# Patient Record
Sex: MALE | Race: WHITE | Employment: OTHER
[De-identification: names, ages, dates, MRNs, and addresses within clinical notes are randomized per-mention and may not be internally consistent; named-entity substitution may affect disease eponyms.]

---

## 2017-01-04 ENCOUNTER — OFFICE VISIT (OUTPATIENT)
Dept: FAMILY MEDICINE CLINIC | Facility: CLINIC | Age: 62
End: 2017-01-04

## 2017-01-04 VITALS
WEIGHT: 208.6 LBS | SYSTOLIC BLOOD PRESSURE: 110 MMHG | DIASTOLIC BLOOD PRESSURE: 88 MMHG | HEIGHT: 65 IN | HEART RATE: 74 BPM | BODY MASS INDEX: 34.75 KG/M2 | TEMPERATURE: 98.7 F | RESPIRATION RATE: 20 BRPM

## 2017-01-04 DIAGNOSIS — J20.9 ACUTE BRONCHITIS, UNSPECIFIED ORGANISM: ICD-10-CM

## 2017-01-04 DIAGNOSIS — J01.10 ACUTE FRONTAL SINUSITIS, RECURRENCE NOT SPECIFIED: Primary | ICD-10-CM

## 2017-01-04 PROCEDURE — 99214 OFFICE O/P EST MOD 30 MIN: CPT | Performed by: NURSE PRACTITIONER

## 2017-01-04 RX ORDER — ALBUTEROL SULFATE 90 UG/1
2 AEROSOL, METERED RESPIRATORY (INHALATION) EVERY 6 HOURS PRN
Qty: 1 INHALER | Refills: 0 | Status: SHIPPED | OUTPATIENT
Start: 2017-01-04 | End: 2020-01-13 | Stop reason: ALTCHOICE

## 2017-01-04 RX ORDER — GUAIFENESIN 600 MG/1
600 TABLET, EXTENDED RELEASE ORAL 2 TIMES DAILY
Qty: 20 TABLET | Refills: 0 | COMMUNITY
Start: 2017-01-04 | End: 2017-03-22 | Stop reason: ALTCHOICE

## 2017-01-04 RX ORDER — AZITHROMYCIN 250 MG/1
TABLET, FILM COATED ORAL
Qty: 1 PACKET | Refills: 0 | Status: SHIPPED | OUTPATIENT
Start: 2017-01-04 | End: 2017-01-14

## 2017-01-04 RX ORDER — GUAIFENESIN AND CODEINE PHOSPHATE 100; 10 MG/5ML; MG/5ML
5 SOLUTION ORAL EVERY 4 HOURS PRN
Qty: 90 ML | Refills: 0 | Status: SHIPPED | OUTPATIENT
Start: 2017-01-04 | End: 2017-01-11

## 2017-01-04 ASSESSMENT — ENCOUNTER SYMPTOMS
SINUS PRESSURE: 1
COUGH: 1
SHORTNESS OF BREATH: 1
ABDOMINAL PAIN: 0
NAUSEA: 0
VOMITING: 0
RHINORRHEA: 1

## 2017-03-22 ENCOUNTER — OFFICE VISIT (OUTPATIENT)
Dept: FAMILY MEDICINE CLINIC | Age: 62
End: 2017-03-22
Payer: COMMERCIAL

## 2017-03-22 VITALS
RESPIRATION RATE: 20 BRPM | TEMPERATURE: 98.3 F | BODY MASS INDEX: 35.45 KG/M2 | DIASTOLIC BLOOD PRESSURE: 80 MMHG | SYSTOLIC BLOOD PRESSURE: 120 MMHG | WEIGHT: 213 LBS | HEART RATE: 74 BPM

## 2017-03-22 DIAGNOSIS — H65.93 OME (OTITIS MEDIA WITH EFFUSION), BILATERAL: Primary | ICD-10-CM

## 2017-03-22 DIAGNOSIS — J01.10 ACUTE FRONTAL SINUSITIS, RECURRENCE NOT SPECIFIED: ICD-10-CM

## 2017-03-22 PROCEDURE — 99213 OFFICE O/P EST LOW 20 MIN: CPT | Performed by: NURSE PRACTITIONER

## 2017-03-22 RX ORDER — FLUTICASONE PROPIONATE 50 MCG
1 SPRAY, SUSPENSION (ML) NASAL 2 TIMES DAILY
Qty: 1 BOTTLE | Refills: 0 | Status: SHIPPED | OUTPATIENT
Start: 2017-03-22 | End: 2018-02-06 | Stop reason: SDUPTHER

## 2017-03-22 RX ORDER — GUAIFENESIN 600 MG/1
600 TABLET, EXTENDED RELEASE ORAL 2 TIMES DAILY
Qty: 20 TABLET | Refills: 0 | Status: SHIPPED | OUTPATIENT
Start: 2017-03-22 | End: 2017-07-10

## 2017-03-22 RX ORDER — AMOXICILLIN AND CLAVULANATE POTASSIUM 875; 125 MG/1; MG/1
1 TABLET, FILM COATED ORAL EVERY 12 HOURS
Qty: 20 TABLET | Refills: 0 | Status: SHIPPED | OUTPATIENT
Start: 2017-03-22 | End: 2017-04-01

## 2017-03-22 ASSESSMENT — ENCOUNTER SYMPTOMS
RHINORRHEA: 1
SINUS PRESSURE: 1
COUGH: 1
SORE THROAT: 1
SHORTNESS OF BREATH: 0

## 2017-05-04 ENCOUNTER — OFFICE VISIT (OUTPATIENT)
Dept: FAMILY MEDICINE CLINIC | Age: 62
End: 2017-05-04
Payer: COMMERCIAL

## 2017-05-04 VITALS
SYSTOLIC BLOOD PRESSURE: 146 MMHG | HEIGHT: 65 IN | BODY MASS INDEX: 35.65 KG/M2 | WEIGHT: 214 LBS | DIASTOLIC BLOOD PRESSURE: 90 MMHG

## 2017-05-04 DIAGNOSIS — E78.2 MIXED HYPERLIPIDEMIA: ICD-10-CM

## 2017-05-04 DIAGNOSIS — J30.2 SEASONAL ALLERGIC RHINITIS, UNSPECIFIED ALLERGIC RHINITIS TRIGGER: ICD-10-CM

## 2017-05-04 DIAGNOSIS — R73.01 IMPAIRED FASTING GLUCOSE: ICD-10-CM

## 2017-05-04 DIAGNOSIS — I10 ESSENTIAL HYPERTENSION: Primary | ICD-10-CM

## 2017-05-04 DIAGNOSIS — E66.9 OBESITY (BMI 30-39.9): ICD-10-CM

## 2017-05-04 PROCEDURE — 99214 OFFICE O/P EST MOD 30 MIN: CPT | Performed by: FAMILY MEDICINE

## 2017-05-04 RX ORDER — LORATADINE 10 MG/1
10 TABLET ORAL DAILY
Qty: 30 TABLET | Refills: 3 | Status: SHIPPED | OUTPATIENT
Start: 2017-05-04 | End: 2018-10-03 | Stop reason: SDUPTHER

## 2017-05-04 ASSESSMENT — ENCOUNTER SYMPTOMS
SHORTNESS OF BREATH: 0
CHEST TIGHTNESS: 0
ABDOMINAL PAIN: 0
BLOOD IN STOOL: 0
RHINORRHEA: 1
COUGH: 1

## 2017-05-04 ASSESSMENT — PATIENT HEALTH QUESTIONNAIRE - PHQ9
2. FEELING DOWN, DEPRESSED OR HOPELESS: 0
SUM OF ALL RESPONSES TO PHQ QUESTIONS 1-9: 0
SUM OF ALL RESPONSES TO PHQ9 QUESTIONS 1 & 2: 0
1. LITTLE INTEREST OR PLEASURE IN DOING THINGS: 0

## 2017-07-10 ENCOUNTER — OFFICE VISIT (OUTPATIENT)
Dept: FAMILY MEDICINE CLINIC | Age: 62
End: 2017-07-10
Payer: COMMERCIAL

## 2017-07-10 VITALS
WEIGHT: 210.2 LBS | HEIGHT: 68 IN | DIASTOLIC BLOOD PRESSURE: 70 MMHG | BODY MASS INDEX: 31.86 KG/M2 | HEART RATE: 70 BPM | SYSTOLIC BLOOD PRESSURE: 110 MMHG | RESPIRATION RATE: 16 BRPM

## 2017-07-10 DIAGNOSIS — Z00.00 ANNUAL PHYSICAL EXAM: Primary | ICD-10-CM

## 2017-07-10 DIAGNOSIS — G89.29 CHRONIC PAIN OF RIGHT KNEE: ICD-10-CM

## 2017-07-10 DIAGNOSIS — G89.29 CHRONIC BILATERAL LOW BACK PAIN WITHOUT SCIATICA: ICD-10-CM

## 2017-07-10 DIAGNOSIS — M54.50 CHRONIC BILATERAL LOW BACK PAIN WITHOUT SCIATICA: ICD-10-CM

## 2017-07-10 DIAGNOSIS — M25.561 CHRONIC PAIN OF RIGHT KNEE: ICD-10-CM

## 2017-07-10 PROCEDURE — 99396 PREV VISIT EST AGE 40-64: CPT | Performed by: FAMILY MEDICINE

## 2017-07-10 RX ORDER — TIZANIDINE HYDROCHLORIDE 4 MG/1
4 CAPSULE, GELATIN COATED ORAL 3 TIMES DAILY PRN
Qty: 30 CAPSULE | Refills: 0 | Status: SHIPPED | OUTPATIENT
Start: 2017-07-10 | End: 2018-04-10 | Stop reason: SDUPTHER

## 2017-07-10 RX ORDER — NAPROXEN 500 MG/1
500 TABLET ORAL 2 TIMES DAILY PRN
Qty: 20 TABLET | Refills: 0 | Status: SHIPPED | OUTPATIENT
Start: 2017-07-10 | End: 2018-04-10 | Stop reason: SDUPTHER

## 2017-07-10 ASSESSMENT — ENCOUNTER SYMPTOMS
CHEST TIGHTNESS: 0
SHORTNESS OF BREATH: 0
BLOOD IN STOOL: 0
ABDOMINAL PAIN: 0
BACK PAIN: 1

## 2017-07-12 ENCOUNTER — HOSPITAL ENCOUNTER (OUTPATIENT)
Age: 62
Discharge: HOME | End: 2017-07-12
Payer: COMMERCIAL

## 2017-07-12 DIAGNOSIS — E78.2 MIXED HYPERLIPIDEMIA: ICD-10-CM

## 2017-07-12 DIAGNOSIS — R73.01 IMPAIRED FASTING GLUCOSE: ICD-10-CM

## 2017-07-12 DIAGNOSIS — E55.9 VITAMIN D DEFICIENCY: ICD-10-CM

## 2017-07-12 LAB
ALBUMIN SERPL-MCNC: 4 G/DL (ref 3.5–5.2)
ALBUMIN/GLOBULIN RATIO: ABNORMAL (ref 1–2.5)
ALP BLD-CCNC: 55 U/L (ref 40–129)
ALT SERPL-CCNC: 35 U/L (ref 5–41)
ANION GAP SERPL CALCULATED.3IONS-SCNC: 13 MMOL/L (ref 9–17)
AST SERPL-CCNC: 21 U/L
BILIRUB SERPL-MCNC: <0.15 MG/DL (ref 0.3–1.2)
BUN BLDV-MCNC: 21 MG/DL (ref 8–23)
BUN/CREAT BLD: ABNORMAL (ref 9–20)
CALCIUM SERPL-MCNC: 9.2 MG/DL (ref 8.6–10.4)
CHLORIDE BLD-SCNC: 105 MMOL/L (ref 98–107)
CHOLESTEROL/HDL RATIO: 7.6
CHOLESTEROL: 206 MG/DL
CO2: 22 MMOL/L (ref 20–31)
CREAT SERPL-MCNC: 0.79 MG/DL (ref 0.7–1.2)
GFR AFRICAN AMERICAN: >60 ML/MIN
GFR NON-AFRICAN AMERICAN: >60 ML/MIN
GFR SERPL CREATININE-BSD FRML MDRD: ABNORMAL ML/MIN/{1.73_M2}
GFR SERPL CREATININE-BSD FRML MDRD: ABNORMAL ML/MIN/{1.73_M2}
GLUCOSE BLD-MCNC: 120 MG/DL (ref 70–99)
HDLC SERPL-MCNC: 27 MG/DL
LDL CHOLESTEROL: 108 MG/DL (ref 0–130)
POTASSIUM SERPL-SCNC: 4.2 MMOL/L (ref 3.7–5.3)
SODIUM BLD-SCNC: 140 MMOL/L (ref 135–144)
TOTAL PROTEIN: 6.9 G/DL (ref 6.4–8.3)
TRIGL SERPL-MCNC: 356 MG/DL
VITAMIN D 25-HYDROXY: 26.9 NG/ML (ref 30–100)
VLDLC SERPL CALC-MCNC: ABNORMAL MG/DL (ref 1–30)

## 2017-07-12 PROCEDURE — 36415 COLL VENOUS BLD VENIPUNCTURE: CPT

## 2017-07-12 PROCEDURE — 80061 LIPID PANEL: CPT

## 2017-07-12 PROCEDURE — 82306 VITAMIN D 25 HYDROXY: CPT

## 2017-07-12 PROCEDURE — 80053 COMPREHEN METABOLIC PANEL: CPT

## 2017-07-14 RX ORDER — ERGOCALCIFEROL 1.25 MG/1
CAPSULE ORAL
Qty: 4 CAPSULE | Refills: 5 | Status: SHIPPED | OUTPATIENT
Start: 2017-07-14 | End: 2018-11-08 | Stop reason: SDUPTHER

## 2017-07-14 RX ORDER — ROSUVASTATIN CALCIUM 10 MG/1
10 TABLET, COATED ORAL DAILY
Qty: 30 TABLET | Refills: 5 | Status: SHIPPED | OUTPATIENT
Start: 2017-07-14 | End: 2018-06-22

## 2017-11-14 ENCOUNTER — OFFICE VISIT (OUTPATIENT)
Dept: FAMILY MEDICINE CLINIC | Age: 62
End: 2017-11-14
Payer: COMMERCIAL

## 2017-11-14 VITALS
RESPIRATION RATE: 14 BRPM | SYSTOLIC BLOOD PRESSURE: 128 MMHG | DIASTOLIC BLOOD PRESSURE: 70 MMHG | BODY MASS INDEX: 32.65 KG/M2 | WEIGHT: 211.6 LBS | HEART RATE: 80 BPM

## 2017-11-14 DIAGNOSIS — R73.01 IMPAIRED FASTING GLUCOSE: ICD-10-CM

## 2017-11-14 DIAGNOSIS — E55.9 VITAMIN D DEFICIENCY: ICD-10-CM

## 2017-11-14 DIAGNOSIS — I10 ESSENTIAL HYPERTENSION: Primary | ICD-10-CM

## 2017-11-14 DIAGNOSIS — E78.2 MIXED HYPERLIPIDEMIA: ICD-10-CM

## 2017-11-14 PROCEDURE — 99214 OFFICE O/P EST MOD 30 MIN: CPT | Performed by: FAMILY MEDICINE

## 2017-11-14 ASSESSMENT — ENCOUNTER SYMPTOMS
ABDOMINAL PAIN: 0
BLOOD IN STOOL: 0
CHEST TIGHTNESS: 0
SHORTNESS OF BREATH: 0

## 2017-11-14 NOTE — PROGRESS NOTES
Subjective:      Patient ID: Shante Bermudez is a 58 y.o. male. Chronic Disease Visit Information    BP Readings from Last 3 Encounters:   07/10/17 110/70   05/04/17 (!) 146/90   03/22/17 120/80          LDL Cholesterol (mg/dL)   Date Value   07/12/2017 108     HDL (mg/dL)   Date Value   07/12/2017 27 (L)     BUN (mg/dL)   Date Value   07/12/2017 21     CREATININE (mg/dL)   Date Value   07/12/2017 0.79     Glucose (mg/dL)   Date Value   07/12/2017 120 (H)            Have you changed or started any medications since your last visit including any over-the-counter medicines, vitamins, or herbal medicines? no   Are you having any side effects from any of your medications? -  no  Have you stopped taking any of your medications? Is so, why? -  no    Have you seen any other physician or provider since your last visit? No  Have you had any other diagnostic tests since your last visit? No  Have you been seen in the emergency room and/or had an admission to a hospital since we last saw you? No  Have you had your annual diabetic retinal (eye) exam? No  Have you had your routine dental cleaning in the past 6 months? no    Have you activated your [employer] account? If not, what are your barriers?  Yes     Patient Care Team:  Jessy Matthews MD as PCP - General (Family Medicine)  Nely Crockett MD as Orthopedic Surgeon (Orthopedic Surgery)  Deepak Monahan MD (Neurology)  Cameron Guerrero MD as Consulting Physician (Gastroenterology)         Medical History Review  Past Medical, Family, and Social History reviewed and does contribute to the patient presenting condition    Health Maintenance   Topic Date Due    Hepatitis C screen  1955    HIV screen  09/03/1970    Pneumococcal med risk (1 of 1 - PPSV23) 09/03/1974    Diabetes screen  09/03/1995    Lipid screen  07/12/2022    Colon cancer screen colonoscopy  03/19/2024    DTaP/Tdap/Td vaccine (2 - Td) 06/12/2025    Zostavax vaccine  Addressed    Flu 2. Mixed hyperlipidemia  ALT    AST    Basic Metabolic Panel    Lipid Panel   3. Impaired fasting glucose  Basic Metabolic Panel   4. Vitamin D deficiency  Vitamin D 25 Hydroxy           Plan:      Samuel received counseling on the following healthy behaviors: nutrition and medication adherence  Reviewed prior labs and health maintenance  Continue current medications, diet and exercise. Discussed use, benefit, and side effects of prescribed medications. Barriers to medication compliance addressed. Patient given educational materials - see patient instructions  Was a self-tracking handout given in paper form or via [employer]? No:     Requested Prescriptions      No prescriptions requested or ordered in this encounter       All patient questions answered. Patient voiced understanding. Quality Measures    There is no height or weight on file to calculate BMI. Elevated. Weight control planned discussed Healthy diet and regular exercise. Blood pressure is normal. Treatment plan consists of No treatment change needed. Lab Results   Component Value Date    LDLCHOLESTEROL 108 07/12/2017    (goal LDL reduction with dx if diabetes is 50% LDL reduction)      PHQ Scores 5/4/2017 4/19/2016   PHQ2 Score 0 0   PHQ9 Score 0 0     Interpretation of Total Score Depression Severity: 1-4 = Minimal depression, 5-9 = Mild depression, 10-14 = Moderate depression, 15-19 = Moderately severe depression, 20-27 = Severe depression      Orders Placed This Encounter   Procedures    ALT     Standing Status:   Future     Standing Expiration Date:   11/14/2018    AST     Standing Status:   Future     Standing Expiration Date:   11/14/2018    Basic Metabolic Panel     Fasting     Standing Status:   Future     Standing Expiration Date:   11/14/2018    Lipid Panel     Standing Status:   Future     Standing Expiration Date:   11/14/2018     Order Specific Question:   Is Patient Fasting?/# of Hours     Answer:    Fast 8-10 hours   

## 2018-02-02 RX ORDER — PANTOPRAZOLE SODIUM 40 MG/1
TABLET, DELAYED RELEASE ORAL
Qty: 30 TABLET | Refills: 3 | Status: SHIPPED | OUTPATIENT
Start: 2018-02-02 | End: 2018-05-11 | Stop reason: SDUPTHER

## 2018-02-06 ENCOUNTER — OFFICE VISIT (OUTPATIENT)
Dept: FAMILY MEDICINE CLINIC | Age: 63
End: 2018-02-06
Payer: COMMERCIAL

## 2018-02-06 VITALS
TEMPERATURE: 97.2 F | HEIGHT: 67 IN | DIASTOLIC BLOOD PRESSURE: 80 MMHG | BODY MASS INDEX: 33.27 KG/M2 | WEIGHT: 212 LBS | SYSTOLIC BLOOD PRESSURE: 120 MMHG | OXYGEN SATURATION: 98 % | HEART RATE: 78 BPM

## 2018-02-06 DIAGNOSIS — H65.91 RIGHT OTITIS MEDIA WITH EFFUSION: ICD-10-CM

## 2018-02-06 DIAGNOSIS — J01.00 ACUTE NON-RECURRENT MAXILLARY SINUSITIS: Primary | ICD-10-CM

## 2018-02-06 PROCEDURE — 99213 OFFICE O/P EST LOW 20 MIN: CPT | Performed by: NURSE PRACTITIONER

## 2018-02-06 RX ORDER — LORATADINE 10 MG/1
10 TABLET ORAL DAILY
Qty: 30 TABLET | Refills: 0 | Status: SHIPPED | OUTPATIENT
Start: 2018-02-06 | End: 2018-04-10

## 2018-02-06 RX ORDER — ATORVASTATIN CALCIUM 20 MG/1
TABLET, FILM COATED ORAL
Qty: 30 TABLET | Refills: 3 | Status: SHIPPED | OUTPATIENT
Start: 2018-02-06 | End: 2018-06-07 | Stop reason: SDUPTHER

## 2018-02-06 RX ORDER — AZITHROMYCIN 250 MG/1
TABLET, FILM COATED ORAL
Qty: 1 PACKET | Refills: 0 | Status: SHIPPED | OUTPATIENT
Start: 2018-02-06 | End: 2018-02-16

## 2018-02-06 RX ORDER — FLUTICASONE PROPIONATE 50 MCG
1 SPRAY, SUSPENSION (ML) NASAL 2 TIMES DAILY
Qty: 1 BOTTLE | Refills: 1 | Status: SHIPPED | OUTPATIENT
Start: 2018-02-06 | End: 2020-04-22 | Stop reason: SDUPTHER

## 2018-02-06 ASSESSMENT — ENCOUNTER SYMPTOMS
SHORTNESS OF BREATH: 0
SINUS PRESSURE: 1
RHINORRHEA: 1
ABDOMINAL PAIN: 0
VOMITING: 0
NAUSEA: 0
COUGH: 1

## 2018-02-06 NOTE — PROGRESS NOTES
For the last 3 days. Pt states taht this happens ever year at this time. Visit Information    Have you changed or started any medications since your last visit including any over-the-counter medicines, vitamins, or herbal medicines? no   Are you having any side effects from any of your medications? -  no  Have you stopped taking any of your medications? Is so, why? -  no    Have you seen any other physician or provider since your last visit? No  Have you had any other diagnostic tests since your last visit? No  Have you been seen in the emergency room and/or had an admission to a hospital since we last saw you? No  Have you had your routine dental cleaning in the past 6 months? no    Have you activated your [employer] account? If not, what are your barriers?  Yes     Patient Care Team:  Aba Knowles MD as PCP - General (Family Medicine)  Andria Dumont MD as Orthopedic Surgeon (Orthopedic Surgery)  Ben Forbes MD (Neurology)  Daylin Jackson MD as Consulting Physician (Gastroenterology)    Medical History Review  Past Medical, Family, and Social History reviewed and does contribute to the patient presenting condition    Health Maintenance   Topic Date Due    Hepatitis C screen  1955    A1C test (Diabetic or Prediabetic)  09/03/1965    HIV screen  09/03/1970    Pneumococcal med risk (1 of 1 - PPSV23) 09/03/1974    Potassium monitoring  07/12/2018    Creatinine monitoring  07/12/2018    Lipid screen  07/12/2022    Colon cancer screen colonoscopy  03/19/2024    DTaP/Tdap/Td vaccine (2 - Td) 06/12/2025    Zostavax vaccine  Addressed    Flu vaccine  Completed

## 2018-04-10 ENCOUNTER — OFFICE VISIT (OUTPATIENT)
Dept: FAMILY MEDICINE CLINIC | Age: 63
End: 2018-04-10
Payer: COMMERCIAL

## 2018-04-10 VITALS
BODY MASS INDEX: 33.64 KG/M2 | WEIGHT: 214.8 LBS | TEMPERATURE: 97.9 F | SYSTOLIC BLOOD PRESSURE: 120 MMHG | DIASTOLIC BLOOD PRESSURE: 80 MMHG | HEART RATE: 72 BPM | RESPIRATION RATE: 16 BRPM

## 2018-04-10 DIAGNOSIS — G89.29 CHRONIC RIGHT-SIDED LOW BACK PAIN WITH RIGHT-SIDED SCIATICA: Primary | ICD-10-CM

## 2018-04-10 DIAGNOSIS — G89.29 CHRONIC RIGHT-SIDED LOW BACK PAIN WITHOUT SCIATICA: Primary | ICD-10-CM

## 2018-04-10 DIAGNOSIS — M54.41 CHRONIC RIGHT-SIDED LOW BACK PAIN WITH RIGHT-SIDED SCIATICA: Primary | ICD-10-CM

## 2018-04-10 DIAGNOSIS — M54.50 CHRONIC RIGHT-SIDED LOW BACK PAIN WITHOUT SCIATICA: Primary | ICD-10-CM

## 2018-04-10 DIAGNOSIS — M51.37 DEGENERATION OF LUMBAR OR LUMBOSACRAL INTERVERTEBRAL DISC: ICD-10-CM

## 2018-04-10 PROCEDURE — 99214 OFFICE O/P EST MOD 30 MIN: CPT | Performed by: NURSE PRACTITIONER

## 2018-04-10 RX ORDER — TIZANIDINE HYDROCHLORIDE 4 MG/1
4 CAPSULE, GELATIN COATED ORAL 2 TIMES DAILY PRN
Qty: 20 CAPSULE | Refills: 1 | Status: SHIPPED | OUTPATIENT
Start: 2018-04-10 | End: 2019-03-05 | Stop reason: ALTCHOICE

## 2018-04-10 RX ORDER — NAPROXEN 500 MG/1
500 TABLET ORAL 2 TIMES DAILY PRN
Qty: 20 TABLET | Refills: 1 | Status: SHIPPED | OUTPATIENT
Start: 2018-04-10 | End: 2019-03-05 | Stop reason: ALTCHOICE

## 2018-04-10 RX ORDER — TRAMADOL HYDROCHLORIDE 50 MG/1
50 TABLET ORAL DAILY PRN
Qty: 7 TABLET | Refills: 0 | Status: SHIPPED | OUTPATIENT
Start: 2018-04-10 | End: 2018-04-17

## 2018-04-10 ASSESSMENT — ENCOUNTER SYMPTOMS
COUGH: 0
ABDOMINAL PAIN: 0
SHORTNESS OF BREATH: 0
NAUSEA: 0
BACK PAIN: 1

## 2018-04-11 ENCOUNTER — HOSPITAL ENCOUNTER (OUTPATIENT)
Dept: GENERAL RADIOLOGY | Age: 63
Discharge: HOME | End: 2018-04-13
Payer: COMMERCIAL

## 2018-04-11 ENCOUNTER — HOSPITAL ENCOUNTER (OUTPATIENT)
Age: 63
Discharge: HOME | End: 2018-04-13
Payer: COMMERCIAL

## 2018-04-11 DIAGNOSIS — G89.29 CHRONIC RIGHT-SIDED LOW BACK PAIN WITHOUT SCIATICA: ICD-10-CM

## 2018-04-11 DIAGNOSIS — M54.50 CHRONIC RIGHT-SIDED LOW BACK PAIN WITHOUT SCIATICA: ICD-10-CM

## 2018-04-11 PROCEDURE — 72100 X-RAY EXAM L-S SPINE 2/3 VWS: CPT

## 2018-04-16 RX ORDER — LISINOPRIL 5 MG/1
TABLET ORAL
Qty: 90 TABLET | Refills: 1 | Status: SHIPPED | OUTPATIENT
Start: 2018-04-16 | End: 2018-11-05 | Stop reason: SDUPTHER

## 2018-05-03 ENCOUNTER — TELEPHONE (OUTPATIENT)
Dept: FAMILY MEDICINE CLINIC | Age: 63
End: 2018-05-03

## 2018-05-03 DIAGNOSIS — Z00.00 ANNUAL PHYSICAL EXAM: Primary | ICD-10-CM

## 2018-05-11 ENCOUNTER — APPOINTMENT (OUTPATIENT)
Dept: GENERAL RADIOLOGY | Age: 63
End: 2018-05-11
Payer: COMMERCIAL

## 2018-05-11 ENCOUNTER — HOSPITAL ENCOUNTER (EMERGENCY)
Age: 63
Discharge: HOME | End: 2018-05-11
Attending: EMERGENCY MEDICINE
Payer: COMMERCIAL

## 2018-05-11 ENCOUNTER — APPOINTMENT (OUTPATIENT)
Dept: CT IMAGING | Age: 63
End: 2018-05-11
Payer: COMMERCIAL

## 2018-05-11 VITALS
DIASTOLIC BLOOD PRESSURE: 86 MMHG | HEART RATE: 65 BPM | RESPIRATION RATE: 23 BRPM | TEMPERATURE: 97.5 F | OXYGEN SATURATION: 93 % | SYSTOLIC BLOOD PRESSURE: 145 MMHG

## 2018-05-11 DIAGNOSIS — R20.2 PARESTHESIA: Primary | ICD-10-CM

## 2018-05-11 LAB
ABSOLUTE EOS #: 0.1 K/UL (ref 0–0.4)
ABSOLUTE IMMATURE GRANULOCYTE: NORMAL K/UL (ref 0–0.3)
ABSOLUTE LYMPH #: 2.1 K/UL (ref 1–4.8)
ABSOLUTE MONO #: 0.4 K/UL (ref 0.1–1.3)
ALBUMIN SERPL-MCNC: 4.3 G/DL (ref 3.5–5.2)
ALBUMIN/GLOBULIN RATIO: ABNORMAL (ref 1–2.5)
ALP BLD-CCNC: 55 U/L (ref 40–129)
ALT SERPL-CCNC: 25 U/L (ref 5–41)
ANION GAP SERPL CALCULATED.3IONS-SCNC: 12 MMOL/L (ref 9–17)
AST SERPL-CCNC: 17 U/L
BASOPHILS # BLD: 1 % (ref 0–2)
BASOPHILS ABSOLUTE: 0.1 K/UL (ref 0–0.2)
BILIRUB SERPL-MCNC: 0.27 MG/DL (ref 0.3–1.2)
BUN BLDV-MCNC: 23 MG/DL (ref 8–23)
BUN/CREAT BLD: ABNORMAL (ref 9–20)
CALCIUM SERPL-MCNC: 8.7 MG/DL (ref 8.6–10.4)
CHLORIDE BLD-SCNC: 103 MMOL/L (ref 98–107)
CO2: 23 MMOL/L (ref 20–31)
CREAT SERPL-MCNC: 0.85 MG/DL (ref 0.7–1.2)
DIFFERENTIAL TYPE: NORMAL
EKG ATRIAL RATE: 69 BPM
EKG P AXIS: 35 DEGREES
EKG P-R INTERVAL: 182 MS
EKG Q-T INTERVAL: 404 MS
EKG QRS DURATION: 104 MS
EKG QTC CALCULATION (BAZETT): 432 MS
EKG R AXIS: -17 DEGREES
EKG T AXIS: 6 DEGREES
EKG VENTRICULAR RATE: 69 BPM
EOSINOPHILS RELATIVE PERCENT: 2 % (ref 0–4)
GFR AFRICAN AMERICAN: >60 ML/MIN
GFR NON-AFRICAN AMERICAN: >60 ML/MIN
GFR SERPL CREATININE-BSD FRML MDRD: ABNORMAL ML/MIN/{1.73_M2}
GFR SERPL CREATININE-BSD FRML MDRD: ABNORMAL ML/MIN/{1.73_M2}
GLUCOSE BLD-MCNC: 102 MG/DL (ref 70–99)
GLUCOSE BLD-MCNC: 93 MG/DL (ref 75–110)
HCT VFR BLD CALC: 44.9 % (ref 41–53)
HEMOGLOBIN: 15.5 G/DL (ref 13.5–17.5)
IMMATURE GRANULOCYTES: NORMAL %
INR BLD: 1
LYMPHOCYTES # BLD: 30 % (ref 24–44)
MCH RBC QN AUTO: 33.2 PG (ref 26–34)
MCHC RBC AUTO-ENTMCNC: 34.5 G/DL (ref 31–37)
MCV RBC AUTO: 96.2 FL (ref 80–100)
MONOCYTES # BLD: 6 % (ref 1–7)
NRBC AUTOMATED: NORMAL PER 100 WBC
PARTIAL THROMBOPLASTIN TIME: 27.9 SEC (ref 23–31)
PDW BLD-RTO: 13.4 % (ref 11.5–14.9)
PLATELET # BLD: 172 K/UL (ref 150–450)
PLATELET ESTIMATE: NORMAL
PMV BLD AUTO: 10.2 FL (ref 6–12)
POTASSIUM SERPL-SCNC: 3.7 MMOL/L (ref 3.7–5.3)
PROTHROMBIN TIME: 10.6 SEC (ref 9.7–12)
RBC # BLD: 4.67 M/UL (ref 4.5–5.9)
RBC # BLD: NORMAL 10*6/UL
SEG NEUTROPHILS: 61 % (ref 36–66)
SEGMENTED NEUTROPHILS ABSOLUTE COUNT: 4.1 K/UL (ref 1.3–9.1)
SODIUM BLD-SCNC: 138 MMOL/L (ref 135–144)
TOTAL PROTEIN: 7 G/DL (ref 6.4–8.3)
TROPONIN INTERP: NORMAL
TROPONIN INTERP: NORMAL
TROPONIN T: <0.03 NG/ML
TROPONIN T: <0.03 NG/ML
WBC # BLD: 6.8 K/UL (ref 3.5–11)
WBC # BLD: NORMAL 10*3/UL

## 2018-05-11 PROCEDURE — 93005 ELECTROCARDIOGRAM TRACING: CPT

## 2018-05-11 PROCEDURE — 36415 COLL VENOUS BLD VENIPUNCTURE: CPT

## 2018-05-11 PROCEDURE — 99284 EMERGENCY DEPT VISIT MOD MDM: CPT

## 2018-05-11 PROCEDURE — 85730 THROMBOPLASTIN TIME PARTIAL: CPT

## 2018-05-11 PROCEDURE — 70450 CT HEAD/BRAIN W/O DYE: CPT

## 2018-05-11 PROCEDURE — 84484 ASSAY OF TROPONIN QUANT: CPT

## 2018-05-11 PROCEDURE — 71045 X-RAY EXAM CHEST 1 VIEW: CPT

## 2018-05-11 PROCEDURE — 82947 ASSAY GLUCOSE BLOOD QUANT: CPT

## 2018-05-11 PROCEDURE — 85025 COMPLETE CBC W/AUTO DIFF WBC: CPT

## 2018-05-11 PROCEDURE — 80053 COMPREHEN METABOLIC PANEL: CPT

## 2018-05-11 PROCEDURE — 85610 PROTHROMBIN TIME: CPT

## 2018-05-11 RX ORDER — PANTOPRAZOLE SODIUM 40 MG/1
TABLET, DELAYED RELEASE ORAL
Qty: 30 TABLET | Refills: 3 | Status: SHIPPED | OUTPATIENT
Start: 2018-05-11 | End: 2018-10-03 | Stop reason: SDUPTHER

## 2018-05-11 ASSESSMENT — ENCOUNTER SYMPTOMS
RESPIRATORY NEGATIVE: 1
SHORTNESS OF BREATH: 0
COUGH: 0
BACK PAIN: 0
VOMITING: 0
ABDOMINAL PAIN: 0
GASTROINTESTINAL NEGATIVE: 1

## 2018-05-14 ENCOUNTER — HOSPITAL ENCOUNTER (OUTPATIENT)
Age: 63
Discharge: HOME | End: 2018-05-14
Payer: COMMERCIAL

## 2018-05-14 DIAGNOSIS — Z00.00 ANNUAL PHYSICAL EXAM: ICD-10-CM

## 2018-05-14 LAB
CHOLESTEROL/HDL RATIO: 5.8
CHOLESTEROL: 198 MG/DL
GLUCOSE FASTING: 109 MG/DL (ref 70–99)
HDLC SERPL-MCNC: 34 MG/DL
LDL CHOLESTEROL: 109 MG/DL (ref 0–130)
TRIGL SERPL-MCNC: 274 MG/DL
VLDLC SERPL CALC-MCNC: ABNORMAL MG/DL (ref 1–30)

## 2018-05-14 PROCEDURE — 36415 COLL VENOUS BLD VENIPUNCTURE: CPT

## 2018-05-14 PROCEDURE — 80061 LIPID PANEL: CPT

## 2018-05-14 PROCEDURE — 82947 ASSAY GLUCOSE BLOOD QUANT: CPT

## 2018-05-15 ENCOUNTER — OFFICE VISIT (OUTPATIENT)
Dept: FAMILY MEDICINE CLINIC | Age: 63
End: 2018-05-15
Payer: COMMERCIAL

## 2018-05-15 VITALS
DIASTOLIC BLOOD PRESSURE: 80 MMHG | RESPIRATION RATE: 16 BRPM | BODY MASS INDEX: 33.36 KG/M2 | SYSTOLIC BLOOD PRESSURE: 120 MMHG | HEART RATE: 80 BPM | WEIGHT: 213 LBS

## 2018-05-15 DIAGNOSIS — R73.01 IMPAIRED FASTING GLUCOSE: ICD-10-CM

## 2018-05-15 DIAGNOSIS — E66.9 OBESITY (BMI 30.0-34.9): ICD-10-CM

## 2018-05-15 DIAGNOSIS — I10 ESSENTIAL HYPERTENSION: Primary | ICD-10-CM

## 2018-05-15 DIAGNOSIS — E78.2 MIXED HYPERLIPIDEMIA: ICD-10-CM

## 2018-05-15 PROBLEM — E66.811 OBESITY (BMI 30.0-34.9): Status: ACTIVE | Noted: 2018-05-15

## 2018-05-15 PROCEDURE — 99214 OFFICE O/P EST MOD 30 MIN: CPT | Performed by: FAMILY MEDICINE

## 2018-05-15 ASSESSMENT — ENCOUNTER SYMPTOMS
BLOOD IN STOOL: 0
SHORTNESS OF BREATH: 0
CHEST TIGHTNESS: 0
ABDOMINAL PAIN: 0

## 2018-05-15 ASSESSMENT — PATIENT HEALTH QUESTIONNAIRE - PHQ9
SUM OF ALL RESPONSES TO PHQ QUESTIONS 1-9: 0
SUM OF ALL RESPONSES TO PHQ9 QUESTIONS 1 & 2: 0
2. FEELING DOWN, DEPRESSED OR HOPELESS: 0
1. LITTLE INTEREST OR PLEASURE IN DOING THINGS: 0

## 2018-06-07 RX ORDER — ATORVASTATIN CALCIUM 20 MG/1
TABLET, FILM COATED ORAL
Qty: 30 TABLET | Refills: 3 | Status: SHIPPED | OUTPATIENT
Start: 2018-06-07 | End: 2018-10-03 | Stop reason: SDUPTHER

## 2018-06-22 ENCOUNTER — OFFICE VISIT (OUTPATIENT)
Dept: FAMILY MEDICINE CLINIC | Age: 63
End: 2018-06-22
Payer: COMMERCIAL

## 2018-06-22 VITALS
WEIGHT: 214 LBS | HEART RATE: 62 BPM | BODY MASS INDEX: 33.59 KG/M2 | DIASTOLIC BLOOD PRESSURE: 80 MMHG | HEIGHT: 67 IN | SYSTOLIC BLOOD PRESSURE: 130 MMHG | TEMPERATURE: 97.4 F | RESPIRATION RATE: 16 BRPM

## 2018-06-22 DIAGNOSIS — Z00.00 ANNUAL PHYSICAL EXAM: Primary | ICD-10-CM

## 2018-06-22 PROCEDURE — 99396 PREV VISIT EST AGE 40-64: CPT | Performed by: NURSE PRACTITIONER

## 2018-06-22 ASSESSMENT — ENCOUNTER SYMPTOMS
BLOOD IN STOOL: 0
COUGH: 0
ABDOMINAL PAIN: 0
SHORTNESS OF BREATH: 0

## 2018-10-03 ENCOUNTER — OFFICE VISIT (OUTPATIENT)
Dept: FAMILY MEDICINE CLINIC | Age: 63
End: 2018-10-03
Payer: COMMERCIAL

## 2018-10-03 VITALS
RESPIRATION RATE: 14 BRPM | TEMPERATURE: 97.8 F | WEIGHT: 215 LBS | BODY MASS INDEX: 33.67 KG/M2 | SYSTOLIC BLOOD PRESSURE: 160 MMHG | DIASTOLIC BLOOD PRESSURE: 90 MMHG | HEART RATE: 64 BPM

## 2018-10-03 DIAGNOSIS — I10 ESSENTIAL HYPERTENSION: ICD-10-CM

## 2018-10-03 DIAGNOSIS — J01.00 ACUTE NON-RECURRENT MAXILLARY SINUSITIS: Primary | ICD-10-CM

## 2018-10-03 PROCEDURE — 99214 OFFICE O/P EST MOD 30 MIN: CPT | Performed by: NURSE PRACTITIONER

## 2018-10-03 RX ORDER — ATORVASTATIN CALCIUM 20 MG/1
TABLET, FILM COATED ORAL
Qty: 30 TABLET | Refills: 3 | Status: SHIPPED | OUTPATIENT
Start: 2018-10-03 | End: 2019-02-06 | Stop reason: SDUPTHER

## 2018-10-03 RX ORDER — LORATADINE 10 MG/1
10 TABLET ORAL DAILY
Qty: 30 TABLET | Refills: 3 | Status: SHIPPED | OUTPATIENT
Start: 2018-10-03 | End: 2020-04-22 | Stop reason: SDUPTHER

## 2018-10-03 RX ORDER — PANTOPRAZOLE SODIUM 40 MG/1
TABLET, DELAYED RELEASE ORAL
Qty: 30 TABLET | Refills: 3 | Status: SHIPPED | OUTPATIENT
Start: 2018-10-03 | End: 2019-02-06 | Stop reason: SDUPTHER

## 2018-10-03 RX ORDER — AMOXICILLIN AND CLAVULANATE POTASSIUM 875; 125 MG/1; MG/1
1 TABLET, FILM COATED ORAL 2 TIMES DAILY
Qty: 20 TABLET | Refills: 0 | Status: SHIPPED | OUTPATIENT
Start: 2018-10-03 | End: 2018-10-13

## 2018-10-03 ASSESSMENT — ENCOUNTER SYMPTOMS
COUGH: 1
ABDOMINAL PAIN: 0
SHORTNESS OF BREATH: 0
RHINORRHEA: 1
NAUSEA: 0
SINUS PRESSURE: 1

## 2018-11-02 ENCOUNTER — OFFICE VISIT (OUTPATIENT)
Dept: FAMILY MEDICINE CLINIC | Age: 63
End: 2018-11-02
Payer: COMMERCIAL

## 2018-11-02 VITALS
WEIGHT: 215 LBS | RESPIRATION RATE: 18 BRPM | BODY MASS INDEX: 33.67 KG/M2 | SYSTOLIC BLOOD PRESSURE: 120 MMHG | HEART RATE: 64 BPM | DIASTOLIC BLOOD PRESSURE: 84 MMHG

## 2018-11-02 DIAGNOSIS — E78.2 MIXED HYPERLIPIDEMIA: ICD-10-CM

## 2018-11-02 DIAGNOSIS — E55.9 VITAMIN D DEFICIENCY: ICD-10-CM

## 2018-11-02 DIAGNOSIS — R73.01 IMPAIRED FASTING GLUCOSE: ICD-10-CM

## 2018-11-02 DIAGNOSIS — E66.9 OBESITY (BMI 30.0-34.9): ICD-10-CM

## 2018-11-02 DIAGNOSIS — Z12.5 SCREENING PSA (PROSTATE SPECIFIC ANTIGEN): ICD-10-CM

## 2018-11-02 DIAGNOSIS — I10 ESSENTIAL HYPERTENSION: Primary | ICD-10-CM

## 2018-11-02 PROCEDURE — 99214 OFFICE O/P EST MOD 30 MIN: CPT | Performed by: FAMILY MEDICINE

## 2018-11-02 ASSESSMENT — ENCOUNTER SYMPTOMS
ABDOMINAL PAIN: 0
SHORTNESS OF BREATH: 0
BLOOD IN STOOL: 0
CHEST TIGHTNESS: 0

## 2018-11-02 NOTE — PROGRESS NOTES
Standing Status:   Future     Standing Expiration Date:   11/2/2019    Basic Metabolic Panel     Fasting     Standing Status:   Future     Standing Expiration Date:   11/2/2019    Lipid Panel     Standing Status:   Future     Standing Expiration Date:   11/2/2019     Order Specific Question:   Is Patient Fasting?/# of Hours     Answer:    Fast 8-10 hours    Magnesium     Standing Status:   Future     Standing Expiration Date:   11/2/2019    Hemoglobin A1C     Standing Status:   Future     Standing Expiration Date:   11/2/2019    Vitamin D 25 Hydroxy     Standing Status:   Future     Standing Expiration Date:   11/2/2019    Psa screening     Standing Status:   Future     Standing Expiration Date:   11/2/2019           Continue routine medication  Follow-up in 4-5 months

## 2018-11-05 RX ORDER — LISINOPRIL 5 MG/1
TABLET ORAL
Qty: 90 TABLET | Refills: 1 | Status: SHIPPED | OUTPATIENT
Start: 2018-11-05 | End: 2019-05-03 | Stop reason: SDUPTHER

## 2018-11-06 ENCOUNTER — HOSPITAL ENCOUNTER (OUTPATIENT)
Age: 63
Discharge: HOME | End: 2018-11-06
Payer: COMMERCIAL

## 2018-11-06 DIAGNOSIS — E78.2 MIXED HYPERLIPIDEMIA: ICD-10-CM

## 2018-11-06 DIAGNOSIS — Z12.5 SCREENING PSA (PROSTATE SPECIFIC ANTIGEN): ICD-10-CM

## 2018-11-06 DIAGNOSIS — R73.01 IMPAIRED FASTING GLUCOSE: ICD-10-CM

## 2018-11-06 DIAGNOSIS — E55.9 VITAMIN D DEFICIENCY: ICD-10-CM

## 2018-11-06 DIAGNOSIS — I10 ESSENTIAL HYPERTENSION: ICD-10-CM

## 2018-11-06 LAB
ALT SERPL-CCNC: 25 U/L (ref 5–41)
ANION GAP SERPL CALCULATED.3IONS-SCNC: 14 MMOL/L (ref 9–17)
AST SERPL-CCNC: 17 U/L
BUN BLDV-MCNC: 15 MG/DL (ref 8–23)
BUN/CREAT BLD: ABNORMAL (ref 9–20)
CALCIUM SERPL-MCNC: 9 MG/DL (ref 8.6–10.4)
CHLORIDE BLD-SCNC: 102 MMOL/L (ref 98–107)
CHOLESTEROL/HDL RATIO: 4.1
CHOLESTEROL: 138 MG/DL
CO2: 23 MMOL/L (ref 20–31)
CREAT SERPL-MCNC: 0.84 MG/DL (ref 0.7–1.2)
ESTIMATED AVERAGE GLUCOSE: 128 MG/DL
GFR AFRICAN AMERICAN: >60 ML/MIN
GFR NON-AFRICAN AMERICAN: >60 ML/MIN
GFR SERPL CREATININE-BSD FRML MDRD: ABNORMAL ML/MIN/{1.73_M2}
GFR SERPL CREATININE-BSD FRML MDRD: ABNORMAL ML/MIN/{1.73_M2}
GLUCOSE BLD-MCNC: 146 MG/DL (ref 70–99)
HBA1C MFR BLD: 6.1 % (ref 4–6)
HDLC SERPL-MCNC: 34 MG/DL
LDL CHOLESTEROL: 74 MG/DL (ref 0–130)
MAGNESIUM: 2.1 MG/DL (ref 1.6–2.6)
POTASSIUM SERPL-SCNC: 3.9 MMOL/L (ref 3.7–5.3)
PROSTATE SPECIFIC ANTIGEN: 0.55 UG/L
SODIUM BLD-SCNC: 139 MMOL/L (ref 135–144)
TRIGL SERPL-MCNC: 150 MG/DL
VITAMIN D 25-HYDROXY: 22 NG/ML (ref 30–100)
VLDLC SERPL CALC-MCNC: ABNORMAL MG/DL (ref 1–30)

## 2018-11-06 PROCEDURE — 84460 ALANINE AMINO (ALT) (SGPT): CPT

## 2018-11-06 PROCEDURE — 80061 LIPID PANEL: CPT

## 2018-11-06 PROCEDURE — 84450 TRANSFERASE (AST) (SGOT): CPT

## 2018-11-06 PROCEDURE — 80048 BASIC METABOLIC PNL TOTAL CA: CPT

## 2018-11-06 PROCEDURE — 82306 VITAMIN D 25 HYDROXY: CPT

## 2018-11-06 PROCEDURE — 36415 COLL VENOUS BLD VENIPUNCTURE: CPT

## 2018-11-06 PROCEDURE — 83036 HEMOGLOBIN GLYCOSYLATED A1C: CPT

## 2018-11-06 PROCEDURE — G0103 PSA SCREENING: HCPCS

## 2018-11-06 PROCEDURE — 83735 ASSAY OF MAGNESIUM: CPT

## 2018-11-08 DIAGNOSIS — E55.9 VITAMIN D DEFICIENCY: Primary | ICD-10-CM

## 2018-11-08 RX ORDER — ERGOCALCIFEROL 1.25 MG/1
CAPSULE ORAL
Qty: 4 CAPSULE | Refills: 5 | Status: SHIPPED | OUTPATIENT
Start: 2018-11-08 | End: 2019-06-10 | Stop reason: SDUPTHER

## 2018-12-11 ENCOUNTER — OFFICE VISIT (OUTPATIENT)
Dept: FAMILY MEDICINE CLINIC | Age: 63
End: 2018-12-11
Payer: COMMERCIAL

## 2018-12-11 ENCOUNTER — HOSPITAL ENCOUNTER (OUTPATIENT)
Age: 63
Discharge: HOME | End: 2018-12-11
Payer: COMMERCIAL

## 2018-12-11 VITALS
RESPIRATION RATE: 16 BRPM | HEART RATE: 60 BPM | BODY MASS INDEX: 33.83 KG/M2 | TEMPERATURE: 97.8 F | DIASTOLIC BLOOD PRESSURE: 86 MMHG | SYSTOLIC BLOOD PRESSURE: 132 MMHG | WEIGHT: 216 LBS

## 2018-12-11 DIAGNOSIS — R59.0 LYMPHADENOPATHY OF LEFT CERVICAL REGION: ICD-10-CM

## 2018-12-11 DIAGNOSIS — R59.0 LYMPHADENOPATHY OF LEFT CERVICAL REGION: Primary | ICD-10-CM

## 2018-12-11 LAB
ABSOLUTE EOS #: 0.2 K/UL (ref 0–0.4)
ABSOLUTE IMMATURE GRANULOCYTE: ABNORMAL K/UL (ref 0–0.3)
ABSOLUTE LYMPH #: 2.5 K/UL (ref 1–4.8)
ABSOLUTE MONO #: 0.8 K/UL (ref 0.1–1.3)
BASOPHILS # BLD: 1 % (ref 0–2)
BASOPHILS ABSOLUTE: 0 K/UL (ref 0–0.2)
DIFFERENTIAL TYPE: ABNORMAL
EOSINOPHILS RELATIVE PERCENT: 2 % (ref 0–4)
HCT VFR BLD CALC: 45.6 % (ref 41–53)
HEMOGLOBIN: 15.4 G/DL (ref 13.5–17.5)
IMMATURE GRANULOCYTES: ABNORMAL %
LYMPHOCYTES # BLD: 30 % (ref 24–44)
MCH RBC QN AUTO: 33.3 PG (ref 26–34)
MCHC RBC AUTO-ENTMCNC: 33.8 G/DL (ref 31–37)
MCV RBC AUTO: 98.7 FL (ref 80–100)
MONOCYTES # BLD: 9 % (ref 1–7)
NRBC AUTOMATED: ABNORMAL PER 100 WBC
PDW BLD-RTO: 13.1 % (ref 11.5–14.9)
PLATELET # BLD: 164 K/UL (ref 150–450)
PLATELET ESTIMATE: ABNORMAL
PMV BLD AUTO: 10.7 FL (ref 6–12)
RBC # BLD: 4.62 M/UL (ref 4.5–5.9)
RBC # BLD: ABNORMAL 10*6/UL
SEG NEUTROPHILS: 58 % (ref 36–66)
SEGMENTED NEUTROPHILS ABSOLUTE COUNT: 4.9 K/UL (ref 1.3–9.1)
WBC # BLD: 8.5 K/UL (ref 3.5–11)
WBC # BLD: ABNORMAL 10*3/UL

## 2018-12-11 PROCEDURE — 99214 OFFICE O/P EST MOD 30 MIN: CPT | Performed by: NURSE PRACTITIONER

## 2018-12-11 PROCEDURE — 85025 COMPLETE CBC W/AUTO DIFF WBC: CPT

## 2018-12-11 PROCEDURE — 36415 COLL VENOUS BLD VENIPUNCTURE: CPT

## 2018-12-11 RX ORDER — CEPHALEXIN 500 MG/1
500 CAPSULE ORAL 4 TIMES DAILY
Qty: 28 CAPSULE | Refills: 0 | Status: SHIPPED | OUTPATIENT
Start: 2018-12-11 | End: 2018-12-18

## 2018-12-11 ASSESSMENT — ENCOUNTER SYMPTOMS
COUGH: 0
ABDOMINAL PAIN: 0
NAUSEA: 0
SHORTNESS OF BREATH: 0

## 2018-12-18 ENCOUNTER — OFFICE VISIT (OUTPATIENT)
Dept: FAMILY MEDICINE CLINIC | Age: 63
End: 2018-12-18
Payer: COMMERCIAL

## 2018-12-18 VITALS
RESPIRATION RATE: 14 BRPM | BODY MASS INDEX: 33.67 KG/M2 | HEART RATE: 56 BPM | WEIGHT: 215 LBS | DIASTOLIC BLOOD PRESSURE: 80 MMHG | SYSTOLIC BLOOD PRESSURE: 130 MMHG | TEMPERATURE: 97.4 F

## 2018-12-18 DIAGNOSIS — R59.0 LYMPHADENOPATHY OF LEFT CERVICAL REGION: Primary | ICD-10-CM

## 2018-12-18 PROCEDURE — 99213 OFFICE O/P EST LOW 20 MIN: CPT | Performed by: NURSE PRACTITIONER

## 2018-12-18 ASSESSMENT — ENCOUNTER SYMPTOMS
SHORTNESS OF BREATH: 0
COUGH: 0
ABDOMINAL PAIN: 0
NAUSEA: 0

## 2018-12-18 NOTE — PROGRESS NOTES
past week. States the lumps are disappearing and the swelling is gone. Review of Systems   Constitutional: Negative for chills and fever. Respiratory: Negative for cough and shortness of breath. Cardiovascular: Negative for chest pain and palpitations. Gastrointestinal: Negative for abdominal pain and nausea. Skin:        Lumps left side of neck       Objective:   Physical Exam   Constitutional: He appears well-developed and well-nourished. No distress. obesity   HENT:   Head: Normocephalic. Right Ear: External ear normal.   Left Ear: External ear normal.   1-2 small palpable lymph nodes beneath left ear lobe. Swelling and multiple nodules in left jaw area are resolved. Eyes: Conjunctivae and EOM are normal.   Neck: Neck supple. No thyromegaly present. Cardiovascular: Normal rate, regular rhythm and normal heart sounds. Pulmonary/Chest: Breath sounds normal. No respiratory distress. Abdominal: Soft. There is no tenderness. Assessment:      1. Lymphadenopathy of left cervical region            Plan:      BP Readings from Last 3 Encounters:   12/18/18 130/80   12/11/18 132/86   11/02/18 120/84     /80 (Site: Right Upper Arm, Position: Sitting, Cuff Size: Medium Adult)   Pulse 56   Temp 97.4 °F (36.3 °C) (Oral)   Resp 14   Wt 215 lb (97.5 kg)   BMI 33.67 kg/m²   Lab Results   Component Value Date    WBC 8.5 12/11/2018    HGB 15.4 12/11/2018    HCT 45.6 12/11/2018     12/11/2018    CHOL 138 11/06/2018    TRIG 150 (H) 11/06/2018    HDL 34 (L) 11/06/2018    ALT 25 11/06/2018    AST 17 11/06/2018     11/06/2018    K 3.9 11/06/2018     11/06/2018    CREATININE 0.84 11/06/2018    BUN 15 11/06/2018    CO2 23 11/06/2018    PSA 0.55 11/06/2018    INR 1.0 05/11/2018    GLUF 109 (H) 05/14/2018    LABA1C 6.1 (H) 11/06/2018     Lab Results   Component Value Date    CALCIUM 9.0 11/06/2018     Lab Results   Component Value Date    LDLCHOLESTEROL 74 11/06/2018         1.

## 2019-03-05 ENCOUNTER — HOSPITAL ENCOUNTER (OUTPATIENT)
Age: 64
Discharge: HOME | End: 2019-03-07
Payer: COMMERCIAL

## 2019-03-05 ENCOUNTER — OFFICE VISIT (OUTPATIENT)
Dept: FAMILY MEDICINE CLINIC | Age: 64
End: 2019-03-05
Payer: COMMERCIAL

## 2019-03-05 ENCOUNTER — HOSPITAL ENCOUNTER (OUTPATIENT)
Dept: GENERAL RADIOLOGY | Age: 64
Discharge: HOME | End: 2019-03-07
Payer: COMMERCIAL

## 2019-03-05 VITALS
WEIGHT: 209.2 LBS | HEART RATE: 76 BPM | BODY MASS INDEX: 32.77 KG/M2 | SYSTOLIC BLOOD PRESSURE: 140 MMHG | DIASTOLIC BLOOD PRESSURE: 88 MMHG | RESPIRATION RATE: 20 BRPM

## 2019-03-05 DIAGNOSIS — I10 ESSENTIAL HYPERTENSION: ICD-10-CM

## 2019-03-05 DIAGNOSIS — M25.512 PAIN AND SWELLING OF LEFT SHOULDER: ICD-10-CM

## 2019-03-05 DIAGNOSIS — M25.512 ACUTE PAIN OF LEFT SHOULDER: ICD-10-CM

## 2019-03-05 DIAGNOSIS — M25.412 PAIN AND SWELLING OF LEFT SHOULDER: ICD-10-CM

## 2019-03-05 DIAGNOSIS — S49.92XA INJURY OF LEFT SHOULDER, INITIAL ENCOUNTER: Primary | ICD-10-CM

## 2019-03-05 PROCEDURE — 99214 OFFICE O/P EST MOD 30 MIN: CPT | Performed by: NURSE PRACTITIONER

## 2019-03-05 PROCEDURE — 73030 X-RAY EXAM OF SHOULDER: CPT

## 2019-03-05 RX ORDER — KETOROLAC TROMETHAMINE 10 MG/1
10 TABLET, FILM COATED ORAL EVERY 6 HOURS PRN
Qty: 20 TABLET | Refills: 0 | Status: ON HOLD | OUTPATIENT
Start: 2019-03-05 | End: 2020-02-19 | Stop reason: HOSPADM

## 2019-03-05 RX ORDER — TIZANIDINE HYDROCHLORIDE 4 MG/1
4 CAPSULE, GELATIN COATED ORAL 3 TIMES DAILY PRN
Qty: 30 CAPSULE | Refills: 0 | Status: SHIPPED | OUTPATIENT
Start: 2019-03-05 | End: 2020-10-16

## 2019-03-05 ASSESSMENT — PATIENT HEALTH QUESTIONNAIRE - PHQ9
2. FEELING DOWN, DEPRESSED OR HOPELESS: 0
1. LITTLE INTEREST OR PLEASURE IN DOING THINGS: 0
SUM OF ALL RESPONSES TO PHQ QUESTIONS 1-9: 0
SUM OF ALL RESPONSES TO PHQ9 QUESTIONS 1 & 2: 0
SUM OF ALL RESPONSES TO PHQ QUESTIONS 1-9: 0

## 2019-03-05 ASSESSMENT — ENCOUNTER SYMPTOMS
NAUSEA: 0
SHORTNESS OF BREATH: 0
COUGH: 0
ABDOMINAL PAIN: 0

## 2019-03-18 ENCOUNTER — HOSPITAL ENCOUNTER (OUTPATIENT)
Dept: MRI IMAGING | Age: 64
Discharge: HOME | End: 2019-03-20
Payer: COMMERCIAL

## 2019-03-18 DIAGNOSIS — M25.412 PAIN AND SWELLING OF LEFT SHOULDER: ICD-10-CM

## 2019-03-18 DIAGNOSIS — S49.92XA INJURY OF LEFT SHOULDER, INITIAL ENCOUNTER: ICD-10-CM

## 2019-03-18 DIAGNOSIS — M25.512 ACUTE PAIN OF LEFT SHOULDER: ICD-10-CM

## 2019-03-18 DIAGNOSIS — M25.512 PAIN AND SWELLING OF LEFT SHOULDER: ICD-10-CM

## 2019-03-18 PROCEDURE — 73221 MRI JOINT UPR EXTREM W/O DYE: CPT

## 2019-03-22 ENCOUNTER — OFFICE VISIT (OUTPATIENT)
Dept: ORTHOPEDIC SURGERY | Age: 64
End: 2019-03-22
Payer: COMMERCIAL

## 2019-03-22 VITALS — HEIGHT: 67 IN | WEIGHT: 207 LBS | BODY MASS INDEX: 32.49 KG/M2

## 2019-03-22 DIAGNOSIS — M75.22 LEFT BICIPITAL TENOSYNOVITIS: ICD-10-CM

## 2019-03-22 DIAGNOSIS — M75.102 TEAR OF LEFT ROTATOR CUFF, UNSPECIFIED TEAR EXTENT: Primary | ICD-10-CM

## 2019-03-22 PROCEDURE — 20610 DRAIN/INJ JOINT/BURSA W/O US: CPT | Performed by: ORTHOPAEDIC SURGERY

## 2019-03-22 PROCEDURE — 99203 OFFICE O/P NEW LOW 30 MIN: CPT | Performed by: ORTHOPAEDIC SURGERY

## 2019-03-22 RX ORDER — TRIAMCINOLONE ACETONIDE 40 MG/ML
40 INJECTION, SUSPENSION INTRA-ARTICULAR; INTRAMUSCULAR ONCE
Status: COMPLETED | OUTPATIENT
Start: 2019-03-22 | End: 2019-03-22

## 2019-03-22 RX ORDER — LIDOCAINE HYDROCHLORIDE 10 MG/ML
5 INJECTION, SOLUTION INFILTRATION; PERINEURAL ONCE
Status: COMPLETED | OUTPATIENT
Start: 2019-03-22 | End: 2019-03-22

## 2019-03-22 RX ADMIN — LIDOCAINE HYDROCHLORIDE 5 ML: 10 INJECTION, SOLUTION INFILTRATION; PERINEURAL at 14:41

## 2019-03-22 RX ADMIN — TRIAMCINOLONE ACETONIDE 40 MG: 40 INJECTION, SUSPENSION INTRA-ARTICULAR; INTRAMUSCULAR at 14:41

## 2019-03-22 NOTE — LETTER
3/22/2019    Delia Vinson MD  [address]  [facility], [address]  [place], [address] [place] [address]    RE: Lauren Red    Dear Dr. Mic Arroyo,    Thank you for allowing me to participate in the care of Mr. Tamayo. I had the opportunity to evaluate the patient on 3/22/2019. Attached you will find my evaluation and recommendations. Thanks again for the confidence you have expressed in me by allowing my participation in the care of your patient. I will keep you apprised of further developments in the patients treatment course as it progresses. If I can be of further assistance in any fashion, please feel free to contact me at your convenience.     Sincerely,        Lydia Schumacher  Shoulder and Elbow Surgery

## 2019-04-06 PROBLEM — M75.22 LEFT BICIPITAL TENOSYNOVITIS: Status: ACTIVE | Noted: 2019-04-06

## 2019-04-06 PROBLEM — M75.102 TEAR OF LEFT ROTATOR CUFF: Status: ACTIVE | Noted: 2019-04-06

## 2019-04-06 NOTE — PROGRESS NOTES
Orthopedic Shoulder Encounter Note     Chief complaint: Left shoulder pain    HPI: Cassandra Winston is a 61 y.o.  right-hand dominant male who presents for evaluation of left shoulder pain that's been ongoing for about a month. He indicates that about a month ago he was getting his yard prepared moving and throwing bricks around and felt a pull in his left shoulder. The following day at work his left shoulder got worse where he was unable to lift his arm up. He went in to see his primary care provider who ordered an x-ray and MRI study. Following the MRI study he was sent here for further evaluation and treatment. He indicates that he has since been able to lift his arm a little bit more. He still has pain in this shoulder primarily over the lateral aspect of the shoulder that worsens over the course of the day. Its typically activity related and he also reports having positional nighttime pain as well. He denies having any weakness but does report having some stiffness in the shoulder in the morning. He denies having any radicular symptoms or numbness/tingling. Previous treatment:    NSAIDs: None    Physical Therapy:  No    Injections: None    Surgeries: None    Review of Systems:     Constitution: no fever or chills   Pain level: 5/10  Musculoskeletal: As noted in the HPI   Neurologic: no neurologic symptoms    Past Medical History  Lorri Desai  has a past medical history of Essential hypertension, Hyperlipidemia, Hyperparathyroidism ([address] Obesity (BMI 30-39.9), Osteoporosis, Sciatica, Stroke ([address] and Vertebral fracture, osteoporotic ([address] Past Surgical History  Lorri Desai  has a past surgical history that includes hernia repair; Vasectomy; and Colonoscopy (3/19/2014).     Current Medications  Current Outpatient Medications   Medication Sig Dispense Refill    ketorolac (TORADOL) 10 MG tablet Take 1 tablet by mouth every 6 hours as needed for Pain Take with food 20 tablet 0    tiZANidine (ZANAFLEX) 4 MG capsule Take 1 capsule by mouth 3 times daily as needed for Muscle spasms 30 capsule 0    metoprolol tartrate (LOPRESSOR) 25 MG tablet take 1 tablet by mouth twice a day 60 tablet 3    atorvastatin (LIPITOR) 20 MG tablet take 1 tablet by mouth at bedtime 30 tablet 3    pantoprazole (PROTONIX) 40 MG tablet take 1 tablet by mouth once daily 30 tablet 3    vitamin D (ERGOCALCIFEROL) 48923 units CAPS capsule take 1 capsule by mouth every week 4 capsule 5    lisinopril (PRINIVIL;ZESTRIL) 5 MG tablet take 1 tablet by mouth once daily 90 tablet 1    loratadine (CLARITIN) 10 MG tablet Take 1 tablet by mouth daily 30 tablet 3    fluticasone (FLONASE) 50 MCG/ACT nasal spray 1 spray by Nasal route 2 times daily 1 Bottle 1    albuterol sulfate HFA (PROAIR HFA) 108 (90 BASE) MCG/ACT inhaler Inhale 2 puffs into the lungs every 6 hours as needed for Wheezing 1 Inhaler 0    aspirin 81 MG chewable tablet Take 1 tablet by mouth daily 30 tablet 3    alendronate (FOSAMAX) 70 MG tablet take 1 tablet by mouth every week (Patient taking differently: take 1 tablet by mouth every week (Fridays)) 4 tablet 5     No current facility-administered medications for this visit. Allergies  Allergies have been reviewed. Cha Myers has No Known Allergies. Social History  Cha Myers  reports that he has been smoking. He has a 20.00 pack-year smoking history. He has never used smokeless tobacco. He reports that he drinks alcohol. He reports that he does not use drugs. Family History  Samuel's family history includes Heart Disease in his father.      Physical Exam:     Ht 5' 7\" (1.702 m)   Wt 207 lb (93.9 kg)   BMI 32.42 kg/m²    General Appearance: alert, well appearing, and in no distress  Mental Status: alert, oriented to person, place, and time  Gait: normal    Shoulder:    Skin: warm and dry, no rash or erythema; no swelling or obvious muscular atrophy  Vasculature: 2+ radial pulses bilaterally  Neuro: Sensation grossly intact to light touch diffusely  Tenderness: Tender to palpation over the anterior aspect of the left shoulder    ROM: (Degrees)    Right   A P   Left   A P    Elevation  155    Elevation  70 110  Abduction  165    Abduction  75  80  ER   65    ER   55 45  IR   L1    IR   L1   90 abd/ER      90 abd/ER     90 abd/IR      90 abd/IR     Crepitation  No    Crepitation No  Dyskenesia  No    Dyskenesia No      Muscle strength:    Right       Left    Deltoid   5    Deltoid   5  Supraspinatus  5    Supraspinatus  3  ER   5    ER   5  IR   5    IR   5    Special tests    Right   Rotator Cuff    Left    n   Painful arc    y   n   Pain with ER    n    n   Neer's     y    n   Hawkin's    y    n   Drop Arm    n  n   Lift off/Belly Press   n  n   ER Lag    n          AC Joint  n   AC tenderness   n  n   Cross-chest adduction  n       Labrum/biceps    n   [place]'s    y (equivocal)   n   Biceps sheer    n      n   Speed's/Yergason's   y    n   Tenderness Biceps Groove  y    n   's    n         Instability  n   Ant Apprehension   n    n   Post Apprehension   n    n   Ant Load shift    n    n   Post Load shift   n   n   Sulcus     n  n   Generalized Laxity   n  n   Relocation test   n  n   Crank test     n  n   Celso-superior escape  n       Imaging:  Xrays: 3 views of the left shoulder obtained on 3/5/19 were independently reviewed  Indications: Left shoulder pain  Findings: Normal glenohumeral joint space. Moderate acromioclavicular joint space loss associated with osteophytic change. Type 2 acromion  Impression: Left shoulder radiograph with moderate acromioclavicular joint osteoarthritis. MRI: MRI of the left shoulder reviewed from 3/18/19 demonstrates a full-thickness tear of the supraspinatus with retraction to about the level of the mid humeral head. Biceps tendon appears to be intact.   There is a fair amount of increased signal on T2-weighted images and subacromial and some deltoid space extending around the biceps tendon. Impression/Plan:     Nereyda Johnson is a 61 y.o. old male with left shoulder full-thickness rotator cuff tear in addition to biceps tenosynovitis. I had a discussion with the patient today with regards to the nature and extent of his problem. We did discuss treatment options available to him including nonoperative and operative intervention. He is electing to proceed conservatively at this time. A cortisone injection was administered as outlined below. I'll see him back for reevaluation in 3 months but he was encouraged to return or call earlier with questions and/or concerns. Procedure: left shoulder subacromial space injection  Following an appropriate discussion with the patient regarding the risks and benefits of the procedure he consented to proceed. his left shoulder was prepped using chlorhexadine solution. Using aseptic technique and through a posterior approach, his left shoulder subacromial space was injected with a 4 cc mixture of 1cc 40mg/ml kenalog and 3 cc of 1% lidocaine without epinephrine. A band aid was applied to the injection site. he tolerated the injection with no immediate adverse reactions.           NA = Not assessed  RTC = Rotator cuff  RCT = Rotator cuff tear  ER = External rotation  IR = Internal rotation  AC = Acromioclavicular  GH = Glenohumeral  n = No  y = Yes

## 2019-05-03 RX ORDER — LISINOPRIL 5 MG/1
TABLET ORAL
Qty: 90 TABLET | Refills: 1 | Status: SHIPPED | OUTPATIENT
Start: 2019-05-03 | End: 2019-11-04 | Stop reason: SDUPTHER

## 2019-06-03 RX ORDER — PANTOPRAZOLE SODIUM 40 MG/1
TABLET, DELAYED RELEASE ORAL
Qty: 30 TABLET | Refills: 3 | Status: SHIPPED | OUTPATIENT
Start: 2019-06-03 | End: 2019-09-30 | Stop reason: SDUPTHER

## 2019-06-03 RX ORDER — ATORVASTATIN CALCIUM 20 MG/1
TABLET, FILM COATED ORAL
Qty: 30 TABLET | Refills: 3 | Status: SHIPPED | OUTPATIENT
Start: 2019-06-03 | End: 2019-09-30 | Stop reason: SDUPTHER

## 2019-06-10 ENCOUNTER — OFFICE VISIT (OUTPATIENT)
Dept: FAMILY MEDICINE CLINIC | Age: 64
End: 2019-06-10
Payer: COMMERCIAL

## 2019-06-10 VITALS
DIASTOLIC BLOOD PRESSURE: 88 MMHG | WEIGHT: 209 LBS | SYSTOLIC BLOOD PRESSURE: 138 MMHG | HEART RATE: 64 BPM | RESPIRATION RATE: 16 BRPM | TEMPERATURE: 97.1 F | BODY MASS INDEX: 32.73 KG/M2

## 2019-06-10 DIAGNOSIS — E78.2 MIXED HYPERLIPIDEMIA: ICD-10-CM

## 2019-06-10 DIAGNOSIS — I10 ESSENTIAL HYPERTENSION: ICD-10-CM

## 2019-06-10 DIAGNOSIS — E55.9 VITAMIN D DEFICIENCY: ICD-10-CM

## 2019-06-10 DIAGNOSIS — R73.01 IFG (IMPAIRED FASTING GLUCOSE): Primary | ICD-10-CM

## 2019-06-10 LAB — HBA1C MFR BLD: 5.9 %

## 2019-06-10 PROCEDURE — 83036 HEMOGLOBIN GLYCOSYLATED A1C: CPT | Performed by: NURSE PRACTITIONER

## 2019-06-10 PROCEDURE — 99214 OFFICE O/P EST MOD 30 MIN: CPT | Performed by: NURSE PRACTITIONER

## 2019-06-10 RX ORDER — ERGOCALCIFEROL 1.25 MG/1
CAPSULE ORAL
Qty: 12 CAPSULE | Refills: 0 | Status: SHIPPED | OUTPATIENT
Start: 2019-06-10 | End: 2019-10-31 | Stop reason: SDUPTHER

## 2019-06-10 ASSESSMENT — ENCOUNTER SYMPTOMS
ABDOMINAL PAIN: 0
COUGH: 0
SHORTNESS OF BREATH: 0
NAUSEA: 0

## 2019-06-10 NOTE — PROGRESS NOTES
Subjective:      Patient ID: Joseph Figueredo is a 61 y.o. male. Chronic Disease Visit Information    BP Readings from Last 3 Encounters:   06/10/19 138/88   03/05/19 (!) 140/88   12/18/18 130/80          Hemoglobin A1C (%)   Date Value   11/06/2018 6.1 (H)     LDL Cholesterol (mg/dL)   Date Value   11/06/2018 74     HDL (mg/dL)   Date Value   11/06/2018 34 (L)     BUN (mg/dL)   Date Value   11/06/2018 15     CREATININE (mg/dL)   Date Value   11/06/2018 0.84     Glucose (mg/dL)   Date Value   11/06/2018 146 (H)            Have you changed or started any medications since your last visit including any over-the-counter medicines, vitamins, or herbal medicines? no   Are you having any side effects from any of your medications? -  no  Have you stopped taking any of your medications? Is so, why? -  no    Have you seen any other physician or provider since your last visit? Yes - Records Obtained  Have you had any other diagnostic tests since your last visit? Yes - Records Obtained  Have you been seen in the emergency room and/or had an admission to a hospital since we last saw you? No  Have you had your annual diabetic retinal (eye) exam? Yes - Records Requested  Have you had your routine dental cleaning in the past 6 months? no    Have you activated your [employer] account? If not, what are your barriers?  Yes     Patient Care Team:  Tyesha Chaparro MD as PCP - General (Family Medicine)  Tyesha Chaparro MD as PCP - [facility] Provider  Anastasia Collier MD as Orthopedic Surgeon (Orthopedic Surgery)  Charbel Cho MD (Neurology)  Oumar Sharpe MD as Consulting Physician (Gastroenterology)         Medical History Review  Past Medical, Family, and Social History reviewed and does contribute to the patient presenting condition    Health Maintenance   Topic Date Due    Hepatitis C screen  1955    Pneumococcal 0-64 years Vaccine (1 of 1 - PPSV23) 09/03/1961    HIV screen  09/03/1970    Shingles Vaccine (1 of 2) 09/03/2005    A1C test (Diabetic or Prediabetic)  11/06/2019    Potassium monitoring  11/06/2019    Creatinine monitoring  11/06/2019    Lipid screen  11/06/2023    Colon cancer screen colonoscopy  03/19/2024    DTaP/Tdap/Td vaccine (2 - Td) 06/12/2025    Flu vaccine  Completed       HPI     61year old male presents with management of IGG HTN HLD and vit d deficiency with medication refills. A1c was 6.1 last Nov and states he doesn't watch diet as he should. A1c is 5.9  today. He used to drink 12 cans of pepci a day but has cut down to one. Currently is on dual therapy for bp and it has been stable. Is compliant with statin therapy and tolerates it well. Denies headache dizziness cp sob or nv abd pain. Hx of right rotator cuff tear and follows up with Dr. Colton Sarmiento. Review of Systems   Constitutional: Negative for chills, diaphoresis and fever. Eyes: Negative for visual disturbance. Respiratory: Negative for cough and shortness of breath. Cardiovascular: Negative for chest pain and palpitations. Gastrointestinal: Negative for abdominal pain and nausea. Musculoskeletal: Positive for arthralgias. Skin: Negative for wound. Neurological: Negative for dizziness, weakness and numbness. Objective:   Physical Exam   Constitutional: He appears well-nourished. No distress. HENT:   Nose: Nose normal.   Mouth/Throat: Oropharynx is clear and moist.   Eyes: Conjunctivae are normal.   Neck: Neck supple. Cardiovascular: Normal rate, regular rhythm and normal heart sounds. Pulmonary/Chest: Effort normal and breath sounds normal. No respiratory distress. Abdominal: Soft. There is no tenderness. Musculoskeletal: He exhibits no edema or tenderness. Lymphadenopathy:     He has no cervical adenopathy. Neurological: He is alert. No cranial nerve deficit. Skin: Skin is warm and dry. No rash noted. Psychiatric: He has a normal mood and affect.  His behavior is normal. Nursing note and vitals reviewed. Assessment:      1. IFG (impaired fasting glucose)    2. Essential hypertension    3. Mixed hyperlipidemia    4. Vitamin D deficiency            Plan:       BP Readings from Last 3 Encounters:   06/10/19 138/88   03/05/19 (!) 140/88   12/18/18 130/80     /88 (Site: Left Upper Arm, Position: Sitting, Cuff Size: Medium Adult)   Pulse 64   Temp 97.1 °F (36.2 °C) (Oral)   Resp 16   Wt 209 lb (94.8 kg)   BMI 32.73 kg/m²   Lab Results   Component Value Date    WBC 8.5 12/11/2018    HGB 15.4 12/11/2018    HCT 45.6 12/11/2018     12/11/2018    CHOL 138 11/06/2018    TRIG 150 (H) 11/06/2018    HDL 34 (L) 11/06/2018    ALT 25 11/06/2018    AST 17 11/06/2018     11/06/2018    K 3.9 11/06/2018     11/06/2018    CREATININE 0.84 11/06/2018    BUN 15 11/06/2018    CO2 23 11/06/2018    PSA 0.55 11/06/2018    INR 1.0 05/11/2018    GLUF 109 (H) 05/14/2018    LABA1C 6.1 (H) 11/06/2018     Lab Results   Component Value Date    CALCIUM 9.0 11/06/2018     Lab Results   Component Value Date    LDLCHOLESTEROL 74 11/06/2018         1. IFG (impaired fasting glucose)  - stable. Cont diet measures   - POCT glycosylated hemoglobin (Hb A1C)    2. Essential hypertension  - stable. Cont current therapy    3. Mixed hyperlipidemia  - cont statin therapy    4. Vitamin D deficiency  - vitamin D (ERGOCALCIFEROL) 78152 units CAPS capsule; take 1 capsule by mouth every week  Dispense: 12 capsule;  Refill: 0      Requested Prescriptions     Signed Prescriptions Disp Refills    vitamin D (ERGOCALCIFEROL) 55453 units CAPS capsule 12 capsule 0     Sig: take 1 capsule by mouth every week       Medications Discontinued During This Encounter   Medication Reason    vitamin D (ERGOCALCIFEROL) 24863 units CAPS capsule REORDER       Samuel received counseling on the following healthy behaviors: nutrition, exercise and tobacco cessation  Reviewed prior labs and health maintenance  Continue current medications, diet and exercise. Discussed use, benefit, and side effects of prescribed medications. Barriers to medication compliance addressed. Patient given educational materials - see patient instructions  Was a self-tracking handout given in paper form or via [employer]? Yes    Requested Prescriptions     Signed Prescriptions Disp Refills    vitamin D (ERGOCALCIFEROL) 36342 units CAPS capsule 12 capsule 0     Sig: take 1 capsule by mouth every week       All patient questions answered. Patient voiced understanding. Quality Measures    Body mass index is 32.73 kg/m². Elevated. Weight control planned discussed medically supervised diet with primary care physician and Healthy diet and regular exercise. BP: 138/88 Blood pressure is normal. Treatment plan consists of No treatment change needed.     Lab Results   Component Value Date    LDLCHOLESTEROL 74 11/06/2018    (goal LDL reduction with dx if diabetes is 50% LDL reduction)      PHQ Scores 3/5/2019 5/15/2018 5/4/2017 4/19/2016   PHQ2 Score 0 0 0 0   PHQ9 Score 0 0 0 0     Interpretation of Total Score Depression Severity: 1-4 = Minimal depression, 5-9 = Mild depression, 10-14 = Moderate depression, 15-19 = Moderately severe depression, 20-27 = Severe depression        Diann Ramos APRN - CNP

## 2019-06-27 ENCOUNTER — OFFICE VISIT (OUTPATIENT)
Dept: ORTHOPEDIC SURGERY | Age: 64
End: 2019-06-27
Payer: COMMERCIAL

## 2019-06-27 VITALS — BODY MASS INDEX: 32.96 KG/M2 | WEIGHT: 210 LBS | HEIGHT: 67 IN

## 2019-06-27 DIAGNOSIS — M75.122 COMPLETE TEAR OF LEFT ROTATOR CUFF, UNSPECIFIED WHETHER TRAUMATIC: Primary | ICD-10-CM

## 2019-06-27 PROCEDURE — 99213 OFFICE O/P EST LOW 20 MIN: CPT | Performed by: ORTHOPAEDIC SURGERY

## 2019-07-06 NOTE — PROGRESS NOTES
He was encouraged to return or call at any time with questions and or concerns.          NA = Not assessed  RTC = Rotator cuff  RCT = Rotator cuff tear  ER = External rotation  IR = Internal rotation  AC = Acromioclavicular  GH = Glenohumeral  n = No  y = Yes

## 2019-09-30 RX ORDER — PANTOPRAZOLE SODIUM 40 MG/1
TABLET, DELAYED RELEASE ORAL
Qty: 30 TABLET | Refills: 3 | Status: SHIPPED | OUTPATIENT
Start: 2019-09-30 | End: 2020-01-28

## 2019-09-30 RX ORDER — ATORVASTATIN CALCIUM 20 MG/1
TABLET, FILM COATED ORAL
Qty: 30 TABLET | Refills: 3 | Status: SHIPPED | OUTPATIENT
Start: 2019-09-30 | End: 2020-01-28

## 2019-10-31 ENCOUNTER — OFFICE VISIT (OUTPATIENT)
Dept: FAMILY MEDICINE CLINIC | Age: 64
End: 2019-10-31
Payer: COMMERCIAL

## 2019-10-31 VITALS
RESPIRATION RATE: 20 BRPM | BODY MASS INDEX: 33.89 KG/M2 | DIASTOLIC BLOOD PRESSURE: 80 MMHG | WEIGHT: 216.4 LBS | SYSTOLIC BLOOD PRESSURE: 138 MMHG | HEART RATE: 64 BPM | TEMPERATURE: 98.5 F

## 2019-10-31 DIAGNOSIS — E78.2 MIXED HYPERLIPIDEMIA: ICD-10-CM

## 2019-10-31 DIAGNOSIS — I10 ESSENTIAL HYPERTENSION: ICD-10-CM

## 2019-10-31 DIAGNOSIS — E55.9 VITAMIN D DEFICIENCY: ICD-10-CM

## 2019-10-31 DIAGNOSIS — R73.01 IFG (IMPAIRED FASTING GLUCOSE): Primary | ICD-10-CM

## 2019-10-31 PROCEDURE — 99214 OFFICE O/P EST MOD 30 MIN: CPT | Performed by: NURSE PRACTITIONER

## 2019-10-31 RX ORDER — ERGOCALCIFEROL 1.25 MG/1
CAPSULE ORAL
Qty: 12 CAPSULE | Refills: 1 | Status: SHIPPED | OUTPATIENT
Start: 2019-10-31 | End: 2020-06-18 | Stop reason: SDUPTHER

## 2019-10-31 ASSESSMENT — ENCOUNTER SYMPTOMS
ABDOMINAL PAIN: 0
COUGH: 0
SHORTNESS OF BREATH: 0
NAUSEA: 0

## 2019-11-01 ENCOUNTER — HOSPITAL ENCOUNTER (OUTPATIENT)
Age: 64
Discharge: HOME | End: 2019-11-01
Payer: COMMERCIAL

## 2019-11-01 DIAGNOSIS — I10 ESSENTIAL HYPERTENSION: ICD-10-CM

## 2019-11-01 DIAGNOSIS — E55.9 VITAMIN D DEFICIENCY: ICD-10-CM

## 2019-11-01 DIAGNOSIS — R73.01 IFG (IMPAIRED FASTING GLUCOSE): ICD-10-CM

## 2019-11-01 DIAGNOSIS — E78.2 MIXED HYPERLIPIDEMIA: ICD-10-CM

## 2019-11-01 LAB
ALBUMIN SERPL-MCNC: 4.2 G/DL (ref 3.5–5.2)
ALBUMIN/GLOBULIN RATIO: ABNORMAL (ref 1–2.5)
ALP BLD-CCNC: 65 U/L (ref 40–129)
ALT SERPL-CCNC: 22 U/L (ref 5–41)
ANION GAP SERPL CALCULATED.3IONS-SCNC: 12 MMOL/L (ref 9–17)
AST SERPL-CCNC: 19 U/L
BILIRUB SERPL-MCNC: 0.25 MG/DL (ref 0.3–1.2)
BUN BLDV-MCNC: 22 MG/DL (ref 8–23)
BUN/CREAT BLD: ABNORMAL (ref 9–20)
CALCIUM SERPL-MCNC: 9.2 MG/DL (ref 8.6–10.4)
CHLORIDE BLD-SCNC: 105 MMOL/L (ref 98–107)
CHOLESTEROL/HDL RATIO: 3.9
CHOLESTEROL: 142 MG/DL
CO2: 23 MMOL/L (ref 20–31)
CREAT SERPL-MCNC: 0.86 MG/DL (ref 0.7–1.2)
ESTIMATED AVERAGE GLUCOSE: 123 MG/DL
GFR AFRICAN AMERICAN: >60 ML/MIN
GFR NON-AFRICAN AMERICAN: >60 ML/MIN
GFR SERPL CREATININE-BSD FRML MDRD: ABNORMAL ML/MIN/{1.73_M2}
GFR SERPL CREATININE-BSD FRML MDRD: ABNORMAL ML/MIN/{1.73_M2}
GLUCOSE BLD-MCNC: 123 MG/DL (ref 70–99)
HBA1C MFR BLD: 5.9 % (ref 4–6)
HDLC SERPL-MCNC: 36 MG/DL
LDL CHOLESTEROL: 63 MG/DL (ref 0–130)
MAGNESIUM: 2.2 MG/DL (ref 1.6–2.6)
POTASSIUM SERPL-SCNC: 4.6 MMOL/L (ref 3.7–5.3)
SODIUM BLD-SCNC: 140 MMOL/L (ref 135–144)
TOTAL PROTEIN: 7.1 G/DL (ref 6.4–8.3)
TRIGL SERPL-MCNC: 215 MG/DL
VITAMIN D 25-HYDROXY: 33.2 NG/ML (ref 30–100)
VLDLC SERPL CALC-MCNC: ABNORMAL MG/DL (ref 1–30)

## 2019-11-01 PROCEDURE — 83735 ASSAY OF MAGNESIUM: CPT

## 2019-11-01 PROCEDURE — 83036 HEMOGLOBIN GLYCOSYLATED A1C: CPT

## 2019-11-01 PROCEDURE — 80061 LIPID PANEL: CPT

## 2019-11-01 PROCEDURE — 82306 VITAMIN D 25 HYDROXY: CPT

## 2019-11-01 PROCEDURE — 80053 COMPREHEN METABOLIC PANEL: CPT

## 2019-11-01 PROCEDURE — 36415 COLL VENOUS BLD VENIPUNCTURE: CPT

## 2019-11-04 RX ORDER — LISINOPRIL 5 MG/1
TABLET ORAL
Qty: 90 TABLET | Refills: 1 | Status: SHIPPED | OUTPATIENT
Start: 2019-11-04 | End: 2020-07-17

## 2020-01-13 ENCOUNTER — OFFICE VISIT (OUTPATIENT)
Dept: FAMILY MEDICINE CLINIC | Age: 65
End: 2020-01-13
Payer: COMMERCIAL

## 2020-01-13 VITALS
HEIGHT: 67 IN | HEART RATE: 68 BPM | SYSTOLIC BLOOD PRESSURE: 136 MMHG | OXYGEN SATURATION: 98 % | DIASTOLIC BLOOD PRESSURE: 80 MMHG | TEMPERATURE: 98.7 F | WEIGHT: 217 LBS | BODY MASS INDEX: 34.06 KG/M2

## 2020-01-13 PROCEDURE — 99214 OFFICE O/P EST MOD 30 MIN: CPT | Performed by: FAMILY MEDICINE

## 2020-01-13 ASSESSMENT — ENCOUNTER SYMPTOMS
DIARRHEA: 0
CHEST TIGHTNESS: 0
BLOOD IN STOOL: 0
ABDOMINAL PAIN: 1
VOMITING: 0
SHORTNESS OF BREATH: 0
CONSTIPATION: 0

## 2020-01-13 ASSESSMENT — PATIENT HEALTH QUESTIONNAIRE - PHQ9
1. LITTLE INTEREST OR PLEASURE IN DOING THINGS: 0
SUM OF ALL RESPONSES TO PHQ QUESTIONS 1-9: 0
SUM OF ALL RESPONSES TO PHQ QUESTIONS 1-9: 0
2. FEELING DOWN, DEPRESSED OR HOPELESS: 0
SUM OF ALL RESPONSES TO PHQ9 QUESTIONS 1 & 2: 0

## 2020-01-13 NOTE — PROGRESS NOTES
Subjective:      Patient ID: Rodger Garcia is a 59 y.o. male. Visit Information    Have you changed or started any medications since your last visit including any over-the-counter medicines, vitamins, or herbal medicines? no   Are you having any side effects from any of your medications? -  no  Have you stopped taking any of your medications? Is so, why? -  yes - ran out    Have you seen any other physician or provider since your last visit? No  Have you had any other diagnostic tests since your last visit? No  Have you been seen in the emergency room and/or had an admission to a hospital since we last saw you? No  Have you had your routine dental cleaning in the past 6 months? no    Have you activated your [employer] account? If not, what are your barriers? Yes     Patient Care Team:  Alfredito Ramirez MD as PCP - General (Family Medicine)  Alfredito Ramirez MD as PCP - [facility]  Candace Mohan MD as Orthopedic Surgeon (Orthopedic Surgery)  Shruthi Diaz MD (Neurology)  Bo Dumont MD as Consulting Physician (Gastroenterology)    Medical History Review  Past Medical, Family, and Social History reviewed and does not contribute to the patient presenting condition    Health Maintenance   Topic Date Due    Hepatitis C screen  1955    Pneumococcal 0-64 years Vaccine (1 of 1 - PPSV23) 09/03/1961    HIV screen  09/03/1970    Shingles Vaccine (1 of 2) 09/03/2005    Colon cancer screen colonoscopy  03/19/2015    A1C test (Diabetic or Prediabetic)  11/01/2020    Lipid screen  11/01/2020    Potassium monitoring  11/01/2020    Creatinine monitoring  11/01/2020    DTaP/Tdap/Td vaccine (2 - Td) 06/12/2025    Flu vaccine  Completed     HPI  Patient is a 79-year-old obese white male who presents for hypertension, hyperlipidemia, vitamin D deficiency, impaired fasting glucose. Patient also states about 6 days ago he started having right lower abdominal pain.   He states that wheezing. Abdominal:      General: There is no distension. Palpations: Abdomen is soft. There is no mass. Tenderness: There is no tenderness. Skin:     General: Skin is warm and dry. Findings: No rash. Neurological:      Mental Status: He is alert and oriented to person, place, and time. Psychiatric:         Mood and Affect: Mood normal.         Behavior: Behavior normal.         Assessment:       Diagnosis Orders   1. Essential hypertension  CBC Auto Differential   2. Mixed hyperlipidemia     3. Vitamin D deficiency     4. Impaired fasting glucose     5. Right lower quadrant abdominal pain  CBC Auto Differential           Plan:       Samuel received counseling on the following healthy behaviors: nutrition and medication adherence  Reviewed prior labs and health maintenance  Continue current medications, diet and exercise. Discussed use, benefit, and side effects of prescribed medications. Barriers to medication compliance addressed. Patient given educational materials - see patient instructions  Was a self-tracking handout given in paper form or via [employer]? No:     Requested Prescriptions      No prescriptions requested or ordered in this encounter       All patient questions answered. Patient voiced understanding. Quality Measures    Body mass index is 33.98 kg/m². Elevated. Weight control planned discussed Healthy diet and regular exercise. BP: 136/80 Blood pressure is normal. Treatment plan consists of Weight Reduction.     Lab Results   Component Value Date    LDLCHOLESTEROL 63 11/01/2019    (goal LDL reduction with dx if diabetes is 50% LDL reduction)      PHQ Scores 1/13/2020 3/5/2019 5/15/2018 5/4/2017 4/19/2016   PHQ2 Score 0 0 0 0 0   PHQ9 Score 0 0 0 0 0     Interpretation of Total Score Depression Severity: 1-4 = Minimal depression, 5-9 = Mild depression, 10-14 = Moderate depression, 15-19 = Moderately severe depression, 20-27 = Severe depression  Orders Placed This Encounter   Procedures    CBC Auto Differential     Standing Status:   Future     Standing Expiration Date:   1/13/2021         Patient referred to general surgery for his abdominal pain and possible colonoscopy  Continue routine medication  Follow-up in 3 to 4 months

## 2020-01-28 RX ORDER — ATORVASTATIN CALCIUM 20 MG/1
TABLET, FILM COATED ORAL
Qty: 30 TABLET | Refills: 3 | Status: SHIPPED | OUTPATIENT
Start: 2020-01-28 | End: 2020-07-28

## 2020-01-28 RX ORDER — PANTOPRAZOLE SODIUM 40 MG/1
TABLET, DELAYED RELEASE ORAL
Qty: 30 TABLET | Refills: 3 | Status: SHIPPED | OUTPATIENT
Start: 2020-01-28 | End: 2020-04-01 | Stop reason: SDUPTHER

## 2020-02-04 ENCOUNTER — HOSPITAL ENCOUNTER (OUTPATIENT)
Dept: PREADMISSION TESTING | Age: 65
Discharge: HOME | End: 2020-02-08
Payer: COMMERCIAL

## 2020-02-04 VITALS
HEIGHT: 67 IN | WEIGHT: 215.7 LBS | BODY MASS INDEX: 33.85 KG/M2 | TEMPERATURE: 97.9 F | SYSTOLIC BLOOD PRESSURE: 124 MMHG | RESPIRATION RATE: 20 BRPM | DIASTOLIC BLOOD PRESSURE: 72 MMHG | OXYGEN SATURATION: 97 % | HEART RATE: 69 BPM

## 2020-02-04 LAB
ABSOLUTE EOS #: 0.2 K/UL (ref 0–0.4)
ABSOLUTE IMMATURE GRANULOCYTE: ABNORMAL K/UL (ref 0–0.3)
ABSOLUTE LYMPH #: 1.8 K/UL (ref 1–4.8)
ABSOLUTE MONO #: 0.6 K/UL (ref 0.1–1.3)
ANION GAP SERPL CALCULATED.3IONS-SCNC: 12 MMOL/L (ref 9–17)
BASOPHILS # BLD: 1 % (ref 0–2)
BASOPHILS ABSOLUTE: 0 K/UL (ref 0–0.2)
BUN BLDV-MCNC: 19 MG/DL (ref 8–23)
BUN/CREAT BLD: ABNORMAL (ref 9–20)
CALCIUM SERPL-MCNC: 9.3 MG/DL (ref 8.6–10.4)
CHLORIDE BLD-SCNC: 104 MMOL/L (ref 98–107)
CO2: 21 MMOL/L (ref 20–31)
CREAT SERPL-MCNC: 0.9 MG/DL (ref 0.7–1.2)
DIFFERENTIAL TYPE: ABNORMAL
EOSINOPHILS RELATIVE PERCENT: 3 % (ref 0–4)
GFR AFRICAN AMERICAN: >60 ML/MIN
GFR NON-AFRICAN AMERICAN: >60 ML/MIN
GFR SERPL CREATININE-BSD FRML MDRD: ABNORMAL ML/MIN/{1.73_M2}
GFR SERPL CREATININE-BSD FRML MDRD: ABNORMAL ML/MIN/{1.73_M2}
GLUCOSE BLD-MCNC: 124 MG/DL (ref 70–99)
HCT VFR BLD CALC: 46.2 % (ref 41–53)
HEMOGLOBIN: 15.6 G/DL (ref 13.5–17.5)
IMMATURE GRANULOCYTES: ABNORMAL %
LYMPHOCYTES # BLD: 24 % (ref 24–44)
MCH RBC QN AUTO: 32.7 PG (ref 26–34)
MCHC RBC AUTO-ENTMCNC: 33.7 G/DL (ref 31–37)
MCV RBC AUTO: 97.1 FL (ref 80–100)
MONOCYTES # BLD: 8 % (ref 1–7)
NRBC AUTOMATED: ABNORMAL PER 100 WBC
PDW BLD-RTO: 13.4 % (ref 11.5–14.9)
PLATELET # BLD: 171 K/UL (ref 150–450)
PLATELET ESTIMATE: ABNORMAL
PMV BLD AUTO: 10.5 FL (ref 6–12)
POTASSIUM SERPL-SCNC: 4.2 MMOL/L (ref 3.7–5.3)
RBC # BLD: 4.76 M/UL (ref 4.5–5.9)
RBC # BLD: ABNORMAL 10*6/UL
SEG NEUTROPHILS: 64 % (ref 36–66)
SEGMENTED NEUTROPHILS ABSOLUTE COUNT: 4.8 K/UL (ref 1.3–9.1)
SODIUM BLD-SCNC: 137 MMOL/L (ref 135–144)
WBC # BLD: 7.5 K/UL (ref 3.5–11)
WBC # BLD: ABNORMAL 10*3/UL

## 2020-02-04 PROCEDURE — 36415 COLL VENOUS BLD VENIPUNCTURE: CPT

## 2020-02-04 PROCEDURE — 93005 ELECTROCARDIOGRAM TRACING: CPT | Performed by: SURGERY

## 2020-02-04 PROCEDURE — 80048 BASIC METABOLIC PNL TOTAL CA: CPT

## 2020-02-04 PROCEDURE — 85025 COMPLETE CBC W/AUTO DIFF WBC: CPT

## 2020-02-04 NOTE — H&P
 None   Social Needs    Financial resource strain: None    Food insecurity:     Worry: None     Inability: None    Transportation needs:     Medical: None     Non-medical: None   Tobacco Use    Smoking status: Current Every Day Smoker     Packs/day: 1.00     Years: 40.00     Pack years: 40.00    Smokeless tobacco: Never Used   Substance and Sexual Activity    Alcohol use: Yes     Comment: rarely    Drug use: No    Sexual activity: Yes     Partners: Female   Lifestyle    Physical activity:     Days per week: None     Minutes per session: None    Stress: None   Relationships    Social connections:     Talks on phone: None     Gets together: None     Attends Oriental orthodox service: None     Active member of club or organization: None     Attends meetings of clubs or organizations: None     Relationship status: None    Intimate partner violence:     Fear of current or ex partner: None     Emotionally abused: None     Physically abused: None     Forced sexual activity: None   Other Topics Concern    None   Social History Narrative    None           REVIEW OF SYSTEMS      No Known Allergies    Current Outpatient Medications on File Prior to Encounter   Medication Sig Dispense Refill    metoprolol tartrate (LOPRESSOR) 25 MG tablet take 1 tablet by mouth twice a day 60 tablet 3    atorvastatin (LIPITOR) 20 MG tablet take 1 tablet by mouth at bedtime 30 tablet 3    pantoprazole (PROTONIX) 40 MG tablet take 1 tablet by mouth once daily 30 tablet 3    lisinopril (PRINIVIL;ZESTRIL) 5 MG tablet take 1 tablet by mouth once daily 90 tablet 1    vitamin D (ERGOCALCIFEROL) 1.25 MG (73476 UT) CAPS capsule take 1 capsule by mouth every week 12 capsule 1    ketorolac (TORADOL) 10 MG tablet Take 1 tablet by mouth every 6 hours as needed for Pain Take with food 20 tablet 0    tiZANidine (ZANAFLEX) 4 MG capsule Take 1 capsule by mouth 3 times daily as needed for Muscle spasms 30 capsule 0    loratadine (CLARITIN) 10 MG tablet Take 1 tablet by mouth daily 30 tablet 3    fluticasone (FLONASE) 50 MCG/ACT nasal spray 1 spray by Nasal route 2 times daily 1 Bottle 1    aspirin 81 MG chewable tablet Take 1 tablet by mouth daily 30 tablet 3    alendronate (FOSAMAX) 70 MG tablet take 1 tablet by mouth every week (Patient taking differently: take 1 tablet by mouth every week (Fridays)) 4 tablet 5     No current facility-administered medications on file prior to encounter. General health:  Fairly good. No fever or chills. Skin:  No itching, redness or rash. HEENT:  No headache, epistaxis or sore throat. Neck:  No pain, stiffness or masses. Cardiovascular/Respiratory system:  No chest pain, palpitation or shortness of breath. Gastrointestinal tract: See HPI              Genitourinary:  No burning on micturition. No hesitancy, urgency, frequency or discoloration of urine. Locomotor:  No bone or joint pains. No swelling. Neuropsychiatric:  No referable complaints. GENERAL PHYSICAL EXAM:     Vitals: /72 Comment: lt. arm  Pulse 69   Temp 97.9 °F (36.6 °C) (Oral)   Resp 20   Ht 5' 7\" (1.702 m)   Wt 215 lb 11.2 oz (97.8 kg)   SpO2 97%   BMI 33.78 kg/m²  Body mass index is 33.78 kg/m². GENERAL APPEARANCE:   [facility] 59 [facility]  male, moderately obese, nourished, conscious, alert. Does not appear to be distress or pain at this time. SKIN:  Warm, dry, no cyanosis or jaundice. HEAD:  Normocephalic, atraumatic, no swelling or tenderness. EYES:  Pupils equal, reactive to light. EARS:  No discharge, no marked hearing loss. NOSE:  No rhinorrhea, epistaxis or septal deformity. THROAT:  Not congested. No ulceration bleeding or discharge.                   NECK:  No stiffness, trachea central.                  CHEST:  Symmetrical and equal on expansion. HEART:  RRR S1 > S2. No audible murmurs or gallops. LUNGS:  Equal on expansion, normal breath sounds. No adventitious sounds. ABDOMEN:  Obese. Soft on palpation. No localized tenderness. No guarding or rigidity. No palpable hepatosplenomegaly. Umbilical hernia noted upon cough. LYMPHATICS:  No palpable cervical lymphadenopathy. LOCOMOTOR, BACK AND SPINE:  No tenderness or deformities. EXTREMITIES:  Symmetrical, no pretibial edema. Rosios sign negative or no calf tenderness. No discoloration or ulcerations. NEUROLOGIC:  The patient is conscious, alert, oriented,Cranial nerve II-XII intact, taste and smell were not examined. No apparent focal sensory or motor deficits.                                                                                      PROVISIONAL DIAGNOSES / SURGERY:      UMBILICAL HERNIA     HERNIA UMBILICAL REPAIR OPEN W/MESH      Patient Active Problem List    Diagnosis Date Noted    Tear of left rotator cuff 04/06/2019    Left bicipital tenosynovitis 04/06/2019    Obesity (BMI 30.0-34.9) 05/15/2018    Essential hypertension 05/04/2017    Impaired fasting glucose 09/04/2014    Mixed hyperlipidemia 09/04/2014    Allergic rhinitis 09/30/2013    Low back pain 06/04/2013    Degeneration of lumbar or lumbosacral intervertebral disc 05/09/2013    Back pain 05/09/2013    Osteoporosis 04/25/2013    Cigarette smoker 04/25/2013    Secondary hyperparathyroidism ([facility] [address] 04/25/2013    Vitamin D deficiency 04/25/2013    History of stroke 04/25/2013           TWAN Tobias, KAMALA - CNP on 2/4/2020 at 8:56 AM

## 2020-02-05 ENCOUNTER — ANESTHESIA EVENT (OUTPATIENT)
Dept: ENDOSCOPY | Age: 65
End: 2020-02-05

## 2020-02-05 RX ORDER — SODIUM CHLORIDE, SODIUM LACTATE, POTASSIUM CHLORIDE, CALCIUM CHLORIDE 600; 310; 30; 20 MG/100ML; MG/100ML; MG/100ML; MG/100ML
INJECTION, SOLUTION INTRAVENOUS CONTINUOUS
Status: CANCELLED | OUTPATIENT
Start: 2020-02-05

## 2020-02-05 RX ORDER — LIDOCAINE HYDROCHLORIDE 10 MG/ML
1 INJECTION, SOLUTION EPIDURAL; INFILTRATION; INTRACAUDAL; PERINEURAL
Status: CANCELLED | OUTPATIENT
Start: 2020-02-05 | End: 2020-02-05

## 2020-02-05 RX ORDER — SODIUM CHLORIDE 0.9 % (FLUSH) 0.9 %
10 SYRINGE (ML) INJECTION EVERY 12 HOURS SCHEDULED
Status: CANCELLED | OUTPATIENT
Start: 2020-02-05

## 2020-02-05 RX ORDER — SODIUM CHLORIDE 0.9 % (FLUSH) 0.9 %
10 SYRINGE (ML) INJECTION PRN
Status: CANCELLED | OUTPATIENT
Start: 2020-02-05

## 2020-02-06 LAB
EKG ATRIAL RATE: 63 BPM
EKG P AXIS: 7 DEGREES
EKG P-R INTERVAL: 184 MS
EKG Q-T INTERVAL: 410 MS
EKG QRS DURATION: 94 MS
EKG QTC CALCULATION (BAZETT): 419 MS
EKG R AXIS: -7 DEGREES
EKG T AXIS: 11 DEGREES
EKG VENTRICULAR RATE: 63 BPM

## 2020-02-06 PROCEDURE — 93010 ELECTROCARDIOGRAM REPORT: CPT | Performed by: INTERNAL MEDICINE

## 2020-02-11 ENCOUNTER — ANESTHESIA (OUTPATIENT)
Dept: ENDOSCOPY | Age: 65
End: 2020-02-11

## 2020-02-11 ENCOUNTER — HOSPITAL ENCOUNTER (OUTPATIENT)
Age: 65
Setting detail: OUTPATIENT SURGERY
Discharge: HOME | End: 2020-02-11
Attending: SURGERY | Admitting: SURGERY

## 2020-02-11 VITALS — DIASTOLIC BLOOD PRESSURE: 75 MMHG | TEMPERATURE: 63.3 F | SYSTOLIC BLOOD PRESSURE: 173 MMHG | OXYGEN SATURATION: 97 %

## 2020-02-11 VITALS
HEART RATE: 68 BPM | SYSTOLIC BLOOD PRESSURE: 138 MMHG | DIASTOLIC BLOOD PRESSURE: 80 MMHG | HEIGHT: 67 IN | BODY MASS INDEX: 33.74 KG/M2 | RESPIRATION RATE: 18 BRPM | WEIGHT: 215 LBS | TEMPERATURE: 98.4 F | OXYGEN SATURATION: 96 %

## 2020-02-11 PROCEDURE — 6360000002 HC RX W HCPCS

## 2020-02-11 PROCEDURE — 7100000001 HC PACU RECOVERY - ADDTL 15 MIN: Performed by: SURGERY

## 2020-02-11 PROCEDURE — 2709999900 HC NON-CHARGEABLE SUPPLY: Performed by: SURGERY

## 2020-02-11 PROCEDURE — 2580000003 HC RX 258: Performed by: ANESTHESIOLOGY

## 2020-02-11 PROCEDURE — 2500000003 HC RX 250 WO HCPCS

## 2020-02-11 PROCEDURE — 7100000000 HC PACU RECOVERY - FIRST 15 MIN: Performed by: SURGERY

## 2020-02-11 PROCEDURE — 88305 TISSUE EXAM BY PATHOLOGIST: CPT

## 2020-02-11 PROCEDURE — 3700000000 HC ANESTHESIA ATTENDED CARE: Performed by: SURGERY

## 2020-02-11 PROCEDURE — 3609010400 HC COLONOSCOPY POLYPECTOMY HOT BIOPSY: Performed by: SURGERY

## 2020-02-11 PROCEDURE — 7100000031 HC ASPR PHASE II RECOVERY - ADDTL 15 MIN: Performed by: SURGERY

## 2020-02-11 PROCEDURE — 2720000010 HC SURG SUPPLY STERILE: Performed by: SURGERY

## 2020-02-11 PROCEDURE — 7100000010 HC PHASE II RECOVERY - FIRST 15 MIN: Performed by: SURGERY

## 2020-02-11 PROCEDURE — 3700000001 HC ADD 15 MINUTES (ANESTHESIA): Performed by: SURGERY

## 2020-02-11 PROCEDURE — 7100000011 HC PHASE II RECOVERY - ADDTL 15 MIN: Performed by: SURGERY

## 2020-02-11 PROCEDURE — 7100000030 HC ASPR PHASE II RECOVERY - FIRST 15 MIN: Performed by: SURGERY

## 2020-02-11 DEVICE — WORKING LENGTH 235CM, WORKING CHANNEL 2.8MM
Type: IMPLANTABLE DEVICE | Site: TRANSVERSE COLON | Status: FUNCTIONAL
Brand: RESOLUTION 360 CLIP

## 2020-02-11 RX ORDER — SODIUM CHLORIDE 0.9 % (FLUSH) 0.9 %
10 SYRINGE (ML) INJECTION PRN
Status: DISCONTINUED | OUTPATIENT
Start: 2020-02-11 | End: 2020-02-11 | Stop reason: HOSPADM

## 2020-02-11 RX ORDER — SODIUM CHLORIDE, SODIUM LACTATE, POTASSIUM CHLORIDE, CALCIUM CHLORIDE 600; 310; 30; 20 MG/100ML; MG/100ML; MG/100ML; MG/100ML
INJECTION, SOLUTION INTRAVENOUS CONTINUOUS
Status: DISCONTINUED | OUTPATIENT
Start: 2020-02-11 | End: 2020-02-11 | Stop reason: HOSPADM

## 2020-02-11 RX ORDER — PROPOFOL 10 MG/ML
INJECTION, EMULSION INTRAVENOUS PRN
Status: DISCONTINUED | OUTPATIENT
Start: 2020-02-11 | End: 2020-02-11 | Stop reason: SDUPTHER

## 2020-02-11 RX ORDER — SODIUM CHLORIDE 0.9 % (FLUSH) 0.9 %
10 SYRINGE (ML) INJECTION EVERY 12 HOURS SCHEDULED
Status: DISCONTINUED | OUTPATIENT
Start: 2020-02-11 | End: 2020-02-11 | Stop reason: HOSPADM

## 2020-02-11 RX ORDER — LIDOCAINE HYDROCHLORIDE 10 MG/ML
INJECTION, SOLUTION EPIDURAL; INFILTRATION; INTRACAUDAL; PERINEURAL PRN
Status: DISCONTINUED | OUTPATIENT
Start: 2020-02-11 | End: 2020-02-11 | Stop reason: SDUPTHER

## 2020-02-11 RX ORDER — LIDOCAINE HYDROCHLORIDE 10 MG/ML
1 INJECTION, SOLUTION EPIDURAL; INFILTRATION; INTRACAUDAL; PERINEURAL
Status: DISCONTINUED | OUTPATIENT
Start: 2020-02-11 | End: 2020-02-11 | Stop reason: HOSPADM

## 2020-02-11 RX ORDER — MIDAZOLAM HYDROCHLORIDE 1 MG/ML
INJECTION INTRAMUSCULAR; INTRAVENOUS PRN
Status: DISCONTINUED | OUTPATIENT
Start: 2020-02-11 | End: 2020-02-11 | Stop reason: SDUPTHER

## 2020-02-11 RX ORDER — ONDANSETRON 2 MG/ML
INJECTION INTRAMUSCULAR; INTRAVENOUS PRN
Status: DISCONTINUED | OUTPATIENT
Start: 2020-02-11 | End: 2020-02-11 | Stop reason: SDUPTHER

## 2020-02-11 RX ADMIN — MIDAZOLAM 2 MG: 1 INJECTION INTRAMUSCULAR; INTRAVENOUS at 15:51

## 2020-02-11 RX ADMIN — ONDANSETRON 4 MG: 2 INJECTION INTRAMUSCULAR; INTRAVENOUS at 17:00

## 2020-02-11 RX ADMIN — SODIUM CHLORIDE, POTASSIUM CHLORIDE, SODIUM LACTATE AND CALCIUM CHLORIDE: 600; 310; 30; 20 INJECTION, SOLUTION INTRAVENOUS at 13:57

## 2020-02-11 RX ADMIN — PROPOFOL 150 MG: 10 INJECTION, EMULSION INTRAVENOUS at 16:00

## 2020-02-11 RX ADMIN — LIDOCAINE HYDROCHLORIDE 50 MG: 10 INJECTION, SOLUTION EPIDURAL; INFILTRATION; INTRACAUDAL; PERINEURAL at 16:00

## 2020-02-11 ASSESSMENT — PULMONARY FUNCTION TESTS
PIF_VALUE: 3
PIF_VALUE: 3
PIF_VALUE: 5
PIF_VALUE: 18
PIF_VALUE: 3
PIF_VALUE: 2
PIF_VALUE: 0
PIF_VALUE: 3
PIF_VALUE: 1
PIF_VALUE: 3
PIF_VALUE: 4
PIF_VALUE: 3
PIF_VALUE: 0
PIF_VALUE: 3
PIF_VALUE: 1
PIF_VALUE: 3
PIF_VALUE: 20
PIF_VALUE: 3
PIF_VALUE: 2
PIF_VALUE: 3
PIF_VALUE: 2
PIF_VALUE: 3
PIF_VALUE: 3
PIF_VALUE: 0
PIF_VALUE: 3
PIF_VALUE: 19
PIF_VALUE: 5
PIF_VALUE: 3
PIF_VALUE: 3
PIF_VALUE: 1
PIF_VALUE: 3
PIF_VALUE: 4
PIF_VALUE: 0
PIF_VALUE: 3
PIF_VALUE: 2
PIF_VALUE: 3
PIF_VALUE: 4
PIF_VALUE: 3
PIF_VALUE: 20
PIF_VALUE: 3
PIF_VALUE: 4
PIF_VALUE: 1
PIF_VALUE: 1
PIF_VALUE: 4
PIF_VALUE: 1
PIF_VALUE: 3

## 2020-02-11 ASSESSMENT — PAIN SCALES - GENERAL
PAINLEVEL_OUTOF10: 0

## 2020-02-11 ASSESSMENT — LIFESTYLE VARIABLES: SMOKING_STATUS: 1

## 2020-02-11 NOTE — H&P
HISTORY and [address]       NAME:  Kaelyn Vasquez  MRN: [id number]   YOB: 1955   Date: 2/11/2020   Age: 59 y.o. Gender: male       COMPLAINT AND PRESENT HISTORY:   Kaelyn Vasquez is 59 y.o.,  male, Patient is here Colonoscopy today and    He will be having [address] W/MESH tomorrow  2/12/2020. Pt has had prior Colonoscopy approx 6 yrs ago, pt believes that he had x2 polyps. No changes in bowel pattern. No hx of GI/RECTAL bleeding. No hx of abdominal cramping. Pt has heartburn and reflux he is on PPI. Patient has symptoms of : bulging out and discomfort. Patient noticed the Hernia greater than  5 years ago. The Hernia grew in size. The Hernia is  Tender and  expansile especially when leaning on things. Pt is more relieved with hernia when: lying down, leaning on the hernia aggravates hernia more. Pt denies any constipation or bowel blockage. No fever or chills. Pt is current smoker. PAST MEDICAL HISTORY     Past Medical History:   Diagnosis Date    Essential hypertension 5/4/2017    Hyperlipidemia 9/4/2014    Hyperparathyroidism ([address]     Obesity (BMI 30-39. 9) 5/4/2017    Osteoporosis     Sciatica     Stroke ([address]     Vertebral fracture, osteoporotic ([address]        SURGICAL HISTORY       Past Surgical History:   Procedure Laterality Date    COLONOSCOPY  3/19/2014    tubular adenoma x 2, inadequate prep, some polyps not removed, needs colonoscopy in 6-12 months    HERNIA REPAIR      rt. inguinal    VASECTOMY         FAMILY HISTORY       Family History   Problem Relation Age of Onset    Heart Disease Father        SOCIAL HISTORY       Social History     Socioeconomic History    Marital status:      Spouse name: None    Number of children: None    Years of education: None    Highest education level: None   Occupational History    None   Social Needs    Financial resource strain: None    Food (CLARITIN) 10 MG tablet Take 1 tablet by mouth daily 30 tablet 3    fluticasone (FLONASE) 50 MCG/ACT nasal spray 1 spray by Nasal route 2 times daily 1 Bottle 1    aspirin 81 MG chewable tablet Take 1 tablet by mouth daily 30 tablet 3    alendronate (FOSAMAX) 70 MG tablet take 1 tablet by mouth every week (Patient taking differently: take 1 tablet by mouth every week (Fridays)) 4 tablet 5       Negative except for what is mentioned in the HPI. GENERAL PHYSICAL EXAM     Vitals: BP (!) 150/98   Pulse 71   Temp 97.9 °F (36.6 °C) (Oral)   Resp 18   Ht 5' 7\" (1.702 m)   Wt 215 lb (97.5 kg)   SpO2 97%   BMI 33.67 kg/m²  Body mass index is 33.67 kg/m². GENERAL APPEARANCE:   Zita Kim is 59 y.o.,  male, moderately obese, nourished, conscious, alert. Does not appear to be distress or pain at this time. SKIN:  Warm, dry, no cyanosis or jaundice. HEAD:  Normocephalic, atraumatic, no swelling or tenderness. EYES:  Pupils equal, reactive to light. EARS:  No discharge, no marked hearing loss. NOSE:  No rhinorrhea, epistaxis or septal deformity. THROAT:  Not congested. No ulceration bleeding or discharge. NECK:  No stiffness, trachea central.  No palpable masses or L.N.                 CHEST:  Symmetrical and equal on expansion. HEART:  RRR S1 > S2. No audible murmurs or gallops. LUNGS:  Equal on expansion, normal breath sounds. No adventitious sounds. ABDOMEN:  Obese. Soft on palpation. No dysphagia, No localized tenderness. No guarding or rigidity. No palpable hepatosplenomegaly. LYMPHATICS:  No palpable cervical lymphadenopathy. LOCOMOTOR, BACK AND SPINE:  No tenderness or deformities. EXTREMITIES:  Symmetrical, no pretibial edema. Rosios sign negative. No discoloration or ulcerations.     NEUROLOGIC: The patient is conscious, alert, oriented,Cranial nerve II-XII intact, taste and smell were not examined. No apparent focal sensory or motor deficits.              PROVISIONAL DIAGNOSES / SURGERY:      ABDOMINAL PAIN    HERNIA UMBILICAL REPAIR OPEN W/MESH    Patient Active Problem List    Diagnosis Date Noted    Tear of left rotator cuff 04/06/2019    Left bicipital tenosynovitis 04/06/2019    Obesity (BMI 30.0-34.9) 05/15/2018    Essential hypertension 05/04/2017    Impaired fasting glucose 09/04/2014    Mixed hyperlipidemia 09/04/2014    Allergic rhinitis 09/30/2013    Low back pain 06/04/2013    Degeneration of lumbar or lumbosacral intervertebral disc 05/09/2013    Back pain 05/09/2013    Osteoporosis 04/25/2013    Cigarette smoker 04/25/2013    Secondary hyperparathyroidism ([facility] [address] 04/25/2013    Vitamin D deficiency 04/25/2013    History of stroke 04/25/2013           TWAN Ledezma, APRN - CNP on 2/11/2020 at 3:51 PM

## 2020-02-11 NOTE — H&P (VIEW-ONLY)
HISTORY and [address]       NAME:  Michaelle Flood  MRN: [id number]   YOB: 1955   Date: 2/11/2020   Age: 59 y.o. Gender: male       COMPLAINT AND PRESENT HISTORY:   Michaelle Flood is 59 y.o.,  male, Patient is here Colonoscopy today and    He will be having [address] W/MESH tomorrow  2/12/2020. Pt has had prior Colonoscopy approx 6 yrs ago, pt believes that he had x2 polyps. No changes in bowel pattern. No hx of GI/RECTAL bleeding. No hx of abdominal cramping. Pt has heartburn and reflux he is on PPI. Patient has symptoms of : bulging out and discomfort. Patient noticed the Hernia greater than  5 years ago. The Hernia grew in size. The Hernia is  Tender and  expansile especially when leaning on things. Pt is more relieved with hernia when: lying down, leaning on the hernia aggravates hernia more. Pt denies any constipation or bowel blockage. No fever or chills. Pt is current smoker. PAST MEDICAL HISTORY     Past Medical History:   Diagnosis Date    Essential hypertension 5/4/2017    Hyperlipidemia 9/4/2014    Hyperparathyroidism ([address]     Obesity (BMI 30-39. 9) 5/4/2017    Osteoporosis     Sciatica     Stroke ([address]     Vertebral fracture, osteoporotic ([address]        SURGICAL HISTORY       Past Surgical History:   Procedure Laterality Date    COLONOSCOPY  3/19/2014    tubular adenoma x 2, inadequate prep, some polyps not removed, needs colonoscopy in 6-12 months    HERNIA REPAIR      rt. inguinal    VASECTOMY         FAMILY HISTORY       Family History   Problem Relation Age of Onset    Heart Disease Father        SOCIAL HISTORY       Social History     Socioeconomic History    Marital status:      Spouse name: None    Number of children: None    Years of education: None    Highest education level: None   Occupational History    None   Social Needs    Financial resource strain: None    Food

## 2020-02-11 NOTE — ANESTHESIA PRE PROCEDURE
Liliane Bradford  mL/hr at 02/11/20 1357      lidocaine PF 1 % injection 1 mL  1 mL Intradermal Once PRN Jovanni Crisostomo MD        sodium chloride flush 0.9 % injection 10 mL  10 mL Intravenous 2 times per day Jovanni Crisostomo MD        sodium chloride flush 0.9 % injection 10 mL  10 mL Intravenous PRN Jovanni Crisostomo MD           Allergies:  No Known Allergies    Problem List:    Patient Active Problem List   Diagnosis Code    Osteoporosis M81.0    Cigarette smoker F17.210    Secondary hyperparathyroidism ([facility] [address] N25.81    Vitamin D deficiency E55.9    History of stroke Z86.73    Degeneration of lumbar or lumbosacral intervertebral disc M51.37    Back pain M54.9    Low back pain M54.5    Allergic rhinitis J30.9    Impaired fasting glucose R73.01    Mixed hyperlipidemia E78.2    Essential hypertension I10    Obesity (BMI 30.0-34. 9) E66.9    Tear of left rotator cuff M75.102    Left bicipital tenosynovitis M75.22       Past Medical History:        Diagnosis Date    Essential hypertension 5/4/2017    Hyperlipidemia 9/4/2014    Hyperparathyroidism ([facility] [address]     Obesity (BMI 30-39. 9) 5/4/2017    Osteoporosis     Sciatica     Stroke [facility])     Vertebral fracture, osteoporotic (HCC)        Past Surgical History:        Procedure Laterality Date    COLONOSCOPY  3/19/2014    tubular adenoma x 2, inadequate prep, some polyps not removed, needs colonoscopy in 6-12 months    HERNIA REPAIR      rt. inguinal    VASECTOMY         Social History:    Social History     Tobacco Use    Smoking status: Current Every Day Smoker     Packs/day: 1.00     Years: 40.00     Pack years: 40.00    Smokeless tobacco: Never Used   Substance Use Topics    Alcohol use: Yes     Comment: rarely                                Ready to quit: Not Answered  Counseling given: Not Answered      Vital Signs (Current):   Vitals:    02/11/20 1343 02/11/20 1351   BP:  (!) 150/98   Pulse:  71   Resp:  18   Temp:  97.9 °F (36.6 °C)   TempSrc:  Oral FB Dental: normal exam         Pulmonary:normal exam  breath sounds clear to auscultation  (+) current smoker          Patient did not smoke on day of surgery. Cardiovascular:    (+) hypertension:,         Rhythm: regular  Rate: normal           Beta Blocker:  Dose within 24 Hrs         Neuro/Psych:   (+) CVA:, neuromuscular disease:,              ROS comment: Degeneration of lumbar or lumbosacral intervertebral disc GI/Hepatic/Renal: Neg GI/Hepatic/Renal ROS            Endo/Other:                      ROS comment: Osteoporosis  Obesity Abdominal:           Vascular:                                        Anesthesia Plan      general     ASA 3       Induction: intravenous. MIPS: Prophylactic antiemetics administered. Anesthetic plan and risks discussed with patient. Plan discussed with CRNA.                   Felicity Thornton MD   2/11/2020

## 2020-02-12 ENCOUNTER — HOSPITAL ENCOUNTER (INPATIENT)
Age: 65
LOS: 7 days | Discharge: HOME HEALTH | DRG: 330 | End: 2020-02-19
Attending: SURGERY | Admitting: SURGERY

## 2020-02-12 ENCOUNTER — ANESTHESIA EVENT (OUTPATIENT)
Dept: OPERATING ROOM | Age: 65
DRG: 330 | End: 2020-02-12

## 2020-02-12 ENCOUNTER — ANESTHESIA (OUTPATIENT)
Dept: OPERATING ROOM | Age: 65
DRG: 330 | End: 2020-02-12

## 2020-02-12 VITALS — DIASTOLIC BLOOD PRESSURE: 93 MMHG | OXYGEN SATURATION: 95 % | TEMPERATURE: 97.2 F | SYSTOLIC BLOOD PRESSURE: 186 MMHG

## 2020-02-12 PROBLEM — K63.5 COLON POLYP: Status: ACTIVE | Noted: 2020-02-12

## 2020-02-12 LAB
BILIRUBIN URINE: NEGATIVE
COLOR: YELLOW
COMMENT UA: ABNORMAL
GLUCOSE BLD-MCNC: 132 MG/DL (ref 75–110)
GLUCOSE URINE: NEGATIVE
KETONES, URINE: ABNORMAL
LEUKOCYTE ESTERASE, URINE: NEGATIVE
NITRITE, URINE: NEGATIVE
PH UA: 5.5 (ref 5–8)
PROTEIN UA: NEGATIVE
SPECIFIC GRAVITY UA: 1.02 (ref 1–1.03)
TURBIDITY: CLEAR
URINE HGB: NEGATIVE
UROBILINOGEN, URINE: NORMAL

## 2020-02-12 PROCEDURE — 2500000003 HC RX 250 WO HCPCS: Performed by: NURSE ANESTHETIST, CERTIFIED REGISTERED

## 2020-02-12 PROCEDURE — 88307 TISSUE EXAM BY PATHOLOGIST: CPT

## 2020-02-12 PROCEDURE — 2500000003 HC RX 250 WO HCPCS: Performed by: SURGERY

## 2020-02-12 PROCEDURE — 2580000003 HC RX 258: Performed by: ANESTHESIOLOGY

## 2020-02-12 PROCEDURE — 2500000003 HC RX 250 WO HCPCS: Performed by: ANESTHESIOLOGY

## 2020-02-12 PROCEDURE — 88305 TISSUE EXAM BY PATHOLOGIST: CPT

## 2020-02-12 PROCEDURE — 3600000013 HC SURGERY LEVEL 3 ADDTL 15MIN: Performed by: SURGERY

## 2020-02-12 PROCEDURE — 3700000000 HC ANESTHESIA ATTENDED CARE: Performed by: SURGERY

## 2020-02-12 PROCEDURE — 2580000003 HC RX 258: Performed by: SURGERY

## 2020-02-12 PROCEDURE — 6360000002 HC RX W HCPCS: Performed by: NURSE ANESTHETIST, CERTIFIED REGISTERED

## 2020-02-12 PROCEDURE — 3700000001 HC ADD 15 MINUTES (ANESTHESIA): Performed by: SURGERY

## 2020-02-12 PROCEDURE — 0DBU0ZZ EXCISION OF OMENTUM, OPEN APPROACH: ICD-10-PCS | Performed by: SURGERY

## 2020-02-12 PROCEDURE — 3600000003 HC SURGERY LEVEL 3 BASE: Performed by: SURGERY

## 2020-02-12 PROCEDURE — 6360000002 HC RX W HCPCS: Performed by: ANESTHESIOLOGY

## 2020-02-12 PROCEDURE — 2709999900 HC NON-CHARGEABLE SUPPLY: Performed by: SURGERY

## 2020-02-12 PROCEDURE — 0DTF0ZZ RESECTION OF RIGHT LARGE INTESTINE, OPEN APPROACH: ICD-10-PCS | Performed by: SURGERY

## 2020-02-12 PROCEDURE — 2720000010 HC SURG SUPPLY STERILE: Performed by: SURGERY

## 2020-02-12 PROCEDURE — C1765 ADHESION BARRIER: HCPCS | Performed by: SURGERY

## 2020-02-12 PROCEDURE — 81003 URINALYSIS AUTO W/O SCOPE: CPT

## 2020-02-12 PROCEDURE — 6360000002 HC RX W HCPCS: Performed by: SURGERY

## 2020-02-12 PROCEDURE — 2060000000 HC ICU INTERMEDIATE R&B

## 2020-02-12 PROCEDURE — 7100000000 HC PACU RECOVERY - FIRST 15 MIN: Performed by: SURGERY

## 2020-02-12 PROCEDURE — 6370000000 HC RX 637 (ALT 250 FOR IP): Performed by: ANESTHESIOLOGY

## 2020-02-12 PROCEDURE — 7100000001 HC PACU RECOVERY - ADDTL 15 MIN: Performed by: SURGERY

## 2020-02-12 PROCEDURE — 2700000000 HC OXYGEN THERAPY PER DAY

## 2020-02-12 PROCEDURE — C9113 INJ PANTOPRAZOLE SODIUM, VIA: HCPCS | Performed by: SURGERY

## 2020-02-12 PROCEDURE — 82947 ASSAY GLUCOSE BLOOD QUANT: CPT

## 2020-02-12 PROCEDURE — 94761 N-INVAS EAR/PLS OXIMETRY MLT: CPT

## 2020-02-12 RX ORDER — ONDANSETRON 2 MG/ML
4 INJECTION INTRAMUSCULAR; INTRAVENOUS EVERY 6 HOURS PRN
Status: DISCONTINUED | OUTPATIENT
Start: 2020-02-12 | End: 2020-02-19 | Stop reason: HOSPADM

## 2020-02-12 RX ORDER — KETOROLAC TROMETHAMINE 30 MG/ML
15 INJECTION, SOLUTION INTRAMUSCULAR; INTRAVENOUS EVERY 6 HOURS PRN
Status: DISCONTINUED | OUTPATIENT
Start: 2020-02-12 | End: 2020-02-14

## 2020-02-12 RX ORDER — PROPOFOL 10 MG/ML
INJECTION, EMULSION INTRAVENOUS PRN
Status: DISCONTINUED | OUTPATIENT
Start: 2020-02-12 | End: 2020-02-12 | Stop reason: SDUPTHER

## 2020-02-12 RX ORDER — SODIUM CHLORIDE 9 MG/ML
INJECTION, SOLUTION INTRAVENOUS CONTINUOUS
Status: DISCONTINUED | OUTPATIENT
Start: 2020-02-12 | End: 2020-02-19 | Stop reason: HOSPADM

## 2020-02-12 RX ORDER — LIDOCAINE HYDROCHLORIDE 10 MG/ML
INJECTION, SOLUTION EPIDURAL; INFILTRATION; INTRACAUDAL; PERINEURAL PRN
Status: DISCONTINUED | OUTPATIENT
Start: 2020-02-12 | End: 2020-02-12 | Stop reason: SDUPTHER

## 2020-02-12 RX ORDER — FENTANYL CITRATE 50 UG/ML
25 INJECTION, SOLUTION INTRAMUSCULAR; INTRAVENOUS EVERY 5 MIN PRN
Status: DISCONTINUED | OUTPATIENT
Start: 2020-02-12 | End: 2020-02-12 | Stop reason: HOSPADM

## 2020-02-12 RX ORDER — ROCURONIUM BROMIDE 10 MG/ML
INJECTION, SOLUTION INTRAVENOUS PRN
Status: DISCONTINUED | OUTPATIENT
Start: 2020-02-12 | End: 2020-02-12 | Stop reason: SDUPTHER

## 2020-02-12 RX ORDER — METOCLOPRAMIDE HYDROCHLORIDE 5 MG/ML
10 INJECTION INTRAMUSCULAR; INTRAVENOUS EVERY 6 HOURS
Status: DISCONTINUED | OUTPATIENT
Start: 2020-02-12 | End: 2020-02-17

## 2020-02-12 RX ORDER — DIPHENHYDRAMINE HYDROCHLORIDE 50 MG/ML
12.5 INJECTION INTRAMUSCULAR; INTRAVENOUS
Status: DISCONTINUED | OUTPATIENT
Start: 2020-02-12 | End: 2020-02-12 | Stop reason: HOSPADM

## 2020-02-12 RX ORDER — SODIUM CHLORIDE 0.9 % (FLUSH) 0.9 %
10 SYRINGE (ML) INJECTION EVERY 12 HOURS SCHEDULED
Status: DISCONTINUED | OUTPATIENT
Start: 2020-02-12 | End: 2020-02-19 | Stop reason: HOSPADM

## 2020-02-12 RX ORDER — ONDANSETRON 2 MG/ML
INJECTION INTRAMUSCULAR; INTRAVENOUS PRN
Status: DISCONTINUED | OUTPATIENT
Start: 2020-02-12 | End: 2020-02-12 | Stop reason: SDUPTHER

## 2020-02-12 RX ORDER — MIDAZOLAM HYDROCHLORIDE 1 MG/ML
INJECTION INTRAMUSCULAR; INTRAVENOUS PRN
Status: DISCONTINUED | OUTPATIENT
Start: 2020-02-12 | End: 2020-02-12 | Stop reason: SDUPTHER

## 2020-02-12 RX ORDER — DEXAMETHASONE SODIUM PHOSPHATE 4 MG/ML
INJECTION, SOLUTION INTRA-ARTICULAR; INTRALESIONAL; INTRAMUSCULAR; INTRAVENOUS; SOFT TISSUE PRN
Status: DISCONTINUED | OUTPATIENT
Start: 2020-02-12 | End: 2020-02-12 | Stop reason: SDUPTHER

## 2020-02-12 RX ORDER — PANTOPRAZOLE SODIUM 40 MG/10ML
40 INJECTION, POWDER, LYOPHILIZED, FOR SOLUTION INTRAVENOUS DAILY
Status: DISCONTINUED | OUTPATIENT
Start: 2020-02-12 | End: 2020-02-17

## 2020-02-12 RX ORDER — FENTANYL CITRATE 50 UG/ML
100 INJECTION, SOLUTION INTRAMUSCULAR; INTRAVENOUS
Status: DISCONTINUED | OUTPATIENT
Start: 2020-02-12 | End: 2020-02-19 | Stop reason: HOSPADM

## 2020-02-12 RX ORDER — SODIUM CHLORIDE, SODIUM LACTATE, POTASSIUM CHLORIDE, CALCIUM CHLORIDE 600; 310; 30; 20 MG/100ML; MG/100ML; MG/100ML; MG/100ML
INJECTION, SOLUTION INTRAVENOUS CONTINUOUS
Status: DISCONTINUED | OUTPATIENT
Start: 2020-02-12 | End: 2020-02-12

## 2020-02-12 RX ORDER — GLYCOPYRROLATE 1 MG/5 ML
SYRINGE (ML) INTRAVENOUS PRN
Status: DISCONTINUED | OUTPATIENT
Start: 2020-02-12 | End: 2020-02-12 | Stop reason: SDUPTHER

## 2020-02-12 RX ORDER — METOCLOPRAMIDE HYDROCHLORIDE 5 MG/ML
10 INJECTION INTRAMUSCULAR; INTRAVENOUS
Status: DISCONTINUED | OUTPATIENT
Start: 2020-02-12 | End: 2020-02-12 | Stop reason: HOSPADM

## 2020-02-12 RX ORDER — PROMETHAZINE HYDROCHLORIDE 25 MG/ML
6.25 INJECTION, SOLUTION INTRAMUSCULAR; INTRAVENOUS
Status: DISCONTINUED | OUTPATIENT
Start: 2020-02-12 | End: 2020-02-12 | Stop reason: CLARIF

## 2020-02-12 RX ORDER — OXYCODONE HYDROCHLORIDE AND ACETAMINOPHEN 5; 325 MG/1; MG/1
1 TABLET ORAL
Status: DISCONTINUED | OUTPATIENT
Start: 2020-02-12 | End: 2020-02-12 | Stop reason: HOSPADM

## 2020-02-12 RX ORDER — SODIUM CHLORIDE 0.9 % (FLUSH) 0.9 %
10 SYRINGE (ML) INJECTION PRN
Status: DISCONTINUED | OUTPATIENT
Start: 2020-02-12 | End: 2020-02-19 | Stop reason: HOSPADM

## 2020-02-12 RX ORDER — EPHEDRINE SULFATE/0.9% NACL/PF 50 MG/5 ML
SYRINGE (ML) INTRAVENOUS PRN
Status: DISCONTINUED | OUTPATIENT
Start: 2020-02-12 | End: 2020-02-12 | Stop reason: SDUPTHER

## 2020-02-12 RX ORDER — SODIUM CHLORIDE 9 MG/ML
10 INJECTION INTRAVENOUS DAILY
Status: DISCONTINUED | OUTPATIENT
Start: 2020-02-12 | End: 2020-02-17

## 2020-02-12 RX ORDER — FENTANYL CITRATE 50 UG/ML
50 INJECTION, SOLUTION INTRAMUSCULAR; INTRAVENOUS
Status: DISCONTINUED | OUTPATIENT
Start: 2020-02-12 | End: 2020-02-19 | Stop reason: HOSPADM

## 2020-02-12 RX ORDER — 0.9 % SODIUM CHLORIDE 0.9 %
500 INTRAVENOUS SOLUTION INTRAVENOUS
Status: DISCONTINUED | OUTPATIENT
Start: 2020-02-12 | End: 2020-02-12 | Stop reason: HOSPADM

## 2020-02-12 RX ORDER — HYDROMORPHONE HCL 110MG/55ML
PATIENT CONTROLLED ANALGESIA SYRINGE INTRAVENOUS PRN
Status: DISCONTINUED | OUTPATIENT
Start: 2020-02-12 | End: 2020-02-12 | Stop reason: SDUPTHER

## 2020-02-12 RX ORDER — HYDRALAZINE HYDROCHLORIDE 20 MG/ML
5 INJECTION INTRAMUSCULAR; INTRAVENOUS EVERY 10 MIN PRN
Status: DISCONTINUED | OUTPATIENT
Start: 2020-02-12 | End: 2020-02-12 | Stop reason: HOSPADM

## 2020-02-12 RX ORDER — LABETALOL 20 MG/4 ML (5 MG/ML) INTRAVENOUS SYRINGE
5 EVERY 10 MIN PRN
Status: DISCONTINUED | OUTPATIENT
Start: 2020-02-12 | End: 2020-02-12 | Stop reason: HOSPADM

## 2020-02-12 RX ORDER — FENTANYL CITRATE 50 UG/ML
INJECTION, SOLUTION INTRAMUSCULAR; INTRAVENOUS PRN
Status: DISCONTINUED | OUTPATIENT
Start: 2020-02-12 | End: 2020-02-12 | Stop reason: SDUPTHER

## 2020-02-12 RX ADMIN — FENTANYL CITRATE 50 MCG: 50 INJECTION, SOLUTION INTRAMUSCULAR; INTRAVENOUS at 16:48

## 2020-02-12 RX ADMIN — MIDAZOLAM 2 MG: 1 INJECTION INTRAMUSCULAR; INTRAVENOUS at 15:35

## 2020-02-12 RX ADMIN — PROPOFOL 200 MG: 10 INJECTION, EMULSION INTRAVENOUS at 15:39

## 2020-02-12 RX ADMIN — SODIUM CHLORIDE, POTASSIUM CHLORIDE, SODIUM LACTATE AND CALCIUM CHLORIDE: 600; 310; 30; 20 INJECTION, SOLUTION INTRAVENOUS at 17:46

## 2020-02-12 RX ADMIN — HYDROMORPHONE HYDROCHLORIDE 0.5 MG: 2 INJECTION, SOLUTION INTRAMUSCULAR; INTRAVENOUS; SUBCUTANEOUS at 17:40

## 2020-02-12 RX ADMIN — SODIUM CHLORIDE 10 ML: 9 INJECTION INTRAMUSCULAR; INTRAVENOUS; SUBCUTANEOUS at 20:54

## 2020-02-12 RX ADMIN — Medication 0.4 MG: at 16:06

## 2020-02-12 RX ADMIN — HYDROMORPHONE HYDROCHLORIDE 0.5 MG: 2 INJECTION, SOLUTION INTRAMUSCULAR; INTRAVENOUS; SUBCUTANEOUS at 17:06

## 2020-02-12 RX ADMIN — FENTANYL CITRATE 100 MCG: 50 INJECTION, SOLUTION INTRAMUSCULAR; INTRAVENOUS at 15:39

## 2020-02-12 RX ADMIN — HYDROMORPHONE HYDROCHLORIDE 0.5 MG: 2 INJECTION, SOLUTION INTRAMUSCULAR; INTRAVENOUS; SUBCUTANEOUS at 18:06

## 2020-02-12 RX ADMIN — METOCLOPRAMIDE 10 MG: 5 INJECTION, SOLUTION INTRAMUSCULAR; INTRAVENOUS at 20:50

## 2020-02-12 RX ADMIN — DEXTROSE MONOHYDRATE 2 G: 50 INJECTION, SOLUTION INTRAVENOUS at 22:47

## 2020-02-12 RX ADMIN — METRONIDAZOLE 500 MG: 500 INJECTION, SOLUTION INTRAVENOUS at 15:48

## 2020-02-12 RX ADMIN — ROCURONIUM BROMIDE 20 MG: 10 INJECTION, SOLUTION INTRAVENOUS at 16:09

## 2020-02-12 RX ADMIN — HYDROMORPHONE HYDROCHLORIDE 0.5 MG: 2 INJECTION, SOLUTION INTRAMUSCULAR; INTRAVENOUS; SUBCUTANEOUS at 17:27

## 2020-02-12 RX ADMIN — HYDROMORPHONE HYDROCHLORIDE 0.5 MG: 1 INJECTION, SOLUTION INTRAMUSCULAR; INTRAVENOUS; SUBCUTANEOUS at 18:37

## 2020-02-12 RX ADMIN — FENTANYL CITRATE 100 MCG: 50 INJECTION, SOLUTION INTRAMUSCULAR; INTRAVENOUS at 16:09

## 2020-02-12 RX ADMIN — METOPROLOL TARTRATE 25 MG: 25 TABLET ORAL at 14:02

## 2020-02-12 RX ADMIN — SODIUM CHLORIDE: 9 INJECTION, SOLUTION INTRAVENOUS at 19:57

## 2020-02-12 RX ADMIN — METRONIDAZOLE 500 MG: 500 INJECTION, SOLUTION INTRAVENOUS at 20:51

## 2020-02-12 RX ADMIN — SODIUM CHLORIDE, POTASSIUM CHLORIDE, SODIUM LACTATE AND CALCIUM CHLORIDE: 600; 310; 30; 20 INJECTION, SOLUTION INTRAVENOUS at 16:13

## 2020-02-12 RX ADMIN — ONDANSETRON 4 MG: 2 INJECTION INTRAMUSCULAR; INTRAVENOUS at 17:51

## 2020-02-12 RX ADMIN — PANTOPRAZOLE SODIUM 40 MG: 40 INJECTION, POWDER, FOR SOLUTION INTRAVENOUS at 20:50

## 2020-02-12 RX ADMIN — ROCURONIUM BROMIDE 50 MG: 10 INJECTION, SOLUTION INTRAVENOUS at 15:39

## 2020-02-12 RX ADMIN — ROCURONIUM BROMIDE 10 MG: 10 INJECTION, SOLUTION INTRAVENOUS at 17:27

## 2020-02-12 RX ADMIN — LIDOCAINE HYDROCHLORIDE 50 MG: 10 INJECTION, SOLUTION EPIDURAL; INFILTRATION; INTRACAUDAL; PERINEURAL at 15:39

## 2020-02-12 RX ADMIN — Medication 10 MG: at 15:59

## 2020-02-12 RX ADMIN — ROCURONIUM BROMIDE 20 MG: 10 INJECTION, SOLUTION INTRAVENOUS at 16:34

## 2020-02-12 RX ADMIN — SODIUM CHLORIDE, POTASSIUM CHLORIDE, SODIUM LACTATE AND CALCIUM CHLORIDE: 600; 310; 30; 20 INJECTION, SOLUTION INTRAVENOUS at 14:13

## 2020-02-12 RX ADMIN — KETOROLAC TROMETHAMINE 15 MG: 30 INJECTION, SOLUTION INTRAMUSCULAR; INTRAVENOUS at 20:50

## 2020-02-12 RX ADMIN — ROCURONIUM BROMIDE 10 MG: 10 INJECTION, SOLUTION INTRAVENOUS at 17:33

## 2020-02-12 RX ADMIN — SUGAMMADEX 200 MG: 100 INJECTION, SOLUTION INTRAVENOUS at 18:01

## 2020-02-12 RX ADMIN — CEFAZOLIN 2 G: 10 INJECTION, POWDER, FOR SOLUTION INTRAVENOUS at 15:45

## 2020-02-12 RX ADMIN — DEXAMETHASONE SODIUM PHOSPHATE 8 MG: 4 INJECTION, SOLUTION INTRA-ARTICULAR; INTRALESIONAL; INTRAMUSCULAR; INTRAVENOUS; SOFT TISSUE at 15:46

## 2020-02-12 RX ADMIN — LIDOCAINE HYDROCHLORIDE 50 MG: 10 INJECTION, SOLUTION EPIDURAL; INFILTRATION; INTRACAUDAL; PERINEURAL at 17:58

## 2020-02-12 ASSESSMENT — PULMONARY FUNCTION TESTS
PIF_VALUE: 5
PIF_VALUE: 22
PIF_VALUE: 21
PIF_VALUE: 20
PIF_VALUE: 21
PIF_VALUE: 14
PIF_VALUE: 21
PIF_VALUE: 22
PIF_VALUE: 20
PIF_VALUE: 22
PIF_VALUE: 19
PIF_VALUE: 14
PIF_VALUE: 17
PIF_VALUE: 19
PIF_VALUE: 14
PIF_VALUE: 2
PIF_VALUE: 21
PIF_VALUE: 16
PIF_VALUE: 21
PIF_VALUE: 19
PIF_VALUE: 26
PIF_VALUE: 22
PIF_VALUE: 19
PIF_VALUE: 21
PIF_VALUE: 21
PIF_VALUE: 14
PIF_VALUE: 21
PIF_VALUE: 15
PIF_VALUE: 19
PIF_VALUE: 22
PIF_VALUE: 22
PIF_VALUE: 1
PIF_VALUE: 14
PIF_VALUE: 22
PIF_VALUE: 21
PIF_VALUE: 20
PIF_VALUE: 22
PIF_VALUE: 1
PIF_VALUE: 21
PIF_VALUE: 19
PIF_VALUE: 24
PIF_VALUE: 17
PIF_VALUE: 20
PIF_VALUE: 21
PIF_VALUE: 19
PIF_VALUE: 31
PIF_VALUE: 3
PIF_VALUE: 21
PIF_VALUE: 15
PIF_VALUE: 20
PIF_VALUE: 14
PIF_VALUE: 15
PIF_VALUE: 22
PIF_VALUE: 21
PIF_VALUE: 21
PIF_VALUE: 22
PIF_VALUE: 22
PIF_VALUE: 19
PIF_VALUE: 20
PIF_VALUE: 20
PIF_VALUE: 3
PIF_VALUE: 19
PIF_VALUE: 1
PIF_VALUE: 7
PIF_VALUE: 20
PIF_VALUE: 22
PIF_VALUE: 21
PIF_VALUE: 19
PIF_VALUE: 22
PIF_VALUE: 19
PIF_VALUE: 21
PIF_VALUE: 2
PIF_VALUE: 19
PIF_VALUE: 16
PIF_VALUE: 21
PIF_VALUE: 19
PIF_VALUE: 19
PIF_VALUE: 21
PIF_VALUE: 21
PIF_VALUE: 5
PIF_VALUE: 14
PIF_VALUE: 22
PIF_VALUE: 19
PIF_VALUE: 19
PIF_VALUE: 20
PIF_VALUE: 14
PIF_VALUE: 19
PIF_VALUE: 22
PIF_VALUE: 19
PIF_VALUE: 13
PIF_VALUE: 1
PIF_VALUE: 19
PIF_VALUE: 22
PIF_VALUE: 14
PIF_VALUE: 17
PIF_VALUE: 22
PIF_VALUE: 22
PIF_VALUE: 18
PIF_VALUE: 20
PIF_VALUE: 22
PIF_VALUE: 21
PIF_VALUE: 2
PIF_VALUE: 22
PIF_VALUE: 19
PIF_VALUE: 19
PIF_VALUE: 14
PIF_VALUE: 17
PIF_VALUE: 21
PIF_VALUE: 14
PIF_VALUE: 5
PIF_VALUE: 22
PIF_VALUE: 19
PIF_VALUE: 15
PIF_VALUE: 19
PIF_VALUE: 21
PIF_VALUE: 20
PIF_VALUE: 21
PIF_VALUE: 1
PIF_VALUE: 21
PIF_VALUE: 13
PIF_VALUE: 14
PIF_VALUE: 19
PIF_VALUE: 21
PIF_VALUE: 21
PIF_VALUE: 14
PIF_VALUE: 14
PIF_VALUE: 20
PIF_VALUE: 2
PIF_VALUE: 19
PIF_VALUE: 20
PIF_VALUE: 21
PIF_VALUE: 19
PIF_VALUE: 19
PIF_VALUE: 20
PIF_VALUE: 14
PIF_VALUE: 19
PIF_VALUE: 21
PIF_VALUE: 31
PIF_VALUE: 19
PIF_VALUE: 21
PIF_VALUE: 22
PIF_VALUE: 13
PIF_VALUE: 19
PIF_VALUE: 5
PIF_VALUE: 26

## 2020-02-12 ASSESSMENT — PAIN DESCRIPTION - PAIN TYPE: TYPE: ACUTE PAIN

## 2020-02-12 ASSESSMENT — PAIN SCALES - GENERAL
PAINLEVEL_OUTOF10: 0
PAINLEVEL_OUTOF10: 0
PAINLEVEL_OUTOF10: 10
PAINLEVEL_OUTOF10: 7

## 2020-02-12 ASSESSMENT — PAIN - FUNCTIONAL ASSESSMENT: PAIN_FUNCTIONAL_ASSESSMENT: 0-10

## 2020-02-12 ASSESSMENT — LIFESTYLE VARIABLES: SMOKING_STATUS: 1

## 2020-02-12 ASSESSMENT — PAIN DESCRIPTION - LOCATION: LOCATION: ABDOMEN

## 2020-02-12 NOTE — ANESTHESIA PRE PROCEDURE
Medications Ordered in Other Visits   Medication Dose Route Frequency Provider Last Rate Last Dose    lactated ringers infusion   Intravenous Continuous Larisa Warner MD        ceFAZolin (ANCEF) 2 g in dextrose 5 % 50 mL IVPB  2 g Intravenous Once Tita Reyes MD        metronidazole (FLAGYL) 500 mg in NaCl 100 mL IVPB premix  500 mg Intravenous Once Tita Ryees MD           Allergies:  No Known Allergies    Problem List:    Patient Active Problem List   Diagnosis Code    Osteoporosis M81.0    Cigarette smoker F17.210    Secondary hyperparathyroidism ([facility] [address] N25.81    Vitamin D deficiency E55.9    History of stroke Z80.78    Degeneration of lumbar or lumbosacral intervertebral disc M51.37    Back pain M54.9    Low back pain M54.5    Allergic rhinitis J30.9    Impaired fasting glucose R73.01    Mixed hyperlipidemia E78.2    Essential hypertension I10    Obesity (BMI 30.0-34. 9) E66.9    Tear of left rotator cuff M75.102    Left bicipital tenosynovitis M75.22       Past Medical History:        Diagnosis Date    Essential hypertension 5/4/2017    Hyperlipidemia 9/4/2014    Hyperparathyroidism ([facility] [address]     Obesity (BMI 30-39. 9) 5/4/2017    Osteoporosis     Sciatica     Stroke [facility])     Vertebral fracture, osteoporotic (HCC)        Past Surgical History:        Procedure Laterality Date    COLONOSCOPY  3/19/2014    tubular adenoma x 2, inadequate prep, some polyps not removed, needs colonoscopy in 6-12 months    COLONOSCOPY N/A 2/11/2020    [facility]      rt. inguinal    VASECTOMY         Social History:    Social History     Tobacco Use    Smoking status: Current Every Day Smoker     Packs/day: 1.00     Years: 40.00     Pack years: 40.00    Smokeless tobacco: Never Used   Substance Use Topics    Alcohol use: Yes     Comment: rarely                                Ready to quit: Not Answered  Counseling given: Not Answered      Vital Signs (Current): There were no vitals filed for this visit. BP Readings from Last 3 Encounters:   02/11/20 138/80   02/11/20 (!) 173/75   02/04/20 124/72       NPO Status:                                                                                 BMI:   Wt Readings from Last 3 Encounters:   02/11/20 215 lb (97.5 kg)   02/04/20 215 lb 11.2 oz (97.8 kg)   01/13/20 217 lb (98.4 kg)     There is no height or weight on file to calculate BMI.    CBC:   Lab Results   Component Value Date    WBC 7.5 02/04/2020    RBC 4.76 02/04/2020    HGB 15.6 02/04/2020    HCT 46.2 02/04/2020    MCV 97.1 02/04/2020    RDW 13.4 02/04/2020     02/04/2020       CMP:   Lab Results   Component Value Date     02/04/2020    K 4.2 02/04/2020     02/04/2020    CO2 21 02/04/2020    BUN 19 02/04/2020    CREATININE 0.90 02/04/2020    GFRAA >60 02/04/2020    LABGLOM >60 02/04/2020    GLUCOSE 124 02/04/2020    PROT 7.1 11/01/2019    CALCIUM 9.3 02/04/2020    BILITOT 0.25 11/01/2019    ALKPHOS 65 11/01/2019    AST 19 11/01/2019    ALT 22 11/01/2019       POC Tests: No results for input(s): POCGLU, POCNA, POCK, POCCL, POCBUN, POCHEMO, POCHCT in the last 72 hours. Coags:   Lab Results   Component Value Date    PROTIME 10.6 05/11/2018    INR 1.0 05/11/2018    APTT 27.9 05/11/2018       HCG (If Applicable): No results found for: PREGTESTUR, PREGSERUM, HCG, HCGQUANT     ABGs: No results found for: PHART, PO2ART, ZPM9YBC, CUC9AEQ, BEART, S8KWMVUM     Type & Screen (If Applicable):  No results found for: [facility]    Anesthesia Evaluation  Patient summary reviewed and Nursing notes reviewed no history of anesthetic complications:   Airway: Mallampati: III  TM distance: >3 FB   Neck ROM: full  Mouth opening: > = 3 FB Dental:      Comment:  Many missing lower pre molars and molars    Pulmonary:normal exam  breath sounds clear to auscultation  (+) current smoker          Patient did not smoke on day of surgery. Cardiovascular:    (+) hypertension:,       ECG reviewed  Rhythm: regular  Rate: normal  Echocardiogram reviewed         Beta Blocker:  Dose within 24 Hrs         Neuro/Psych:   (+) CVA:, neuromuscular disease:,              ROS comment: Degeneration of lumbar or lumbosacral intervertebral disc GI/Hepatic/Renal:   (+) bowel prep,          ROS comment: Colon polp. Endo/Other: Negative Endo/Other ROS                     ROS comment: Osteoporosis  Obesity Abdominal:           Vascular: negative vascular ROS. Anesthesia Plan      general     ASA 3       Induction: intravenous. MIPS: Postoperative opioids intended and Prophylactic antiemetics administered. Anesthetic plan and risks discussed with patient. Plan discussed with CRNA.                   Larisa Warner MD   2/12/2020

## 2020-02-13 LAB
ABSOLUTE EOS #: 0 K/UL (ref 0–0.4)
ABSOLUTE IMMATURE GRANULOCYTE: ABNORMAL K/UL (ref 0–0.3)
ABSOLUTE LYMPH #: 1.38 K/UL (ref 1–4.8)
ABSOLUTE MONO #: 2.06 K/UL (ref 0.1–1.3)
ANION GAP SERPL CALCULATED.3IONS-SCNC: 14 MMOL/L (ref 9–17)
BASOPHILS # BLD: 0 % (ref 0–2)
BASOPHILS ABSOLUTE: 0 K/UL (ref 0–0.2)
BUN BLDV-MCNC: 15 MG/DL (ref 8–23)
BUN/CREAT BLD: ABNORMAL (ref 9–20)
CALCIUM SERPL-MCNC: 8.1 MG/DL (ref 8.6–10.4)
CHLORIDE BLD-SCNC: 109 MMOL/L (ref 98–107)
CO2: 20 MMOL/L (ref 20–31)
CREAT SERPL-MCNC: 1.22 MG/DL (ref 0.7–1.2)
DIFFERENTIAL TYPE: ABNORMAL
EOSINOPHILS RELATIVE PERCENT: 0 % (ref 0–4)
GFR AFRICAN AMERICAN: >60 ML/MIN
GFR NON-AFRICAN AMERICAN: 60 ML/MIN
GFR SERPL CREATININE-BSD FRML MDRD: ABNORMAL ML/MIN/{1.73_M2}
GFR SERPL CREATININE-BSD FRML MDRD: ABNORMAL ML/MIN/{1.73_M2}
GLUCOSE BLD-MCNC: 184 MG/DL (ref 70–99)
HCT VFR BLD CALC: 38.1 % (ref 41–53)
HCT VFR BLD CALC: 41.9 % (ref 41–53)
HEMOGLOBIN: 12.7 G/DL (ref 13.5–17.5)
HEMOGLOBIN: 14.1 G/DL (ref 13.5–17.5)
IMMATURE GRANULOCYTES: ABNORMAL %
LYMPHOCYTES # BLD: 8 % (ref 24–44)
MCH RBC QN AUTO: 33.2 PG (ref 26–34)
MCHC RBC AUTO-ENTMCNC: 33.6 G/DL (ref 31–37)
MCV RBC AUTO: 98.8 FL (ref 80–100)
MONOCYTES # BLD: 12 % (ref 1–7)
MORPHOLOGY: NORMAL
NRBC AUTOMATED: ABNORMAL PER 100 WBC
PDW BLD-RTO: 13.2 % (ref 11.5–14.9)
PLATELET # BLD: 237 K/UL (ref 150–450)
PLATELET ESTIMATE: ABNORMAL
PMV BLD AUTO: 10.5 FL (ref 6–12)
POTASSIUM SERPL-SCNC: 4 MMOL/L (ref 3.7–5.3)
RBC # BLD: 4.25 M/UL (ref 4.5–5.9)
RBC # BLD: ABNORMAL 10*6/UL
SEG NEUTROPHILS: 80 % (ref 36–66)
SEGMENTED NEUTROPHILS ABSOLUTE COUNT: 13.76 K/UL (ref 1.3–9.1)
SODIUM BLD-SCNC: 143 MMOL/L (ref 135–144)
SURGICAL PATHOLOGY REPORT: NORMAL
WBC # BLD: 17.2 K/UL (ref 3.5–11)
WBC # BLD: ABNORMAL 10*3/UL

## 2020-02-13 PROCEDURE — 36415 COLL VENOUS BLD VENIPUNCTURE: CPT

## 2020-02-13 PROCEDURE — 6370000000 HC RX 637 (ALT 250 FOR IP): Performed by: FAMILY MEDICINE

## 2020-02-13 PROCEDURE — 6360000002 HC RX W HCPCS: Performed by: SURGERY

## 2020-02-13 PROCEDURE — 85018 HEMOGLOBIN: CPT

## 2020-02-13 PROCEDURE — 2580000003 HC RX 258: Performed by: SURGERY

## 2020-02-13 PROCEDURE — 2060000000 HC ICU INTERMEDIATE R&B

## 2020-02-13 PROCEDURE — 85014 HEMATOCRIT: CPT

## 2020-02-13 PROCEDURE — 85025 COMPLETE CBC W/AUTO DIFF WBC: CPT

## 2020-02-13 PROCEDURE — 2500000003 HC RX 250 WO HCPCS: Performed by: SURGERY

## 2020-02-13 PROCEDURE — C9113 INJ PANTOPRAZOLE SODIUM, VIA: HCPCS | Performed by: SURGERY

## 2020-02-13 PROCEDURE — 80048 BASIC METABOLIC PNL TOTAL CA: CPT

## 2020-02-13 PROCEDURE — 2700000000 HC OXYGEN THERAPY PER DAY

## 2020-02-13 PROCEDURE — 2500000003 HC RX 250 WO HCPCS: Performed by: FAMILY MEDICINE

## 2020-02-13 RX ORDER — METOPROLOL TARTRATE 5 MG/5ML
2.5 INJECTION INTRAVENOUS ONCE
Status: COMPLETED | OUTPATIENT
Start: 2020-02-13 | End: 2020-02-13

## 2020-02-13 RX ORDER — METOPROLOL TARTRATE 5 MG/5ML
5 INJECTION INTRAVENOUS ONCE
Status: DISCONTINUED | OUTPATIENT
Start: 2020-02-13 | End: 2020-02-13

## 2020-02-13 RX ORDER — 0.9 % SODIUM CHLORIDE 0.9 %
1000 INTRAVENOUS SOLUTION INTRAVENOUS ONCE
Status: COMPLETED | OUTPATIENT
Start: 2020-02-13 | End: 2020-02-13

## 2020-02-13 RX ORDER — TIZANIDINE 4 MG/1
4 TABLET ORAL 3 TIMES DAILY PRN
Status: DISCONTINUED | OUTPATIENT
Start: 2020-02-13 | End: 2020-02-16

## 2020-02-13 RX ADMIN — SODIUM CHLORIDE: 9 INJECTION, SOLUTION INTRAVENOUS at 20:11

## 2020-02-13 RX ADMIN — METOCLOPRAMIDE 10 MG: 5 INJECTION, SOLUTION INTRAMUSCULAR; INTRAVENOUS at 08:08

## 2020-02-13 RX ADMIN — ENOXAPARIN SODIUM 40 MG: 40 INJECTION SUBCUTANEOUS at 08:08

## 2020-02-13 RX ADMIN — FENTANYL CITRATE 100 MCG: 50 INJECTION INTRAMUSCULAR; INTRAVENOUS at 06:08

## 2020-02-13 RX ADMIN — METOPROLOL TARTRATE 2.5 MG: 1 INJECTION, SOLUTION INTRAVENOUS at 12:31

## 2020-02-13 RX ADMIN — SODIUM CHLORIDE 1000 ML: 9 INJECTION, SOLUTION INTRAVENOUS at 16:26

## 2020-02-13 RX ADMIN — FENTANYL CITRATE 100 MCG: 50 INJECTION INTRAMUSCULAR; INTRAVENOUS at 10:33

## 2020-02-13 RX ADMIN — PANTOPRAZOLE SODIUM 40 MG: 40 INJECTION, POWDER, FOR SOLUTION INTRAVENOUS at 08:06

## 2020-02-13 RX ADMIN — METRONIDAZOLE 500 MG: 500 INJECTION, SOLUTION INTRAVENOUS at 02:15

## 2020-02-13 RX ADMIN — METOPROLOL TARTRATE 25 MG: 25 TABLET ORAL at 13:56

## 2020-02-13 RX ADMIN — TIZANIDINE 4 MG: 4 TABLET ORAL at 18:32

## 2020-02-13 RX ADMIN — METRONIDAZOLE 500 MG: 500 INJECTION, SOLUTION INTRAVENOUS at 09:28

## 2020-02-13 RX ADMIN — FENTANYL CITRATE 100 MCG: 50 INJECTION INTRAMUSCULAR; INTRAVENOUS at 02:15

## 2020-02-13 RX ADMIN — ONDANSETRON 4 MG: 2 INJECTION INTRAMUSCULAR; INTRAVENOUS at 02:15

## 2020-02-13 RX ADMIN — SODIUM CHLORIDE 10 ML: 9 INJECTION INTRAMUSCULAR; INTRAVENOUS; SUBCUTANEOUS at 08:07

## 2020-02-13 RX ADMIN — METOPROLOL TARTRATE 25 MG: 25 TABLET ORAL at 21:09

## 2020-02-13 RX ADMIN — METOCLOPRAMIDE 10 MG: 5 INJECTION, SOLUTION INTRAMUSCULAR; INTRAVENOUS at 13:50

## 2020-02-13 RX ADMIN — METOCLOPRAMIDE 10 MG: 5 INJECTION, SOLUTION INTRAMUSCULAR; INTRAVENOUS at 02:15

## 2020-02-13 RX ADMIN — FENTANYL CITRATE 100 MCG: 50 INJECTION INTRAMUSCULAR; INTRAVENOUS at 08:10

## 2020-02-13 RX ADMIN — FENTANYL CITRATE 50 MCG: 50 INJECTION INTRAMUSCULAR; INTRAVENOUS at 19:23

## 2020-02-13 RX ADMIN — METOCLOPRAMIDE 10 MG: 5 INJECTION, SOLUTION INTRAMUSCULAR; INTRAVENOUS at 20:07

## 2020-02-13 RX ADMIN — DEXTROSE MONOHYDRATE 2 G: 50 INJECTION, SOLUTION INTRAVENOUS at 08:00

## 2020-02-13 RX ADMIN — TIZANIDINE 4 MG: 4 TABLET ORAL at 09:28

## 2020-02-13 RX ADMIN — KETOROLAC TROMETHAMINE 15 MG: 30 INJECTION, SOLUTION INTRAMUSCULAR; INTRAVENOUS at 13:50

## 2020-02-13 ASSESSMENT — PAIN SCALES - GENERAL
PAINLEVEL_OUTOF10: 6
PAINLEVEL_OUTOF10: 9
PAINLEVEL_OUTOF10: 8
PAINLEVEL_OUTOF10: 10
PAINLEVEL_OUTOF10: 10
PAINLEVEL_OUTOF10: 6
PAINLEVEL_OUTOF10: 10
PAINLEVEL_OUTOF10: 7
PAINLEVEL_OUTOF10: 6
PAINLEVEL_OUTOF10: 6

## 2020-02-13 ASSESSMENT — PAIN DESCRIPTION - LOCATION
LOCATION: ABDOMEN
LOCATION: ABDOMEN;BACK

## 2020-02-13 ASSESSMENT — PAIN DESCRIPTION - PAIN TYPE
TYPE: ACUTE PAIN
TYPE: ACUTE PAIN;SURGICAL PAIN
TYPE: ACUTE PAIN;SURGICAL PAIN
TYPE: ACUTE PAIN;SURGICAL PAIN;CHRONIC PAIN
TYPE: ACUTE PAIN;SURGICAL PAIN
TYPE: ACUTE PAIN;SURGICAL PAIN

## 2020-02-13 ASSESSMENT — PAIN DESCRIPTION - ORIENTATION
ORIENTATION: RIGHT;LEFT
ORIENTATION: RIGHT;LEFT
ORIENTATION: LEFT;RIGHT

## 2020-02-13 NOTE — CARE COORDINATION
CASE MANAGEMENT NOTE:    Admission Date:  2/12/2020 Dimitris Connors is a 59 y.o.  male    Admitted for : Colon polyp [K63.5]    Met with:  Patient    PCP:  Jackelin Reyes                                Insurance:  [facility]      Current Residence/ Living Arrangements:  independently at home             Current Services PTA:  No    Is patient agreeable to VNS: Yes    Freedom of choice provided:  Yes    List of [address] provided: No    VNS chosen:  No    DME:  none    Home Oxygen: No    Nebulizer: No    CPAP/BIPAP: No    Supplier: N/A    Potential Assistance Needed: Yes New post op and lives alone ? VNS     SNF needed: No    Freedom of choice and list provided: Yes    Pharmacy:  [facility]       Does Patient want to use MEDS to BEDS? No    Is the Patient an [facility] with Readmission Risk Score greater than 14%? No  If yes, pt needs a follow up appointment made within 7 days. Family Members/Caregivers that pt would like involved in their care:    Yes    If yes, list name here:  Tono hollingsworth    Transportation Provider:  Family             Is patient in Isolation/One on One/Altered Mental Status? No  If yes, skip next question. If no, would they like an I-Pad to  use? No  If yes, call [phone]. Discharge Plan:  02/13/2020 LAKISHA Lives in a 1 story home with steps to enter DME none Plan to return home with no needs possible VNS post op. R. Hemicolectomy with anastimosis . mk              Electronically signed by: Narciso Smith RN on 2/13/2020 at 11:33 AM

## 2020-02-13 NOTE — PROGRESS NOTES
Patient was encouraged to take deep breaths with 3-4 breath hold a couple times per awaked hour. Encourage splinting incision and with good coughing. Pulse Ox increased from 90 to 93 with above procedure.     Melba Cruz RRT

## 2020-02-13 NOTE — PROGRESS NOTES
Safety maintained. Call light reachable.  Electronically signed by Prateek Fallon RN on 2/13/2020 at 7:09 AM

## 2020-02-13 NOTE — FLOWSHEET NOTE
02/13/20 1238   Encounter Summary   Services provided to: Patient   Referral/Consult From: Rounding   Continue Visiting   (2-13-20)   Complexity of Encounter Moderate   Length of Encounter 15 minutes   Spiritual Assessment Completed Yes   Spiritual/Restorationism   Type Spiritual support   Assessment Calm; Approachable; Anxious   Intervention Active listening;Prayer;Provided reading materials/devotional materials;Sustaining presence/ Ministry of presence   Outcome Expressed gratitude

## 2020-02-13 NOTE — PROGRESS NOTES
At 2050 Pt c/o pain. Non-pharmacological interventions ineffective. Administered IV Toradol PRN A/O. Effective. Will monitor.

## 2020-02-13 NOTE — PROGRESS NOTES
At [address] pt admitted from PACU to room 2084 accompanied by his sister. A&O. Calm and cooperative. Pt stable. No distress. Educated on staff, safety, environment, routine, IV. Meds, pain and POC this shift. Provided oral swabs. Call light reachable.

## 2020-02-14 LAB
ABSOLUTE BANDS #: 0.81 K/UL (ref 0–1)
ABSOLUTE EOS #: 0 K/UL (ref 0–0.4)
ABSOLUTE EOS #: 0.1 K/UL (ref 0–0.4)
ABSOLUTE IMMATURE GRANULOCYTE: ABNORMAL K/UL (ref 0–0.3)
ABSOLUTE IMMATURE GRANULOCYTE: ABNORMAL K/UL (ref 0–0.3)
ABSOLUTE LYMPH #: 2.27 K/UL (ref 1–4.8)
ABSOLUTE LYMPH #: 2.3 K/UL (ref 1–4.8)
ABSOLUTE MONO #: 1.1 K/UL (ref 0.1–1.3)
ABSOLUTE MONO #: 1.3 K/UL (ref 0.1–1.3)
ANION GAP SERPL CALCULATED.3IONS-SCNC: 13 MMOL/L (ref 9–17)
BANDS: 5 % (ref 0–10)
BASOPHILS # BLD: 0 % (ref 0–2)
BASOPHILS # BLD: 0 % (ref 0–2)
BASOPHILS ABSOLUTE: 0 K/UL (ref 0–0.2)
BASOPHILS ABSOLUTE: 0 K/UL (ref 0–0.2)
BUN BLDV-MCNC: 24 MG/DL (ref 8–23)
BUN/CREAT BLD: ABNORMAL (ref 9–20)
CALCIUM SERPL-MCNC: 7.9 MG/DL (ref 8.6–10.4)
CHLORIDE BLD-SCNC: 107 MMOL/L (ref 98–107)
CO2: 21 MMOL/L (ref 20–31)
CREAT SERPL-MCNC: 1.5 MG/DL (ref 0.7–1.2)
DIFFERENTIAL TYPE: ABNORMAL
DIFFERENTIAL TYPE: ABNORMAL
EOSINOPHILS RELATIVE PERCENT: 0 % (ref 0–4)
EOSINOPHILS RELATIVE PERCENT: 1 % (ref 0–4)
GFR AFRICAN AMERICAN: 57 ML/MIN
GFR NON-AFRICAN AMERICAN: 47 ML/MIN
GFR SERPL CREATININE-BSD FRML MDRD: ABNORMAL ML/MIN/{1.73_M2}
GFR SERPL CREATININE-BSD FRML MDRD: ABNORMAL ML/MIN/{1.73_M2}
GLUCOSE BLD-MCNC: 120 MG/DL (ref 75–110)
GLUCOSE BLD-MCNC: 129 MG/DL (ref 70–99)
HCT VFR BLD CALC: 27.5 % (ref 41–53)
HCT VFR BLD CALC: 28.7 % (ref 41–53)
HCT VFR BLD CALC: 29.7 % (ref 41–53)
HEMOGLOBIN: 9 G/DL (ref 13.5–17.5)
HEMOGLOBIN: 9.2 G/DL (ref 13.5–17.5)
HEMOGLOBIN: 9.8 G/DL (ref 13.5–17.5)
IMMATURE GRANULOCYTES: ABNORMAL %
IMMATURE GRANULOCYTES: ABNORMAL %
LYMPHOCYTES # BLD: 14 % (ref 24–44)
LYMPHOCYTES # BLD: 15 % (ref 24–44)
MCH RBC QN AUTO: 32.4 PG (ref 26–34)
MCH RBC QN AUTO: 33.3 PG (ref 26–34)
MCHC RBC AUTO-ENTMCNC: 32.1 G/DL (ref 31–37)
MCHC RBC AUTO-ENTMCNC: 33.1 G/DL (ref 31–37)
MCV RBC AUTO: 100.7 FL (ref 80–100)
MCV RBC AUTO: 100.8 FL (ref 80–100)
MONOCYTES # BLD: 7 % (ref 1–7)
MONOCYTES # BLD: 8 % (ref 1–7)
MORPHOLOGY: NORMAL
NRBC AUTOMATED: ABNORMAL PER 100 WBC
NRBC AUTOMATED: ABNORMAL PER 100 WBC
PDW BLD-RTO: 13.6 % (ref 11.5–14.9)
PDW BLD-RTO: 13.7 % (ref 11.5–14.9)
PLATELET # BLD: 162 K/UL (ref 150–450)
PLATELET # BLD: 170 K/UL (ref 150–450)
PLATELET ESTIMATE: ABNORMAL
PLATELET ESTIMATE: ABNORMAL
PMV BLD AUTO: 9.4 FL (ref 6–12)
PMV BLD AUTO: 9.9 FL (ref 6–12)
POTASSIUM SERPL-SCNC: 4 MMOL/L (ref 3.7–5.3)
RBC # BLD: 2.85 M/UL (ref 4.5–5.9)
RBC # BLD: 2.95 M/UL (ref 4.5–5.9)
RBC # BLD: ABNORMAL 10*6/UL
RBC # BLD: ABNORMAL 10*6/UL
SEG NEUTROPHILS: 73 % (ref 36–66)
SEG NEUTROPHILS: 77 % (ref 36–66)
SEGMENTED NEUTROPHILS ABSOLUTE COUNT: 11.8 K/UL (ref 1.3–9.1)
SEGMENTED NEUTROPHILS ABSOLUTE COUNT: 11.82 K/UL (ref 1.3–9.1)
SODIUM BLD-SCNC: 141 MMOL/L (ref 135–144)
SURGICAL PATHOLOGY REPORT: NORMAL
WBC # BLD: 15.3 K/UL (ref 3.5–11)
WBC # BLD: 16.2 K/UL (ref 3.5–11)
WBC # BLD: ABNORMAL 10*3/UL
WBC # BLD: ABNORMAL 10*3/UL

## 2020-02-14 PROCEDURE — 97530 THERAPEUTIC ACTIVITIES: CPT

## 2020-02-14 PROCEDURE — 85018 HEMOGLOBIN: CPT

## 2020-02-14 PROCEDURE — 2060000000 HC ICU INTERMEDIATE R&B

## 2020-02-14 PROCEDURE — 36415 COLL VENOUS BLD VENIPUNCTURE: CPT

## 2020-02-14 PROCEDURE — 82947 ASSAY GLUCOSE BLOOD QUANT: CPT

## 2020-02-14 PROCEDURE — 85025 COMPLETE CBC W/AUTO DIFF WBC: CPT

## 2020-02-14 PROCEDURE — 6370000000 HC RX 637 (ALT 250 FOR IP): Performed by: FAMILY MEDICINE

## 2020-02-14 PROCEDURE — 85014 HEMATOCRIT: CPT

## 2020-02-14 PROCEDURE — 97161 PT EVAL LOW COMPLEX 20 MIN: CPT

## 2020-02-14 PROCEDURE — 6360000002 HC RX W HCPCS: Performed by: SURGERY

## 2020-02-14 PROCEDURE — C9113 INJ PANTOPRAZOLE SODIUM, VIA: HCPCS | Performed by: SURGERY

## 2020-02-14 PROCEDURE — 2580000003 HC RX 258: Performed by: SURGERY

## 2020-02-14 PROCEDURE — 80048 BASIC METABOLIC PNL TOTAL CA: CPT

## 2020-02-14 RX ADMIN — ONDANSETRON 4 MG: 2 INJECTION INTRAMUSCULAR; INTRAVENOUS at 08:36

## 2020-02-14 RX ADMIN — SODIUM CHLORIDE: 9 INJECTION, SOLUTION INTRAVENOUS at 09:30

## 2020-02-14 RX ADMIN — SODIUM CHLORIDE 10 ML: 9 INJECTION INTRAMUSCULAR; INTRAVENOUS; SUBCUTANEOUS at 08:37

## 2020-02-14 RX ADMIN — KETOROLAC TROMETHAMINE 15 MG: 30 INJECTION, SOLUTION INTRAMUSCULAR; INTRAVENOUS at 05:13

## 2020-02-14 RX ADMIN — TIZANIDINE 4 MG: 4 TABLET ORAL at 08:37

## 2020-02-14 RX ADMIN — TIZANIDINE 4 MG: 4 TABLET ORAL at 00:54

## 2020-02-14 RX ADMIN — METOCLOPRAMIDE 10 MG: 5 INJECTION, SOLUTION INTRAMUSCULAR; INTRAVENOUS at 08:36

## 2020-02-14 RX ADMIN — SODIUM CHLORIDE: 9 INJECTION, SOLUTION INTRAVENOUS at 16:33

## 2020-02-14 RX ADMIN — PANTOPRAZOLE SODIUM 40 MG: 40 INJECTION, POWDER, FOR SOLUTION INTRAVENOUS at 08:37

## 2020-02-14 RX ADMIN — FENTANYL CITRATE 50 MCG: 50 INJECTION INTRAMUSCULAR; INTRAVENOUS at 16:32

## 2020-02-14 RX ADMIN — METOCLOPRAMIDE 10 MG: 5 INJECTION, SOLUTION INTRAMUSCULAR; INTRAVENOUS at 02:07

## 2020-02-14 RX ADMIN — SODIUM CHLORIDE: 9 INJECTION, SOLUTION INTRAVENOUS at 02:40

## 2020-02-14 RX ADMIN — METOCLOPRAMIDE 10 MG: 5 INJECTION, SOLUTION INTRAMUSCULAR; INTRAVENOUS at 15:21

## 2020-02-14 RX ADMIN — METOCLOPRAMIDE 10 MG: 5 INJECTION, SOLUTION INTRAMUSCULAR; INTRAVENOUS at 20:57

## 2020-02-14 RX ADMIN — METOPROLOL TARTRATE 25 MG: 25 TABLET ORAL at 08:37

## 2020-02-14 RX ADMIN — PIPERACILLIN SODIUM AND TAZOBACTAM SODIUM 3.38 G: 3; .375 INJECTION, POWDER, LYOPHILIZED, FOR SOLUTION INTRAVENOUS at 16:33

## 2020-02-14 RX ADMIN — METOPROLOL TARTRATE 25 MG: 25 TABLET ORAL at 20:57

## 2020-02-14 RX ADMIN — FENTANYL CITRATE 50 MCG: 50 INJECTION INTRAMUSCULAR; INTRAVENOUS at 08:36

## 2020-02-14 RX ADMIN — PIPERACILLIN SODIUM AND TAZOBACTAM SODIUM 3.38 G: 3; .375 INJECTION, POWDER, LYOPHILIZED, FOR SOLUTION INTRAVENOUS at 20:57

## 2020-02-14 RX ADMIN — FENTANYL CITRATE 50 MCG: 50 INJECTION INTRAMUSCULAR; INTRAVENOUS at 20:58

## 2020-02-14 ASSESSMENT — PAIN DESCRIPTION - LOCATION
LOCATION: ABDOMEN

## 2020-02-14 ASSESSMENT — PAIN DESCRIPTION - FREQUENCY: FREQUENCY: CONTINUOUS

## 2020-02-14 ASSESSMENT — PAIN DESCRIPTION - ORIENTATION
ORIENTATION: LEFT;RIGHT
ORIENTATION: RIGHT;LEFT
ORIENTATION: LEFT;RIGHT

## 2020-02-14 ASSESSMENT — PAIN SCALES - GENERAL
PAINLEVEL_OUTOF10: 0
PAINLEVEL_OUTOF10: 5
PAINLEVEL_OUTOF10: 5
PAINLEVEL_OUTOF10: 4
PAINLEVEL_OUTOF10: 7
PAINLEVEL_OUTOF10: 5
PAINLEVEL_OUTOF10: 7
PAINLEVEL_OUTOF10: 7
PAINLEVEL_OUTOF10: 6

## 2020-02-14 ASSESSMENT — PAIN DESCRIPTION - PAIN TYPE
TYPE: SURGICAL PAIN
TYPE: SURGICAL PAIN
TYPE: ACUTE PAIN;SURGICAL PAIN
TYPE: ACUTE PAIN;SURGICAL PAIN

## 2020-02-14 ASSESSMENT — PAIN DESCRIPTION - DESCRIPTORS: DESCRIPTORS: ACHING

## 2020-02-14 NOTE — CARE COORDINATION
DISCHARGE PLANNING NOTE:    Plan is for this patient to return to home. He declines need for VNS and states he is comfortable taking care of CANDIS's at home. His brother is going to assist with any needs and his sister is going to do his cleaning and some cooking. POD #2 right hemicolectomy. On IV ancef, IV flagyl, IV reglan, NPO, NG out. Will continue to follow along.      Electronically signed by Stuart Schwartz RN on 2/14/2020 at 12:22 PM

## 2020-02-14 NOTE — PROGRESS NOTES
Hospitalist Progress Note  2/13/2020 9:42 PM  Subjective:   Admit Date: 2/12/2020  PCP: Sherri Parham MD     Full Code      C/c:No chief complaint on file. Interval History:pt is post op and had colectomy for diverticulitis,pt is doing better,except post op pain    Diet: Diet NPO Effective Now Exceptions are: Ice Chips, Sips with Meds                                ip days:1  Medications:   Scheduled Meds:   metoprolol tartrate  25 mg Oral BID    sodium chloride flush  10 mL Intravenous 2 times per day    [Held by provider] enoxaparin  40 mg Subcutaneous Daily    metoclopramide  10 mg Intravenous Q6H    pantoprazole  40 mg Intravenous Daily    And    sodium chloride (PF)  10 mL Intravenous Daily     Continuous Infusions:   sodium chloride 150 mL/hr at 02/13/20 2011     PRN Meds:.tiZANidine, sodium chloride flush, ondansetron, fentanNYL, fentanNYL, ketorolac     CBC:   Recent Labs     02/13/20  0533 02/13/20  1208   WBC 17.2*  --    HGB 14.1 12.7*     --      BMP:    Recent Labs     02/13/20  0533      K 4.0   *   CO2 20   BUN 15   CREATININE 1.22*   GLUCOSE 184*     Hepatic: No results for input(s): AST, ALT, ALB, BILITOT, ALKPHOS in the last 72 hours. Troponin: No results for input(s): TROPONINI in the last 72 hours. BNP: No results for input(s): BNP in the last 72 hours. Lipids: No results for input(s): CHOL, HDL in the last 72 hours. Invalid input(s): LDLCALCU  INR: No results for input(s): INR in the last 72 hours.     Objective:   Vitals: /84   Pulse 97   Temp 98.6 °F (37 °C) (Oral)   Resp 16   Ht 5' 5\" (1.651 m)   Wt 220 lb 14.4 oz (100.2 kg)   SpO2 92%   BMI 36.76 kg/m²   General appearance: alert, appears stated age and cooperative  Skin: Skin color, texture, turgor normal. No rashes or lesions  Lungs: clear to auscultation bilaterally  Heart: regular rate and rhythm, S1, S2 normal, no murmur, click, rub or gallop  Abdomen: soft, non-tender; bowel

## 2020-02-14 NOTE — PROGRESS NOTES
RN into room to ambulate patient, PT in with patient and RN expressed the need to ambulate the halls. PT is going to ambulate patient in hallway; RN expressed the importance of the IS. RN will continue to monitor.

## 2020-02-14 NOTE — PROGRESS NOTES
Hospitalist Progress Note  2/14/2020 5:18 PM  Subjective:   Admit Date: 2/12/2020  PCP: Noreen Khan MD     Full Code      C/c:No chief complaint on file. Interval History: still having some oozing from surgical site    Diet: DIET CLEAR LIQUID;                                ip days:2  Medications:   Scheduled Meds:   piperacillin-tazobactam  3.375 g Intravenous Q8H    metoprolol tartrate  25 mg Oral BID    sodium chloride flush  10 mL Intravenous 2 times per day    metoclopramide  10 mg Intravenous Q6H    pantoprazole  40 mg Intravenous Daily    And    sodium chloride (PF)  10 mL Intravenous Daily     Continuous Infusions:   sodium chloride 150 mL/hr at 02/14/20 1633     PRN Meds:.tiZANidine, sodium chloride flush, ondansetron, fentanNYL, fentanNYL     CBC:   Recent Labs     02/13/20  0533 02/13/20  1208 02/14/20  0613 02/14/20  1429   WBC 17.2*  --  16.2* 15.3*   HGB 14.1 12.7* 9.8* 9.2*     --  170 162     BMP:    Recent Labs     02/13/20  0533 02/14/20  0613    141   K 4.0 4.0   * 107   CO2 20 21   BUN 15 24*   CREATININE 1.22* 1.50*   GLUCOSE 184* 129*     Hepatic: No results for input(s): AST, ALT, ALB, BILITOT, ALKPHOS in the last 72 hours. Troponin: No results for input(s): TROPONINI in the last 72 hours. BNP: No results for input(s): BNP in the last 72 hours. Lipids: No results for input(s): CHOL, HDL in the last 72 hours. Invalid input(s): LDLCALCU  INR: No results for input(s): INR in the last 72 hours.     Objective:   Vitals: BP (!) 142/82   Pulse 97   Temp 98.8 °F (37.1 °C) (Oral)   Resp 22   Ht 5' 5\" (1.651 m)   Wt 220 lb 14.4 oz (100.2 kg)   SpO2 93%   BMI 36.76 kg/m²   General appearance: alert, appears stated age and cooperative  Skin: Skin color, texture, turgor normal. No rashes or lesions  Lungs: clear to auscultation bilaterally  Heart: regular rate and rhythm, S1, S2 normal, no murmur, click, rub or gallop  Abdomen: soft, non-tender; bowel

## 2020-02-15 LAB
ABSOLUTE EOS #: 0.2 K/UL (ref 0–0.4)
ABSOLUTE IMMATURE GRANULOCYTE: ABNORMAL K/UL (ref 0–0.3)
ABSOLUTE LYMPH #: 1.3 K/UL (ref 1–4.8)
ABSOLUTE MONO #: 0.8 K/UL (ref 0.1–1.3)
ANION GAP SERPL CALCULATED.3IONS-SCNC: 7 MMOL/L (ref 9–17)
BASOPHILS # BLD: 1 % (ref 0–2)
BASOPHILS ABSOLUTE: 0.1 K/UL (ref 0–0.2)
BUN BLDV-MCNC: 15 MG/DL (ref 8–23)
BUN/CREAT BLD: ABNORMAL (ref 9–20)
CALCIUM SERPL-MCNC: 8.2 MG/DL (ref 8.6–10.4)
CHLORIDE BLD-SCNC: 109 MMOL/L (ref 98–107)
CO2: 23 MMOL/L (ref 20–31)
CREAT SERPL-MCNC: 1.07 MG/DL (ref 0.7–1.2)
DIFFERENTIAL TYPE: ABNORMAL
EOSINOPHILS RELATIVE PERCENT: 1 % (ref 0–4)
GFR AFRICAN AMERICAN: >60 ML/MIN
GFR NON-AFRICAN AMERICAN: >60 ML/MIN
GFR SERPL CREATININE-BSD FRML MDRD: ABNORMAL ML/MIN/{1.73_M2}
GFR SERPL CREATININE-BSD FRML MDRD: ABNORMAL ML/MIN/{1.73_M2}
GLUCOSE BLD-MCNC: 156 MG/DL (ref 70–99)
HCT VFR BLD CALC: 25.3 % (ref 41–53)
HCT VFR BLD CALC: 27.3 % (ref 41–53)
HCT VFR BLD CALC: 27.8 % (ref 41–53)
HCT VFR BLD CALC: 28.2 % (ref 41–53)
HEMOGLOBIN: 8.3 G/DL (ref 13.5–17.5)
HEMOGLOBIN: 8.9 G/DL (ref 13.5–17.5)
HEMOGLOBIN: 9.4 G/DL (ref 13.5–17.5)
HEMOGLOBIN: 9.7 G/DL (ref 13.5–17.5)
IMMATURE GRANULOCYTES: ABNORMAL %
LYMPHOCYTES # BLD: 11 % (ref 24–44)
MCH RBC QN AUTO: 34.3 PG (ref 26–34)
MCHC RBC AUTO-ENTMCNC: 34.3 G/DL (ref 31–37)
MCV RBC AUTO: 100 FL (ref 80–100)
MONOCYTES # BLD: 7 % (ref 1–7)
NRBC AUTOMATED: ABNORMAL PER 100 WBC
PDW BLD-RTO: 13.2 % (ref 11.5–14.9)
PLATELET # BLD: 175 K/UL (ref 150–450)
PLATELET ESTIMATE: ABNORMAL
PMV BLD AUTO: 9.6 FL (ref 6–12)
POTASSIUM SERPL-SCNC: 4.3 MMOL/L (ref 3.7–5.3)
RBC # BLD: 2.82 M/UL (ref 4.5–5.9)
RBC # BLD: ABNORMAL 10*6/UL
SEG NEUTROPHILS: 80 % (ref 36–66)
SEGMENTED NEUTROPHILS ABSOLUTE COUNT: 9.8 K/UL (ref 1.3–9.1)
SODIUM BLD-SCNC: 139 MMOL/L (ref 135–144)
WBC # BLD: 12.2 K/UL (ref 3.5–11)
WBC # BLD: ABNORMAL 10*3/UL

## 2020-02-15 PROCEDURE — 2060000000 HC ICU INTERMEDIATE R&B

## 2020-02-15 PROCEDURE — 80048 BASIC METABOLIC PNL TOTAL CA: CPT

## 2020-02-15 PROCEDURE — 6360000002 HC RX W HCPCS: Performed by: SURGERY

## 2020-02-15 PROCEDURE — 85014 HEMATOCRIT: CPT

## 2020-02-15 PROCEDURE — 85025 COMPLETE CBC W/AUTO DIFF WBC: CPT

## 2020-02-15 PROCEDURE — 85018 HEMOGLOBIN: CPT

## 2020-02-15 PROCEDURE — C9113 INJ PANTOPRAZOLE SODIUM, VIA: HCPCS | Performed by: SURGERY

## 2020-02-15 PROCEDURE — 2580000003 HC RX 258: Performed by: SURGERY

## 2020-02-15 PROCEDURE — 6370000000 HC RX 637 (ALT 250 FOR IP): Performed by: FAMILY MEDICINE

## 2020-02-15 PROCEDURE — 36415 COLL VENOUS BLD VENIPUNCTURE: CPT

## 2020-02-15 RX ADMIN — METOPROLOL TARTRATE 25 MG: 25 TABLET ORAL at 07:18

## 2020-02-15 RX ADMIN — TIZANIDINE 4 MG: 4 TABLET ORAL at 19:35

## 2020-02-15 RX ADMIN — FENTANYL CITRATE 50 MCG: 50 INJECTION INTRAMUSCULAR; INTRAVENOUS at 04:05

## 2020-02-15 RX ADMIN — FENTANYL CITRATE 50 MCG: 50 INJECTION INTRAMUSCULAR; INTRAVENOUS at 16:17

## 2020-02-15 RX ADMIN — PIPERACILLIN SODIUM AND TAZOBACTAM SODIUM 3.38 G: 3; .375 INJECTION, POWDER, LYOPHILIZED, FOR SOLUTION INTRAVENOUS at 06:16

## 2020-02-15 RX ADMIN — SODIUM CHLORIDE 10 ML: 9 INJECTION INTRAMUSCULAR; INTRAVENOUS; SUBCUTANEOUS at 07:18

## 2020-02-15 RX ADMIN — FENTANYL CITRATE 100 MCG: 50 INJECTION INTRAMUSCULAR; INTRAVENOUS at 22:11

## 2020-02-15 RX ADMIN — PANTOPRAZOLE SODIUM 40 MG: 40 INJECTION, POWDER, FOR SOLUTION INTRAVENOUS at 07:18

## 2020-02-15 RX ADMIN — METOCLOPRAMIDE 10 MG: 5 INJECTION, SOLUTION INTRAMUSCULAR; INTRAVENOUS at 07:18

## 2020-02-15 RX ADMIN — PIPERACILLIN SODIUM AND TAZOBACTAM SODIUM 3.38 G: 3; .375 INJECTION, POWDER, LYOPHILIZED, FOR SOLUTION INTRAVENOUS at 19:35

## 2020-02-15 RX ADMIN — METOCLOPRAMIDE 10 MG: 5 INJECTION, SOLUTION INTRAMUSCULAR; INTRAVENOUS at 19:35

## 2020-02-15 RX ADMIN — METOPROLOL TARTRATE 25 MG: 25 TABLET ORAL at 19:35

## 2020-02-15 RX ADMIN — METOCLOPRAMIDE 10 MG: 5 INJECTION, SOLUTION INTRAMUSCULAR; INTRAVENOUS at 01:22

## 2020-02-15 RX ADMIN — PIPERACILLIN SODIUM AND TAZOBACTAM SODIUM 3.38 G: 3; .375 INJECTION, POWDER, LYOPHILIZED, FOR SOLUTION INTRAVENOUS at 14:03

## 2020-02-15 RX ADMIN — FENTANYL CITRATE 50 MCG: 50 INJECTION INTRAMUSCULAR; INTRAVENOUS at 00:18

## 2020-02-15 RX ADMIN — METOCLOPRAMIDE 10 MG: 5 INJECTION, SOLUTION INTRAMUSCULAR; INTRAVENOUS at 14:04

## 2020-02-15 RX ADMIN — TIZANIDINE 4 MG: 4 TABLET ORAL at 09:49

## 2020-02-15 RX ADMIN — FENTANYL CITRATE 100 MCG: 50 INJECTION INTRAMUSCULAR; INTRAVENOUS at 07:43

## 2020-02-15 RX ADMIN — SODIUM CHLORIDE: 9 INJECTION, SOLUTION INTRAVENOUS at 07:15

## 2020-02-15 ASSESSMENT — PAIN DESCRIPTION - FREQUENCY: FREQUENCY: INTERMITTENT

## 2020-02-15 ASSESSMENT — PAIN SCALES - GENERAL
PAINLEVEL_OUTOF10: 4
PAINLEVEL_OUTOF10: 0
PAINLEVEL_OUTOF10: 0
PAINLEVEL_OUTOF10: 7
PAINLEVEL_OUTOF10: 6
PAINLEVEL_OUTOF10: 7
PAINLEVEL_OUTOF10: 4
PAINLEVEL_OUTOF10: 4
PAINLEVEL_OUTOF10: 7
PAINLEVEL_OUTOF10: 8
PAINLEVEL_OUTOF10: 4

## 2020-02-15 ASSESSMENT — PAIN DESCRIPTION - ONSET: ONSET: ON-GOING

## 2020-02-15 ASSESSMENT — PAIN DESCRIPTION - PROGRESSION: CLINICAL_PROGRESSION: GRADUALLY IMPROVING

## 2020-02-15 ASSESSMENT — PAIN DESCRIPTION - PAIN TYPE: TYPE: SURGICAL PAIN

## 2020-02-15 ASSESSMENT — PAIN DESCRIPTION - DESCRIPTORS: DESCRIPTORS: ACHING;CRAMPING

## 2020-02-15 NOTE — PROGRESS NOTES
Nurse changed dressing to abd. Midline incision intact and well approximated. Case drain draining serosanguinous drainage. Dressing changed.  Will continue to monitor

## 2020-02-16 PROBLEM — E66.811 OBESITY (BMI 30.0-34.9): Chronic | Status: ACTIVE | Noted: 2018-05-15

## 2020-02-16 PROBLEM — D62 ACUTE BLOOD LOSS AS CAUSE OF POSTOPERATIVE ANEMIA: Status: ACTIVE | Noted: 2020-02-16

## 2020-02-16 PROBLEM — I10 ESSENTIAL HYPERTENSION: Chronic | Status: ACTIVE | Noted: 2017-05-04

## 2020-02-16 PROBLEM — K63.5 COLON POLYP: Chronic | Status: ACTIVE | Noted: 2020-02-12

## 2020-02-16 PROBLEM — E66.9 OBESITY (BMI 30.0-34.9): Chronic | Status: ACTIVE | Noted: 2018-05-15

## 2020-02-16 PROBLEM — Z90.49 S/P RIGHT HEMICOLECTOMY: Chronic | Status: ACTIVE | Noted: 2020-02-16

## 2020-02-16 LAB
ABSOLUTE EOS #: 0.4 K/UL (ref 0–0.4)
ABSOLUTE IMMATURE GRANULOCYTE: ABNORMAL K/UL (ref 0–0.3)
ABSOLUTE LYMPH #: 1.5 K/UL (ref 1–4.8)
ABSOLUTE MONO #: 0.8 K/UL (ref 0.1–1.3)
ANION GAP SERPL CALCULATED.3IONS-SCNC: 10 MMOL/L (ref 9–17)
BASOPHILS # BLD: 0 % (ref 0–2)
BASOPHILS ABSOLUTE: 0 K/UL (ref 0–0.2)
BUN BLDV-MCNC: 14 MG/DL (ref 8–23)
BUN/CREAT BLD: ABNORMAL (ref 9–20)
CALCIUM SERPL-MCNC: 7.7 MG/DL (ref 8.6–10.4)
CHLORIDE BLD-SCNC: 110 MMOL/L (ref 98–107)
CO2: 21 MMOL/L (ref 20–31)
CREAT SERPL-MCNC: 0.98 MG/DL (ref 0.7–1.2)
DIFFERENTIAL TYPE: ABNORMAL
EOSINOPHILS RELATIVE PERCENT: 6 % (ref 0–4)
GFR AFRICAN AMERICAN: >60 ML/MIN
GFR NON-AFRICAN AMERICAN: >60 ML/MIN
GFR SERPL CREATININE-BSD FRML MDRD: ABNORMAL ML/MIN/{1.73_M2}
GFR SERPL CREATININE-BSD FRML MDRD: ABNORMAL ML/MIN/{1.73_M2}
GLUCOSE BLD-MCNC: 105 MG/DL (ref 70–99)
HCT VFR BLD CALC: 26 % (ref 41–53)
HCT VFR BLD CALC: 26.2 % (ref 41–53)
HCT VFR BLD CALC: 26.8 % (ref 41–53)
HCT VFR BLD CALC: 28 % (ref 41–53)
HEMOGLOBIN: 8.3 G/DL (ref 13.5–17.5)
HEMOGLOBIN: 8.6 G/DL (ref 13.5–17.5)
HEMOGLOBIN: 8.8 G/DL (ref 13.5–17.5)
HEMOGLOBIN: 9.2 G/DL (ref 13.5–17.5)
IMMATURE GRANULOCYTES: ABNORMAL %
LYMPHOCYTES # BLD: 21 % (ref 24–44)
MCH RBC QN AUTO: 32.5 PG (ref 26–34)
MCHC RBC AUTO-ENTMCNC: 32 G/DL (ref 31–37)
MCV RBC AUTO: 101.3 FL (ref 80–100)
MONOCYTES # BLD: 11 % (ref 1–7)
NRBC AUTOMATED: ABNORMAL PER 100 WBC
PDW BLD-RTO: 13.3 % (ref 11.5–14.9)
PLATELET # BLD: 166 K/UL (ref 150–450)
PLATELET ESTIMATE: ABNORMAL
PMV BLD AUTO: 9.3 FL (ref 6–12)
POTASSIUM SERPL-SCNC: 4.1 MMOL/L (ref 3.7–5.3)
RBC # BLD: 2.57 M/UL (ref 4.5–5.9)
RBC # BLD: ABNORMAL 10*6/UL
SEG NEUTROPHILS: 62 % (ref 36–66)
SEGMENTED NEUTROPHILS ABSOLUTE COUNT: 4.3 K/UL (ref 1.3–9.1)
SODIUM BLD-SCNC: 141 MMOL/L (ref 135–144)
WBC # BLD: 6.9 K/UL (ref 3.5–11)
WBC # BLD: ABNORMAL 10*3/UL

## 2020-02-16 PROCEDURE — C9113 INJ PANTOPRAZOLE SODIUM, VIA: HCPCS | Performed by: SURGERY

## 2020-02-16 PROCEDURE — 6370000000 HC RX 637 (ALT 250 FOR IP): Performed by: INTERNAL MEDICINE

## 2020-02-16 PROCEDURE — 85025 COMPLETE CBC W/AUTO DIFF WBC: CPT

## 2020-02-16 PROCEDURE — 2580000003 HC RX 258: Performed by: SURGERY

## 2020-02-16 PROCEDURE — 85014 HEMATOCRIT: CPT

## 2020-02-16 PROCEDURE — 6370000000 HC RX 637 (ALT 250 FOR IP): Performed by: SURGERY

## 2020-02-16 PROCEDURE — 36415 COLL VENOUS BLD VENIPUNCTURE: CPT

## 2020-02-16 PROCEDURE — 6360000002 HC RX W HCPCS: Performed by: SURGERY

## 2020-02-16 PROCEDURE — 80048 BASIC METABOLIC PNL TOTAL CA: CPT

## 2020-02-16 PROCEDURE — 85018 HEMOGLOBIN: CPT

## 2020-02-16 PROCEDURE — 2060000000 HC ICU INTERMEDIATE R&B

## 2020-02-16 RX ORDER — NICOTINE 21 MG/24HR
1 PATCH, TRANSDERMAL 24 HOURS TRANSDERMAL DAILY
Status: DISCONTINUED | OUTPATIENT
Start: 2020-02-16 | End: 2020-02-19 | Stop reason: HOSPADM

## 2020-02-16 RX ADMIN — PIPERACILLIN SODIUM AND TAZOBACTAM SODIUM 3.38 G: 3; .375 INJECTION, POWDER, LYOPHILIZED, FOR SOLUTION INTRAVENOUS at 13:31

## 2020-02-16 RX ADMIN — SODIUM CHLORIDE 10 ML: 9 INJECTION INTRAMUSCULAR; INTRAVENOUS; SUBCUTANEOUS at 08:29

## 2020-02-16 RX ADMIN — METOPROLOL TARTRATE 12.5 MG: 25 TABLET ORAL at 08:30

## 2020-02-16 RX ADMIN — PANTOPRAZOLE SODIUM 40 MG: 40 INJECTION, POWDER, FOR SOLUTION INTRAVENOUS at 08:29

## 2020-02-16 RX ADMIN — Medication 10 ML: at 20:37

## 2020-02-16 RX ADMIN — PIPERACILLIN SODIUM AND TAZOBACTAM SODIUM 3.38 G: 3; .375 INJECTION, POWDER, LYOPHILIZED, FOR SOLUTION INTRAVENOUS at 03:42

## 2020-02-16 RX ADMIN — FENTANYL CITRATE 100 MCG: 50 INJECTION INTRAMUSCULAR; INTRAVENOUS at 19:28

## 2020-02-16 RX ADMIN — SODIUM CHLORIDE: 9 INJECTION, SOLUTION INTRAVENOUS at 03:44

## 2020-02-16 RX ADMIN — FENTANYL CITRATE 100 MCG: 50 INJECTION INTRAMUSCULAR; INTRAVENOUS at 03:48

## 2020-02-16 RX ADMIN — FENTANYL CITRATE 50 MCG: 50 INJECTION INTRAMUSCULAR; INTRAVENOUS at 13:31

## 2020-02-16 RX ADMIN — PIPERACILLIN SODIUM AND TAZOBACTAM SODIUM 3.38 G: 3; .375 INJECTION, POWDER, LYOPHILIZED, FOR SOLUTION INTRAVENOUS at 20:05

## 2020-02-16 RX ADMIN — FENTANYL CITRATE 50 MCG: 50 INJECTION INTRAMUSCULAR; INTRAVENOUS at 08:30

## 2020-02-16 RX ADMIN — METOPROLOL TARTRATE 12.5 MG: 25 TABLET ORAL at 20:35

## 2020-02-16 RX ADMIN — METOCLOPRAMIDE 10 MG: 5 INJECTION, SOLUTION INTRAMUSCULAR; INTRAVENOUS at 01:51

## 2020-02-16 RX ADMIN — SODIUM CHLORIDE: 9 INJECTION, SOLUTION INTRAVENOUS at 19:30

## 2020-02-16 ASSESSMENT — PAIN SCALES - GENERAL
PAINLEVEL_OUTOF10: 3
PAINLEVEL_OUTOF10: 7
PAINLEVEL_OUTOF10: 1
PAINLEVEL_OUTOF10: 3
PAINLEVEL_OUTOF10: 5
PAINLEVEL_OUTOF10: 7
PAINLEVEL_OUTOF10: 5

## 2020-02-16 ASSESSMENT — PAIN DESCRIPTION - PAIN TYPE
TYPE: SURGICAL PAIN

## 2020-02-16 ASSESSMENT — PAIN DESCRIPTION - LOCATION
LOCATION: ABDOMEN

## 2020-02-16 ASSESSMENT — PAIN DESCRIPTION - DESCRIPTORS: DESCRIPTORS: ACHING;CRAMPING

## 2020-02-16 ASSESSMENT — PAIN DESCRIPTION - ONSET
ONSET: ON-GOING
ONSET: ON-GOING

## 2020-02-16 ASSESSMENT — PAIN DESCRIPTION - FREQUENCY
FREQUENCY: INTERMITTENT
FREQUENCY: INTERMITTENT

## 2020-02-16 ASSESSMENT — PAIN DESCRIPTION - PROGRESSION: CLINICAL_PROGRESSION: GRADUALLY IMPROVING

## 2020-02-16 ASSESSMENT — PAIN DESCRIPTION - ORIENTATION: ORIENTATION: RIGHT;LEFT

## 2020-02-16 NOTE — PROGRESS NOTES
of lumbar or lumbosacral intervertebral disc    Back pain    Low back pain    Allergic rhinitis    Impaired fasting glucose    Mixed hyperlipidemia    Essential hypertension    Obesity (BMI 30.0-34. 9)    Tear of left rotator cuff    Left bicipital tenosynovitis    Colon polyp         Plan  1. GI prophylaxis proton pump inhibitor per orders, pharmacologic prophylaxis (with any of the following: enoxaparin (Lovenox) 40mg SQ 2h prior to surgery then QD) and intermittent pneumatic compression boots  2. clear liquids  3.  Advance to full liquid    Electronically signed by Fatoumata Calloway MD  on 2/15/2020 at 9:25 PM

## 2020-02-16 NOTE — PROGRESS NOTES
Attempted to wean oxygen to 2l\m  After approximately 4 hours pt began to desat and oxygen turned back up to 4 l\m.

## 2020-02-16 NOTE — PROGRESS NOTES
Surgery Progress Note             POD # 4    PATIENT NAME: Jj Ayala     TODAY'S DATE: 2/16/2020, 7:19 AM    Chief complaint: s/p right hemicolectomy    SUBJECTIVE:    Pt is having severe nicotine withdrawal.  Patients pain is 4 out of 10. Pt is  passing gas. Pt has had a bowel movement. OBJECTIVE:   VITALS:  /60   Pulse 78   Temp 98.6 °F (37 °C) (Oral)   Resp 16   Ht 5' 5\" (1.651 m)   Wt 220 lb 14.4 oz (100.2 kg)   SpO2 91%   BMI 36.76 kg/m²     INTAKE/OUTPUT:      Intake/Output Summary (Last 24 hours) at 2/16/2020 0719  Last data filed at 2/16/2020 0348  Gross per 24 hour   Intake 3410 ml   Output 1275 ml   Net 2135 ml                 CONSTITUTIONAL:  awake and alert. No acute distress  CARDIOVASCULAR:  regular rate and rhythm and No Murmur  LUNGS:  CTA Bilaterally  ABDOMEN:   Abdomen soft, non-tender, non-distended  INCISION: Incision clean/dry/intact    Data:  CBC:   Lab Results   Component Value Date    WBC 6.9 02/16/2020    RBC 2.57 02/16/2020    HGB 9.2 02/16/2020    HCT 28.0 02/16/2020    .3 02/16/2020    MCH 32.5 02/16/2020    MCHC 32.0 02/16/2020    RDW 13.3 02/16/2020     02/16/2020    MPV 9.3 02/16/2020     BMP:    Lab Results   Component Value Date     02/16/2020    K 4.1 02/16/2020     02/16/2020    CO2 21 02/16/2020    BUN 14 02/16/2020    LABALBU 4.2 11/01/2019    CREATININE 0.98 02/16/2020    CALCIUM 7.7 02/16/2020    GFRAA >60 02/16/2020    LABGLOM >60 02/16/2020    GLUCOSE 105 02/16/2020       ASSESSMENT   59 y.o. male Active Problems:     Patient Active Problem List   Diagnosis    Osteoporosis    Cigarette smoker    Secondary hyperparathyroidism ([address]    Vitamin D deficiency    History of stroke    Degeneration of lumbar or lumbosacral intervertebral disc    Back pain    Low back pain    Allergic rhinitis    Impaired fasting glucose    Mixed hyperlipidemia    Essential hypertension    Obesity (BMI 30.0-34. 9)    Tear of left rotator cuff    Left bicipital tenosynovitis    Colon polyp    Acute blood loss as cause of postoperative anemia    S/P right hemicolectomy           Plan  1. GI prophylaxis proton pump inhibitor per orders, intermittent pneumatic compression boots  2. full liquid diet  3. Advance to regular diet  4. Nicotine patch.     Electronically signed by Anthony Peña MD  on 2/16/2020 at 7:19 AM

## 2020-02-17 LAB
ABSOLUTE EOS #: 0.4 K/UL (ref 0–0.4)
ABSOLUTE IMMATURE GRANULOCYTE: ABNORMAL K/UL (ref 0–0.3)
ABSOLUTE LYMPH #: 1.6 K/UL (ref 1–4.8)
ABSOLUTE MONO #: 0.8 K/UL (ref 0.1–1.3)
ANION GAP SERPL CALCULATED.3IONS-SCNC: 9 MMOL/L (ref 9–17)
BASOPHILS # BLD: 0 % (ref 0–2)
BASOPHILS ABSOLUTE: 0 K/UL (ref 0–0.2)
BUN BLDV-MCNC: 10 MG/DL (ref 8–23)
BUN/CREAT BLD: ABNORMAL (ref 9–20)
CALCIUM SERPL-MCNC: 7.8 MG/DL (ref 8.6–10.4)
CHLORIDE BLD-SCNC: 112 MMOL/L (ref 98–107)
CO2: 22 MMOL/L (ref 20–31)
CREAT SERPL-MCNC: 0.79 MG/DL (ref 0.7–1.2)
DIFFERENTIAL TYPE: ABNORMAL
EOSINOPHILS RELATIVE PERCENT: 6 % (ref 0–4)
GFR AFRICAN AMERICAN: >60 ML/MIN
GFR NON-AFRICAN AMERICAN: >60 ML/MIN
GFR SERPL CREATININE-BSD FRML MDRD: ABNORMAL ML/MIN/{1.73_M2}
GFR SERPL CREATININE-BSD FRML MDRD: ABNORMAL ML/MIN/{1.73_M2}
GLUCOSE BLD-MCNC: 105 MG/DL (ref 70–99)
HCT VFR BLD CALC: 24.7 % (ref 41–53)
HEMOGLOBIN: 8.2 G/DL (ref 13.5–17.5)
IMMATURE GRANULOCYTES: ABNORMAL %
LYMPHOCYTES # BLD: 21 % (ref 24–44)
MCH RBC QN AUTO: 32.7 PG (ref 26–34)
MCHC RBC AUTO-ENTMCNC: 33.3 G/DL (ref 31–37)
MCV RBC AUTO: 98.4 FL (ref 80–100)
MONOCYTES # BLD: 10 % (ref 1–7)
NRBC AUTOMATED: ABNORMAL PER 100 WBC
PDW BLD-RTO: 12.8 % (ref 11.5–14.9)
PLATELET # BLD: 189 K/UL (ref 150–450)
PLATELET ESTIMATE: ABNORMAL
PMV BLD AUTO: 9.3 FL (ref 6–12)
POTASSIUM SERPL-SCNC: 3.6 MMOL/L (ref 3.7–5.3)
RBC # BLD: 2.51 M/UL (ref 4.5–5.9)
RBC # BLD: ABNORMAL 10*6/UL
SEG NEUTROPHILS: 63 % (ref 36–66)
SEGMENTED NEUTROPHILS ABSOLUTE COUNT: 5 K/UL (ref 1.3–9.1)
SODIUM BLD-SCNC: 143 MMOL/L (ref 135–144)
WBC # BLD: 7.9 K/UL (ref 3.5–11)
WBC # BLD: ABNORMAL 10*3/UL

## 2020-02-17 PROCEDURE — 85025 COMPLETE CBC W/AUTO DIFF WBC: CPT

## 2020-02-17 PROCEDURE — 36415 COLL VENOUS BLD VENIPUNCTURE: CPT

## 2020-02-17 PROCEDURE — 80048 BASIC METABOLIC PNL TOTAL CA: CPT

## 2020-02-17 PROCEDURE — C9113 INJ PANTOPRAZOLE SODIUM, VIA: HCPCS | Performed by: SURGERY

## 2020-02-17 PROCEDURE — 6370000000 HC RX 637 (ALT 250 FOR IP): Performed by: INTERNAL MEDICINE

## 2020-02-17 PROCEDURE — 2060000000 HC ICU INTERMEDIATE R&B

## 2020-02-17 PROCEDURE — 6370000000 HC RX 637 (ALT 250 FOR IP): Performed by: SURGERY

## 2020-02-17 PROCEDURE — 2580000003 HC RX 258: Performed by: SURGERY

## 2020-02-17 PROCEDURE — 6360000002 HC RX W HCPCS: Performed by: SURGERY

## 2020-02-17 RX ADMIN — FENTANYL CITRATE 50 MCG: 50 INJECTION INTRAMUSCULAR; INTRAVENOUS at 17:01

## 2020-02-17 RX ADMIN — FENTANYL CITRATE 50 MCG: 50 INJECTION INTRAMUSCULAR; INTRAVENOUS at 20:57

## 2020-02-17 RX ADMIN — PIPERACILLIN SODIUM AND TAZOBACTAM SODIUM 3.38 G: 3; .375 INJECTION, POWDER, LYOPHILIZED, FOR SOLUTION INTRAVENOUS at 20:57

## 2020-02-17 RX ADMIN — FENTANYL CITRATE 50 MCG: 50 INJECTION INTRAMUSCULAR; INTRAVENOUS at 11:05

## 2020-02-17 RX ADMIN — PIPERACILLIN SODIUM AND TAZOBACTAM SODIUM 3.38 G: 3; .375 INJECTION, POWDER, LYOPHILIZED, FOR SOLUTION INTRAVENOUS at 04:00

## 2020-02-17 RX ADMIN — FENTANYL CITRATE 50 MCG: 50 INJECTION INTRAMUSCULAR; INTRAVENOUS at 13:25

## 2020-02-17 RX ADMIN — FENTANYL CITRATE 50 MCG: 50 INJECTION INTRAMUSCULAR; INTRAVENOUS at 08:58

## 2020-02-17 RX ADMIN — FENTANYL CITRATE 100 MCG: 50 INJECTION INTRAMUSCULAR; INTRAVENOUS at 02:56

## 2020-02-17 RX ADMIN — METOPROLOL TARTRATE 25 MG: 25 TABLET ORAL at 09:11

## 2020-02-17 RX ADMIN — SODIUM CHLORIDE 10 ML: 9 INJECTION INTRAMUSCULAR; INTRAVENOUS; SUBCUTANEOUS at 09:03

## 2020-02-17 RX ADMIN — METOPROLOL TARTRATE 25 MG: 25 TABLET ORAL at 20:58

## 2020-02-17 RX ADMIN — PANTOPRAZOLE SODIUM 40 MG: 40 INJECTION, POWDER, FOR SOLUTION INTRAVENOUS at 09:03

## 2020-02-17 ASSESSMENT — PAIN SCALES - GENERAL
PAINLEVEL_OUTOF10: 4
PAINLEVEL_OUTOF10: 8
PAINLEVEL_OUTOF10: 7
PAINLEVEL_OUTOF10: 6
PAINLEVEL_OUTOF10: 5
PAINLEVEL_OUTOF10: 3
PAINLEVEL_OUTOF10: 7
PAINLEVEL_OUTOF10: 7
PAINLEVEL_OUTOF10: 4

## 2020-02-17 ASSESSMENT — PAIN DESCRIPTION - LOCATION
LOCATION: ABDOMEN
LOCATION: ABDOMEN

## 2020-02-17 ASSESSMENT — PAIN DESCRIPTION - PAIN TYPE
TYPE: SURGICAL PAIN
TYPE: SURGICAL PAIN

## 2020-02-17 NOTE — PROGRESS NOTES
Patient was seen and examined. Significant diarrhea. Tolerating diet. No fever chills vital signs are stable. Voiding well. Abdomen is benign. Incision is clean dry intact. CANDIS drains are serosanguineous. Extremity nontender. Blood work was reviewed. Continue diet as tolerated. Discontinue Reglan. Hep-Lock IV. Likely discharge tomorrow.

## 2020-02-17 NOTE — CARE COORDINATION
DISCHARGE PLANNING NOTE:    I spoke with patient at bedside. Patient states his sister is currently at home caring for his kittens. She will be able to assist with any needs he has upon discharge. He declines need for VNS. He is having lots of liquid stools. Dr. Citlalli Arredondo rounds while I am at bedside. He states he is going to stop the reglan, stop the IV fluids, and hopefully by tomorrow his loose stools will slow down and he can discharge tomorrow. Will continue to follow along.      Electronically signed by Stuart Schwartz RN on 2/17/2020 at 11:29 AM

## 2020-02-17 NOTE — PROGRESS NOTES
Patient reports having multiple loose stools overnight and this morning. Dr. Sugey Meek notified when rounding. New orders received to hold Zosyn, Reglan, and Protonix.

## 2020-02-17 NOTE — PROGRESS NOTES
[facility] Internal Medicine     Patient name:  Des Albert  Date of admission:  2/12/2020 12:58 PM  MRN:   [id number]  YOB: 1955    CC- post op     HPI-  Pt is post op   Tolerating liquid diet advanced   bp normal today     REVIEW OF SYSTEMS:    · General----negative for fatigue, weight loss  · GI negative for nausea and vomiting, no dysphagia       EXAM-  BP (!) 152/86   Pulse 84   Temp 99.3 °F (37.4 °C) (Oral)   Resp 16   Ht 5' 5\" (1.651 m)   Wt 220 lb 14.4 oz (100.2 kg)   SpO2 93%   BMI 36.76 kg/m²      · General appearance: NAD conversant  · Lungs: normal effort, clear to auscultation bilaterally,no wheeze. · Heart: regular rate and rhythm, S1, S2 normal, no murmur  · Abdomen: soft, BS +   · Extremities: no cyanosis, no edema no clubbing no synovitis      Laboratory Testing:  CBC:   Recent Labs     02/16/20  0537  02/16/20  2329   WBC 6.9  --   --    HGB 8.3*   < > 8.6*     --   --     < > = values in this interval not displayed. BMP:    Recent Labs     02/14/20  0613 02/15/20  0414 02/16/20  0537    139 141   K 4.0 4.3 4.1    109* 110*   CO2 21 23 21   BUN 24* 15 14   CREATININE 1.50* 1.07 0.98   GLUCOSE 129* 156* 105*         ASSESSMENT:    Patient Active Problem List   Diagnosis    Osteoporosis    Cigarette smoker    Secondary hyperparathyroidism ([facility] [address]    Vitamin D deficiency    History of stroke    Degeneration of lumbar or lumbosacral intervertebral disc    Back pain    Low back pain    Allergic rhinitis    Impaired fasting glucose    Mixed hyperlipidemia    Essential hypertension    Obesity (BMI 30.0-34. 9)    Tear of left rotator cuff    Left bicipital tenosynovitis    Colon polyp    Acute blood loss as cause of postoperative anemia    S/P right hemicolectomy       PLAN:  Post op status   Held dvt proph start am if ok   f/u cbc bmp   Physical therapy   Pain control   Monitor bp and temp  optimise po intake as tolerated  Call if fevers or elevated bp    Decrease IVF to 50 if tolerating po           Javier Rizvi MD  [address] [place] [address] Internal Medicine   [address] [address]  [phone]

## 2020-02-18 LAB
ABSOLUTE EOS #: 0.3 K/UL (ref 0–0.4)
ABSOLUTE IMMATURE GRANULOCYTE: ABNORMAL K/UL (ref 0–0.3)
ABSOLUTE LYMPH #: 1.5 K/UL (ref 1–4.8)
ABSOLUTE MONO #: 0.8 K/UL (ref 0.1–1.3)
ANION GAP SERPL CALCULATED.3IONS-SCNC: 12 MMOL/L (ref 9–17)
BASOPHILS # BLD: 1 % (ref 0–2)
BASOPHILS ABSOLUTE: 0 K/UL (ref 0–0.2)
BUN BLDV-MCNC: 13 MG/DL (ref 8–23)
BUN/CREAT BLD: ABNORMAL (ref 9–20)
CALCIUM SERPL-MCNC: 8.1 MG/DL (ref 8.6–10.4)
CHLORIDE BLD-SCNC: 110 MMOL/L (ref 98–107)
CO2: 23 MMOL/L (ref 20–31)
CREAT SERPL-MCNC: 0.82 MG/DL (ref 0.7–1.2)
DIFFERENTIAL TYPE: ABNORMAL
EOSINOPHILS RELATIVE PERCENT: 3 % (ref 0–4)
GFR AFRICAN AMERICAN: >60 ML/MIN
GFR NON-AFRICAN AMERICAN: >60 ML/MIN
GFR SERPL CREATININE-BSD FRML MDRD: ABNORMAL ML/MIN/{1.73_M2}
GFR SERPL CREATININE-BSD FRML MDRD: ABNORMAL ML/MIN/{1.73_M2}
GLUCOSE BLD-MCNC: 106 MG/DL (ref 70–99)
HCT VFR BLD CALC: 25.6 % (ref 41–53)
HEMOGLOBIN: 8.4 G/DL (ref 13.5–17.5)
IMMATURE GRANULOCYTES: ABNORMAL %
LYMPHOCYTES # BLD: 16 % (ref 24–44)
MCH RBC QN AUTO: 32.2 PG (ref 26–34)
MCHC RBC AUTO-ENTMCNC: 32.8 G/DL (ref 31–37)
MCV RBC AUTO: 98.2 FL (ref 80–100)
MONOCYTES # BLD: 8 % (ref 1–7)
NRBC AUTOMATED: ABNORMAL PER 100 WBC
PDW BLD-RTO: 12.8 % (ref 11.5–14.9)
PLATELET # BLD: 214 K/UL (ref 150–450)
PLATELET ESTIMATE: ABNORMAL
PMV BLD AUTO: 9.1 FL (ref 6–12)
POTASSIUM SERPL-SCNC: 3.3 MMOL/L (ref 3.7–5.3)
POTASSIUM SERPL-SCNC: 3.7 MMOL/L (ref 3.7–5.3)
RBC # BLD: 2.61 M/UL (ref 4.5–5.9)
RBC # BLD: ABNORMAL 10*6/UL
SEG NEUTROPHILS: 72 % (ref 36–66)
SEGMENTED NEUTROPHILS ABSOLUTE COUNT: 7 K/UL (ref 1.3–9.1)
SODIUM BLD-SCNC: 145 MMOL/L (ref 135–144)
WBC # BLD: 9.6 K/UL (ref 3.5–11)
WBC # BLD: ABNORMAL 10*3/UL

## 2020-02-18 PROCEDURE — 6360000002 HC RX W HCPCS: Performed by: SURGERY

## 2020-02-18 PROCEDURE — 2580000003 HC RX 258: Performed by: SURGERY

## 2020-02-18 PROCEDURE — 6370000000 HC RX 637 (ALT 250 FOR IP): Performed by: SURGERY

## 2020-02-18 PROCEDURE — 36415 COLL VENOUS BLD VENIPUNCTURE: CPT

## 2020-02-18 PROCEDURE — 80048 BASIC METABOLIC PNL TOTAL CA: CPT

## 2020-02-18 PROCEDURE — 84132 ASSAY OF SERUM POTASSIUM: CPT

## 2020-02-18 PROCEDURE — 85025 COMPLETE CBC W/AUTO DIFF WBC: CPT

## 2020-02-18 PROCEDURE — 2060000000 HC ICU INTERMEDIATE R&B

## 2020-02-18 PROCEDURE — 6370000000 HC RX 637 (ALT 250 FOR IP): Performed by: INTERNAL MEDICINE

## 2020-02-18 RX ORDER — OXYCODONE HYDROCHLORIDE AND ACETAMINOPHEN 5; 325 MG/1; MG/1
1 TABLET ORAL EVERY 4 HOURS PRN
Status: DISCONTINUED | OUTPATIENT
Start: 2020-02-18 | End: 2020-02-19 | Stop reason: HOSPADM

## 2020-02-18 RX ORDER — POTASSIUM CHLORIDE 7.45 MG/ML
10 INJECTION INTRAVENOUS PRN
Status: DISCONTINUED | OUTPATIENT
Start: 2020-02-18 | End: 2020-02-19 | Stop reason: HOSPADM

## 2020-02-18 RX ADMIN — METOPROLOL TARTRATE 25 MG: 25 TABLET ORAL at 08:38

## 2020-02-18 RX ADMIN — OXYCODONE HYDROCHLORIDE AND ACETAMINOPHEN 1 TABLET: 5; 325 TABLET ORAL at 13:53

## 2020-02-18 RX ADMIN — OXYCODONE HYDROCHLORIDE AND ACETAMINOPHEN 1 TABLET: 5; 325 TABLET ORAL at 20:28

## 2020-02-18 RX ADMIN — POTASSIUM CHLORIDE 10 MEQ: 7.46 INJECTION, SOLUTION INTRAVENOUS at 15:35

## 2020-02-18 RX ADMIN — METOPROLOL TARTRATE 25 MG: 25 TABLET ORAL at 20:28

## 2020-02-18 RX ADMIN — FENTANYL CITRATE 50 MCG: 50 INJECTION INTRAMUSCULAR; INTRAVENOUS at 03:53

## 2020-02-18 RX ADMIN — POTASSIUM CHLORIDE 10 MEQ: 7.46 INJECTION, SOLUTION INTRAVENOUS at 13:54

## 2020-02-18 RX ADMIN — Medication 10 ML: at 08:38

## 2020-02-18 RX ADMIN — PIPERACILLIN SODIUM AND TAZOBACTAM SODIUM 3.38 G: 3; .375 INJECTION, POWDER, LYOPHILIZED, FOR SOLUTION INTRAVENOUS at 12:30

## 2020-02-18 RX ADMIN — OXYCODONE HYDROCHLORIDE AND ACETAMINOPHEN 1 TABLET: 5; 325 TABLET ORAL at 08:55

## 2020-02-18 RX ADMIN — POTASSIUM CHLORIDE 10 MEQ: 7.46 INJECTION, SOLUTION INTRAVENOUS at 10:28

## 2020-02-18 RX ADMIN — ENOXAPARIN SODIUM 40 MG: 40 INJECTION SUBCUTANEOUS at 13:53

## 2020-02-18 RX ADMIN — PIPERACILLIN SODIUM AND TAZOBACTAM SODIUM 3.38 G: 3; .375 INJECTION, POWDER, LYOPHILIZED, FOR SOLUTION INTRAVENOUS at 20:34

## 2020-02-18 RX ADMIN — POTASSIUM CHLORIDE 10 MEQ: 7.46 INJECTION, SOLUTION INTRAVENOUS at 12:24

## 2020-02-18 ASSESSMENT — PAIN SCALES - GENERAL
PAINLEVEL_OUTOF10: 6
PAINLEVEL_OUTOF10: 3
PAINLEVEL_OUTOF10: 5
PAINLEVEL_OUTOF10: 4
PAINLEVEL_OUTOF10: 6
PAINLEVEL_OUTOF10: 7
PAINLEVEL_OUTOF10: 5
PAINLEVEL_OUTOF10: 7

## 2020-02-18 NOTE — PROGRESS NOTES
Hospitalist Progress Note  2/17/2020 9:37 PM  Subjective:   Admit Date: 2/12/2020  PCP: Ihsan Johnson MD     Full Code      C/c:post colectomy      Interval History: pt having lot of diarrhea    Diet: DIET GENERAL;                                ip days:5  Medications:   Scheduled Meds:   metoprolol tartrate  25 mg Oral BID    nicotine  1 patch Transdermal Daily    piperacillin-tazobactam  3.375 g Intravenous Q8H    sodium chloride flush  10 mL Intravenous 2 times per day     Continuous Infusions:   sodium chloride Stopped (02/17/20 1400)     PRN Meds:.sodium chloride flush, ondansetron, fentanNYL, fentanNYL     CBC:   Recent Labs     02/15/20  0414  02/16/20  0537  02/16/20  1805 02/16/20  2329 02/17/20  0542   WBC 12.2*  --  6.9  --   --   --  7.9   HGB 9.7*   < > 8.3*   < > 8.8* 8.6* 8.2*     --  166  --   --   --  189    < > = values in this interval not displayed. BMP:    Recent Labs     02/15/20  0414 02/16/20  0537 02/17/20  0542    141 143   K 4.3 4.1 3.6*   * 110* 112*   CO2 23 21 22   BUN 15 14 10   CREATININE 1.07 0.98 0.79   GLUCOSE 156* 105* 105*     Hepatic: No results for input(s): AST, ALT, ALB, BILITOT, ALKPHOS in the last 72 hours. Troponin: No results for input(s): TROPONINI in the last 72 hours. BNP: No results for input(s): BNP in the last 72 hours. Lipids: No results for input(s): CHOL, HDL in the last 72 hours. Invalid input(s): LDLCALCU  INR: No results for input(s): INR in the last 72 hours.     Objective:   Vitals: /74   Pulse 78   Temp 99.2 °F (37.3 °C) (Oral)   Resp 16   Ht 5' 5\" (1.651 m)   Wt 220 lb 14.4 oz (100.2 kg)   SpO2 94%   BMI 36.76 kg/m²   General appearance: alert, appears stated age and cooperative  Skin: Skin color, texture, turgor normal. No rashes or lesions  Lungs: clear to auscultation bilaterally  Heart: regular rate and rhythm, S1, S2 normal, no murmur, click, rub or gallop  Abdomen: soft, non-tender; bowel sounds normal; no masses,  no organomegaly  Extremities: extremities normal, atraumatic, no cyanosis or edema  Neurologic: Mental status: Alert, oriented, thought content appropriate    Prophylaxis:   DVT with  [] lovenox        [] heparin        [] Scd        [x] none:     Radiology:  No results found. Assessment :   1. S/p colectomy/doing better  2. Ac diarrhea     Plan:   1. Hold reglan/protonix/anibiotics  2. See order    Patient Active Problem List:     Osteoporosis     Cigarette smoker     Secondary hyperparathyroidism ([facility] [address]     Vitamin D deficiency     History of stroke     Degeneration of lumbar or lumbosacral intervertebral disc     Back pain     Low back pain     Allergic rhinitis     Impaired fasting glucose     Mixed hyperlipidemia     Essential hypertension     Obesity (BMI 30.0-34. 9)     Tear of left rotator cuff     Left bicipital tenosynovitis     Colon polyp     Acute blood loss as cause of postoperative anemia     S/P right hemicolectomy      Anticipated Disposition upon discharge: [] Home                                                                         [] Home with Home Health                                                                         [] [facility]                                                                         [] [address]      Patient is admitted as inpatient status because of co-morbidities listed above, severity of signs and symptoms as outlined, requirement for current medical therapies and most importantly because of direct risk to patient if care not provided in a hospital setting.           Sammie Randall MD  [facility] Hospitalist

## 2020-02-18 NOTE — DISCHARGE INSTR - COC
Continuity of Care Form    Patient Name: Haider Sanchez   :  1955  MRN:  [id number]    Admit date:  2020  Discharge date:  2020      Code Status Order: Full Code   Advance Directives:   Advance Care Flowsheet Documentation     Date/Time Healthcare Directive Type of Healthcare Directive Copy in [address] Agent's Name Healthcare Agent's Phone Number    20 1349  Yes, patient has an advance directive for healthcare treatment --  No, copy requested from family -- -- --          Admitting Physician:  Neo Ruiz MD  PCP: Peter Nicole MD    Discharging Nurse: [facility] Unit/Room#: 0293/5743-10  Discharging Unit Phone Number: [phone]    Emergency Contact:   Extended Emergency Contact Information  Primary Emergency Contact: [address] [address] Phone: [phone]  Relation: Brother/Sister    Past Surgical History:  Past Surgical History:   Procedure Laterality Date    COLONOSCOPY  3/19/2014    tubular adenoma x 2, inadequate prep, some polyps not removed, needs colonoscopy in 6-12 months    COLONOSCOPY N/A 2020    COLONOSCOPY POLYPECTOMY HOT BIOPSY performed by Neo Ruiz MD at [facility] Right 2020    OPEN RIGHT COLECTOMY, PRIMARY ANASTOMOSIS performed by Neo Ruiz MD at [address] [place] [address] N      rt. inguinal    OMENTUM RESECTION  2020    OMENTECTOMY -  PARTIAL GREATER OMENECTOMY performed by Neo Ruiz MD at [facility]         Immunization History:   Immunization History   Administered Date(s) Administered    Hepatitis B 2003, 2003, 2003    Influenza Vaccine, unspecified formulation 10/18/2016, 08/15/2017    Influenza Virus Vaccine 10/08/2015, 10/03/2018, 2019    Tdap (Boostrix, Adacel) 2015       Active Problems:  Patient Active Problem List   Diagnosis Code    Osteoporosis M81.0    Cigarette smoker F17.210    Secondary hyperparathyroidism ([facility] [address] N25.81    Vitamin D deficiency E55.9    History of stroke Z80.78    Degeneration of lumbar or lumbosacral intervertebral disc M51.37    Back pain M54.9    Low back pain M54.5    Allergic rhinitis J30.9    Impaired fasting glucose R73.01    Mixed hyperlipidemia E78.2    Essential hypertension I10    Obesity (BMI 30.0-34. 9) E66.9    Tear of left rotator cuff M75.102    Left bicipital tenosynovitis M75.22    Colon polyp K63.5    Acute blood loss as cause of postoperative anemia D62    S/P right hemicolectomy Z90.49       Isolation/Infection:   Isolation          No Isolation        Patient Infection Status     None to display          Nurse Assessment:  Last Vital Signs: BP (!) 145/81   Pulse 68   Temp 99.4 °F (37.4 °C) (Oral)   Resp 16   Ht 5' 5\" (1.651 m)   Wt 220 lb 14.4 oz (100.2 kg)   SpO2 (!) 89%   BMI 36.76 kg/m²     Last documented pain score (0-10 scale): Pain Level: 6  Last Weight:   Wt Readings from Last 1 Encounters:   02/16/20 220 lb 14.4 oz (100.2 kg)     Mental Status:  oriented    IV Access:  - None    Nursing Mobility/ADLs:  Walking   Independent  Transfer  Independent  Bathing  Independent  Dressing  Independent  Toileting  Independent  Feeding  Independent  Med Admin  Independent  Med Delivery   whole    Wound Care Documentation and Therapy:        Elimination:  Continence:   · Bowel:  Yes  · Bladder: Yes  Urinary Catheter: None   Colostomy/Ileostomy/Ileal Conduit: No       Date of Last BM: 2-19-20    Intake/Output Summary (Last 24 hours) at 2/18/2020 1225  Last data filed at 2/18/2020 0536  Gross per 24 hour   Intake --   Output 295 ml   Net -295 ml     I/O last 3 completed shifts:  In: -   Out: 385 [Drains:385]    Safety Concerns:     None    Impairments/Disabilities:      None    Nutrition Therapy:  Current Nutrition Therapy:   - Oral Diet:  high fiber diet    Routes of Feeding: Oral  Liquids: No Restrictions  Daily Fluid Restriction: no  Last Modified Barium Swallow with Video (Video Swallowing Test): not done    Treatments at the Time of Hospital Discharge:   Respiratory Treatments: as needed  Oxygen Therapy:  is not on home oxygen therapy. Ventilator:    - No ventilator support    Rehab Therapies: Physical Therapy and Occupational Therapy  Weight Bearing Status/Restrictions: No weight bearing restirctions  Other Medical Equipment (for information only, NOT a DME order): Other Treatments: Skilled nursing assessment. Medication administration and education. Patient's personal belongings (please select all that are sent with patient):  Glasses    RN SIGNATURE:  Electronically signed by Harry Sanon RN on 2/19/20 at 10:37 AM    CASE MANAGEMENT/SOCIAL WORK SECTION    Inpatient Status Date: 2/12/20    Readmission Risk Assessment Score:  Readmission Risk              Risk of Unplanned Readmission:        7           Discharging to Facility/ [facility]  [facility]  Phone [phone]  Fax  [phone]    Home health care agency's  to evaluate patient two weeks prior to discharge from home health to determine post-discharge services. · Name:   · Address:  · Phone:  · Fax:    Dialysis Facility (if applicable)   · Name:  · Address:  · Dialysis Schedule:  · Phone:  · Fax:    / signature: Electronically signed by Colleen Faulkner RN on 2/18/20 at 12:26 PM    PHYSICIAN SECTION    Prognosis: Good    Condition at Discharge: Stable    Rehab Potential (if transferring to Rehab): Good    Recommended Labs or Other Treatments After Discharge: follow up with dr Clarisse Flores in 1 week,  Empty record CANDIS output daily    Physician Certification: I certify the above information and transfer of Melody Wilcox  is necessary for the continuing treatment of the diagnosis listed and that he requires Home Care for less 30 days.      Update Admission H&P: No change in

## 2020-02-18 NOTE — PLAN OF CARE
Nutrition Problem: Altered GI function  Intervention: Food and/or Nutrient Delivery: Continue current diet  Nutritional Goals: PO intakes are greater than 75% at meals
Problem: Falls - Risk of:  Goal: Will remain free from falls  2/18/2020 1725 by Srikanth Lim RN  Outcome: Ongoing     Problem: Falls - Risk of:  Goal: Absence of physical injury  2/18/2020 1725 by Srikanth Lim RN  Outcome: Ongoing     Problem: Infection:  Goal: Will remain free from infection  2/18/2020 0531 by Chino Dnag RN  Outcome: Ongoing     Problem: Safety:  Goal: Free from accidental physical injury  2/18/2020 1725 by Srikanth Lim RN  Outcome: Ongoing     Problem: Safety:  Goal: Free from intentional harm  2/18/2020 1725 by Srikanth Lim RN  Outcome: Ongoing     Problem: Pain:  Goal: Pain level will decrease  2/18/2020 1725 by Srikanth Lim RN  Outcome: Ongoing    Problem: Pain:  Goal: Control of acute pain  2/18/2020 1725 by Srikanth Lim RN  Outcome: Ongoing     Problem: Pain:  Goal: Control of chronic pain  2/18/2020 1725 by Srikanth Lim RN  Outcome: Ongoing     Problem: Discharge Planning:  Goal: Patients continuum of care needs are met  2/18/2020 1725 by Srikanth Lim RN  Outcome: Ongoing     Problem: Nutrition  Goal: Optimal nutrition therapy  2/18/2020 1725 by Srikanth Lim RN  Outcome: Ongoing     Problem:  Bowel/Gastric:  Goal: Control of bowel function will improve  2/18/2020 1725 by Srikanth Lim RN  Outcome: Ongoing          Problem: Infection - Surgical Site:  Goal: Will show no infection signs and symptoms  2/18/2020 1725 by Srikanth Lim RN  Outcome: Ongoing        Problem: Pain:  Goal: Pain level will decrease  2/18/2020 1725 by Srikanth Lim RN  Outcome: Ongoing     Problem: Pain:  Goal: Patient's pain/discomfort is manageable  2/18/2020 1725 by Srikanth Lim RN  Outcome: Ongoing
Problem: Falls - Risk of:  Goal: Will remain free from falls  Description  Will remain free from falls  2/16/2020 0409 by Mt Monroy RN  Outcome: Ongoing  2/15/2020 1829 by Ghazala Stock RN  Outcome: Ongoing     Problem: Infection:  Goal: Will remain free from infection  Description  Will remain free from infection  2/16/2020 0409 by Mt Monroy RN  Outcome: Ongoing  2/15/2020 [address] by Ghazala Stock RN  Outcome: Ongoing     Problem: Safety:  Goal: Free from accidental physical injury  Description  Free from accidental physical injury  2/16/2020 0409 by tM Monroy RN  Outcome: Ongoing  2/15/2020 [address] by Ghazala Stock RN  Outcome: Ongoing     Problem: Pain:  Goal: Patient's pain/discomfort is manageable  Description  Patient's pain/discomfort is manageable  2/16/2020 0409 by Mt Monroy RN  Outcome: Ongoing  2/15/2020 [address] by Ghazala Stock RN  Outcome: Ongoing     Problem: Skin Integrity:  Goal: Skin integrity will stabilize  Description  Skin integrity will stabilize  2/16/2020 0409 by Mt Monroy RN  Outcome: Ongoing  2/15/2020 [address] by Ghazala Stock RN  Outcome: Ongoing     Problem:  Bowel/Gastric:  Goal: Control of bowel function will improve  Description  Control of bowel function will improve  2/16/2020 0409 by Mt Monroy RN  Outcome: Ongoing  2/15/2020 1829 by Ghazala Stock RN  Outcome: Ongoing
Problem: Falls - Risk of:  Goal: Will remain free from falls  Description  Will remain free from falls  Outcome: Met This Shift  Goal: Absence of physical injury  Description  Absence of physical injury  Outcome: Met This Shift     Problem: Infection:  Goal: Will remain free from infection  Description  Will remain free from infection  Outcome: Met This Shift     Problem: Safety:  Goal: Free from accidental physical injury  Description  Free from accidental physical injury  Outcome: Met This Shift  Goal: Free from intentional harm  Description  Free from intentional harm  Outcome: Met This Shift     Problem: Daily Care:  Goal: Daily care needs are met  Description  Daily care needs are met  Outcome: Met This Shift     Problem: Pain:  Goal: Patient's pain/discomfort is manageable  Description  Patient's pain/discomfort is manageable  Outcome: Met This Shift     Problem: Skin Integrity:  Goal: Skin integrity will stabilize  Description  Skin integrity will stabilize  Outcome: Met This Shift     Problem: Discharge Planning:  Goal: Patients continuum of care needs are met  Description  Patients continuum of care needs are met  Outcome: Met This Shift   Pt stable. No distress. Educated on staff, safety, environment, routine, meds, IV, pain and POC this shift. Safety maintained. Call light reachable.  Electronically signed by Minna Kussmaul, RN on 2/13/2020 at 6:27 AM
function will improve  Description  Control of bowel function will improve  2/14/2020 0400 by Darcy Anguiano RN  Outcome: Ongoing  Note:   Pt hypoactive bowel sounds; will continue to monitor bowel function.
infection signs and symptoms  Description  Will show no infection signs and symptoms  Outcome: Ongoing
opportunities for patient to meet all ADLs per self. Problem: Pain:  Goal: Patient's pain/discomfort is manageable  Description  Patient's pain/discomfort is manageable  2/15/2020 0408 by Edna Yeager RN  Outcome: Ongoing  Note:   Pain decreased with prescribed PRN pain meds. 2/14/2020 1812 by Ivan Forrest RN  Outcome: Ongoing  Note:   Abdominal Pain: Patient complains of abdominal pain. The pain is located midline abdomen at surgical site. The pain is described as aching and sharp, and is 5/10 in intensity. Aggravating factors include movement and lying supine. Alleviating factors include activity and sitting up and prescribed PRN pain med. Goal: Pain level will decrease  Description  Pain level will decrease  2/15/2020 0408 by Edna Yeager RN  Outcome: Ongoing  2/14/2020 1812 by Ivan Forrest RN  Outcome: Ongoing  Goal: Control of acute pain  Description  Control of acute pain  2/15/2020 0408 by Edna Yeager RN  Outcome: Ongoing  2/14/2020 1812 by Ivan Forrest RN  Outcome: Ongoing  Goal: Control of chronic pain  Description  Control of chronic pain  2/15/2020 0408 by Edna Yeager RN  Outcome: Ongoing  2/14/2020 1812 by Ivan Forrest RN  Outcome: Ongoing     Problem: Skin Integrity:  Goal: Skin integrity will stabilize  Description  Skin integrity will stabilize  2/15/2020 0408 by Edna Yeager RN  Outcome: Ongoing  2/14/2020 1812 by Ivan Forrest RN  Outcome: Ongoing  Note:   Skin assessed during dressing change. See head to toe assessment. Problem: Discharge Planning:  Goal: Patients continuum of care needs are met  Description  Patients continuum of care needs are met  2/15/2020 0408 by Edna Yeager RN  Outcome: Ongoing  2/14/2020 1812 by Ivan Forrest RN  Outcome: Ongoing  Note:   Patient plans to discharge home.      Problem: Nutrition  Goal: Optimal nutrition therapy  2/15/2020 0408 by Edna Yeager RN  Outcome: Ongoing  2/14/2020 1812 by Ivan Forrest

## 2020-02-18 NOTE — CARE COORDINATION
ONGOING DISCHARGE PLAN:    Spoke with patient regarding discharge plan and he is agreeable to VNS. [place] of choice was offered and list of VNS agencies was provided. [facility] selected was [facility] to see patient for new referral today. POD #6 open right colectomy and hernia repair with mesh. Active orders include IV zosyn and general diet. HAYLEY NEEDS SIGNED AND COMPLETED. Will continue to follow for additional discharge needs.     Electronically signed by Zoey Ferrera RN on 2/18/2020 at 12:18 PM

## 2020-02-19 ENCOUNTER — TELEPHONE (OUTPATIENT)
Dept: FAMILY MEDICINE CLINIC | Age: 65
End: 2020-02-19

## 2020-02-19 VITALS
HEIGHT: 65 IN | SYSTOLIC BLOOD PRESSURE: 143 MMHG | DIASTOLIC BLOOD PRESSURE: 82 MMHG | HEART RATE: 71 BPM | TEMPERATURE: 99.1 F | RESPIRATION RATE: 16 BRPM | BODY MASS INDEX: 36.8 KG/M2 | WEIGHT: 220.9 LBS | OXYGEN SATURATION: 92 %

## 2020-02-19 LAB
ABSOLUTE EOS #: 0.4 K/UL (ref 0–0.4)
ABSOLUTE IMMATURE GRANULOCYTE: ABNORMAL K/UL (ref 0–0.3)
ABSOLUTE LYMPH #: 2.3 K/UL (ref 1–4.8)
ABSOLUTE MONO #: 0.8 K/UL (ref 0.1–1.3)
ANION GAP SERPL CALCULATED.3IONS-SCNC: 8 MMOL/L (ref 9–17)
BASOPHILS # BLD: 1 % (ref 0–2)
BASOPHILS ABSOLUTE: 0.1 K/UL (ref 0–0.2)
BUN BLDV-MCNC: 12 MG/DL (ref 8–23)
BUN/CREAT BLD: ABNORMAL (ref 9–20)
CALCIUM SERPL-MCNC: 8.2 MG/DL (ref 8.6–10.4)
CHLORIDE BLD-SCNC: 108 MMOL/L (ref 98–107)
CO2: 23 MMOL/L (ref 20–31)
CREAT SERPL-MCNC: 0.84 MG/DL (ref 0.7–1.2)
DIFFERENTIAL TYPE: ABNORMAL
EOSINOPHILS RELATIVE PERCENT: 4 % (ref 0–4)
GFR AFRICAN AMERICAN: >60 ML/MIN
GFR NON-AFRICAN AMERICAN: >60 ML/MIN
GFR SERPL CREATININE-BSD FRML MDRD: ABNORMAL ML/MIN/{1.73_M2}
GFR SERPL CREATININE-BSD FRML MDRD: ABNORMAL ML/MIN/{1.73_M2}
GLUCOSE BLD-MCNC: 106 MG/DL (ref 70–99)
HCT VFR BLD CALC: 28.7 % (ref 41–53)
HEMOGLOBIN: 9.5 G/DL (ref 13.5–17.5)
IMMATURE GRANULOCYTES: ABNORMAL %
LYMPHOCYTES # BLD: 24 % (ref 24–44)
MCH RBC QN AUTO: 33.6 PG (ref 26–34)
MCHC RBC AUTO-ENTMCNC: 33.1 G/DL (ref 31–37)
MCV RBC AUTO: 101.6 FL (ref 80–100)
MONOCYTES # BLD: 9 % (ref 1–7)
NRBC AUTOMATED: ABNORMAL PER 100 WBC
PDW BLD-RTO: 13.2 % (ref 11.5–14.9)
PLATELET # BLD: 257 K/UL (ref 150–450)
PLATELET ESTIMATE: ABNORMAL
PMV BLD AUTO: 9.1 FL (ref 6–12)
POTASSIUM SERPL-SCNC: 3.8 MMOL/L (ref 3.7–5.3)
RBC # BLD: 2.83 M/UL (ref 4.5–5.9)
RBC # BLD: ABNORMAL 10*6/UL
SEG NEUTROPHILS: 62 % (ref 36–66)
SEGMENTED NEUTROPHILS ABSOLUTE COUNT: 5.9 K/UL (ref 1.3–9.1)
SODIUM BLD-SCNC: 139 MMOL/L (ref 135–144)
WBC # BLD: 9.5 K/UL (ref 3.5–11)
WBC # BLD: ABNORMAL 10*3/UL

## 2020-02-19 PROCEDURE — 85025 COMPLETE CBC W/AUTO DIFF WBC: CPT

## 2020-02-19 PROCEDURE — 80048 BASIC METABOLIC PNL TOTAL CA: CPT

## 2020-02-19 PROCEDURE — 2580000003 HC RX 258: Performed by: SURGERY

## 2020-02-19 PROCEDURE — 6370000000 HC RX 637 (ALT 250 FOR IP): Performed by: SURGERY

## 2020-02-19 PROCEDURE — 6370000000 HC RX 637 (ALT 250 FOR IP): Performed by: INTERNAL MEDICINE

## 2020-02-19 PROCEDURE — 36415 COLL VENOUS BLD VENIPUNCTURE: CPT

## 2020-02-19 PROCEDURE — 6360000002 HC RX W HCPCS: Performed by: SURGERY

## 2020-02-19 RX ORDER — DOCUSATE SODIUM 100 MG/1
100 CAPSULE, LIQUID FILLED ORAL 2 TIMES DAILY
Qty: 30 CAPSULE | Refills: 2 | Status: ON HOLD | OUTPATIENT
Start: 2020-02-19 | End: 2021-01-30

## 2020-02-19 RX ORDER — OXYCODONE HYDROCHLORIDE AND ACETAMINOPHEN 5; 325 MG/1; MG/1
1 TABLET ORAL EVERY 6 HOURS PRN
Qty: 28 TABLET | Refills: 0 | Status: SHIPPED | OUTPATIENT
Start: 2020-02-19 | End: 2020-02-26

## 2020-02-19 RX ORDER — IBUPROFEN 800 MG/1
800 TABLET ORAL EVERY 8 HOURS PRN
Qty: 30 TABLET | Refills: 0 | Status: SHIPPED | OUTPATIENT
Start: 2020-02-19 | End: 2020-11-13 | Stop reason: ALTCHOICE

## 2020-02-19 RX ADMIN — PIPERACILLIN SODIUM AND TAZOBACTAM SODIUM 3.38 G: 3; .375 INJECTION, POWDER, LYOPHILIZED, FOR SOLUTION INTRAVENOUS at 06:39

## 2020-02-19 RX ADMIN — METOPROLOL TARTRATE 25 MG: 25 TABLET ORAL at 08:18

## 2020-02-19 RX ADMIN — OXYCODONE HYDROCHLORIDE AND ACETAMINOPHEN 1 TABLET: 5; 325 TABLET ORAL at 00:48

## 2020-02-19 RX ADMIN — OXYCODONE HYDROCHLORIDE AND ACETAMINOPHEN 1 TABLET: 5; 325 TABLET ORAL at 05:59

## 2020-02-19 ASSESSMENT — PAIN SCALES - GENERAL
PAINLEVEL_OUTOF10: 5
PAINLEVEL_OUTOF10: 5
PAINLEVEL_OUTOF10: 3

## 2020-02-19 NOTE — DISCHARGE SUMMARY
tablet  take 1 tablet by mouth once daily             tiZANidine (ZANAFLEX) 4 MG capsule  Take 1 capsule by mouth 3 times daily as needed for Muscle spasms             vitamin D (ERGOCALCIFEROL) 1.25 MG (68209 UT) CAPS capsule  take 1 capsule by mouth every week                  HPI and Hospital Course:  He recovered from his partial colectomy and was advanced to a regular diet. He was discharged home instable condition.         Electronically signed by Ivette Archuleta MD on 2/19/2020 at 10:05 AM

## 2020-02-19 NOTE — PROGRESS NOTES
Hospitalist Progress Note  2/19/2020 10:23 AM  Subjective:   Admit Date: 2/12/2020  PCP: Rachelle Guillermo MD     Full Code      C/c:no diarrhea/doing better      Interval History: wants to go home    Diet: DIET GENERAL;                                ip days:7  Medications:   Scheduled Meds:   enoxaparin  40 mg Subcutaneous Daily    metoprolol tartrate  25 mg Oral BID    nicotine  1 patch Transdermal Daily    piperacillin-tazobactam  3.375 g Intravenous Q8H    sodium chloride flush  10 mL Intravenous 2 times per day     Continuous Infusions:   sodium chloride Stopped (02/17/20 1400)     PRN Meds:.oxyCODONE-acetaminophen, potassium chloride, sodium chloride flush, ondansetron, fentanNYL, fentanNYL     CBC:   Recent Labs     02/17/20  0542 02/18/20  0722 02/19/20  0525   WBC 7.9 9.6 9.5   HGB 8.2* 8.4* 9.5*    214 257     BMP:    Recent Labs     02/17/20  0542 02/18/20  0722 02/18/20  1742 02/19/20  0525    145*  --  139   K 3.6* 3.3* 3.7 3.8   * 110*  --  108*   CO2 22 23  --  23   BUN 10 13  --  12   CREATININE 0.79 0.82  --  0.84   GLUCOSE 105* 106*  --  106*     Hepatic: No results for input(s): AST, ALT, ALB, BILITOT, ALKPHOS in the last 72 hours. Troponin: No results for input(s): TROPONINI in the last 72 hours. BNP: No results for input(s): BNP in the last 72 hours. Lipids: No results for input(s): CHOL, HDL in the last 72 hours. Invalid input(s): LDLCALCU  INR: No results for input(s): INR in the last 72 hours.     Objective:   Vitals: BP (!) 143/82   Pulse 71   Temp 99.1 °F (37.3 °C) (Oral)   Resp 16   Ht 5' 5\" (1.651 m)   Wt 220 lb 14.4 oz (100.2 kg)   SpO2 92%   BMI 36.76 kg/m²   General appearance: alert, appears stated age and cooperative  Skin: Skin color, texture, turgor normal. No rashes or lesions  Lungs: clear to auscultation bilaterally  Heart: regular rate and rhythm, S1, S2 normal, no murmur, click, rub or gallop  Abdomen: soft, non-tender; bowel sounds normal; no masses,  no organomegaly  Extremities: extremities normal, atraumatic, no cyanosis or edema  Neurologic: Mental status: Alert, oriented, thought content appropriate    Prophylaxis:   DVT with  [x] lovenox        [] heparin        [] Scd        [] none:     Radiology:  No results found. Assessment :   1. S/p colectomy  2. stable     Plan:   1. Ok to d/c  2. See order    Patient Active Problem List:     Osteoporosis     Cigarette smoker     Secondary hyperparathyroidism ([facility] [address]     Vitamin D deficiency     History of stroke     Degeneration of lumbar or lumbosacral intervertebral disc     Back pain     Low back pain     Allergic rhinitis     Impaired fasting glucose     Mixed hyperlipidemia     Essential hypertension     Obesity (BMI 30.0-34. 9)     Tear of left rotator cuff     Left bicipital tenosynovitis     Colon polyp     Acute blood loss as cause of postoperative anemia     S/P right hemicolectomy      Anticipated Disposition upon discharge: [] Home                                                                         [] Home with Home Health                                                                         [] [facility]                                                                         [] [address]      Patient is admitted as inpatient status because of co-morbidities listed above, severity of signs and symptoms as outlined, requirement for current medical therapies and most importantly because of direct risk to patient if care not provided in a hospital setting.           Noemi Castrejon MD  Rounding Hospitalist

## 2020-04-01 RX ORDER — PANTOPRAZOLE SODIUM 40 MG/1
TABLET, DELAYED RELEASE ORAL
Qty: 90 TABLET | Refills: 1 | Status: SHIPPED | OUTPATIENT
Start: 2020-04-01 | End: 2021-01-26

## 2020-04-01 NOTE — TELEPHONE ENCOUNTER
Last visit: 1/13/20  Last Med refill:   Does patient have enough medication for 72 hours: Yes    Next Visit Date:  Future Appointments   Date Time Provider Miryam Goncalves   6/17/2020  3:15 PM Janie Cassette, APRN - CNP [address] Maintenance   Topic Date Due    Hepatitis C screen  1955    HIV screen  09/03/1970    Shingles Vaccine (1 of 2) 09/03/2005    Low dose CT lung screening  09/03/2010    Pneumococcal 0-64 years Vaccine (1 of 1 - PPSV23) 04/13/2020 (Originally 9/3/1961)    A1C test (Diabetic or Prediabetic)  11/01/2020    Lipid screen  11/01/2020    Colon cancer screen colonoscopy  02/11/2021    Potassium monitoring  02/19/2021    Creatinine monitoring  02/19/2021    DTaP/Tdap/Td vaccine (2 - Td) 06/12/2025    Flu vaccine  Completed    Hepatitis A vaccine  Aged Out    Hepatitis B vaccine  Aged Out    Hib vaccine  Aged Out    Meningococcal (ACWY) vaccine  Aged Out       Hemoglobin A1C (%)   Date Value   11/01/2019 5.9   06/10/2019 5.9   11/06/2018 6.1 (H)             ( goal A1C is < 7)   No results found for: LABMICR  LDL Cholesterol (mg/dL)   Date Value   11/01/2019 63   11/06/2018 74       (goal LDL is <100)   AST (U/L)   Date Value   11/01/2019 19     ALT (U/L)   Date Value   11/01/2019 22     BUN (mg/dL)   Date Value   02/19/2020 12     BP Readings from Last 3 Encounters:   02/19/20 (!) 143/82   02/12/20 (!) 186/93   02/11/20 138/80          (goal 120/80)    All Future Testing planned in [facility]  Lab Frequency Next Occurrence   CBC Auto Differential Once 01/13/2021               Patient Active Problem List:     Osteoporosis     Cigarette smoker     Secondary hyperparathyroidism ([address]     Vitamin D deficiency     History of stroke     Degeneration of lumbar or lumbosacral intervertebral disc     Back pain     Low back pain     Allergic rhinitis     Impaired fasting glucose     Mixed hyperlipidemia     Essential hypertension     Obesity (BMI 30.0-34. 9)     Tear of

## 2020-04-22 ENCOUNTER — OFFICE VISIT (OUTPATIENT)
Dept: FAMILY MEDICINE CLINIC | Age: 65
End: 2020-04-22
Payer: COMMERCIAL

## 2020-04-22 VITALS
BODY MASS INDEX: 33.12 KG/M2 | HEART RATE: 75 BPM | WEIGHT: 211 LBS | SYSTOLIC BLOOD PRESSURE: 130 MMHG | TEMPERATURE: 98.2 F | DIASTOLIC BLOOD PRESSURE: 88 MMHG | OXYGEN SATURATION: 96 % | HEIGHT: 67 IN

## 2020-04-22 PROBLEM — D64.9 ANEMIA: Status: ACTIVE | Noted: 2020-04-22

## 2020-04-22 PROBLEM — Z85.828 HISTORY OF MALIGNANT NEOPLASM OF SKIN: Status: ACTIVE | Noted: 2020-04-22

## 2020-04-22 PROBLEM — G89.18 ACUTE POSTOPERATIVE PAIN: Status: ACTIVE | Noted: 2020-04-22

## 2020-04-22 PROCEDURE — 99214 OFFICE O/P EST MOD 30 MIN: CPT | Performed by: NURSE PRACTITIONER

## 2020-04-22 RX ORDER — LORATADINE 10 MG/1
10 TABLET ORAL DAILY
Qty: 30 TABLET | Refills: 3 | Status: SHIPPED | OUTPATIENT
Start: 2020-04-22 | End: 2020-10-16

## 2020-04-22 RX ORDER — AMOXICILLIN AND CLAVULANATE POTASSIUM 875; 125 MG/1; MG/1
1 TABLET, FILM COATED ORAL 2 TIMES DAILY
Qty: 20 TABLET | Refills: 0 | Status: SHIPPED | OUTPATIENT
Start: 2020-04-22 | End: 2020-05-02

## 2020-04-22 RX ORDER — FLUTICASONE PROPIONATE 50 MCG
1 SPRAY, SUSPENSION (ML) NASAL 2 TIMES DAILY
Qty: 1 BOTTLE | Refills: 2 | Status: SHIPPED | OUTPATIENT
Start: 2020-04-22 | End: 2020-10-16

## 2020-04-22 RX ORDER — GUAIFENESIN 600 MG/1
600 TABLET, EXTENDED RELEASE ORAL 2 TIMES DAILY
Qty: 28 TABLET | Refills: 0 | COMMUNITY
Start: 2020-04-22 | End: 2020-11-13 | Stop reason: ALTCHOICE

## 2020-04-22 ASSESSMENT — ENCOUNTER SYMPTOMS
COUGH: 1
NAUSEA: 0
VOMITING: 0
ABDOMINAL PAIN: 0
SINUS PRESSURE: 1
RHINORRHEA: 1
SHORTNESS OF BREATH: 0

## 2020-04-22 NOTE — PROGRESS NOTES
Visit Information    Have you changed or started any medications since your last visit including any over-the-counter medicines, vitamins, or herbal medicines? no   Are you having any side effects from any of your medications? -  no  Have you stopped taking any of your medications? Is so, why? -  no    Have you seen any other physician or provider since your last visit? Yes - Records Obtained  Have you had any other diagnostic tests since your last visit? Yes - Records Obtained  Have you been seen in the emergency room and/or had an admission to a hospital since we last saw you? No  Have you had your routine dental cleaning in the past 6 months? no    Have you activated your [employer] account? If not, what are your barriers? Yes     Patient Care Team:  Charlie Barney MD as PCP - General (Family Medicine)  Charlie Barney MD as PCP - [facility] Provider  Harmony Regalado MD as Orthopedic Surgeon (Orthopedic Surgery)  Lazaro Guerra MD (Neurology)  Gabriel Catalan MD as Consulting Physician (Gastroenterology)    Medical History Review  Past Medical, Family, and Social History reviewed and does contribute to the patient presenting condition    Health Maintenance   Topic Date Due    Hepatitis C screen  1955    Pneumococcal 0-64 years Vaccine (1 of 1 - PPSV23) 09/03/1961    HIV screen  09/03/1970    Shingles Vaccine (1 of 2) 09/03/2005    Low dose CT lung screening  09/03/2010    A1C test (Diabetic or Prediabetic)  11/01/2020    Lipid screen  11/01/2020    Colon cancer screen colonoscopy  02/11/2021    Potassium monitoring  02/19/2021    Creatinine monitoring  02/19/2021    DTaP/Tdap/Td vaccine (2 - Td) 06/12/2025    Flu vaccine  Completed    Hepatitis A vaccine  Aged Out    Hepatitis B vaccine  Aged Out    Hib vaccine  Aged Out    Meningococcal (ACWY) vaccine  Aged Out     Subjective:      Patient ID: Janet Hampton is a 59 y.o. male.     HPI     59year old presents with management of followin. Anemia - due to blood loss during surgery. States he had hernia repair in [place] and noted to have low HH. Hgb was improving per chart review. States he has been doing well since surgery. 2. nasal congestion/pressure post nasal drainage sneezing cough for 2 weeks. Pt also c/o both ear plugged starting 2 days ago. he says cough is productive with greenish sputum, he denies fever chills body ache malaise or nv abd pain, denies any drainage from the ear. Pt is a remote smoker and quit 2 months ago. Hx of HTN stable with current therapy. Review of Systems   Constitutional: Negative for chills and fever. HENT: Positive for congestion, postnasal drip, rhinorrhea and sinus pressure. Respiratory: Positive for cough. Negative for shortness of breath. Cardiovascular: Negative for chest pain and palpitations. Gastrointestinal: Negative for abdominal pain, nausea and vomiting. Neurological: Negative for dizziness and headaches. Objective:   Physical Exam  Vitals signs and nursing note reviewed. Constitutional:       General: He is not in acute distress. Appearance: He is well-developed. HENT:      Head: Normocephalic. Right Ear: External ear normal.      Left Ear: External ear normal.      Nose:      Right Sinus: Maxillary sinus tenderness and frontal sinus tenderness present. Left Sinus: Maxillary sinus tenderness and frontal sinus tenderness present. Eyes:      General: No scleral icterus. Conjunctiva/sclera: Conjunctivae normal.   Neck:      Musculoskeletal: Neck supple. Thyroid: No thyromegaly. Cardiovascular:      Rate and Rhythm: Normal rate and regular rhythm. Heart sounds: Normal heart sounds. Pulmonary:      Effort: No respiratory distress. Breath sounds: Normal breath sounds. Abdominal:      General: There is no distension. Palpations: Abdomen is soft. Tenderness: There is no abdominal tenderness.    Musculoskeletal: Normal range of motion. Lymphadenopathy:      Cervical: No cervical adenopathy. Skin:     General: Skin is warm and dry. Findings: No rash. Neurological:      Mental Status: He is alert and oriented to person, place, and time. Psychiatric:         Behavior: Behavior normal.         Assessment:      1. Anemia, unspecified type    2. Acute non-recurrent pansinusitis    3. Allergic rhinitis, unspecified seasonality, unspecified trigger            Plan:      BP Readings from Last 3 Encounters:   04/22/20 130/88   02/19/20 (!) 143/82   02/12/20 (!) 186/93     /88   Pulse 75   Temp 98.2 °F (36.8 °C) (Tympanic)   Ht 5' 7\" (1.702 m)   Wt 211 lb (95.7 kg)   SpO2 96%   BMI 33.05 kg/m²   Lab Results   Component Value Date    WBC 9.5 02/19/2020    HGB 9.5 (L) 02/19/2020    HCT 28.7 (L) 02/19/2020     02/19/2020    CHOL 142 11/01/2019    TRIG 215 (H) 11/01/2019    HDL 36 (L) 11/01/2019    ALT 22 11/01/2019    AST 19 11/01/2019     02/19/2020    K 3.8 02/19/2020     (H) 02/19/2020    CREATININE 0.84 02/19/2020    BUN 12 02/19/2020    CO2 23 02/19/2020    PSA 0.55 11/06/2018    INR 1.0 05/11/2018    GLUF 109 (H) 05/14/2018    LABA1C 5.9 11/01/2019     Lab Results   Component Value Date    CALCIUM 8.2 (L) 02/19/2020     Lab Results   Component Value Date    LDLCHOLESTEROL 63 11/01/2019         1. Anemia   - check CBC and bmp    2. Acute non-recurrent pansinusitis  - amoxicillin-clavulanate (AUGMENTIN) 875-125 MG per tablet; Take 1 tablet by mouth 2 times daily for 10 days  Dispense: 20 tablet; Refill: 0  - guaiFENesin (MUCINEX) 600 MG extended release tablet; Take 1 tablet by mouth 2 times daily  Dispense: 28 tablet; Refill: 0  - increase fluids and rest     3. Allergic rhinitis, unspecified seasonality, unspecified trigger  - loratadine (CLARITIN) 10 MG tablet; Take 1 tablet by mouth daily  Dispense: 30 tablet;  Refill: 3  - fluticasone (FLONASE) 50 MCG/ACT nasal spray; 1 spray by Nasal

## 2020-06-17 ENCOUNTER — OFFICE VISIT (OUTPATIENT)
Dept: FAMILY MEDICINE CLINIC | Age: 65
End: 2020-06-17
Payer: COMMERCIAL

## 2020-06-17 VITALS
TEMPERATURE: 97.7 F | OXYGEN SATURATION: 97 % | SYSTOLIC BLOOD PRESSURE: 132 MMHG | WEIGHT: 213 LBS | DIASTOLIC BLOOD PRESSURE: 86 MMHG | HEIGHT: 67 IN | HEART RATE: 77 BPM | BODY MASS INDEX: 33.43 KG/M2

## 2020-06-17 PROCEDURE — 99214 OFFICE O/P EST MOD 30 MIN: CPT | Performed by: NURSE PRACTITIONER

## 2020-06-17 SDOH — ECONOMIC STABILITY: FOOD INSECURITY: WITHIN THE PAST 12 MONTHS, YOU WORRIED THAT YOUR FOOD WOULD RUN OUT BEFORE YOU GOT MONEY TO BUY MORE.: NEVER TRUE

## 2020-06-17 SDOH — ECONOMIC STABILITY: INCOME INSECURITY: HOW HARD IS IT FOR YOU TO PAY FOR THE VERY BASICS LIKE FOOD, HOUSING, MEDICAL CARE, AND HEATING?: NOT HARD AT ALL

## 2020-06-17 SDOH — ECONOMIC STABILITY: FOOD INSECURITY: WITHIN THE PAST 12 MONTHS, THE FOOD YOU BOUGHT JUST DIDN'T LAST AND YOU DIDN'T HAVE MONEY TO GET MORE.: NEVER TRUE

## 2020-06-17 ASSESSMENT — PATIENT HEALTH QUESTIONNAIRE - PHQ9
1. LITTLE INTEREST OR PLEASURE IN DOING THINGS: 0
SUM OF ALL RESPONSES TO PHQ QUESTIONS 1-9: 0
SUM OF ALL RESPONSES TO PHQ QUESTIONS 1-9: 0
SUM OF ALL RESPONSES TO PHQ9 QUESTIONS 1 & 2: 0
2. FEELING DOWN, DEPRESSED OR HOPELESS: 0

## 2020-06-17 ASSESSMENT — ENCOUNTER SYMPTOMS
SHORTNESS OF BREATH: 0
ABDOMINAL PAIN: 0
NAUSEA: 0
COUGH: 0

## 2020-06-17 NOTE — PROGRESS NOTES
Subjective:      Patient ID: Zeny Gar is a 59 y.o. maleChronic Disease Visit Information    BP Readings from Last 3 Encounters:   06/17/20 132/86   04/22/20 130/88   02/19/20 (!) 143/82          Hemoglobin A1C (%)   Date Value   11/01/2019 5.9   06/10/2019 5.9   11/06/2018 6.1 (H)     LDL Cholesterol (mg/dL)   Date Value   11/01/2019 63     HDL (mg/dL)   Date Value   11/01/2019 36 (L)     BUN (mg/dL)   Date Value   02/19/2020 12     CREATININE (mg/dL)   Date Value   02/19/2020 0.84     Glucose (mg/dL)   Date Value   02/19/2020 106 (H)            Have you changed or started any medications since your last visit including any over-the-counter medicines, vitamins, or herbal medicines? no   Are you having any side effects from any of your medications? -  no  Have you stopped taking any of your medications? Is so, why? -  no    Have you seen any other physician or provider since your last visit? No  Have you had any other diagnostic tests since your last visit? Yes - Records Requested  Have you been seen in the emergency room and/or had an admission to a hospital since we last saw you? No  Have you had your annual diabetic retinal (eye) exam? No  Have you had your routine dental cleaning in the past 6 months? no    Have you activated your [employer] account? If not, what are your barriers?  Yes     Patient Care Team:  Kyara Thomson MD as PCP - General (Family Medicine)  Kyara Thomson MD as PCP - [facility] Provider  Keisha Ayala MD as Orthopedic Surgeon (Orthopedic Surgery)  Jyoti Rodriguez MD (Neurology)  Eber Pizarro MD as Consulting Physician (Gastroenterology)         Medical History Review  Past Medical, Family, and Social History reviewed and does contribute to the patient presenting condition    Health Maintenance   Topic Date Due    Hepatitis C screen  1955    HIV screen  09/03/1970    Shingles Vaccine (1 of 2) 09/03/2005    Low dose CT lung screening  09/03/2010   

## 2020-06-18 ENCOUNTER — HOSPITAL ENCOUNTER (OUTPATIENT)
Age: 65
Discharge: HOME | End: 2020-06-18
Payer: COMMERCIAL

## 2020-06-18 LAB
ALT SERPL-CCNC: 29 U/L (ref 5–41)
ANION GAP SERPL CALCULATED.3IONS-SCNC: 11 MMOL/L (ref 9–17)
BUN BLDV-MCNC: 17 MG/DL (ref 8–23)
BUN/CREAT BLD: ABNORMAL (ref 9–20)
CALCIUM SERPL-MCNC: 9.2 MG/DL (ref 8.6–10.4)
CHLORIDE BLD-SCNC: 107 MMOL/L (ref 98–107)
CHOLESTEROL/HDL RATIO: 5.1
CHOLESTEROL: 193 MG/DL
CO2: 24 MMOL/L (ref 20–31)
CREAT SERPL-MCNC: 0.86 MG/DL (ref 0.7–1.2)
ESTIMATED AVERAGE GLUCOSE: 134 MG/DL
GFR AFRICAN AMERICAN: >60 ML/MIN
GFR NON-AFRICAN AMERICAN: >60 ML/MIN
GFR SERPL CREATININE-BSD FRML MDRD: ABNORMAL ML/MIN/{1.73_M2}
GFR SERPL CREATININE-BSD FRML MDRD: ABNORMAL ML/MIN/{1.73_M2}
GLUCOSE BLD-MCNC: 111 MG/DL (ref 70–99)
HBA1C MFR BLD: 6.3 % (ref 4–6)
HCT VFR BLD CALC: 41.6 % (ref 41–53)
HDLC SERPL-MCNC: 38 MG/DL
HEMOGLOBIN: 14 G/DL (ref 13.5–17.5)
LDL CHOLESTEROL: 97 MG/DL (ref 0–130)
MCH RBC QN AUTO: 30.9 PG (ref 26–34)
MCHC RBC AUTO-ENTMCNC: 33.7 G/DL (ref 31–37)
MCV RBC AUTO: 91.9 FL (ref 80–100)
NRBC AUTOMATED: NORMAL PER 100 WBC
PDW BLD-RTO: 14.6 % (ref 11.5–14.9)
PLATELET # BLD: 191 K/UL (ref 150–450)
PMV BLD AUTO: 9.3 FL (ref 6–12)
POTASSIUM SERPL-SCNC: 4.2 MMOL/L (ref 3.7–5.3)
PROSTATE SPECIFIC ANTIGEN: 0.58 UG/L
RBC # BLD: 4.53 M/UL (ref 4.5–5.9)
SODIUM BLD-SCNC: 142 MMOL/L (ref 135–144)
TRIGL SERPL-MCNC: 291 MG/DL
VITAMIN D 25-HYDROXY: 25.7 NG/ML (ref 30–100)
VLDLC SERPL CALC-MCNC: ABNORMAL MG/DL (ref 1–30)
WBC # BLD: 7 K/UL (ref 3.5–11)

## 2020-06-18 PROCEDURE — 80048 BASIC METABOLIC PNL TOTAL CA: CPT

## 2020-06-18 PROCEDURE — 83036 HEMOGLOBIN GLYCOSYLATED A1C: CPT

## 2020-06-18 PROCEDURE — 36415 COLL VENOUS BLD VENIPUNCTURE: CPT

## 2020-06-18 PROCEDURE — 85027 COMPLETE CBC AUTOMATED: CPT

## 2020-06-18 PROCEDURE — G0103 PSA SCREENING: HCPCS

## 2020-06-18 PROCEDURE — 80061 LIPID PANEL: CPT

## 2020-06-18 PROCEDURE — 84460 ALANINE AMINO (ALT) (SGPT): CPT

## 2020-06-18 PROCEDURE — 82306 VITAMIN D 25 HYDROXY: CPT

## 2020-06-18 RX ORDER — ERGOCALCIFEROL 1.25 MG/1
CAPSULE ORAL
Qty: 12 CAPSULE | Refills: 1 | Status: SHIPPED | OUTPATIENT
Start: 2020-06-18 | End: 2020-12-03

## 2020-07-17 RX ORDER — LISINOPRIL 5 MG/1
TABLET ORAL
Qty: 90 TABLET | Refills: 1 | Status: SHIPPED | OUTPATIENT
Start: 2020-07-17 | End: 2021-02-05 | Stop reason: SDUPTHER

## 2020-10-16 ENCOUNTER — OFFICE VISIT (OUTPATIENT)
Dept: FAMILY MEDICINE CLINIC | Age: 65
End: 2020-10-16
Payer: COMMERCIAL

## 2020-10-16 VITALS
BODY MASS INDEX: 34.21 KG/M2 | SYSTOLIC BLOOD PRESSURE: 128 MMHG | HEART RATE: 87 BPM | OXYGEN SATURATION: 95 % | HEIGHT: 67 IN | TEMPERATURE: 98.1 F | DIASTOLIC BLOOD PRESSURE: 70 MMHG | WEIGHT: 218 LBS

## 2020-10-16 PROBLEM — Z87.891 EX-CIGARETTE SMOKER: Status: ACTIVE | Noted: 2020-10-16

## 2020-10-16 PROCEDURE — 99214 OFFICE O/P EST MOD 30 MIN: CPT | Performed by: FAMILY MEDICINE

## 2020-10-16 RX ORDER — TIZANIDINE 4 MG/1
4 TABLET ORAL 3 TIMES DAILY PRN
Qty: 30 TABLET | Refills: 0 | Status: SHIPPED | OUTPATIENT
Start: 2020-10-16 | End: 2020-12-17

## 2020-10-16 RX ORDER — IBUPROFEN 800 MG/1
800 TABLET ORAL 3 TIMES DAILY PRN
Qty: 30 TABLET | Refills: 0 | Status: SHIPPED | OUTPATIENT
Start: 2020-10-16 | End: 2021-01-18 | Stop reason: SDUPTHER

## 2020-10-16 RX ORDER — LORATADINE 10 MG/1
10 TABLET ORAL DAILY
Qty: 30 TABLET | Refills: 3 | Status: SHIPPED | OUTPATIENT
Start: 2020-10-16 | End: 2021-10-15 | Stop reason: SDUPTHER

## 2020-10-16 RX ORDER — FLUTICASONE PROPIONATE 50 MCG
SPRAY, SUSPENSION (ML) NASAL
Qty: 1 BOTTLE | Refills: 3 | Status: SHIPPED | OUTPATIENT
Start: 2020-10-16 | End: 2021-10-11

## 2020-10-16 ASSESSMENT — ENCOUNTER SYMPTOMS
BLOOD IN STOOL: 0
CHEST TIGHTNESS: 0
SHORTNESS OF BREATH: 0
RHINORRHEA: 1
BACK PAIN: 1
ABDOMINAL PAIN: 0

## 2020-10-16 NOTE — PROGRESS NOTES
Subjective:      Patient ID: Gonzalo Powell is a 72 y.o. male. HPI  Visit Information    Have you changed or started any medications since your last visit including any over-the-counter medicines, vitamins, or herbal medicines? no   Have you stopped taking any of your medications? Is so, why? -  no  Are you having any side effects from any of your medications? - no    Have you seen any other physician or provider since your last visit?  no   Have you had any other diagnostic tests since your last visit?  no   Have you been seen in the emergency room and/or had an admission in a hospital since we last saw you?  no   Have you had your routine dental cleaning in the past 6 months?  yes -     Do you have an active MyChart account? If no, what is the barrier?   Yes    Patient Care Team:  Wilner Phelna MD as PCP - General (Family Medicine)  Wil[facility] Phelan [facility]  Jamie Roy MD as Orthopedic Surgeon (Orthopedic Surgery)  Adwoa Lambert MD (Neurology)  Ashleigh Jeong MD as Consulting Physician (Gastroenterology)    Medical History Review  Past Medical, Family, and Social History reviewed and does contribute to the patient presenting condition    Health Maintenance   Topic Date Due    AAA screen  1955    Hepatitis C screen  1955    HIV screen  09/03/1970    Shingles Vaccine (1 of 2) 09/03/2005    Low dose CT lung screening  09/03/2010    Pneumococcal 65+ years Vaccine (1 of 1 - PPSV23) 09/03/2020    Colon cancer screen colonoscopy  02/11/2021    A1C test (Diabetic or Prediabetic)  06/18/2021    Lipid screen  06/18/2021    Potassium monitoring  06/18/2021    Creatinine monitoring  06/18/2021    DTaP/Tdap/Td vaccine (2 - Td) 06/12/2025    Flu vaccine  Completed    Hepatitis A vaccine  Aged Out    Hepatitis B vaccine  Aged Out    Hib vaccine  Aged Out    Meningococcal (ACWY) vaccine  Aged Out       Patient is a 54-year-old obese white male who presents for hypertension, hyperlipidemia, vitamin D deficiency, impaired fasting glucose, allergic rhinitis. He also has a history of chronic low back pain which sometimes radiates into his right thigh. He also states he has been sneezing and having an itchy, runny nose at times. He denies any fever, chills, chest pain, abdominal pain, shortness of breath. He states he is taking and tolerating his routine medication. He states he has a good appetite and remains active. Review of Systems   Constitutional: Negative for chills and fever. HENT: Positive for congestion, rhinorrhea, sneezing and tinnitus. Respiratory: Negative for chest tightness and shortness of breath. Cardiovascular: Negative for chest pain. Gastrointestinal: Negative for abdominal pain and blood in stool. Genitourinary: Negative for dysuria and hematuria. Musculoskeletal: Positive for back pain. Skin: Negative for rash. Neurological: Negative for dizziness. Psychiatric/Behavioral: Negative for dysphoric mood. Objective:   Physical Exam  Vitals signs and nursing note reviewed. Constitutional:       General: He is not in acute distress. Appearance: He is well-developed. HENT:      Head: Normocephalic and atraumatic. Right Ear: Tympanic membrane, ear canal and external ear normal.      Left Ear: Tympanic membrane, ear canal and external ear normal.      Nose: Nose normal.      Mouth/Throat:      Mouth: Mucous membranes are moist.      Pharynx: Oropharynx is clear. Eyes:      General: No scleral icterus. Right eye: No discharge. Left eye: No discharge. Conjunctiva/sclera: Conjunctivae normal.   Neck:      Musculoskeletal: Neck supple. Cardiovascular:      Rate and Rhythm: Normal rate and regular rhythm. Heart sounds: Normal heart sounds. Pulmonary:      Effort: Pulmonary effort is normal. No respiratory distress. Breath sounds: Normal breath sounds. No wheezing. Abdominal:      General: There is no distension. Palpations: Abdomen is soft. Tenderness: There is no abdominal tenderness. Musculoskeletal:      Lumbar back: He exhibits tenderness, pain and spasm. Skin:     General: Skin is warm and dry. Findings: No rash. Neurological:      Mental Status: He is alert and oriented to person, place, and time. Psychiatric:         Mood and Affect: Mood normal.         Behavior: Behavior normal.         Assessment:       Diagnosis Orders   1. Essential hypertension  Comprehensive Metabolic Panel    Lipid Panel    Magnesium   2. Mixed hyperlipidemia  Comprehensive Metabolic Panel    Lipid Panel   3. Vitamin D deficiency  Vitamin D 25 Hydroxy   4. Impaired fasting glucose  Comprehensive Metabolic Panel   5. Seasonal allergic rhinitis, unspecified trigger  fluticasone (FLONASE) 50 MCG/ACT nasal spray    loratadine (CLARITIN) 10 MG tablet   6. Lumbar pain  tiZANidine (ZANAFLEX) 4 MG tablet    ibuprofen (ADVIL;MOTRIN) 800 MG tablet           Plan:       Samuel received counseling on the following healthy behaviors: nutrition and medication adherence  Reviewed prior labs and health maintenance  Continue current medications, diet and exercise. Discussed use, benefit, and side effects of prescribed medications. Barriers to medication compliance addressed. Patient given educational materials - see patient instructions  Was a self-tracking handout given in paper form or via [employer] ( [employer] )t?  No:     Requested Prescriptions     Signed Prescriptions Disp Refills    fluticasone (FLONASE) 50 MCG/ACT nasal spray 1 Bottle 3     Si sprays into each nostril daily    loratadine (CLARITIN) 10 MG tablet 30 tablet 3     Sig: Take 1 tablet by mouth daily    tiZANidine (ZANAFLEX) 4 MG tablet 30 tablet 0     Sig: Take 1 tablet by mouth 3 times daily as needed (For muscle spasm)    ibuprofen (ADVIL;MOTRIN) 800 MG tablet 30 tablet 0     Sig: Take 1 tablet by mouth 3 times daily as needed for Pain (Take with food)       All patient questions answered. Patient voiced understanding. Quality Measures    Body mass index is 34.14 kg/m². Elevated. Weight control planned discussed Healthy diet and regular exercise. BP: 128/70. Blood pressure is normal. Treatment plan consists of Weight Reduction. Fall Risk 10/16/2020   2 or more falls in past year? no   Fall with injury in past year? no     The patient does not have a history of falls. I did , complete a risk assessment for falls. A plan of care for falls No Treatment plan indicated    Lab Results   Component Value Date    LDLCHOLESTEROL 97 2020    (goal LDL reduction with dx if diabetes is 50% LDL reduction)    PHQ Scores 2020 2020 3/5/2019 5/15/2018 2017 2016   PHQ2 Score 0 0 0 0 0 0   PHQ9 Score 0 0 0 0 0 0     Interpretation of Total Score Depression Severity: 1-4 = Minimal depression, 5-9 = Mild depression, 10-14 = Moderate depression, 15-19 = Moderately severe depression, 20-27 = Severe depression    Orders Placed This Encounter   Procedures    Comprehensive Metabolic Panel     Fasting     Standing Status:   Future     Standing Expiration Date:   10/16/2021    Lipid Panel     Standing Status:   Future     Standing Expiration Date:   10/16/2021     Order Specific Question:   Is Patient Fasting?/# of Hours     Answer:    Fast 8-10 hours    Magnesium     Standing Status:   Future     Standing Expiration Date:   10/16/2021    Vitamin D 25 Hydroxy     Standing Status:   Future     Standing Expiration Date:   10/16/2021     Orders Placed This Encounter   Medications    fluticasone (FLONASE) 50 MCG/ACT nasal spray     Si sprays into each nostril daily     Dispense:  1 Bottle     Refill:  3    loratadine (CLARITIN) 10 MG tablet     Sig: Take 1 tablet by mouth daily     Dispense:  30 tablet     Refill:  3    tiZANidine (ZANAFLEX) 4 MG tablet     Sig: Take 1 tablet by mouth 3 times daily as needed (For muscle spasm)     Dispense:  30 tablet     Refill:  0    ibuprofen (ADVIL;MOTRIN) 800 MG tablet     Sig: Take 1 tablet by mouth 3 times daily as needed for Pain (Take with food)     Dispense:  30 tablet     Refill:  0         Patient referred to his spinal surgeon for his chronic low back pain  Continue routine medication  Follow-up in 6 months or sooner if needed

## 2020-10-26 ENCOUNTER — HOSPITAL ENCOUNTER (OUTPATIENT)
Age: 65
Discharge: HOME | End: 2020-10-26
Payer: COMMERCIAL

## 2020-10-26 LAB
ALBUMIN SERPL-MCNC: 4.2 G/DL (ref 3.5–5.2)
ALBUMIN/GLOBULIN RATIO: ABNORMAL (ref 1–2.5)
ALP BLD-CCNC: 57 U/L (ref 40–129)
ALT SERPL-CCNC: 33 U/L (ref 5–41)
ANION GAP SERPL CALCULATED.3IONS-SCNC: 12 MMOL/L (ref 9–17)
AST SERPL-CCNC: 20 U/L
BILIRUB SERPL-MCNC: 0.23 MG/DL (ref 0.3–1.2)
BUN BLDV-MCNC: 24 MG/DL (ref 8–23)
BUN/CREAT BLD: ABNORMAL (ref 9–20)
CALCIUM SERPL-MCNC: 9.6 MG/DL (ref 8.6–10.4)
CHLORIDE BLD-SCNC: 106 MMOL/L (ref 98–107)
CHOLESTEROL/HDL RATIO: 5
CHOLESTEROL: 205 MG/DL
CO2: 21 MMOL/L (ref 20–31)
CREAT SERPL-MCNC: 1.04 MG/DL (ref 0.7–1.2)
GFR AFRICAN AMERICAN: >60 ML/MIN
GFR NON-AFRICAN AMERICAN: >60 ML/MIN
GFR SERPL CREATININE-BSD FRML MDRD: ABNORMAL ML/MIN/{1.73_M2}
GFR SERPL CREATININE-BSD FRML MDRD: ABNORMAL ML/MIN/{1.73_M2}
GLUCOSE BLD-MCNC: 123 MG/DL (ref 70–99)
HDLC SERPL-MCNC: 41 MG/DL
LDL CHOLESTEROL: 106 MG/DL (ref 0–130)
MAGNESIUM: 2.2 MG/DL (ref 1.6–2.6)
POTASSIUM SERPL-SCNC: 4.3 MMOL/L (ref 3.7–5.3)
SODIUM BLD-SCNC: 139 MMOL/L (ref 135–144)
TOTAL PROTEIN: 7.5 G/DL (ref 6.4–8.3)
TRIGL SERPL-MCNC: 289 MG/DL
VITAMIN D 25-HYDROXY: 27.2 NG/ML (ref 30–100)
VLDLC SERPL CALC-MCNC: ABNORMAL MG/DL (ref 1–30)

## 2020-10-26 PROCEDURE — 36415 COLL VENOUS BLD VENIPUNCTURE: CPT

## 2020-10-26 PROCEDURE — 82306 VITAMIN D 25 HYDROXY: CPT

## 2020-10-26 PROCEDURE — 83735 ASSAY OF MAGNESIUM: CPT

## 2020-10-26 PROCEDURE — 80061 LIPID PANEL: CPT

## 2020-10-26 PROCEDURE — 80053 COMPREHEN METABOLIC PANEL: CPT

## 2020-10-27 ENCOUNTER — OFFICE VISIT (OUTPATIENT)
Dept: ORTHOPEDIC SURGERY | Age: 65
End: 2020-10-27
Payer: COMMERCIAL

## 2020-10-27 PROCEDURE — 99203 OFFICE O/P NEW LOW 30 MIN: CPT | Performed by: ORTHOPAEDIC SURGERY

## 2020-10-27 NOTE — PROGRESS NOTES
Patient ID: Gudelia Simpson is a 72 y.o. male. Chief Complaint   Patient presents with   Glo Villalpando Doctor    Pain     low back pain last seen          HPI     Patient presents with chronic low back pain. Patient was last seen by me in . At that time we ordered physical therapy he reports he could only afford to go about 6 visits could not afford the 6 weeks of therapy and therefore just chose to continue to tolerate his pain. Patient will get intermittent sharp stabbing pain which then caused him to walk forward hunched for several days at a time. At this time the patient is not complaining of radicular leg pain but pain along the beltline    Past Medical History:   Diagnosis Date    Essential hypertension 2017    Hyperlipidemia 2014    [facility] ([facility] [address]     Obesity (BMI 30-39. 9) 2017    Osteoporosis     Sciatica     Stroke ([facility] [address]     Vertebral fracture, osteoporotic (HCC)      Past Surgical History:   Procedure Laterality Date    COLONOSCOPY  3/19/2014    tubular adenoma x 2, inadequate prep, some polyps not removed, needs colonoscopy in 6-12 months    COLONOSCOPY N/A 2020    COLONOSCOPY POLYPECTOMY HOT BIOPSY performed by Bhakti Huggins MD at [facility] [address] 2020    OPEN RIGHT COLECTOMY, PRIMARY ANASTOMOSIS performed by Bhakti Huggins MD at [facility] [address]      rt. inguinal    OMENTUM RESECTION  2020    OMENTECTOMY -  PARTIAL GREATER OMENECTOMY performed by Bhakti Huggins MD at [address] [facility]       Family History   Problem Relation Age of Onset    Heart Disease Father      Social History     Occupational History    Not on file   Tobacco Use    Smoking status: Former Smoker     Packs/day: 1.00     Years: 40.00     Pack years: 40.00     Last attempt to quit: 2020     Years since quittin.6    Smokeless tobacco: Never Used   Substance and Sexual Activity    Alcohol use:  Yes Comment: rarely    Drug use: No    Sexual activity: Yes     Partners: Female        Review of Systems   All other systems reviewed and are negative. Physical Exam  Vitals signs and nursing note reviewed. Constitutional:       Appearance: He is well-developed. HENT:      Head: Normocephalic and atraumatic. Nose: Nose normal.   Eyes:      Conjunctiva/sclera: Conjunctivae normal.   Neck:      Musculoskeletal: Normal range of motion and neck supple. Pulmonary:      Effort: Pulmonary effort is normal. No respiratory distress. Musculoskeletal:      Comments: Normal gait     Skin:     General: Skin is warm and dry. Neurological:      Mental Status: He is alert and oriented to person, place, and time. Sensory: No sensory deficit. Psychiatric:         Behavior: Behavior normal.         Thought Content: Thought content normal.     [facility] x-rays for 2018 reveals moderate multilevel lumbar spondylosis age-appropriate greatest dissipates collapse at 3 4    Assessment:          1. Degeneration of lumbar or lumbosacral intervertebral disc    2. Low back pain without sciatica, unspecified back pain laterality, unspecified chronicity        Chronic low back pain over 18 years last seen by me 7 to 8 years ago          Plan:     Mri  PT    No orders of the defined types were placed in this encounter. Osmel Saleh MD    Please note that this chart was generated using voicerecognition Dragon dictation software. Although every effort was made to ensurethe accuracy of this automated transcription, some errors in transcription may haveoccurred.

## 2020-11-05 RX ORDER — ATORVASTATIN CALCIUM 20 MG/1
TABLET, FILM COATED ORAL
Qty: 90 TABLET | Refills: 0 | Status: SHIPPED | OUTPATIENT
Start: 2020-11-05 | End: 2021-01-26

## 2020-11-05 NOTE — TELEPHONE ENCOUNTER
Next Visit Date:  Future Appointments   Date Time Provider Miryam Rodríguezi   4/16/2021 10:00 AM Grazyna Carrera MD [address] [facility] [address] [facility] [address] Maintenance   Topic Date Due    AAA screen  1955    Hepatitis C screen  1955    HIV screen  09/03/1970    Shingles Vaccine (1 of 2) 09/03/2005    Low dose CT lung screening  09/03/2010    Pneumococcal 65+ years Vaccine (1 of 1 - PPSV23) 09/03/2020    Annual Wellness Visit (AWV)  10/16/2020    Colon cancer screen colonoscopy  02/11/2021    A1C test (Diabetic or Prediabetic)  06/18/2021    Lipid screen  10/26/2021    Potassium monitoring  10/26/2021    Creatinine monitoring  10/26/2021    DTaP/Tdap/Td vaccine (2 - Td) 06/12/2025    Flu vaccine  Completed    Hepatitis A vaccine  Aged Out    Hepatitis B vaccine  Aged Out    Hib vaccine  Aged Out    Meningococcal (ACWY) vaccine  Aged Out       Hemoglobin A1C (%)   Date Value   06/18/2020 6.3 (H)   11/01/2019 5.9   06/10/2019 5.9             ( goal A1C is < 7)   No results found for: LABMICR  LDL Cholesterol (mg/dL)   Date Value   10/26/2020 106   06/18/2020 97       (goal LDL is <100)   AST (U/L)   Date Value   10/26/2020 20     ALT (U/L)   Date Value   10/26/2020 33     BUN (mg/dL)   Date Value   10/26/2020 24 (H)     BP Readings from Last 3 Encounters:   10/16/20 128/70   06/17/20 132/86   04/22/20 130/88          (goal 120/80)    All Future Testing planned in [facility]  Lab Frequency Next Occurrence   MRI LUMBAR SPINE WO CONTRAST Once 10/27/2020               Patient Active Problem List:     Osteoporosis     Vitamin D deficiency     History of stroke     Degeneration of lumbar or lumbosacral intervertebral disc     Back pain     Low back pain     Allergic rhinitis     Impaired fasting glucose     Mixed hyperlipidemia     Essential hypertension     Obesity (BMI 30.0-34. 9)     Tear of left rotator cuff     Left bicipital tenosynovitis     Colon polyp     Acute blood loss as cause of

## 2020-11-13 ENCOUNTER — OFFICE VISIT (OUTPATIENT)
Dept: FAMILY MEDICINE CLINIC | Age: 65
End: 2020-11-13
Payer: COMMERCIAL

## 2020-11-13 VITALS
SYSTOLIC BLOOD PRESSURE: 128 MMHG | RESPIRATION RATE: 18 BRPM | HEART RATE: 76 BPM | TEMPERATURE: 97.2 F | WEIGHT: 221 LBS | DIASTOLIC BLOOD PRESSURE: 82 MMHG | BODY MASS INDEX: 34.61 KG/M2

## 2020-11-13 PROCEDURE — 99213 OFFICE O/P EST LOW 20 MIN: CPT | Performed by: FAMILY MEDICINE

## 2020-11-13 ASSESSMENT — PATIENT HEALTH QUESTIONNAIRE - PHQ9
SUM OF ALL RESPONSES TO PHQ QUESTIONS 1-9: 0
SUM OF ALL RESPONSES TO PHQ QUESTIONS 1-9: 0
SUM OF ALL RESPONSES TO PHQ9 QUESTIONS 1 & 2: 0
1. LITTLE INTEREST OR PLEASURE IN DOING THINGS: 0
SUM OF ALL RESPONSES TO PHQ QUESTIONS 1-9: 0
2. FEELING DOWN, DEPRESSED OR HOPELESS: 0

## 2020-11-13 ASSESSMENT — ENCOUNTER SYMPTOMS
SHORTNESS OF BREATH: 0
CHEST TIGHTNESS: 0
ABDOMINAL PAIN: 0
SINUS PRESSURE: 1

## 2020-11-13 NOTE — PROGRESS NOTES
(ACWY) vaccine  Aged Out         Patient is a 42-year-old white male who presents for complaint of ringing in both ears which started about 3 to 4 weeks ago. He states that the ringing seems to be worse in the right ear. He also states he has been having sinus pressure. He states that he is taking Claritin but not his Flonase. He denies any fever, chills, chest pain, shortness of breath. Review of Systems   Constitutional: Negative for chills and fever. HENT: Positive for congestion, sinus pressure and tinnitus. Respiratory: Negative for chest tightness and shortness of breath. Cardiovascular: Negative for chest pain. Gastrointestinal: Negative for abdominal pain. Skin: Negative for rash. Objective:   Physical Exam  Vitals signs and nursing note reviewed. Constitutional:       General: He is not in acute distress. Appearance: He is well-developed. HENT:      Head: Normocephalic and atraumatic. Right Ear: Tympanic membrane, ear canal and external ear normal.      Left Ear: Tympanic membrane, ear canal and external ear normal.      Nose: Nose normal.      Mouth/Throat:      Mouth: Mucous membranes are moist.      Pharynx: Oropharynx is clear. Eyes:      General: No scleral icterus. Right eye: No discharge. Left eye: No discharge. Conjunctiva/sclera: Conjunctivae normal.   Neck:      Musculoskeletal: Neck supple. Cardiovascular:      Rate and Rhythm: Normal rate and regular rhythm. Heart sounds: Normal heart sounds. Pulmonary:      Effort: Pulmonary effort is normal. No respiratory distress. Breath sounds: Normal breath sounds. No wheezing. Abdominal:      General: There is no distension. Palpations: Abdomen is soft. Tenderness: There is no abdominal tenderness. Skin:     General: Skin is warm and dry. Findings: No rash. Neurological:      Mental Status: He is alert and oriented to person, place, and time.    Psychiatric: Mood and Affect: Mood normal.         Behavior: Behavior normal.         Assessment:       Diagnosis Orders   1. Bilateral tinnitus     2. Essential hypertension     3.  Sinus pressure             Plan:        Continue on Claritin, resume Flonase nasal spray  Patient referred to ENT  Continue routine medication  Follow-up as scheduled

## 2020-12-03 RX ORDER — ERGOCALCIFEROL 1.25 MG/1
CAPSULE ORAL
Qty: 12 CAPSULE | Refills: 1 | Status: SHIPPED | OUTPATIENT
Start: 2020-12-03 | End: 2021-05-24

## 2020-12-17 ENCOUNTER — HOSPITAL ENCOUNTER (EMERGENCY)
Age: 65
Discharge: HOME | End: 2020-12-17
Attending: EMERGENCY MEDICINE
Payer: COMMERCIAL

## 2020-12-17 ENCOUNTER — APPOINTMENT (OUTPATIENT)
Dept: CT IMAGING | Age: 65
End: 2020-12-17
Payer: COMMERCIAL

## 2020-12-17 ENCOUNTER — APPOINTMENT (OUTPATIENT)
Dept: GENERAL RADIOLOGY | Age: 65
End: 2020-12-17
Payer: COMMERCIAL

## 2020-12-17 VITALS
BODY MASS INDEX: 34.3 KG/M2 | WEIGHT: 219 LBS | HEART RATE: 64 BPM | OXYGEN SATURATION: 94 % | DIASTOLIC BLOOD PRESSURE: 86 MMHG | SYSTOLIC BLOOD PRESSURE: 126 MMHG | RESPIRATION RATE: 17 BRPM | TEMPERATURE: 98 F

## 2020-12-17 LAB
ABSOLUTE EOS #: 0.2 K/UL (ref 0–0.4)
ABSOLUTE IMMATURE GRANULOCYTE: ABNORMAL K/UL (ref 0–0.3)
ABSOLUTE LYMPH #: 2.2 K/UL (ref 1–4.8)
ABSOLUTE MONO #: 0.6 K/UL (ref 0.1–1.3)
ALBUMIN SERPL-MCNC: 4.1 G/DL (ref 3.5–5.2)
ALBUMIN/GLOBULIN RATIO: ABNORMAL (ref 1–2.5)
ALP BLD-CCNC: 64 U/L (ref 40–129)
ALT SERPL-CCNC: 42 U/L (ref 5–41)
ANION GAP SERPL CALCULATED.3IONS-SCNC: 12 MMOL/L (ref 9–17)
AST SERPL-CCNC: 21 U/L
BASOPHILS # BLD: 1 % (ref 0–2)
BASOPHILS ABSOLUTE: 0.1 K/UL (ref 0–0.2)
BILIRUB SERPL-MCNC: 0.2 MG/DL (ref 0.3–1.2)
BILIRUBIN DIRECT: <0.08 MG/DL
BILIRUBIN URINE: NEGATIVE
BILIRUBIN, INDIRECT: ABNORMAL MG/DL (ref 0–1)
BNP INTERPRETATION: NORMAL
BUN BLDV-MCNC: 22 MG/DL (ref 8–23)
BUN/CREAT BLD: ABNORMAL (ref 9–20)
CALCIUM SERPL-MCNC: 9.7 MG/DL (ref 8.6–10.4)
CHLORIDE BLD-SCNC: 104 MMOL/L (ref 98–107)
CO2: 22 MMOL/L (ref 20–31)
COLOR: YELLOW
COMMENT UA: NORMAL
CREAT SERPL-MCNC: 1.03 MG/DL (ref 0.7–1.2)
DIFFERENTIAL TYPE: ABNORMAL
EKG ATRIAL RATE: 67 BPM
EKG P AXIS: 3 DEGREES
EKG P-R INTERVAL: 180 MS
EKG Q-T INTERVAL: 394 MS
EKG QRS DURATION: 102 MS
EKG QTC CALCULATION (BAZETT): 416 MS
EKG R AXIS: -11 DEGREES
EKG T AXIS: 2 DEGREES
EKG VENTRICULAR RATE: 67 BPM
EOSINOPHILS RELATIVE PERCENT: 3 % (ref 0–4)
GFR AFRICAN AMERICAN: >60 ML/MIN
GFR NON-AFRICAN AMERICAN: >60 ML/MIN
GFR SERPL CREATININE-BSD FRML MDRD: ABNORMAL ML/MIN/{1.73_M2}
GFR SERPL CREATININE-BSD FRML MDRD: ABNORMAL ML/MIN/{1.73_M2}
GLOBULIN: ABNORMAL G/DL (ref 1.5–3.8)
GLUCOSE BLD-MCNC: 130 MG/DL (ref 70–99)
GLUCOSE URINE: NEGATIVE
HCT VFR BLD CALC: 43.1 % (ref 41–53)
HEMOGLOBIN: 14.3 G/DL (ref 13.5–17.5)
IMMATURE GRANULOCYTES: ABNORMAL %
KETONES, URINE: NEGATIVE
LACTIC ACID: 1.5 MMOL/L (ref 0.5–2.2)
LEUKOCYTE ESTERASE, URINE: NEGATIVE
LIPASE: 31 U/L (ref 13–60)
LYMPHOCYTES # BLD: 27 % (ref 24–44)
MAGNESIUM: 2.1 MG/DL (ref 1.6–2.6)
MCH RBC QN AUTO: 31.6 PG (ref 26–34)
MCHC RBC AUTO-ENTMCNC: 33.3 G/DL (ref 31–37)
MCV RBC AUTO: 95 FL (ref 80–100)
MONOCYTES # BLD: 7 % (ref 1–7)
NITRITE, URINE: NEGATIVE
NRBC AUTOMATED: ABNORMAL PER 100 WBC
PDW BLD-RTO: 13.1 % (ref 11.5–14.9)
PH UA: 5.5 (ref 5–8)
PLATELET # BLD: 129 K/UL (ref 150–450)
PLATELET ESTIMATE: ABNORMAL
PMV BLD AUTO: 9.5 FL (ref 6–12)
POTASSIUM SERPL-SCNC: 4.3 MMOL/L (ref 3.7–5.3)
PRO-BNP: 56 PG/ML
PROTEIN UA: NEGATIVE
RBC # BLD: 4.53 M/UL (ref 4.5–5.9)
RBC # BLD: ABNORMAL 10*6/UL
SEG NEUTROPHILS: 62 % (ref 36–66)
SEGMENTED NEUTROPHILS ABSOLUTE COUNT: 5 K/UL (ref 1.3–9.1)
SODIUM BLD-SCNC: 138 MMOL/L (ref 135–144)
SPECIFIC GRAVITY UA: 1.03 (ref 1–1.03)
TOTAL PROTEIN: 7.4 G/DL (ref 6.4–8.3)
TROPONIN INTERP: NORMAL
TROPONIN INTERP: NORMAL
TROPONIN T: NORMAL NG/ML
TROPONIN T: NORMAL NG/ML
TROPONIN, HIGH SENSITIVITY: 10 NG/L (ref 0–22)
TROPONIN, HIGH SENSITIVITY: 9 NG/L (ref 0–22)
TURBIDITY: CLEAR
URINE HGB: NEGATIVE
UROBILINOGEN, URINE: NORMAL
WBC # BLD: 8.1 K/UL (ref 3.5–11)
WBC # BLD: ABNORMAL 10*3/UL

## 2020-12-17 PROCEDURE — 80076 HEPATIC FUNCTION PANEL: CPT

## 2020-12-17 PROCEDURE — 2580000003 HC RX 258: Performed by: EMERGENCY MEDICINE

## 2020-12-17 PROCEDURE — 6360000004 HC RX CONTRAST MEDICATION: Performed by: EMERGENCY MEDICINE

## 2020-12-17 PROCEDURE — 93005 ELECTROCARDIOGRAM TRACING: CPT | Performed by: EMERGENCY MEDICINE

## 2020-12-17 PROCEDURE — 93010 ELECTROCARDIOGRAM REPORT: CPT | Performed by: INTERNAL MEDICINE

## 2020-12-17 PROCEDURE — 83880 ASSAY OF NATRIURETIC PEPTIDE: CPT

## 2020-12-17 PROCEDURE — 80048 BASIC METABOLIC PNL TOTAL CA: CPT

## 2020-12-17 PROCEDURE — 81003 URINALYSIS AUTO W/O SCOPE: CPT

## 2020-12-17 PROCEDURE — 99285 EMERGENCY DEPT VISIT HI MDM: CPT

## 2020-12-17 PROCEDURE — 83690 ASSAY OF LIPASE: CPT

## 2020-12-17 PROCEDURE — 6360000002 HC RX W HCPCS: Performed by: EMERGENCY MEDICINE

## 2020-12-17 PROCEDURE — 85025 COMPLETE CBC W/AUTO DIFF WBC: CPT

## 2020-12-17 PROCEDURE — 96374 THER/PROPH/DIAG INJ IV PUSH: CPT

## 2020-12-17 PROCEDURE — 84484 ASSAY OF TROPONIN QUANT: CPT

## 2020-12-17 PROCEDURE — 83605 ASSAY OF LACTIC ACID: CPT

## 2020-12-17 PROCEDURE — 36415 COLL VENOUS BLD VENIPUNCTURE: CPT

## 2020-12-17 PROCEDURE — 83735 ASSAY OF MAGNESIUM: CPT

## 2020-12-17 PROCEDURE — 74177 CT ABD & PELVIS W/CONTRAST: CPT

## 2020-12-17 PROCEDURE — 71045 X-RAY EXAM CHEST 1 VIEW: CPT

## 2020-12-17 RX ORDER — MORPHINE SULFATE 4 MG/ML
4 INJECTION, SOLUTION INTRAMUSCULAR; INTRAVENOUS ONCE
Status: DISCONTINUED | OUTPATIENT
Start: 2020-12-17 | End: 2020-12-17 | Stop reason: HOSPADM

## 2020-12-17 RX ORDER — 0.9 % SODIUM CHLORIDE 0.9 %
80 INTRAVENOUS SOLUTION INTRAVENOUS ONCE
Status: COMPLETED | OUTPATIENT
Start: 2020-12-17 | End: 2020-12-17

## 2020-12-17 RX ORDER — ONDANSETRON 2 MG/ML
4 INJECTION INTRAMUSCULAR; INTRAVENOUS ONCE
Status: COMPLETED | OUTPATIENT
Start: 2020-12-17 | End: 2020-12-17

## 2020-12-17 RX ORDER — SODIUM CHLORIDE 0.9 % (FLUSH) 0.9 %
10 SYRINGE (ML) INJECTION PRN
Status: DISCONTINUED | OUTPATIENT
Start: 2020-12-17 | End: 2020-12-17 | Stop reason: HOSPADM

## 2020-12-17 RX ADMIN — ONDANSETRON 4 MG: 2 INJECTION INTRAMUSCULAR; INTRAVENOUS at 06:20

## 2020-12-17 RX ADMIN — Medication 10 ML: at 07:10

## 2020-12-17 RX ADMIN — SODIUM CHLORIDE 80 ML: 9 INJECTION, SOLUTION INTRAVENOUS at 07:10

## 2020-12-17 RX ADMIN — IOPAMIDOL 75 ML: 755 INJECTION, SOLUTION INTRAVENOUS at 07:10

## 2020-12-17 ASSESSMENT — ENCOUNTER SYMPTOMS
SHORTNESS OF BREATH: 1
BACK PAIN: 0
ABDOMINAL PAIN: 1
COUGH: 0
DIARRHEA: 0
VOMITING: 0
NAUSEA: 1

## 2020-12-17 ASSESSMENT — PAIN SCALES - GENERAL: PAINLEVEL_OUTOF10: 4

## 2020-12-17 NOTE — ED NOTES
Pt discharged in stable condition with discharge instructions, including follow up with PCP. Pt ambulates to door with steady gait and without assistance.       Libertad Mae RN  12/17/20 [id number]

## 2020-12-17 NOTE — ED PROVIDER NOTES
EMERGENCY DEPARTMENT ENCOUNTER    Pt Name: Paul Max  MRN: [id number]  [place] 1955  Date of evaluation: 20  CHIEF COMPLAINT       Chief Complaint   Patient presents with    Chest Pain    Abdominal Pain    Nausea     HISTORY OF PRESENT ILLNESS   HPI     This is a 70-year-old male who comes in today. The patient is status post right hemicolectomy secondary to polyps. The patient states that he suddenly woke up out of bed and had right lower and right upper quadrant abdominal pain with associated nausea and then epigastric abdominal pain that radiated up into his chest with some mild shortness of breath and nausea without any vomiting no diarrhea. The patient's states this happened suddenly tonight he was feeling in his normal state of health yesterday he denies any fevers chills cough congestion. No history of similar symptoms. Did not take anything at home for his pain it is intermittent nothing makes it better or worse but when it comes on it lasts for a couple of minutes. REVIEW OF SYSTEMS     Review of Systems   Constitutional: Negative for fever. HENT: Negative for congestion. Respiratory: Positive for shortness of breath. Negative for cough. Cardiovascular: Positive for chest pain. Gastrointestinal: Positive for abdominal pain and nausea. Negative for diarrhea and vomiting. Genitourinary: Negative for dysuria. Musculoskeletal: Negative for back pain. Skin: Negative for rash. Neurological: Negative for headaches. All other systems reviewed and are negative. PASTMEDICAL HISTORY     Past Medical History:   Diagnosis Date    Essential hypertension 2017    Hyperlipidemia 2014    Hyperparathyroidism ([address]     Obesity (BMI 30-39. 9) 2017    Osteoporosis     Sciatica     Stroke ([address]     Vertebral fracture, osteoporotic ([address]      SURGICAL HISTORY       Past Surgical History:   Procedure Laterality Date    COLONOSCOPY  3/19/2014    tubular

## 2020-12-17 NOTE — PROGRESS NOTES
ADDENDUM:        Care of this patient was assumed from Dr. Fadi Oneill    at    0700  . The patient was seen for Chest Pain, Abdominal Pain, and Nausea  . The patient's initial evaluation and plan have been discussed with the prior provider who initially evaluated the patient. Nursing Notes, Past Medical Hx, Past Surgical Hx, Social Hx, Allergies, and Family Hx were all reviewed. I performed a repeat evaluation of the patient and reviewed tests completed so far. 27-year-old male presented with chief complaint of nausea. Worked up for atypical ACS. 2 negative troponins. Comfortable on reassessment. CT scan without any acute abnormalities. Questionable postsurgical changes from prior abdominal surgery. Mild LFT elevation. Fatty liver on CT. Cholelithiasis on CT. Tolerating p.o. on reassessment pain-free agreeable with discharge home with outpatient follow-up. ED Course        The patient was given the following medications:  Orders Placed This Encounter   Medications    morphine sulfate (PF) injection 4 mg    ondansetron (ZOFRAN) injection 4 mg    0.9 % sodium chloride bolus    iopamidol (ISOVUE-370) 76 % injection 75 mL    sodium chloride flush 0.9 % injection 10 mL       RECENT VITALS:  BP: (!) 175/88, Temp: 98 °F (36.7 °C), Pulse: 65, Resp: 14     RADIOLOGY:All plain film, CT, MRI, and formal ultrasound images (except ED bedside ultrasound) are read by the radiologist and the images and interpretations are directly viewed by the emergency physician. CT ABDOMEN PELVIS W IV CONTRAST Additional Contrast? None   Preliminary Result   1. No evidence of bowel obstruction, intraperitoneal free air, or abscess. 2. Postsurgical changes related to prior hemicolectomy. Very small focal   soft tissue densities within the mesenteric fat near the prior surgery likely   represent postsurgical change; however, other etiologies cannot be excluded.    Follow-up CT examination in 3-4 months is recommended for re-evaluation. 3. Cholelithiasis. 4. Fatty infiltration of the liver. 5. Visualization of focal area of intermediate attenuation with tiny nodular   area of enhancement within the right hepatic dome. Finding may merely   represent perfusion anomaly with adjacent flash filler/cavernous hemangioma. Finding can be reassessed for stability on recommended follow-up examination. XR CHEST PORTABLE   Final Result   No acute cardiopulmonary abnormality. LABS: All lab results were reviewed by myself, and all abnormals are listed below. Labs Reviewed   CBC WITH AUTO DIFFERENTIAL - Abnormal; Notable for the following components:       Result Value    Platelets 021 (*)     All other components within normal limits   BASIC METABOLIC PANEL - Abnormal; Notable for the following components:    Glucose 130 (*)     All other components within normal limits   HEPATIC FUNCTION PANEL - Abnormal; Notable for the following components:    ALT 42 (*)     Total Bilirubin 0.20 (*)     All other components within normal limits   MAGNESIUM   BRAIN NATRIURETIC PEPTIDE   LIPASE   TROPONIN   TROPONIN   LACTIC ACID   URINALYSIS           Disposition   DISPOSITION:    DISPOSITION Decision To Discharge 12/17/2020 09:28:57 AM      CLINICAL IMPRESSION:  1. Abdominal pain, unspecified abdominal location    2.  Nausea        PATIENT REFERRED TO:  Eduardo Girard MD  [address]  [phone]    Call in 1 day        DISCHARGE MEDICATIONS:  New Prescriptions    No medications on file       Jil Adame MD  Attending Emergency Physician

## 2021-01-18 DIAGNOSIS — M54.50 LUMBAR PAIN: ICD-10-CM

## 2021-01-18 RX ORDER — IBUPROFEN 800 MG/1
800 TABLET ORAL 3 TIMES DAILY PRN
Qty: 30 TABLET | Refills: 1 | Status: SHIPPED | OUTPATIENT
Start: 2021-01-18 | End: 2021-10-11

## 2021-01-21 ENCOUNTER — HOSPITAL ENCOUNTER (OUTPATIENT)
Dept: MRI IMAGING | Age: 66
Discharge: HOME | End: 2021-01-23
Payer: COMMERCIAL

## 2021-01-21 DIAGNOSIS — M51.37 DEGENERATION OF LUMBAR OR LUMBOSACRAL INTERVERTEBRAL DISC: ICD-10-CM

## 2021-01-21 DIAGNOSIS — M54.50 LOW BACK PAIN WITHOUT SCIATICA, UNSPECIFIED BACK PAIN LATERALITY, UNSPECIFIED CHRONICITY: ICD-10-CM

## 2021-01-21 PROCEDURE — 72148 MRI LUMBAR SPINE W/O DYE: CPT

## 2021-01-26 RX ORDER — PANTOPRAZOLE SODIUM 40 MG/1
TABLET, DELAYED RELEASE ORAL
Qty: 90 TABLET | Refills: 1 | Status: SHIPPED | OUTPATIENT
Start: 2021-01-26 | End: 2021-07-26

## 2021-01-26 RX ORDER — ATORVASTATIN CALCIUM 20 MG/1
TABLET, FILM COATED ORAL
Qty: 90 TABLET | Refills: 0 | Status: SHIPPED | OUTPATIENT
Start: 2021-01-26 | End: 2021-04-26

## 2021-01-28 ENCOUNTER — OFFICE VISIT (OUTPATIENT)
Dept: ORTHOPEDIC SURGERY | Age: 66
End: 2021-01-28
Payer: COMMERCIAL

## 2021-01-28 DIAGNOSIS — M48.061 SPINAL STENOSIS OF LUMBAR REGION, UNSPECIFIED WHETHER NEUROGENIC CLAUDICATION PRESENT: ICD-10-CM

## 2021-01-28 DIAGNOSIS — M51.37 DEGENERATION OF LUMBAR OR LUMBOSACRAL INTERVERTEBRAL DISC: ICD-10-CM

## 2021-01-28 DIAGNOSIS — M54.50 LOW BACK PAIN WITHOUT SCIATICA, UNSPECIFIED BACK PAIN LATERALITY, UNSPECIFIED CHRONICITY: Primary | ICD-10-CM

## 2021-01-28 PROCEDURE — 99213 OFFICE O/P EST LOW 20 MIN: CPT | Performed by: ORTHOPAEDIC SURGERY

## 2021-01-29 ENCOUNTER — HOSPITAL ENCOUNTER (OUTPATIENT)
Age: 66
Setting detail: OBSERVATION
Discharge: HOME | End: 2021-02-02
Attending: STUDENT IN AN ORGANIZED HEALTH CARE EDUCATION/TRAINING PROGRAM | Admitting: STUDENT IN AN ORGANIZED HEALTH CARE EDUCATION/TRAINING PROGRAM
Payer: COMMERCIAL

## 2021-01-29 ENCOUNTER — TELEPHONE (OUTPATIENT)
Dept: PAIN MANAGEMENT | Age: 66
End: 2021-01-29

## 2021-01-29 ENCOUNTER — APPOINTMENT (OUTPATIENT)
Dept: CT IMAGING | Age: 66
End: 2021-01-29
Payer: COMMERCIAL

## 2021-01-29 DIAGNOSIS — R07.9 CHEST PAIN, UNSPECIFIED TYPE: Primary | ICD-10-CM

## 2021-01-29 LAB
ABSOLUTE EOS #: 0.2 K/UL (ref 0–0.4)
ABSOLUTE IMMATURE GRANULOCYTE: ABNORMAL K/UL (ref 0–0.3)
ABSOLUTE LYMPH #: 1.8 K/UL (ref 1–4.8)
ABSOLUTE MONO #: 0.7 K/UL (ref 0.1–1.3)
ALBUMIN SERPL-MCNC: 4.3 G/DL (ref 3.5–5.2)
ALBUMIN/GLOBULIN RATIO: ABNORMAL (ref 1–2.5)
ALP BLD-CCNC: 63 U/L (ref 40–129)
ALT SERPL-CCNC: 61 U/L (ref 5–41)
ANION GAP SERPL CALCULATED.3IONS-SCNC: 13 MMOL/L (ref 9–17)
AST SERPL-CCNC: 36 U/L
BASOPHILS # BLD: 1 % (ref 0–2)
BASOPHILS ABSOLUTE: 0.1 K/UL (ref 0–0.2)
BILIRUB SERPL-MCNC: 0.21 MG/DL (ref 0.3–1.2)
BUN BLDV-MCNC: 22 MG/DL (ref 8–23)
BUN/CREAT BLD: ABNORMAL (ref 9–20)
CALCIUM SERPL-MCNC: 9.2 MG/DL (ref 8.6–10.4)
CHLORIDE BLD-SCNC: 104 MMOL/L (ref 98–107)
CO2: 21 MMOL/L (ref 20–31)
CREAT SERPL-MCNC: 1.03 MG/DL (ref 0.7–1.2)
DIFFERENTIAL TYPE: ABNORMAL
EOSINOPHILS RELATIVE PERCENT: 2 % (ref 0–4)
GFR AFRICAN AMERICAN: >60 ML/MIN
GFR NON-AFRICAN AMERICAN: >60 ML/MIN
GFR SERPL CREATININE-BSD FRML MDRD: ABNORMAL ML/MIN/{1.73_M2}
GFR SERPL CREATININE-BSD FRML MDRD: ABNORMAL ML/MIN/{1.73_M2}
GLUCOSE BLD-MCNC: 126 MG/DL (ref 70–99)
HCT VFR BLD CALC: 43.2 % (ref 41–53)
HEMOGLOBIN: 14.5 G/DL (ref 13.5–17.5)
IMMATURE GRANULOCYTES: ABNORMAL %
LIPASE: 25 U/L (ref 13–60)
LYMPHOCYTES # BLD: 19 % (ref 24–44)
MCH RBC QN AUTO: 32.1 PG (ref 26–34)
MCHC RBC AUTO-ENTMCNC: 33.5 G/DL (ref 31–37)
MCV RBC AUTO: 95.7 FL (ref 80–100)
MONOCYTES # BLD: 8 % (ref 1–7)
NRBC AUTOMATED: ABNORMAL PER 100 WBC
PDW BLD-RTO: 13.7 % (ref 11.5–14.9)
PLATELET # BLD: 147 K/UL (ref 150–450)
PLATELET ESTIMATE: ABNORMAL
PMV BLD AUTO: 9.9 FL (ref 6–12)
POTASSIUM SERPL-SCNC: 4 MMOL/L (ref 3.7–5.3)
RBC # BLD: 4.51 M/UL (ref 4.5–5.9)
RBC # BLD: ABNORMAL 10*6/UL
SEG NEUTROPHILS: 70 % (ref 36–66)
SEGMENTED NEUTROPHILS ABSOLUTE COUNT: 6.6 K/UL (ref 1.3–9.1)
SODIUM BLD-SCNC: 138 MMOL/L (ref 135–144)
TOTAL PROTEIN: 7.3 G/DL (ref 6.4–8.3)
TROPONIN INTERP: NORMAL
TROPONIN T: NORMAL NG/ML
TROPONIN, HIGH SENSITIVITY: 12 NG/L (ref 0–22)
WBC # BLD: 9.4 K/UL (ref 3.5–11)
WBC # BLD: ABNORMAL 10*3/UL

## 2021-01-29 PROCEDURE — 81003 URINALYSIS AUTO W/O SCOPE: CPT

## 2021-01-29 PROCEDURE — 96375 TX/PRO/DX INJ NEW DRUG ADDON: CPT

## 2021-01-29 PROCEDURE — 80053 COMPREHEN METABOLIC PANEL: CPT

## 2021-01-29 PROCEDURE — 85025 COMPLETE CBC W/AUTO DIFF WBC: CPT

## 2021-01-29 PROCEDURE — 6360000002 HC RX W HCPCS: Performed by: STUDENT IN AN ORGANIZED HEALTH CARE EDUCATION/TRAINING PROGRAM

## 2021-01-29 PROCEDURE — 2580000003 HC RX 258: Performed by: STUDENT IN AN ORGANIZED HEALTH CARE EDUCATION/TRAINING PROGRAM

## 2021-01-29 PROCEDURE — 99285 EMERGENCY DEPT VISIT HI MDM: CPT

## 2021-01-29 PROCEDURE — 85379 FIBRIN DEGRADATION QUANT: CPT

## 2021-01-29 PROCEDURE — 96374 THER/PROPH/DIAG INJ IV PUSH: CPT

## 2021-01-29 PROCEDURE — 93005 ELECTROCARDIOGRAM TRACING: CPT | Performed by: STUDENT IN AN ORGANIZED HEALTH CARE EDUCATION/TRAINING PROGRAM

## 2021-01-29 PROCEDURE — 2500000003 HC RX 250 WO HCPCS: Performed by: STUDENT IN AN ORGANIZED HEALTH CARE EDUCATION/TRAINING PROGRAM

## 2021-01-29 PROCEDURE — 6360000004 HC RX CONTRAST MEDICATION: Performed by: STUDENT IN AN ORGANIZED HEALTH CARE EDUCATION/TRAINING PROGRAM

## 2021-01-29 PROCEDURE — 83690 ASSAY OF LIPASE: CPT

## 2021-01-29 PROCEDURE — 74174 CTA ABD&PLVS W/CONTRAST: CPT

## 2021-01-29 PROCEDURE — 36415 COLL VENOUS BLD VENIPUNCTURE: CPT

## 2021-01-29 PROCEDURE — 6370000000 HC RX 637 (ALT 250 FOR IP): Performed by: STUDENT IN AN ORGANIZED HEALTH CARE EDUCATION/TRAINING PROGRAM

## 2021-01-29 PROCEDURE — 84484 ASSAY OF TROPONIN QUANT: CPT

## 2021-01-29 RX ORDER — SODIUM CHLORIDE 0.9 % (FLUSH) 0.9 %
10 SYRINGE (ML) INJECTION PRN
Status: DISCONTINUED | OUTPATIENT
Start: 2021-01-29 | End: 2021-01-30 | Stop reason: SDUPTHER

## 2021-01-29 RX ORDER — MAGNESIUM HYDROXIDE/ALUMINUM HYDROXICE/SIMETHICONE 120; 1200; 1200 MG/30ML; MG/30ML; MG/30ML
30 SUSPENSION ORAL ONCE
Status: COMPLETED | OUTPATIENT
Start: 2021-01-29 | End: 2021-01-30

## 2021-01-29 RX ORDER — NITROGLYCERIN 0.4 MG/1
0.4 TABLET SUBLINGUAL EVERY 5 MIN PRN
Status: DISCONTINUED | OUTPATIENT
Start: 2021-01-29 | End: 2021-02-02 | Stop reason: HOSPADM

## 2021-01-29 RX ORDER — ONDANSETRON 2 MG/ML
4 INJECTION INTRAMUSCULAR; INTRAVENOUS ONCE
Status: COMPLETED | OUTPATIENT
Start: 2021-01-29 | End: 2021-01-29

## 2021-01-29 RX ORDER — 0.9 % SODIUM CHLORIDE 0.9 %
80 INTRAVENOUS SOLUTION INTRAVENOUS ONCE
Status: COMPLETED | OUTPATIENT
Start: 2021-01-29 | End: 2021-01-30

## 2021-01-29 RX ADMIN — FAMOTIDINE 20 MG: 10 INJECTION INTRAVENOUS at 23:15

## 2021-01-29 RX ADMIN — NITROGLYCERIN 0.4 MG: 0.4 TABLET, ORALLY DISINTEGRATING SUBLINGUAL at 23:12

## 2021-01-29 RX ADMIN — SODIUM CHLORIDE 80 ML: 9 INJECTION, SOLUTION INTRAVENOUS at 23:51

## 2021-01-29 RX ADMIN — Medication 10 ML: at 23:51

## 2021-01-29 RX ADMIN — ONDANSETRON 4 MG: 2 INJECTION INTRAMUSCULAR; INTRAVENOUS at 23:13

## 2021-01-29 RX ADMIN — IOPAMIDOL 100 ML: 755 INJECTION, SOLUTION INTRAVENOUS at 23:51

## 2021-01-29 ASSESSMENT — ENCOUNTER SYMPTOMS
NAUSEA: 0
VOMITING: 0
EYE PAIN: 0
EYE REDNESS: 0
DIARRHEA: 0
SHORTNESS OF BREATH: 0
FACIAL SWELLING: 0
ABDOMINAL PAIN: 1
SORE THROAT: 0
COUGH: 0
BACK PAIN: 0
COLOR CHANGE: 0
RHINORRHEA: 0

## 2021-01-29 ASSESSMENT — PAIN SCALES - GENERAL: PAINLEVEL_OUTOF10: 8

## 2021-01-29 ASSESSMENT — PAIN DESCRIPTION - LOCATION: LOCATION: ABDOMEN;CHEST

## 2021-01-29 ASSESSMENT — PAIN DESCRIPTION - PAIN TYPE: TYPE: ACUTE PAIN

## 2021-01-29 ASSESSMENT — PAIN DESCRIPTION - ORIENTATION: ORIENTATION: LEFT;MID

## 2021-01-29 NOTE — TELEPHONE ENCOUNTER
Called patient to schedule a consultation with Dr. Pinky Heaton. Tyra. 3/17/21 @ 10am  Call earlier if any cancellations.

## 2021-01-30 ENCOUNTER — APPOINTMENT (OUTPATIENT)
Dept: NUCLEAR MEDICINE | Age: 66
End: 2021-01-30
Payer: COMMERCIAL

## 2021-01-30 PROBLEM — I72.8 SPLENIC ARTERY ANEURYSM (HCC): Status: ACTIVE | Noted: 2021-01-30

## 2021-01-30 PROBLEM — R07.9 CHEST PAIN: Status: ACTIVE | Noted: 2021-01-30

## 2021-01-30 LAB
ALBUMIN SERPL-MCNC: 4.2 G/DL (ref 3.5–5.2)
ALBUMIN/GLOBULIN RATIO: ABNORMAL (ref 1–2.5)
ALP BLD-CCNC: 59 U/L (ref 40–129)
ALT SERPL-CCNC: 58 U/L (ref 5–41)
ANION GAP SERPL CALCULATED.3IONS-SCNC: 13 MMOL/L (ref 9–17)
AST SERPL-CCNC: 27 U/L
BILIRUB SERPL-MCNC: 0.27 MG/DL (ref 0.3–1.2)
BILIRUBIN DIRECT: 0.09 MG/DL
BILIRUBIN URINE: NEGATIVE
BILIRUBIN, INDIRECT: 0.18 MG/DL (ref 0–1)
BNP INTERPRETATION: NORMAL
BUN BLDV-MCNC: 19 MG/DL (ref 8–23)
BUN/CREAT BLD: ABNORMAL (ref 9–20)
CALCIUM SERPL-MCNC: 9.3 MG/DL (ref 8.6–10.4)
CHLORIDE BLD-SCNC: 102 MMOL/L (ref 98–107)
CO2: 22 MMOL/L (ref 20–31)
COLOR: YELLOW
COMMENT UA: ABNORMAL
CREAT SERPL-MCNC: 0.98 MG/DL (ref 0.7–1.2)
D-DIMER QUANTITATIVE: <0.27 MG/L FEU (ref 0–0.59)
GFR AFRICAN AMERICAN: >60 ML/MIN
GFR NON-AFRICAN AMERICAN: >60 ML/MIN
GFR SERPL CREATININE-BSD FRML MDRD: ABNORMAL ML/MIN/{1.73_M2}
GFR SERPL CREATININE-BSD FRML MDRD: ABNORMAL ML/MIN/{1.73_M2}
GLOBULIN: ABNORMAL G/DL (ref 1.5–3.8)
GLUCOSE BLD-MCNC: 157 MG/DL (ref 70–99)
GLUCOSE URINE: NEGATIVE
HCT VFR BLD CALC: 42.2 % (ref 41–53)
HEMOGLOBIN: 13.9 G/DL (ref 13.5–17.5)
INR BLD: 1
KETONES, URINE: NEGATIVE
LEUKOCYTE ESTERASE, URINE: NEGATIVE
LV EF: 55 %
LVEF MODALITY: NORMAL
MCH RBC QN AUTO: 31.5 PG (ref 26–34)
MCHC RBC AUTO-ENTMCNC: 32.9 G/DL (ref 31–37)
MCV RBC AUTO: 96 FL (ref 80–100)
NITRITE, URINE: NEGATIVE
NRBC AUTOMATED: ABNORMAL PER 100 WBC
PDW BLD-RTO: 13.8 % (ref 11.5–14.9)
PH UA: 6 (ref 5–8)
PLATELET # BLD: 205 K/UL (ref 150–450)
PMV BLD AUTO: 9.7 FL (ref 6–12)
POTASSIUM SERPL-SCNC: 4.3 MMOL/L (ref 3.7–5.3)
PRO-BNP: 191 PG/ML
PROTEIN UA: NEGATIVE
PROTHROMBIN TIME: 13 SEC (ref 11.8–14.6)
RBC # BLD: 4.4 M/UL (ref 4.5–5.9)
SODIUM BLD-SCNC: 137 MMOL/L (ref 135–144)
SPECIFIC GRAVITY UA: 1.03 (ref 1–1.03)
TOTAL PROTEIN: 7.3 G/DL (ref 6.4–8.3)
TROPONIN INTERP: NORMAL
TROPONIN INTERP: NORMAL
TROPONIN T: NORMAL NG/ML
TROPONIN T: NORMAL NG/ML
TROPONIN, HIGH SENSITIVITY: 10 NG/L (ref 0–22)
TROPONIN, HIGH SENSITIVITY: 10 NG/L (ref 0–22)
TURBIDITY: CLEAR
URINE HGB: NEGATIVE
UROBILINOGEN, URINE: NORMAL
WBC # BLD: 14.3 K/UL (ref 3.5–11)

## 2021-01-30 PROCEDURE — 6370000000 HC RX 637 (ALT 250 FOR IP): Performed by: INTERNAL MEDICINE

## 2021-01-30 PROCEDURE — G0378 HOSPITAL OBSERVATION PER HR: HCPCS

## 2021-01-30 PROCEDURE — 36415 COLL VENOUS BLD VENIPUNCTURE: CPT

## 2021-01-30 PROCEDURE — 2580000003 HC RX 258: Performed by: SURGERY

## 2021-01-30 PROCEDURE — 2500000003 HC RX 250 WO HCPCS: Performed by: SURGERY

## 2021-01-30 PROCEDURE — A9537 TC99M MEBROFENIN: HCPCS | Performed by: INTERNAL MEDICINE

## 2021-01-30 PROCEDURE — U0005 INFEC AGEN DETEC AMPLI PROBE: HCPCS

## 2021-01-30 PROCEDURE — 85610 PROTHROMBIN TIME: CPT

## 2021-01-30 PROCEDURE — 93306 TTE W/DOPPLER COMPLETE: CPT

## 2021-01-30 PROCEDURE — 6370000000 HC RX 637 (ALT 250 FOR IP): Performed by: STUDENT IN AN ORGANIZED HEALTH CARE EDUCATION/TRAINING PROGRAM

## 2021-01-30 PROCEDURE — 6360000002 HC RX W HCPCS: Performed by: SURGERY

## 2021-01-30 PROCEDURE — 83880 ASSAY OF NATRIURETIC PEPTIDE: CPT

## 2021-01-30 PROCEDURE — 2580000003 HC RX 258: Performed by: INTERNAL MEDICINE

## 2021-01-30 PROCEDURE — 85027 COMPLETE CBC AUTOMATED: CPT

## 2021-01-30 PROCEDURE — 3430000000 HC RX DIAGNOSTIC RADIOPHARMACEUTICAL: Performed by: INTERNAL MEDICINE

## 2021-01-30 PROCEDURE — 2580000003 HC RX 258: Performed by: STUDENT IN AN ORGANIZED HEALTH CARE EDUCATION/TRAINING PROGRAM

## 2021-01-30 PROCEDURE — 80076 HEPATIC FUNCTION PANEL: CPT

## 2021-01-30 PROCEDURE — 84484 ASSAY OF TROPONIN QUANT: CPT

## 2021-01-30 PROCEDURE — 80048 BASIC METABOLIC PNL TOTAL CA: CPT

## 2021-01-30 PROCEDURE — U0003 INFECTIOUS AGENT DETECTION BY NUCLEIC ACID (DNA OR RNA); SEVERE ACUTE RESPIRATORY SYNDROME CORONAVIRUS 2 (SARS-COV-2) (CORONAVIRUS DISEASE [COVID-19]), AMPLIFIED PROBE TECHNIQUE, MAKING USE OF HIGH THROUGHPUT TECHNOLOGIES AS DESCRIBED BY CMS-2020-01-R: HCPCS

## 2021-01-30 PROCEDURE — 6360000002 HC RX W HCPCS: Performed by: STUDENT IN AN ORGANIZED HEALTH CARE EDUCATION/TRAINING PROGRAM

## 2021-01-30 PROCEDURE — 78226 HEPATOBILIARY SYSTEM IMAGING: CPT

## 2021-01-30 RX ORDER — ONDANSETRON 2 MG/ML
4 INJECTION INTRAMUSCULAR; INTRAVENOUS EVERY 6 HOURS PRN
Status: DISCONTINUED | OUTPATIENT
Start: 2021-01-30 | End: 2021-01-30 | Stop reason: SDUPTHER

## 2021-01-30 RX ORDER — LISINOPRIL 10 MG/1
10 TABLET ORAL ONCE
Status: COMPLETED | OUTPATIENT
Start: 2021-01-30 | End: 2021-01-30

## 2021-01-30 RX ORDER — ACETAMINOPHEN 650 MG/1
650 SUPPOSITORY RECTAL EVERY 6 HOURS PRN
Status: DISCONTINUED | OUTPATIENT
Start: 2021-01-30 | End: 2021-02-02 | Stop reason: HOSPADM

## 2021-01-30 RX ORDER — ONDANSETRON 2 MG/ML
4 INJECTION INTRAMUSCULAR; INTRAVENOUS EVERY 6 HOURS PRN
Status: DISCONTINUED | OUTPATIENT
Start: 2021-01-30 | End: 2021-02-02 | Stop reason: HOSPADM

## 2021-01-30 RX ORDER — LISINOPRIL 5 MG/1
5 TABLET ORAL DAILY
Status: DISCONTINUED | OUTPATIENT
Start: 2021-01-30 | End: 2021-01-30

## 2021-01-30 RX ORDER — SODIUM CHLORIDE 0.9 % (FLUSH) 0.9 %
10 SYRINGE (ML) INJECTION EVERY 12 HOURS SCHEDULED
Status: DISCONTINUED | OUTPATIENT
Start: 2021-01-30 | End: 2021-01-30 | Stop reason: SDUPTHER

## 2021-01-30 RX ORDER — ACETAMINOPHEN 325 MG/1
650 TABLET ORAL EVERY 4 HOURS PRN
Status: DISCONTINUED | OUTPATIENT
Start: 2021-01-30 | End: 2021-01-30 | Stop reason: SDUPTHER

## 2021-01-30 RX ORDER — SODIUM CHLORIDE 0.9 % (FLUSH) 0.9 %
10 SYRINGE (ML) INJECTION PRN
Status: DISCONTINUED | OUTPATIENT
Start: 2021-01-30 | End: 2021-01-30 | Stop reason: SDUPTHER

## 2021-01-30 RX ORDER — SODIUM CHLORIDE 0.9 % (FLUSH) 0.9 %
10 SYRINGE (ML) INJECTION PRN
Status: DISCONTINUED | OUTPATIENT
Start: 2021-01-30 | End: 2021-02-02 | Stop reason: HOSPADM

## 2021-01-30 RX ORDER — METOPROLOL TARTRATE 50 MG/1
25 TABLET, FILM COATED ORAL 2 TIMES DAILY
Status: CANCELLED | OUTPATIENT
Start: 2021-01-30

## 2021-01-30 RX ORDER — ASPIRIN 81 MG/1
81 TABLET, CHEWABLE ORAL DAILY
Status: DISCONTINUED | OUTPATIENT
Start: 2021-01-30 | End: 2021-02-02 | Stop reason: HOSPADM

## 2021-01-30 RX ORDER — CARVEDILOL 6.25 MG/1
6.25 TABLET ORAL 2 TIMES DAILY WITH MEALS
Status: DISCONTINUED | OUTPATIENT
Start: 2021-01-30 | End: 2021-01-31

## 2021-01-30 RX ORDER — POLYETHYLENE GLYCOL 3350 17 G/17G
17 POWDER, FOR SOLUTION ORAL DAILY PRN
Status: DISCONTINUED | OUTPATIENT
Start: 2021-01-30 | End: 2021-02-02 | Stop reason: HOSPADM

## 2021-01-30 RX ORDER — SODIUM CHLORIDE 0.9 % (FLUSH) 0.9 %
10 SYRINGE (ML) INJECTION EVERY 12 HOURS SCHEDULED
Status: DISCONTINUED | OUTPATIENT
Start: 2021-01-30 | End: 2021-02-02 | Stop reason: HOSPADM

## 2021-01-30 RX ORDER — SODIUM CHLORIDE 9 MG/ML
INJECTION, SOLUTION INTRAVENOUS CONTINUOUS
Status: DISCONTINUED | OUTPATIENT
Start: 2021-01-30 | End: 2021-02-02 | Stop reason: HOSPADM

## 2021-01-30 RX ORDER — LISINOPRIL 10 MG/1
10 TABLET ORAL 2 TIMES DAILY
Status: DISCONTINUED | OUTPATIENT
Start: 2021-01-30 | End: 2021-01-31

## 2021-01-30 RX ORDER — ACETAMINOPHEN 325 MG/1
650 TABLET ORAL EVERY 6 HOURS PRN
Status: DISCONTINUED | OUTPATIENT
Start: 2021-01-30 | End: 2021-02-02 | Stop reason: HOSPADM

## 2021-01-30 RX ORDER — ATORVASTATIN CALCIUM 20 MG/1
20 TABLET, FILM COATED ORAL NIGHTLY
Status: DISCONTINUED | OUTPATIENT
Start: 2021-01-30 | End: 2021-02-02 | Stop reason: HOSPADM

## 2021-01-30 RX ORDER — ASPIRIN 81 MG/1
324 TABLET, CHEWABLE ORAL ONCE
Status: COMPLETED | OUTPATIENT
Start: 2021-01-30 | End: 2021-01-30

## 2021-01-30 RX ORDER — HYDRALAZINE HYDROCHLORIDE 20 MG/ML
10 INJECTION INTRAMUSCULAR; INTRAVENOUS EVERY 4 HOURS PRN
Status: DISCONTINUED | OUTPATIENT
Start: 2021-01-30 | End: 2021-02-02 | Stop reason: HOSPADM

## 2021-01-30 RX ADMIN — Medication 10 ML: at 21:18

## 2021-01-30 RX ADMIN — ASPIRIN 324 MG: 81 TABLET, CHEWABLE ORAL at 03:19

## 2021-01-30 RX ADMIN — Medication 10 ML: at 16:51

## 2021-01-30 RX ADMIN — LISINOPRIL 10 MG: 10 TABLET ORAL at 12:18

## 2021-01-30 RX ADMIN — FAMOTIDINE 20 MG: 10 INJECTION INTRAVENOUS at 21:06

## 2021-01-30 RX ADMIN — ASPIRIN 81 MG: 81 TABLET, CHEWABLE ORAL at 10:41

## 2021-01-30 RX ADMIN — ALUMINUM HYDROXIDE, MAGNESIUM HYDROXIDE, AND SIMETHICONE 30 ML: 200; 200; 20 SUSPENSION ORAL at 00:32

## 2021-01-30 RX ADMIN — SODIUM CHLORIDE: 9 INJECTION, SOLUTION INTRAVENOUS at 21:07

## 2021-01-30 RX ADMIN — CARVEDILOL 6.25 MG: 6.25 TABLET, FILM COATED ORAL at 10:43

## 2021-01-30 RX ADMIN — Medication 10 ML: at 10:45

## 2021-01-30 RX ADMIN — Medication 6 MILLICURIE: at 16:51

## 2021-01-30 RX ADMIN — PIPERACILLIN AND TAZOBACTAM 4500 MG: 4; .5 INJECTION, POWDER, LYOPHILIZED, FOR SOLUTION INTRAVENOUS; PARENTERAL at 21:06

## 2021-01-30 RX ADMIN — ATORVASTATIN CALCIUM 20 MG: 20 TABLET, FILM COATED ORAL at 21:06

## 2021-01-30 RX ADMIN — LISINOPRIL 10 MG: 10 TABLET ORAL at 21:06

## 2021-01-30 RX ADMIN — ENOXAPARIN SODIUM 40 MG: 40 INJECTION SUBCUTANEOUS at 10:40

## 2021-01-30 ASSESSMENT — PAIN DESCRIPTION - FREQUENCY: FREQUENCY: CONTINUOUS

## 2021-01-30 ASSESSMENT — PAIN - FUNCTIONAL ASSESSMENT: PAIN_FUNCTIONAL_ASSESSMENT: ACTIVITIES ARE NOT PREVENTED

## 2021-01-30 ASSESSMENT — PAIN DESCRIPTION - PROGRESSION: CLINICAL_PROGRESSION: GRADUALLY IMPROVING

## 2021-01-30 ASSESSMENT — PAIN DESCRIPTION - DESCRIPTORS: DESCRIPTORS: SHARP;ACHING;PRESSURE

## 2021-01-30 ASSESSMENT — PAIN DESCRIPTION - ORIENTATION: ORIENTATION: MID

## 2021-01-30 ASSESSMENT — PAIN DESCRIPTION - PAIN TYPE: TYPE: ACUTE PAIN

## 2021-01-30 ASSESSMENT — PAIN DESCRIPTION - LOCATION: LOCATION: ABDOMEN;CHEST;STERNUM

## 2021-01-30 ASSESSMENT — PAIN SCALES - GENERAL: PAINLEVEL_OUTOF10: 5

## 2021-01-30 ASSESSMENT — PAIN DESCRIPTION - ONSET: ONSET: ON-GOING

## 2021-01-30 NOTE — PROGRESS NOTES
Patient has arrived to unit via wheelchair and ambulated to unit bed independently. Patient attached to telemetry, vitals taken and assessment completed. Patient oriented to room explained unit protocols. No signs of distress noted at this time will continue to monitor.      Electronically signed by Isabell Santillan RN on 1/30/2021 at 4:05 AM

## 2021-01-30 NOTE — ED NOTES
Report given to Fadumo Caceres from [facility]. Report method by phone   The following was reviewed with receiving RN:   Current vital signs:  BP (!) 174/81   Pulse 59   Temp 97.8 °F (36.6 °C) (Oral)   Resp 21   Ht 5' 7\" (1.702 m)   Wt 220 lb (99.8 kg)   SpO2 93%   BMI 34.46 kg/m²                MEWS Score: 3     Any medication or safety alerts were reviewed. Any pending diagnostics and notifications were also reviewed, as well as any safety concerns or issues, abnormal labs, abnormal imaging, and abnormal assessment findings. Questions were answered.           Jose Huggins RN  01/30/21 [id number]

## 2021-01-30 NOTE — PLAN OF CARE
Problem: Pain:  Description: Pain management should include both nonpharmacologic and pharmacologic interventions. Goal: Pain level will decrease  Description: Pain level will decrease  Outcome: Ongoing  Note: Patient will be monitored and assessed to ensure pain is being managed properly in order to ensure the pain level has decreased or remained at a tolerable level for patient acceptance. Patient has chronic back pain. No complaints of pain this shift     Problem: DAILY CARE  Goal: Daily care needs are met  Outcome: Ongoing  Note: Will assist patient with any and all daily care needs to ensure they are met. Problem: SKIN INTEGRITY  Goal: Skin integrity is maintained or improved  Outcome: Ongoing  Note: Will continue to assess and monitor patient for any signs of skin integrity issues. Patient will be rotated hourly to ensure pressure points are being rotated since patient is unable to move freely. No skin issues at this time. Problem: DISCHARGE BARRIERS  Goal: Patient's continuum of care needs are met  Outcome: Ongoing  Note: Patient continues to be involved in discharge planning. Care coordination continues to work with patient and family in regards to individual needs once discharged as inpatient. All questions are being addressed and patient is kept well informed.

## 2021-01-30 NOTE — CONSULTS
[facility] Cardiology Cardiology    Consult                        Today's Date: 1/30/2021  Patient Name: Jose Hinds  Date of admission: 1/29/2021 10:12 PM  Patient's age: 72 y.o., 1955  Admission Dx: Chest pain [R07.9]    Reason for Consult:  Cardiac evaluation    Requesting Physician: Fátima Guillermo MD    CHIEF COMPLAINT:  Chest pain and epigastric pain    History Obtained From:  patient    HISTORY OF PRESENT ILLNESS:      The patient is a 72 y.o.  male who presented to ER with abdominal and epigastric pain that started 8 PM after he had dinner. No correlation with exertion. No dyspnea reported. He denies any chest pain to me. At present reports mild RUQ abdominal pain. BP upon arrival 214/98    BP now 184/92    CT negative for dissection- showed splenic aneurysm    HS trop  x3 negative    Past Medical History:   has a past medical history of Essential hypertension, Hyperlipidemia, Hyperparathyroidism ([address] Obesity (BMI 30-39.9), Osteoporosis, Sciatica, Stroke ([address] and Vertebral fracture, osteoporotic ([facility] [address] Past Surgical History:   has a past surgical history that includes Vasectomy; Colonoscopy (3/19/2014); hernia repair; Colonoscopy (N/A, 2/11/2020); hemicolectomy (Right, 2/12/2020); and omentum resection (2/12/2020). Home Medications:    Prior to Admission medications    Medication Sig Start Date End Date Taking?  Authorizing Provider   metoprolol tartrate (LOPRESSOR) 25 MG tablet take 1 tablet by mouth twice a day 1/26/21  Yes Wil Son MD   atorvastatin (LIPITOR) 20 MG tablet take 1 tablet by mouth at bedtime 1/26/21  Yes Wil Son MD   pantoprazole (PROTONIX) 40 MG tablet take 1 tablet by mouth once daily 1/26/21  Yes Wil Son MD   fluticasone [facility]) 50 MCG/ACT nasal spray 2 sprays into each nostril daily 10/16/20  Yes Wil Son MD   loratadine (CLARITIN) 10 MG tablet Take 1 tablet by mouth daily 10/16/20  Yes Wil Son MD lisinopril (PRINIVIL;ZESTRIL) 5 MG tablet take 1 tablet by mouth once daily 7/17/20  Yes KAMALA Ruvalcaba CNP   aspirin 81 MG chewable tablet Take 1 tablet by mouth daily 6/18/16  Yes Mikki Robison MD   ibuprofen (ADVIL;MOTRIN) 800 MG tablet Take 1 tablet by mouth 3 times daily as needed for Pain (Take with food) 1/18/21   Aritsides Molina MD   vitamin D (ERGOCALCIFEROL) 1.25 MG (12485 UT) CAPS capsule take 1 capsule by mouth every week 12/3/20   Aristides Molina MD       Allergies:  Patient has no known allergies. Social History:   reports that he quit smoking about 11 months ago. He has a 40.00 pack-year smoking history. He has never used smokeless tobacco. He reports current alcohol use. He reports that he does not use drugs. Family History: family history includes Heart Disease in his father. No h/o sudden cardiac death. No for premature CAD    REVIEW OF SYSTEMS:    · Constitutional: there has been no unanticipated weight loss. There's been No change in energy level, No change in activity level. · Eyes: No visual changes or diplopia. No scleral icterus. · ENT: No Headaches, hearing loss or vertigo. No mouth sores or sore throat. · Cardiovascular: see above  · Respiratory: see above  · Gastrointestinal: see above  · Genitourinary: No dysuria, trouble voiding, or hematuria. · Musculoskeletal:  No gait disturbance, No weakness or joint complaints. · Integumentary: No rash or pruritis. · Neurological: No headache or diplopia. No tingling  · Psychiatric: No anxiety, or depression. · Endocrine: No temperature intolerance. · Hematologic/Lymphatic: No abnormal bruising or bleeding, blood clots or swollen lymph nodes. · Allergic/Immunologic: No nasal congestion or hives.       PHYSICAL EXAM:      BP (!) 184/92   Pulse 64   Temp 98.8 °F (37.1 °C) (Oral)   Resp 17   Ht 5' 7\" (1.702 m)   Wt 220 lb (99.8 kg)   SpO2 92%   BMI 34.46 kg/m²    Constitutional and General Appearance: alert, cooperative, no distress and appears stated age  [de-identified]: PERRL, no cervical lymphadenopathy. No masses palpable. Normal oral mucosa  Respiratory:  · Normal excursion and expansion without use of accessory muscles  · Resp Auscultation: Good respiratory effort. No for increased work of breathing. On auscultation: clear to auscultation bilaterally  Cardiovascular:  · Heart tones are crisp and normal. regular S1 and S2.  · Jugular venous pulsation Normal  · The carotid upstroke is normal in amplitude and contour without delay or bruit   Abdomen:   · soft  · Bowel sounds present  Extremities:  ·  No edema  Neurological:  · Alert and oriented. DATA:    Diagnostics:    EKG:  SB, NS T wave abnormality    TTE 06/17/16  Summary  Left ventricle is normal in size and systolic function. Estimated LV EF 60-65 %. Mild left ventricular hypertrophy. Evidence of diastolic dysfunction. Mild Bi-atrial enlargement. Mild mitral regurgitation. Mild tricuspid regurgitation. Estimated right ventricular systolic pressure is 21 mmHg. No significant pericardial effusion is seen. No definite evidence of any LV Apical thrombus on this study. 9/18/16  1. Normal study. 2. No evidence for left ventricular stress-induced ischemia. 3. No left ventricular wall infarct. 4. Left ventricular ejection fraction of 54%. Labs:     CBC:   Recent Labs     01/29/21  2244 01/30/21  0432   WBC 9.4 14.3*   HGB 14.5 13.9   HCT 43.2 42.2   * 205     BMP:   Recent Labs     01/29/21 2244 01/30/21  0432    137   K 4.0 4.3   CO2 21 22   BUN 22 19   CREATININE 1.03 0.98   LABGLOM >60 >60   GLUCOSE 126* 157*     BNP: No results for input(s): BNP in the last 72 hours. PT/INR: No results for input(s): PROTIME, INR in the last 72 hours. APTT:No results for input(s): APTT in the last 72 hours. CARDIAC ENZYMES:No results for input(s): CKTOTAL, CKMB, CKMBINDEX, TROPONINI in the last 72 hours.   FASTING LIPID PANEL:  Lab Results Component Value Date    HDL 41 10/26/2020    TRIG 289 10/26/2020     LIVER PROFILE:  Recent Labs     01/29/21  2244 01/30/21  0432   AST 36 27   ALT 61* 58*   LABALBU 4.3 4.2       IMPRESSION:    Patient Active Problem List   Diagnosis    Osteoporosis    Vitamin D deficiency    History of stroke    Degeneration of lumbar or lumbosacral intervertebral disc    Back pain    Low back pain    Allergic rhinitis    Impaired fasting glucose    Mixed hyperlipidemia    Essential hypertension    Obesity (BMI 30.0-34. 9)    Tear of left rotator cuff    Left bicipital tenosynovitis    Colon polyp    Acute blood loss as cause of postoperative anemia    S/P right hemicolectomy    History of malignant neoplasm of skin    Acute postoperative pain    Anemia    Ex-cigarette smoker    Chest pain       Abdominal pain  HTN uncontrolled  GERD  H/o right hemicolectomy  Splenic aneurysm  Colelithiasis without evidence of cholecystitis    RECOMMENDATIONS:  1. Increase lisinopril to 10mg po BID  2. Change metoprolol to 6.25mg po BID  3. IV hydralazine 10mg Q4 hr pRN for SBP>160  4. Obtain TTE  5. Vascular surgery consulted  6. Consideration of OP stress test  7. Recommend evaluation of RUQ abdominal pain-Defer to primary team.      Discussed with patient and Nurse.     Domenico Rincon [id number] Cardiology Consult           [phone]

## 2021-01-30 NOTE — H&P
Patient ID:Samuel Calvo  is 72 y.o.   1955 MRN: [id number]    Admit date: 2021  Primary Care Physician: Verónica Grullon MD   Patient Care Team:  Verónica Grullon MD as PCP - General (Family Medicine)  Verónica Grullon MD as PCP - [facility]  Clotilde Black MD as Orthopedic Surgeon (Orthopedic Surgery)  Vicky Gilliland MD (Neurology)  Hugh Hubbard MD as Consulting Physician (Gastroenterology)  Tel: [phone]  Admitting Physician: William Velazquez MD   Code Status:  Full Code  Diet NPO Effective Now    History and Physical:    Problem List: Active Problems:    Chest pain  Resolved Problems:    * No resolved hospital problems. *      Assessment:      Right upper quadrant postprandial abdominal pain radiating radiating to the epigastrium. Leukocytosis  Hypertension, uncontrolled  ? Chest pain  1.3 cm splenic artery aneurysm  Hyperlipidemia  History of CVA  GERD      Plan:    HIDA scan and surgical consultation. Cardiology consulted. Antihypertensive medications adjusted. Coreg started. Monitor blood pressure. Echocardiogram and pending Lexiscan once blood pressure is controlled per cardiology. Vascular surgery consulted for splenic artery aneurysm. Reconcile home medications. Subcu lovenox  for DVT prophylaxis. Admitted in stable yet guarded condition. HPI: Nilam Pappas is a 72 y.o.  male who presents with Chest Pain      Per ED physician note patient presents for evaluation of epigastric and chest pain. However, on close questioning patient has been experiencing right upper quadrant pain that seems to radiate to the epigastric region but no retrosternal chest pain. Patient denies any type of chest pain or exertional chest pain. Has been experiencing some postprandial right upper quadrant abdominal pain and there is now concern for possible cholecystitis.       PAST MEDICAL HISTORY    has a past medical history of Essential hypertension, Hyperlipidemia, Hyperparathyroidism ([facility] [address] Obesity (BMI 30-39.9), Osteoporosis, Sciatica, Stroke ([facility] [address] and Vertebral fracture, osteoporotic ([facility] [address] SURGICAL HISTORY      has a past surgical history that includes Vasectomy; Colonoscopy (3/19/2014); hernia repair; Colonoscopy (N/A, 2/11/2020); hemicolectomy (Right, 2/12/2020); and omentum resection (2/12/2020). CURRENT MEDICATIONS        sodium chloride flush  10 mL Intravenous 2 times per day    aspirin  81 mg Oral Daily    atorvastatin  20 mg Oral Nightly    sodium chloride flush  10 mL Intravenous 2 times per day    enoxaparin  40 mg Subcutaneous Daily    carvedilol  6.25 mg Oral BID WC    lisinopril  10 mg Oral BID     PRN Meds sodium chloride flush, acetaminophen, sodium chloride flush, acetaminophen **OR** acetaminophen, polyethylene glycol, ondansetron, hydrALAZINE, perflutren lipid microspheres, nitroGLYCERIN, sodium chloride flush  Continuous Infusions:     HOME MEDICATIONS     Medications Prior to Admission: metoprolol tartrate (LOPRESSOR) 25 MG tablet, take 1 tablet by mouth twice a day  atorvastatin (LIPITOR) 20 MG tablet, take 1 tablet by mouth at bedtime  pantoprazole (PROTONIX) 40 MG tablet, take 1 tablet by mouth once daily  fluticasone (FLONASE) 50 MCG/ACT nasal spray, 2 sprays into each nostril daily  loratadine (CLARITIN) 10 MG tablet, Take 1 tablet by mouth daily  lisinopril (PRINIVIL;ZESTRIL) 5 MG tablet, take 1 tablet by mouth once daily  aspirin 81 MG chewable tablet, Take 1 tablet by mouth daily  ibuprofen (ADVIL;MOTRIN) 800 MG tablet, Take 1 tablet by mouth 3 times daily as needed for Pain (Take with food)  vitamin D (ERGOCALCIFEROL) 1.25 MG (16653 UT) CAPS capsule, take 1 capsule by mouth every week  [DISCONTINUED] docusate sodium (COLACE) 100 MG capsule, Take 1 capsule by mouth 2 times daily      ALLERGIES     has No Known Allergies. SOCIAL HISTORY      reports that he quit smoking about 11 months ago.  He has a 40.00 pack-year smoking history. He has never used smokeless tobacco. He reports current alcohol use. He reports that he does not use drugs. FAMILY HISTORY     He indicated that his father is alive. family history includes Heart Disease in his father. ROS: CNS: No severe headaches, presyncope or syncope, seizures or epilepsy. Cardiovascular: No chest pain, no shortness of breath, no cough. GI: unremarkable except: as per HPI    : unremarkable. Physical Exam:    BP (!) 184/92   Pulse 64   Temp 98.8 °F (37.1 °C) (Oral)   Resp 17   Ht 5' 7\" (1.702 m)   Wt 220 lb (99.8 kg)   SpO2 92%   BMI 34.46 kg/m²   Temp (24hrs), Av.5 °F (36.9 °C), Min:97.8 °F (36.6 °C), Max:98.9 °F (37.2 °C)    Pulse Ox: SpO2  Av.4 %  Min: 92 %  Max: 97 %  Supplemental O2:     Oxygen Therapy  SpO2: 92 %  Pulse Oximeter Device Mode: Intermittent  Pulse Oximeter Device Location: Finger  O2 Device: None (Room air)    Alert NAD. Vitals reviewed. H&N: atraumatic, normocephalic, anicteric, no conjunctivitis, EOM normal, no lid lag or exophthalmos, oropharynx clear, nose and ears appear normal, trachea mid line, no stridor,  Chest:  no respiratory distress, normal effort, normal breath sounds, no rales or wheezes,   Heart: normal heart sounds, regular rate and rhythm, no murmurs. gallops or rubs,  Abdomen: BS present, right upper quadrant abdominal tenderness, no masses or organomegaly detected , Extremities: no peripheral edema, no cyanosis. no oncholysis. Skin: no rash, no erythema, no jaundice, CNS: grossly normal without cranial nerve deficits, no abnormal coordination or tone,   Psychiatric: normal mood, affect, behavior, speech, thought content and judgment. EKG: normal sinus rhythm at 62 bpm, nonspecific ST and T waves changes. No results for input(s): INR in the last 72 hours.   Lab Results   Component Value Date    DDIMER <0.27 2021   No results found for: BNP  troponinsNo results found for: TROPONINI    D Dimer:   Recent Labs     01/29/21 2244   DDIMER <0.27     Troponin T   Recent Labs     01/29/21  2244 01/30/21  0154 01/30/21 0432   TROPONINT NOT REPORTED NOT REPORTED NOT REPORTED     ProBNP   No results found for requested labs within last 30 days. ProBNP Invalid input(s): PRO-BNP  Lactic Acid:   Lab Results   Component Value Date    LACTA 1.5 12/17/2020     Lactic acid:Invalid input(s): LACTIC ACID    MAG: No results for input(s): MG in the last 72 hours. Recent Labs     01/29/21 2244   LIPASE 25      Recent Labs     01/29/21 2244 01/30/21 0432    137   K 4.0 4.3    102   CO2 21 22   BUN 22 19   CREATININE 1.03 0.98   GLUCOSE 126* 157*   CALCIUM 9.2 9.3   PROT 7.3 7.3   LABALBU 4.3 4.2   BILITOT 0.21* 0.27*   ALKPHOS 63 59   AST 36 27   ALT 61* 58*         Recent Labs     01/29/21 2244 01/30/21 0432   WBC 9.4 14.3*   RBC 4.51 4.40*   HGB 14.5 13.9   HCT 43.2 42.2   MCV 95.7 96.0   MCH 32.1 31.5   MCHC 33.5 32.9   RDW 13.7 13.8   * 205   MPV 9.9 9.7          U/A:  Lab Results   Component Value Date    COLORU YELLOW 01/29/2021    PHUR 6.0 01/29/2021    SPECGRAV 1.031 01/29/2021    LEUKOCYTESUR NEGATIVE 01/29/2021    UROBILINOGEN Normal 01/29/2021    BILIRUBINUR NEGATIVE 01/29/2021    GLUCOSEU NEGATIVE 01/29/2021       PT/INR: No results for input(s): PROTIME, INR in the last 72 hours. APTT: No results for input(s): APTT in the last 72 hours. ESR: No results found for: SEDRATE  CRP: No results found for: CRP  Folate and B12: No results found for: [id number], No results found for: FOLATE  Thyroid Studies: No results found for: TSH, L8CBOPJ, B1LDVPR, THYROIDAB        D-Dimer:   Lab Results   Component Value Date    DDIMER <0.27 01/29/2021       Lactic acid: No components found for: LACT     Troponins:     No components found for: TROP    No results found for: TROPONINI    No results found for requested labs within last 720 hours.     Cardiac Enzymes:    No results found for requested labs within last 720 hours.     No results found for: CKMB    ProBNP:    No components found for: NTBNP      U/A:  Lab Results   Component Value Date    COLORU YELLOW 01/29/2021    PHUR 6.0 01/29/2021    SPECGRAV 1.031 01/29/2021    LEUKOCYTESUR NEGATIVE 01/29/2021    UROBILINOGEN Normal 01/29/2021    BILIRUBINUR NEGATIVE 01/29/2021    GLUCOSEU NEGATIVE 01/29/2021           Thyroid Studies:   No components found for: T4,  FREE  No results found for: T3  No results found for: TSH    No results found for: TSH, Y3KIUGW, F9ILSFC, THYROIDAB    HbA1c  No components found for: HA1CC    Lipids:  Lab Results   Component Value Date    HDL 41 10/26/2020    VLDL NOT REPORTED (H) 10/26/2020         CRP: No results found for: CRP  ESR: No results found for: SEDRATE    Folate and B12: No results found for: [id number], No results found for: FOLATE    No components found for: 3WBC, 3RBC, 3HEMOGLOBIN, 3HEMATOCRIT, 3PLATELETS, 3MCV, 3SEGS, 3STAB, 3LYMP, 3MONOC, 3EOS, 3BASO, 3ASEG, 3MYEL, 3PRMY, 3BLAS, 3PLTE    Recent Results (from the past 24 hour(s))   CBC Auto Differential    Collection Time: 01/29/21 10:44 PM   Result Value Ref Range    WBC 9.4 3.5 - 11.0 k/uL    RBC 4.51 4.5 - 5.9 m/uL    Hemoglobin 14.5 13.5 - 17.5 g/dL    Hematocrit 43.2 41 - 53 %    MCV 95.7 80 - 100 fL    MCH 32.1 26 - 34 pg    MCHC 33.5 31 - 37 g/dL    RDW 13.7 11.5 - 14.9 %    Platelets 536 (L) 704 - 450 k/uL    MPV 9.9 6.0 - 12.0 fL    NRBC Automated NOT REPORTED per 100 WBC    Differential Type NOT REPORTED     Seg Neutrophils 70 (H) 36 - 66 %    Lymphocytes 19 (L) 24 - 44 %    Monocytes 8 (H) 1 - 7 %    Eosinophils % 2 0 - 4 %    Basophils 1 0 - 2 %    Immature Granulocytes NOT REPORTED 0 %    Segs Absolute 6.60 1.3 - 9.1 k/uL    Absolute Lymph # 1.80 1.0 - 4.8 k/uL    Absolute Mono # 0.70 0.1 - 1.3 k/uL    Absolute Eos # 0.20 0.0 - 0.4 k/uL    Basophils Absolute 0.10 0.0 - 0.2 k/uL    Absolute Immature Granulocyte NOT REPORTED 0.00 - 0.30 k/uL    WBC Morphology NOT REPORTED     RBC Morphology NOT REPORTED     Platelet Estimate NOT REPORTED    Comprehensive Metabolic Panel w/ Reflex to MG    Collection Time: 01/29/21 10:44 PM   Result Value Ref Range    Glucose 126 (H) 70 - 99 mg/dL    BUN 22 8 - 23 mg/dL    CREATININE 1.03 0.70 - 1.20 mg/dL    Bun/Cre Ratio NOT REPORTED 9 - 20    Calcium 9.2 8.6 - 10.4 mg/dL    Sodium 138 135 - 144 mmol/L    Potassium 4.0 3.7 - 5.3 mmol/L    Chloride 104 98 - 107 mmol/L    CO2 21 20 - 31 mmol/L    Anion Gap 13 9 - 17 mmol/L    Alkaline Phosphatase 63 40 - 129 U/L    ALT 61 (H) 5 - 41 U/L    AST 36 <40 U/L    Total Bilirubin 0.21 (L) 0.3 - 1.2 mg/dL    Total Protein 7.3 6.4 - 8.3 g/dL    Albumin 4.3 3.5 - 5.2 g/dL    Albumin/Globulin Ratio NOT REPORTED 1.0 - 2.5    GFR Non-African American >60 >60 mL/min    GFR African American >60 >60 mL/min    GFR Comment          GFR Staging NOT REPORTED    Lipase    Collection Time: 01/29/21 10:44 PM   Result Value Ref Range    Lipase 25 13 - 60 U/L   Troponin    Collection Time: 01/29/21 10:44 PM   Result Value Ref Range    Troponin, High Sensitivity 12 0 - 22 ng/L    Troponin T NOT REPORTED <0.03 ng/mL    Troponin Interp NOT REPORTED    D-Dimer, Quantitative    Collection Time: 01/29/21 10:44 PM   Result Value Ref Range    D-Dimer, Quant <0.27 0.00 - 0.59 mg/L FEU   Troponin    Collection Time: 01/30/21  1:54 AM   Result Value Ref Range    Troponin, High Sensitivity 10 0 - 22 ng/L    Troponin T NOT REPORTED <0.03 ng/mL    Troponin Interp NOT REPORTED    Troponin    Collection Time: 01/30/21  4:32 AM   Result Value Ref Range    Troponin, High Sensitivity 10 0 - 22 ng/L    Troponin T NOT REPORTED <0.03 ng/mL    Troponin Interp NOT REPORTED    CBC    Collection Time: 01/30/21  4:32 AM   Result Value Ref Range    WBC 14.3 (H) 3.5 - 11.0 k/uL    RBC 4.40 (L) 4.5 - 5.9 m/uL    Hemoglobin 13.9 13.5 - 17.5 g/dL    Hematocrit 42.2 41 - 53 %    MCV 96.0 80 - 100 fL    MCH 31.5 26 - 34 pg    MCHC 32.9 31 - 37 g/dL    RDW 13.8 11.5 - 14.9 %    Platelets 595 604 - 857 k/uL    MPV 9.7 6.0 - 12.0 fL    NRBC Automated NOT REPORTED per 100 WBC   Basic Metabolic Panel w/ Reflex to MG    Collection Time: 01/30/21  4:32 AM   Result Value Ref Range    Glucose 157 (H) 70 - 99 mg/dL    BUN 19 8 - 23 mg/dL    CREATININE 0.98 0.70 - 1.20 mg/dL    Bun/Cre Ratio NOT REPORTED 9 - 20    Calcium 9.3 8.6 - 10.4 mg/dL    Sodium 137 135 - 144 mmol/L    Potassium 4.3 3.7 - 5.3 mmol/L    Chloride 102 98 - 107 mmol/L    CO2 22 20 - 31 mmol/L    Anion Gap 13 9 - 17 mmol/L    GFR Non-African American >60 >60 mL/min    GFR African American >60 >60 mL/min    GFR Comment          GFR Staging NOT REPORTED    Hepatic function panel    Collection Time: 01/30/21  4:32 AM   Result Value Ref Range    Albumin 4.2 3.5 - 5.2 g/dL    Alkaline Phosphatase 59 40 - 129 U/L    ALT 58 (H) 5 - 41 U/L    AST 27 <40 U/L    Total Bilirubin 0.27 (L) 0.3 - 1.2 mg/dL    Bilirubin, Direct 0.09 <0.31 mg/dL    Bilirubin, Indirect 0.18 0.00 - 1.00 mg/dL    Total Protein 7.3 6.4 - 8.3 g/dL    Globulin NOT REPORTED 1.5 - 3.8 g/dL    Albumin/Globulin Ratio NOT REPORTED 1.0 - 2.5   Brain natriuretic peptide    Collection Time: 01/30/21  4:32 AM   Result Value Ref Range    Pro- <300 pg/mL    BNP Interpretation Pro-BNP Reference Range:        Mri Lumbar Spine Wo Contrast    Result Date: 1/21/2021  EXAMINATION: MRI OF THE LUMBAR SPINE WITHOUT CONTRAST, 1/21/2021 2:24 pm TECHNIQUE: Multiplanar multisequence MRI of the lumbar spine was performed without the administration of intravenous contrast. COMPARISON: None.  HISTORY: ORDERING SYSTEM PROVIDED HISTORY: Degeneration of lumbar or lumbosacral intervertebral disc TECHNOLOGIST PROVIDED HISTORY: Reason for Exam: Degeneration of lumbar or lumbosacral intervertebral disc, Low back pain without sciatica, unspecified back pain laterality, unspecified chronicity Additional signs and symptoms: Right sided low back pain that radiates down buttocks region through right leg down to the big toe. Pain has lasted on and off for 22 years FINDINGS: BONES/ALIGNMENT: Vertebral body heights are maintained there is age-appropriate bone marrow signal. There is degenerative endplate change most pronounced at the L3-4 level. There is multilevel degenerative disc disease with loss of disc signal.  There is disc space narrowing at L3-4. There is no spondylolisthesis. SPINAL CORD: Conus medullaris is normal in caliber and signal and terminates at the L1 level. The cauda equina is unremarkable. SOFT TISSUES: The posterior paraspinal soft tissues are unremarkable. The visualized abdominal structures are unremarkable. L1-L2: There is a mild circumferential disc bulge. There is no canal stenosis or foraminal narrowing. L2-L3: There is a circumferential disc bulge with facet and ligamentous hypertrophy. There is canal stenosis measuring 8 mm in AP dimension. There is mild left foraminal narrowing and no significant right foraminal narrowing. L3-L4: There is a circumferential disc bulge with facet and ligamentous hypertrophy. There is canal stenosis measuring 8 mm in AP dimension. There is moderate bilateral foraminal narrowing. L4-L5: There is a circumferential disc bulge with facet hypertrophy. There is canal stenosis measuring 7 mm in AP dimension. Is moderate bilateral foraminal narrowing. L5-S1: There is a circumferential disc bulge with facet hypertrophy. There is no significant canal stenosis. There is moderate bilateral foraminal narrowing. Multilevel degenerative disc disease and multilevel degenerative facet hypertrophy with canal stenosis most pronounced at L4-5. Bilateral foraminal narrowing as described above.      Cta Chest Abdomen Pelvis W Contrast    Result Date: 1/30/2021  EXAMINATION: CTA OF THE CHEST, ABDOMEN AND PELVIS WITH CONTRAST, 1/29/2021 11:40 pm TECHNIQUE: CTA of the chest, abdomen and pelvis was performed after the administration of intravenous contrast.  Multiplanar reformatted images are provided for review. MIP images are provided for review. Dose modulation, iterative reconstruction, and/or weight based adjustment of the mA/kV was utilized to reduce the radiation dose to as low as reasonably achievable. COMPARISON: CT abdomen and pelvis dated 12/17/2020. HISTORY: ORDERING SYSTEM PROVIDED HISTORY: hypertension, CP, evaluate for dissection TECHNOLOGIST PROVIDED HISTORY: hypertension, CP, evaluate for dissection Decision Support Exception->Emergency Medical Condition (MA) Reason for Exam: Patient c/o midsternal chest pain radiating into his upper back x 1 day, hx of hypertension, stroke Acuity: Acute Type of Exam: Initial FINDINGS: CARDIOVASCULAR: There is no evidence of aortic dissection. The aorta is normal in course and caliber. There is a small splenic aneurysm in the distal splenic artery measuring 1.0 cm x 0.9 x 1.3  Cm. The main pulmonary artery is normal in caliber. The heart is normal in size. There is no pericardial effusion. THYROID: The visualized thyroid gland is unremarkable. LUNGS/PLEURA: There are no focal consolidations or pleural effusions. There is no pneumothorax. The trachea and central bronchi are patent. THORACIC NODES: There are no pathologically enlarged mediastinal, hilar or axillary lymph nodes. HEPATOBILIARY: There is diffuse fatty infiltration of the liver. Again noted is an area of hyperdensity within the right hepatic dome, which may reflect a cavernous hemangioma. There is no biliary ductal dilatation. There is cholelithiasis without evidence of cholecystitis. SPLEEN: Unremarkable. PANCREAS: There is fatty atrophy. ADRENAL GLANDS: Unremarkable. KIDNEYS: The kidneys are normal in size and contour and enhance symmetrically. There is no hydronephrosis or nephrolithiasis.   Hypodensities are again noted in the kidneys bilaterally, likely reflecting renal cyst. These are grossly unchanged from prior CT. ABDOMINAL NODES: No adenopathy. PELVIC ORGANS: The urinary bladder is unremarkable. The prostate is normal in size. PERITONEUM/MESENTERY/BOWEL: The stomach is unremarkable. There is no bowel obstruction. There is no bowel wall thickening. Postsurgical changes from right hemicolectomy are again noted. Irregular areas of soft tissue density is again noted near the postsurgical area is (image 90). These are likely postsurgical changes such as scarring. BONES/SOFT TISSUES: The visualized osseous structures are grossly unremarkable. No CT evidence of aortic dissection. Incidentally noted splenic aneurysm measuring 1.0 x 0.9 x 1.3 cm. Postsurgical changes from right hemicolectomy are again noted. Unchanged soft tissue density in the mesentery adjacent to the hemicolectomy, which likely reflects postsurgical changes/scarring. Hepatic steatosis. Cholelithiasis without evidence of cholecystitis. This note was dictated using M*Modal Fluency Direct so please excuse any grammatical or syntax errors as no guarantees can be provided that every mistake has been identified and corrected by editing.       Electronically signed by Ana Luisa Trujillo MD on 1/30/2021 at 11:01 AM

## 2021-01-30 NOTE — ED PROVIDER NOTES
EMERGENCY DEPARTMENT ENCOUNTER    Pt Name: Jamir Jordan  MRN: [id number]  [place] 1955  Date of evaluation: 1/29/21  CHIEF COMPLAINT       Chief Complaint   Patient presents with    Chest Pain     HISTORY OF PRESENT ILLNESS   HPI  70-year-old male history of hypertension, hyperlipidemia, CVA, GERD presents for evaluation of epigastric and chest pain. Symptoms started acutely around 8 PM tonight after the patient ate dinner. Described as nonradiating moderate pressure type pain. Has been constant since onset. Did have some nausea and dry heaves but no other vomiting. No diaphoresis or shortness of breath. No other recent illness. Patient has had this pain in the past.  Did not take anything at home prior to arrival.  No fever or chills. No diarrhea. Urinary symptoms. No cough. REVIEW OF SYSTEMS     Review of Systems   Constitutional: Negative for chills and fatigue. HENT: Negative for facial swelling, nosebleeds, rhinorrhea and sore throat. Eyes: Negative for pain and redness. Respiratory: Negative for cough and shortness of breath. Cardiovascular: Positive for chest pain. Negative for leg swelling. Gastrointestinal: Positive for abdominal pain. Negative for diarrhea, nausea and vomiting. Genitourinary: Negative for flank pain and hematuria. Musculoskeletal: Negative for arthralgias and back pain. Skin: Negative for color change and rash. Neurological: Negative for dizziness, tremors, facial asymmetry, speech difficulty, weakness and numbness. PASTMEDICAL HISTORY     Past Medical History:   Diagnosis Date    Essential hypertension 5/4/2017    Hyperlipidemia 9/4/2014    Hyperparathyroidism ([address]     Obesity (BMI 30-39. 9) 5/4/2017    Osteoporosis     Sciatica     Stroke [facility])     Vertebral fracture, osteoporotic (HCC)      Past Problem List  Patient Active Problem List   Diagnosis Code    Osteoporosis M81.0    Vitamin D deficiency E55.9    History of stroke Z86.73    Degeneration of lumbar or lumbosacral intervertebral disc M51.37    Back pain M54.9    Low back pain M54.5    Allergic rhinitis J30.9    Impaired fasting glucose R73.01    Mixed hyperlipidemia E78.2    Essential hypertension I10    Obesity (BMI 30.0-34. 9) E66.9    Tear of left rotator cuff M75.102    Left bicipital tenosynovitis M75.22    Colon polyp K63.5    Acute blood loss as cause of postoperative anemia D62    S/P right hemicolectomy Z90.49    History of malignant neoplasm of skin Z85.828    Acute postoperative pain G89.18    Anemia D64.9    Ex-cigarette smoker Z87.891    Chest pain R07.9     SURGICAL HISTORY       Past Surgical History:   Procedure Laterality Date    COLONOSCOPY  3/19/2014    tubular adenoma x 2, inadequate prep, some polyps not removed, needs colonoscopy in 6-12 months    COLONOSCOPY N/A 2/11/2020    COLONOSCOPY POLYPECTOMY HOT BIOPSY performed by Sada Tate MD at [place] [id number] Right 2/12/2020    OPEN RIGHT COLECTOMY, PRIMARY ANASTOMOSIS performed by Sada Tate MD at [place] [id number]      rt. inguinal    OMENTUM RESECTION  2/12/2020    OMENTECTOMY -  PARTIAL GREATER OMENECTOMY [facility]       Previous Medications    ASPIRIN 81 MG CHEWABLE TABLET    Take 1 tablet by mouth daily    ATORVASTATIN (LIPITOR) 20 MG TABLET    take 1 tablet by mouth at bedtime    DOCUSATE SODIUM (COLACE) 100 MG CAPSULE    Take 1 capsule by mouth 2 times daily    FLUTICASONE (FLONASE) 50 MCG/ACT NASAL SPRAY    2 sprays into each nostril daily    IBUPROFEN (ADVIL;MOTRIN) 800 MG TABLET    Take 1 tablet by mouth 3 times daily as needed for Pain (Take with food)    LISINOPRIL (PRINIVIL;ZESTRIL) 5 MG TABLET    take 1 tablet by mouth once daily    LORATADINE (CLARITIN) 10 MG TABLET    Take 1 tablet by mouth daily    METOPROLOL TARTRATE (LOPRESSOR) 25 MG TABLET    take 1 tablet by mouth twice a day    PANTOPRAZOLE (PROTONIX) 40 MG TABLET    take 1 tablet by mouth once daily    VITAMIN D (ERGOCALCIFEROL) 1.25 MG (25580 UT) CAPS CAPSULE    take 1 capsule by mouth every week     ALLERGIES     has No Known Allergies. FAMILY HISTORY     He indicated that his father is alive. SOCIAL HISTORY       Social History     Tobacco Use    Smoking status: Former Smoker     Packs/day: 1.00     Years: 40.00     Pack years: 40.00     Quit date: 2020     Years since quittin.9    Smokeless tobacco: Never Used   Substance Use Topics    Alcohol use: Yes     Comment: rarely    Drug use: No     PHYSICAL EXAM     INITIAL VITALS: BP (!) 177/91   Pulse 56   Temp 97.8 °F (36.6 °C) (Oral)   Resp 24   Ht 5' 7\" (1.702 m)   Wt 220 lb (99.8 kg)   SpO2 96%   BMI 34.46 kg/m²    Physical Exam  Constitutional:       Appearance: Normal appearance. HENT:      Head: Normocephalic and atraumatic. Nose: Nose normal.   Eyes:      Extraocular Movements: Extraocular movements intact. Pupils: Pupils are equal, round, and reactive to light. Neck:      Musculoskeletal: Normal range of motion. No neck rigidity. Cardiovascular:      Rate and Rhythm: Normal rate and regular rhythm. Pulmonary:      Effort: Pulmonary effort is normal. No respiratory distress. Breath sounds: Normal breath sounds. Abdominal:      General: Abdomen is flat. There is no distension. Palpations: Abdomen is soft. There is no mass. Comments: Mild epigastric discomfort nondistended soft abdomen   Musculoskeletal: Normal range of motion. General: No swelling. Skin:     General: Skin is warm and dry. Neurological:      General: No focal deficit present. Mental Status: He is alert. Mental status is at baseline. Cranial Nerves: No cranial nerve deficit. [employer]ISION MAKIN-year-old male presents for evaluation of epigastric pain and substernal chest pain.   Does have cardiac risk factors with prior CVA in the past along with hypertension hyperlipidemia. Patient significantly hypertensive here we will need to rule out aortic dissection with a CT scan. We will also evaluate for other causes of chest pain including ACS, pneumonia, pneumothorax, pleural effusion. No antecedent trauma to raise concern for rib fractures or sternal fracture. Will get labs to assess for anemia, renal dysfunction, cardiac dysfunction. Labs unremarkable. EKG stable with no abnormalities. CT scan showed a incidental splenic artery aneurysm 1 cm x 1.3 cm. No dissection or AAA. Discussed findings with vascular surgery given location of patient's pain no intervention from their point of view. With ongoing pain will place patient in observation given elevated heart score of 4 for cardiac risk stratification. CRITICAL CARE:       PROCEDURES:    Procedures    DIAGNOSTIC RESULTS   EKG:All EKG's are interpreted by the Emergency Department Physician who either signs or Co-signs this chart in the absence of a cardiologist.    Sinus rhythm rate of 60 normal axis normal intervals no concerning ST or T wave changes    RADIOLOGY:All plain film, CT, MRI, and formal ultrasound images (except ED bedside ultrasound) are read by the radiologist, see reports below, unless otherwisenoted in MDM or here. CTA CHEST ABDOMEN PELVIS W CONTRAST   Final Result   No CT evidence of aortic dissection. Incidentally noted splenic aneurysm measuring 1.0 x 0.9 x 1.3 cm. Postsurgical changes from right hemicolectomy are again noted. Unchanged   soft tissue density in the mesentery adjacent to the hemicolectomy, which   likely reflects postsurgical changes/scarring. Hepatic steatosis. Cholelithiasis without evidence of cholecystitis. LABS: All lab results were reviewed by myself, and all abnormals are listed below.   Labs Reviewed   CBC WITH AUTO DIFFERENTIAL - Abnormal; Notable for the following components:       Result Value    Platelets 989 (*)     Seg Neutrophils 70 (*)     Lymphocytes 19 (*)     Monocytes 8 (*)     All other components within normal limits   COMPREHENSIVE METABOLIC PANEL W/ REFLEX TO MG FOR LOW K - Abnormal; Notable for the following components:    Glucose 126 (*)     ALT 61 (*)     Total Bilirubin 0.21 (*)     All other components within normal limits   URINALYSIS - Abnormal; Notable for the following components:    Specific Gravity, UA 1.031 (*)     All other components within normal limits   LIPASE   TROPONIN   D-DIMER, QUANTITATIVE   TROPONIN       EMERGENCY DEPARTMENTCOURSE:         Vitals:    Vitals:    01/29/21 2315 01/29/21 2330 01/30/21 0015 01/30/21 0030   BP: (!) 166/93 (!) 153/90 (!) 178/79 (!) 177/91   Pulse:  56 55 56   Resp:    24   Temp:       TempSrc:       SpO2: 92% 95% 96% 96%   Weight:       Height:           The patient was given the following medications while in the emergency department:  Orders Placed This Encounter   Medications    nitroGLYCERIN (NITROSTAT) SL tablet 0.4 mg    aluminum & magnesium hydroxide-simethicone (MAALOX) 200-200-20 MG/5ML suspension 30 mL    famotidine (PEPCID) injection 20 mg    ondansetron (ZOFRAN) injection 4 mg    iopamidol (ISOVUE-370) 76 % injection 100 mL    0.9 % sodium chloride bolus    sodium chloride flush 0.9 % injection 10 mL    aspirin chewable tablet 324 mg     CONSULTS:  IP CONSULT TO VASCULAR SURGERY  IP CONSULT TO INTERNAL MEDICINE  IP CONSULT TO CARDIOLOGY    FINAL IMPRESSION      1. Chest pain, unspecified type          DISPOSITION/PLAN   DISPOSITION Admitted 01/30/2021 03:08:12 AM      PATIENT REFERRED TO:  No follow-up provider specified.   DISCHARGE MEDICATIONS:  New Prescriptions    No medications on file     Blanquita Villanueva MD  Attending Emergency Physician                    Blanquita Villanueva MD  01/30/21 0601

## 2021-01-30 NOTE — PLAN OF CARE
Problem: Pain:  Goal: Pain level will decrease  1/30/2021 1842 by Ana Freitas RN  Outcome: Ongoing     Problem: SAFETY  Goal: Free from accidental physical injury  Outcome: Ongoing     Problem: DAILY CARE  Goal: Daily care needs are met  1/30/2021 1842 by Ana Freitas RN  Outcome: Ongoing     Problem: PAIN  Goal: Patient's pain/discomfort is manageable  Outcome: Ongoing     Problem: SKIN INTEGRITY  Goal: Skin integrity is maintained or improved  1/30/2021 1842 by Ana Freitas RN  Outcome: Ongoing     Problem: KNOWLEDGE DEFICIT  Goal: Patient/S.O. demonstrates understanding of disease process, treatment plan, medications, and discharge instructions.   Outcome: Ongoing     Problem: DISCHARGE BARRIERS  Goal: Patient's continuum of care needs are met  1/30/2021 1842 by Ana Freitas RN  Outcome: Ongoing

## 2021-01-31 PROBLEM — K81.0 ACUTE ACALCULOUS CHOLECYSTITIS: Status: ACTIVE | Noted: 2021-01-31

## 2021-01-31 LAB
ABSOLUTE EOS #: 0 K/UL (ref 0–0.4)
ABSOLUTE IMMATURE GRANULOCYTE: ABNORMAL K/UL (ref 0–0.3)
ABSOLUTE LYMPH #: 2.2 K/UL (ref 1–4.8)
ABSOLUTE MONO #: 1.2 K/UL (ref 0.1–1.3)
ANION GAP SERPL CALCULATED.3IONS-SCNC: 10 MMOL/L (ref 9–17)
BASOPHILS # BLD: 1 % (ref 0–2)
BASOPHILS ABSOLUTE: 0.1 K/UL (ref 0–0.2)
BUN BLDV-MCNC: 21 MG/DL (ref 8–23)
BUN/CREAT BLD: ABNORMAL (ref 9–20)
CALCIUM SERPL-MCNC: 8.9 MG/DL (ref 8.6–10.4)
CHLORIDE BLD-SCNC: 104 MMOL/L (ref 98–107)
CO2: 23 MMOL/L (ref 20–31)
CREAT SERPL-MCNC: 0.96 MG/DL (ref 0.7–1.2)
DIFFERENTIAL TYPE: ABNORMAL
EOSINOPHILS RELATIVE PERCENT: 0 % (ref 0–4)
GFR AFRICAN AMERICAN: >60 ML/MIN
GFR NON-AFRICAN AMERICAN: >60 ML/MIN
GFR SERPL CREATININE-BSD FRML MDRD: ABNORMAL ML/MIN/{1.73_M2}
GFR SERPL CREATININE-BSD FRML MDRD: ABNORMAL ML/MIN/{1.73_M2}
GLUCOSE BLD-MCNC: 131 MG/DL (ref 70–99)
HCT VFR BLD CALC: 42 % (ref 41–53)
HEMOGLOBIN: 13.7 G/DL (ref 13.5–17.5)
IMMATURE GRANULOCYTES: ABNORMAL %
INR BLD: 1
LYMPHOCYTES # BLD: 16 % (ref 24–44)
MCH RBC QN AUTO: 31.2 PG (ref 26–34)
MCHC RBC AUTO-ENTMCNC: 32.6 G/DL (ref 31–37)
MCV RBC AUTO: 95.7 FL (ref 80–100)
MONOCYTES # BLD: 9 % (ref 1–7)
NRBC AUTOMATED: ABNORMAL PER 100 WBC
PDW BLD-RTO: 13.8 % (ref 11.5–14.9)
PLATELET # BLD: 204 K/UL (ref 150–450)
PLATELET ESTIMATE: ABNORMAL
PMV BLD AUTO: 9.7 FL (ref 6–12)
POTASSIUM SERPL-SCNC: 3.6 MMOL/L (ref 3.7–5.3)
PROTHROMBIN TIME: 12.7 SEC (ref 11.8–14.6)
RBC # BLD: 4.39 M/UL (ref 4.5–5.9)
RBC # BLD: ABNORMAL 10*6/UL
SARS-COV-2, RAPID: NOT DETECTED
SARS-COV-2: NORMAL
SARS-COV-2: NORMAL
SEG NEUTROPHILS: 74 % (ref 36–66)
SEGMENTED NEUTROPHILS ABSOLUTE COUNT: 9.9 K/UL (ref 1.3–9.1)
SODIUM BLD-SCNC: 137 MMOL/L (ref 135–144)
SOURCE: NORMAL
WBC # BLD: 13.5 K/UL (ref 3.5–11)
WBC # BLD: ABNORMAL 10*3/UL

## 2021-01-31 PROCEDURE — 85025 COMPLETE CBC W/AUTO DIFF WBC: CPT

## 2021-01-31 PROCEDURE — G0378 HOSPITAL OBSERVATION PER HR: HCPCS

## 2021-01-31 PROCEDURE — 6360000002 HC RX W HCPCS: Performed by: INTERNAL MEDICINE

## 2021-01-31 PROCEDURE — 6370000000 HC RX 637 (ALT 250 FOR IP): Performed by: STUDENT IN AN ORGANIZED HEALTH CARE EDUCATION/TRAINING PROGRAM

## 2021-01-31 PROCEDURE — 85610 PROTHROMBIN TIME: CPT

## 2021-01-31 PROCEDURE — 2500000003 HC RX 250 WO HCPCS: Performed by: SURGERY

## 2021-01-31 PROCEDURE — 6370000000 HC RX 637 (ALT 250 FOR IP): Performed by: INTERNAL MEDICINE

## 2021-01-31 PROCEDURE — U0002 COVID-19 LAB TEST NON-CDC: HCPCS

## 2021-01-31 PROCEDURE — 36415 COLL VENOUS BLD VENIPUNCTURE: CPT

## 2021-01-31 PROCEDURE — 2580000003 HC RX 258: Performed by: SURGERY

## 2021-01-31 PROCEDURE — 6360000002 HC RX W HCPCS: Performed by: SURGERY

## 2021-01-31 PROCEDURE — 80048 BASIC METABOLIC PNL TOTAL CA: CPT

## 2021-01-31 RX ORDER — LISINOPRIL 20 MG/1
20 TABLET ORAL 2 TIMES DAILY
Status: DISCONTINUED | OUTPATIENT
Start: 2021-01-31 | End: 2021-02-02 | Stop reason: HOSPADM

## 2021-01-31 RX ORDER — POTASSIUM CHLORIDE 7.45 MG/ML
20 INJECTION INTRAVENOUS ONCE
Status: COMPLETED | OUTPATIENT
Start: 2021-01-31 | End: 2021-01-31

## 2021-01-31 RX ORDER — AMLODIPINE BESYLATE 5 MG/1
5 TABLET ORAL DAILY
Status: DISCONTINUED | OUTPATIENT
Start: 2021-01-31 | End: 2021-02-02 | Stop reason: HOSPADM

## 2021-01-31 RX ORDER — POTASSIUM CHLORIDE 7.45 MG/ML
10 INJECTION INTRAVENOUS PRN
Status: DISCONTINUED | OUTPATIENT
Start: 2021-01-31 | End: 2021-02-02 | Stop reason: HOSPADM

## 2021-01-31 RX ORDER — CARVEDILOL 12.5 MG/1
12.5 TABLET ORAL 2 TIMES DAILY WITH MEALS
Status: DISCONTINUED | OUTPATIENT
Start: 2021-01-31 | End: 2021-01-31

## 2021-01-31 RX ORDER — CARVEDILOL 6.25 MG/1
6.25 TABLET ORAL 2 TIMES DAILY WITH MEALS
Status: DISCONTINUED | OUTPATIENT
Start: 2021-01-31 | End: 2021-02-02 | Stop reason: HOSPADM

## 2021-01-31 RX ADMIN — CARVEDILOL 6.25 MG: 6.25 TABLET, FILM COATED ORAL at 08:32

## 2021-01-31 RX ADMIN — ACETAMINOPHEN 650 MG: 325 TABLET ORAL at 22:44

## 2021-01-31 RX ADMIN — HYDRALAZINE HYDROCHLORIDE 10 MG: 20 INJECTION INTRAMUSCULAR; INTRAVENOUS at 01:54

## 2021-01-31 RX ADMIN — PIPERACILLIN SODIUM AND TAZOBACTAM SODIUM 3375 MG: 3; .375 INJECTION, POWDER, LYOPHILIZED, FOR SOLUTION INTRAVENOUS at 01:54

## 2021-01-31 RX ADMIN — ACETAMINOPHEN 650 MG: 325 TABLET ORAL at 15:45

## 2021-01-31 RX ADMIN — AMLODIPINE BESYLATE 5 MG: 5 TABLET ORAL at 08:35

## 2021-01-31 RX ADMIN — CARVEDILOL 6.25 MG: 6.25 TABLET, FILM COATED ORAL at 18:13

## 2021-01-31 RX ADMIN — PIPERACILLIN SODIUM AND TAZOBACTAM SODIUM 3375 MG: 3; .375 INJECTION, POWDER, LYOPHILIZED, FOR SOLUTION INTRAVENOUS at 18:11

## 2021-01-31 RX ADMIN — POTASSIUM CHLORIDE 20 MEQ: 7.46 INJECTION, SOLUTION INTRAVENOUS at 15:45

## 2021-01-31 RX ADMIN — ATORVASTATIN CALCIUM 20 MG: 20 TABLET, FILM COATED ORAL at 20:22

## 2021-01-31 RX ADMIN — PIPERACILLIN SODIUM AND TAZOBACTAM SODIUM 3375 MG: 3; .375 INJECTION, POWDER, LYOPHILIZED, FOR SOLUTION INTRAVENOUS at 11:39

## 2021-01-31 RX ADMIN — ASPIRIN 81 MG: 81 TABLET, CHEWABLE ORAL at 08:32

## 2021-01-31 RX ADMIN — FAMOTIDINE 20 MG: 10 INJECTION INTRAVENOUS at 20:22

## 2021-01-31 RX ADMIN — LISINOPRIL 20 MG: 20 TABLET ORAL at 08:34

## 2021-01-31 RX ADMIN — FAMOTIDINE 20 MG: 10 INJECTION INTRAVENOUS at 08:32

## 2021-01-31 RX ADMIN — LISINOPRIL 20 MG: 20 TABLET ORAL at 20:22

## 2021-01-31 ASSESSMENT — PAIN SCALES - GENERAL
PAINLEVEL_OUTOF10: 2
PAINLEVEL_OUTOF10: 6
PAINLEVEL_OUTOF10: 5

## 2021-01-31 NOTE — PROGRESS NOTES
[facility]   Progress Note                   Date:   1/31/2021  Patient name: Narinder Calle  Date of admission:  1/29/2021 10:12 PM  MRN:   [id number]  YOB: 1955  PCP: Eilene Nissen, MD    Reason for Admission: Chest pain [R07.9]    Subjective:       Clinical Changes / Abnormalities: no chest pain or dyspnea. BP still high. Medications:   Scheduled Meds:   aspirin  81 mg Oral Daily    atorvastatin  20 mg Oral Nightly    sodium chloride flush  10 mL Intravenous 2 times per day    carvedilol  6.25 mg Oral BID WC    lisinopril  10 mg Oral BID    piperacillin-tazobactam (ZOSYN) 3375 mg in dextrose 5% IVPB extended infusion (mini-bag)  3,375 mg Intravenous Q8H    famotidine (PEPCID) injection  20 mg Intravenous BID     Continuous Infusions:   sodium chloride 75 mL/hr at 01/30/21 2107     CBC:   Recent Labs     01/29/21 2244 01/30/21 0432 01/31/21  0429   WBC 9.4 14.3* 13.5*   HGB 14.5 13.9 13.7   * 205 204     BMP:    Recent Labs     01/29/21 2244 01/30/21  0432 01/31/21  0429    137 137   K 4.0 4.3 3.6*    102 104   CO2 21 22 23   BUN 22 19 21   CREATININE 1.03 0.98 0.96   GLUCOSE 126* 157* 131*     Hepatic:   Recent Labs     01/29/21 2244 01/30/21 0432   AST 36 27   ALT 61* 58*   BILITOT 0.21* 0.27*   ALKPHOS 63 59     Troponin: No results for input(s): TROPONINI in the last 72 hours. BNP: No results for input(s): BNP in the last 72 hours. Lipids: No results for input(s): CHOL, HDL in the last 72 hours. Invalid input(s): LDLCALCU  INR:   Recent Labs     01/30/21 2032   INR 1.0       Objective:   Vitals: BP (!) 181/97 Comment: meds given  Pulse 62   Temp 98.6 °F (37 °C) (Oral)   Resp 16   Ht 5' 7\" (1.702 m)   Wt 201 lb 15.1 oz (91.6 kg)   SpO2 94%   BMI 31.63 kg/m²   General appearance: alert and cooperative with exam  HEENT: Head: Normocephalic, no lesions, without obvious abnormality.   Neck: no JVD  Lungs: CTAB  Heart: RRR s1+s2, no murmurs  Abdomen: soft, non-tender  Extremities: no edema  Neurologic: not done    TTE 01/30/21  Summary  Normal left ventricle size and function with an estimated EF > 55%. No segmental wall motion abnormalities seen. Moderate left ventricular hypertrophy. Normal right ventricular size and function. Trivial mitral regurgitation. Trivial tricuspid regurgitation. Normal right ventricular systolic pressure. Aortic root is mildly dilated. (3.9 cm)  No significant pericardial effusion is seen. Assessment / Acute Cardiac Problems:   Abdominal pain  HTN uncontrolled  Preserved LV systolic function LVEF > 08% on TTE 01/30/21  GERD  H/o right hemicolectomy  Splenic aneurysm  Colelithiasis without evidence of cholecystitis    Patient Active Problem List:     Osteoporosis     Vitamin D deficiency     History of stroke     Degeneration of lumbar or lumbosacral intervertebral disc     Back pain     Low back pain     Allergic rhinitis     Impaired fasting glucose     Mixed hyperlipidemia     Uncontrolled hypertension     Obesity (BMI 30.0-34. 9)     Tear of left rotator cuff     Left bicipital tenosynovitis     Colon polyp     Acute blood loss as cause of postoperative anemia     S/P right hemicolectomy     History of malignant neoplasm of skin     Acute postoperative pain     Anemia     Ex-cigarette smoker     Chest pain     Splenic artery aneurysm ([address]      Plan of Treatment:   1. Increase lisinopril to 20mg po BID. Add norvasc 5mg po qday  2. Continue coreg  3. If cholecystectomy is considered, risk is low- No anginal symptoms and LVEF is preserved.     Domenico Paz [facility] Cardiology  [phone]

## 2021-01-31 NOTE — PROGRESS NOTES
Patient ID:Samuel Kline is 72 y.o.   : 1955 MRN: [id number]    Admit date: 2021  Primary Care Physician: Cristian García MD   Patient Care Team:  Cristian García MD as PCP - General (Family Medicine)  Cristian García MD as PCP - [facility]  Martinez Guillermo MD as Orthopedic Surgeon (Orthopedic Surgery)  Fredo Olmos MD (Neurology)  Millie Alvarado MD as Consulting Physician (Gastroenterology)  Tel: [phone] TO 06 [employer]  Admitting Physician: Kacie Godwin MD   Code Status:  Full Code  Diet NPO, After Midnight    Progress Note:    Lashonda Francis is a 72 y.o.  male     HIDA scan confirmed acute acalculous cholecystitis and this explained to the patient. Patient states that he plans to start with a pain clinic in March for chronic low back pain. Echocardiogram revealed left ventricular EF of greater than 55% and moderate LVH. Discussed with surgery and the plan is to initially proceed with drainage of the gallbladder with IR. Patient denies chest pain, shortness of breath or cough. Problem List: Active Problems:    Uncontrolled hypertension    Chest pain    Splenic artery aneurysm (HCC)  Resolved Problems:    * No resolved hospital problems. *      <principal problem not specified>    Assessment:    Acute acalculous cholecystitis  Right upper quadrant postprandial abdominal pain radiating radiating to the epigastrium. Leukocytosis  Hypertension, uncontrolled  Hypokalemia, K3.6.  1.3 cm splenic artery aneurysm  Hyperlipidemia  History of CVA  GERD  Chronic low back pain    Plan:    Cardiology adjusted antihypertensive medications. Need to monitor blood pressure closely. Continue antibiotics. Surgery consulted. To have percutaneous cholecystostomy tube placement. Replenish and recheck potassium.   Vascular surgery consulted for splenic artery aneurysm. .  Lovenox sc for DVT prophylaxis on hold pending surgery. PAST MEDICAL HISTORY    has a past medical history of Essential hypertension, Hyperlipidemia, Hyperparathyroidism ([facility] [address] Obesity (BMI 30-39.9), Osteoporosis, Sciatica, Stroke ([facility] [address] and Vertebral fracture, osteoporotic ([facility] [address] SURGICAL HISTORY      has a past surgical history that includes Vasectomy; Colonoscopy (3/19/2014); hernia repair; Colonoscopy (N/A, 2/11/2020); hemicolectomy (Right, 2/12/2020); and omentum resection (2/12/2020).     CURRENT MEDICATIONS        lisinopril  20 mg Oral BID    amLODIPine  5 mg Oral Daily    carvedilol  6.25 mg Oral BID WC    aspirin  81 mg Oral Daily    atorvastatin  20 mg Oral Nightly    sodium chloride flush  10 mL Intravenous 2 times per day    piperacillin-tazobactam (ZOSYN) 3375 mg in dextrose 5% IVPB extended infusion (mini-bag)  3,375 mg Intravenous Q8H    famotidine (PEPCID) injection  20 mg Intravenous BID     PRN Meds sodium chloride flush, acetaminophen **OR** acetaminophen, polyethylene glycol, ondansetron, hydrALAZINE, perflutren lipid microspheres, sodium chloride flush, nitroGLYCERIN  Continuous Infusions:    sodium chloride 75 mL/hr at 01/30/21 2107           lisinopril (PRINIVIL;ZESTRIL) tablet 20 mg, BID      amLODIPine (NORVASC) tablet 5 mg, Daily      carvedilol (COREG) tablet 6.25 mg, BID WC      aspirin chewable tablet 81 mg, Daily      atorvastatin (LIPITOR) tablet 20 mg, Nightly      sodium chloride flush 0.9 % injection 10 mL, 2 times per day      sodium chloride flush 0.9 % injection 10 mL, PRN      acetaminophen (TYLENOL) tablet 650 mg, Q6H PRN    Or      acetaminophen (TYLENOL) suppository 650 mg, Q6H PRN      polyethylene glycol (GLYCOLAX) packet 17 g, Daily PRN      ondansetron (ZOFRAN) injection 4 mg, Q6H PRN      hydrALAZINE (APRESOLINE) injection 10 mg, Q4H PRN      perflutren lipid microspheres (DEFINITY) injection 2.2 mg, ONCE PRN     sodium chloride flush 0.9 % injection 10 mL, PRN      piperacillin-tazobactam (ZOSYN) 3,375 mg in dextrose 5 % 50 mL IVPB extended infusion (mini-bag), Q8H      famotidine (PEPCID) injection 20 mg, BID      0.9 % sodium chloride infusion, Continuous      nitroGLYCERIN (NITROSTAT) SL tablet 0.4 mg, Q5 Min PRN          HOME MEDICATIONS     Medications Prior to Admission: metoprolol tartrate (LOPRESSOR) 25 MG tablet, take 1 tablet by mouth twice a day  atorvastatin (LIPITOR) 20 MG tablet, take 1 tablet by mouth at bedtime  pantoprazole (PROTONIX) 40 MG tablet, take 1 tablet by mouth once daily  fluticasone (FLONASE) 50 MCG/ACT nasal spray, 2 sprays into each nostril daily  loratadine (CLARITIN) 10 MG tablet, Take 1 tablet by mouth daily  lisinopril (PRINIVIL;ZESTRIL) 5 MG tablet, take 1 tablet by mouth once daily  aspirin 81 MG chewable tablet, Take 1 tablet by mouth daily  ibuprofen (ADVIL;MOTRIN) 800 MG tablet, Take 1 tablet by mouth 3 times daily as needed for Pain (Take with food)  vitamin D (ERGOCALCIFEROL) 1.25 MG (99370 UT) CAPS capsule, take 1 capsule by mouth every week  [DISCONTINUED] docusate sodium (COLACE) 100 MG capsule, Take 1 capsule by mouth 2 times daily      ALLERGIES     has No Known Allergies. No Known Allergies    SOCIAL HISTORY      reports that he quit smoking about 11 months ago. He has a 40.00 pack-year smoking history. He has never used smokeless tobacco. He reports current alcohol use. He reports that he does not use drugs. BP (!) 195/110   Pulse 69   Temp 99.7 °F (37.6 °C) (Oral)   Resp 16   Ht 5' 7\" (1.702 m)   Wt 201 lb 15.1 oz (91.6 kg)   SpO2 95%   BMI 31.63 kg/m²   Temp (24hrs), Av.1 °F (37.3 °C), Min:98.6 °F (37 °C), Max:99.7 °F (37.6 °C)    Pulse Ox:  SpO2  Av.4 %  Min: 93 %  Max: 95 %  Supplemental O2:     Oxygen Therapy  SpO2: 95 %  Pulse Oximeter Device Mode: Intermittent  Pulse Oximeter Device Location: Finger  O2 Device: None (Room air)    Patient Vitals for the past 96 hrs (Last 3 readings):   Weight   01/31/21 0123 201 lb 15.1 oz (91.6 kg)   01/29/21 2216 220 lb (99.8 kg)     Admission weight: 220 lb (99.8 kg)  Wt Readings from Last 3 Encounters:   01/31/21 201 lb 15.1 oz (91.6 kg)   12/17/20 219 lb (99.3 kg)   11/13/20 221 lb (100.2 kg)    (encounters)    /O (24Hr): Intake/Output Summary (Last 24 hours) at 1/31/2021 0919  Last data filed at 1/31/2021 0558  Gross per 24 hour   Intake 458 ml   Output --   Net 458 ml       Alert NAD. Vitals reviewed. H&N: atraumatic, normocephalic, anicteric, no conjunctivitis, EOM normal, no lid lag or exophthalmos, nose and ears appear normal, trachea mid line, no stridor,  Chest: no respiratory distress, normal effort, fairly good air entry, no wheezes,    Heart: normal heart sounds, regular rate and rhythm, no murmurs. gallops or rubs,  Abdomen: BS present, non-tender, no masses or organomegaly detected,   Extremities: no peripheral edema, no cyanosis. no oncholysis. Skin: no rash, no erythema, no jaundice,   CNS: grossly normal without cranial nerve deficits, no abnormal coordination or tone, Psychiatric: normal mood, affect, behavior, speech, thought content and judgment. LABS:    CBC:   Recent Labs     01/29/21 2244 01/30/21 0432 01/31/21 0429   WBC 9.4 14.3* 13.5*   RBC 4.51 4.40* 4.39*   HGB 14.5 13.9 13.7   HCT 43.2 42.2 42.0   MCV 95.7 96.0 95.7   MCH 32.1 31.5 31.2   MCHC 33.5 32.9 32.6   RDW 13.7 13.8 13.8   * 205 204   MPV 9.9 9.7 9.7       MAG: No results for input(s): MG in the last 72 hours.     CMP:   Recent Labs     01/29/21  2244 01/30/21  0432 01/31/21  0429    137 137   K 4.0 4.3 3.6*    102 104   CO2 21 22 23   BUN 22 19 21   CREATININE 1.03 0.98 0.96   GLUCOSE 126* 157* 131*   CALCIUM 9.2 9.3 8.9   PROT 7.3 7.3  --    LABALBU 4.3 4.2  --    BILITOT 0.21* 0.27*  --    ALKPHOS 63 59  --    AST 36 27  --    ALT 61* 58*  --       Recent Labs     01/29/21  2244   LIPASE 25 Phosphorus:  No results for input(s): PHOS in the last 72 hours. Albumin:   Recent Labs     01/29/21  2244 01/30/21  0432   LABALBU 4.3 4.2       U/A:  Lab Results   Component Value Date    COLORU YELLOW 01/29/2021    PHUR 6.0 01/29/2021    SPECGRAV 1.031 01/29/2021    LEUKOCYTESUR NEGATIVE 01/29/2021    UROBILINOGEN Normal 01/29/2021    BILIRUBINUR NEGATIVE 01/29/2021    GLUCOSEU NEGATIVE 01/29/2021       Recent Labs     01/30/21 2032   INR 1.0     Lab Results   Component Value Date    DDIMER <0.27 01/29/2021   No results found for: BNP  troponinsNo results found for: TROPONINI      D Dimer:   Recent Labs     01/29/21 2244   DDIMER <0.27     Troponin T   Recent Labs     01/29/21  2244 01/30/21  0154 01/30/21 0432   TROPONINT NOT REPORTED NOT REPORTED NOT REPORTED     ProBNP   No results found for requested labs within last 30 days. ProBNP Invalid input(s): PRO-BNP  Lactic acid:Invalid input(s): LACTIC ACID      PT/INR:   Recent Labs     01/30/21 2032   PROTIME 13.0   INR 1.0     APTT: No results for input(s): APTT in the last 72 hours. ESR: No results found for: SEDRATE  CRP: No results found for: CRP  Folate and B12: No results found for: [id number], No results found for: FOLATE  Thyroid Studies: No results found for: TSH, K4AFRBD, A4XBZFS, THYROIDAB  D-Dimer:   Lab Results   Component Value Date    DDIMER <0.27 01/29/2021       Lactic acid: No components found for: LACT     Troponins:     No components found for: TROP    No results found for: TROPONINI    No results found for requested labs within last 720 hours. Cardiac Enzymes:    No results found for requested labs within last 720 hours.     No results found for: CKMB    ProBNP:    No components found for: NTBNP      U/A:  Lab Results   Component Value Date    COLORU YELLOW 01/29/2021    PHUR 6.0 01/29/2021    SPECGRAV 1.031 01/29/2021    LEUKOCYTESUR NEGATIVE 01/29/2021    UROBILINOGEN Normal 01/29/2021    BILIRUBINUR NEGATIVE 01/29/2021 GLUCOSEU NEGATIVE 01/29/2021           Thyroid Studies:   No components found for: T4,  FREE  No results found for: T3  No results found for: TSH    No results found for: TSH, O4WZTLF, B4LHIOE, THYROIDAB    HbA1c  No components found for: HA1CC    Lipids:  Lab Results   Component Value Date    HDL 41 10/26/2020    VLDL NOT REPORTED (H) 10/26/2020         CRP: No results found for: CRP  ESR: No results found for: SEDRATE    Folate and B12: No results found for: [id number], No results found for: FOLATE    No components found for: 3WBC, 3RBC, 3HEMOGLOBIN, 3HEMATOCRIT, 3PLATELETS, 3MCV, 3SEGS, 3STAB, 3LYMP, 3MONOC, 3EOS, 3BASO, 3ASEG, 3MYEL, 3PRMY, 3BLAS, 3PLTE    Recent Results (from the past 24 hour(s))   EKG 12 Lead    Collection Time: 01/30/21  1:05 PM   Result Value Ref Range    Ventricular Rate 62 BPM    Atrial Rate 62 BPM    P-R Interval 188 ms    QRS Duration 102 ms    Q-T Interval 378 ms    QTc Calculation (Bazett) 383 ms    P Axis 44 degrees    R Axis -4 degrees    T Axis 38 degrees   Protime-INR    Collection Time: 01/30/21  8:32 PM   Result Value Ref Range    Protime 13.0 11.8 - 14.6 sec    INR 1.0    COVID-19    Collection Time: 01/30/21 10:07 PM    Specimen: Other   Result Value Ref Range    SARS-CoV-2 PENDING     SARS-CoV-2, Rapid          Source . NASOPHARYNGEAL SWAB     SARS-CoV-2 PENDING    COVID-19    Collection Time: 01/31/21  2:03 AM    Specimen: Other   Result Value Ref Range    SARS-CoV-2          SARS-CoV-2, Rapid Not Detected Not Detected    Source . NASOPHARYNGEAL SWAB     SARS-CoV-2         Basic Metabolic Panel w/ Reflex to MG    Collection Time: 01/31/21  4:29 AM   Result Value Ref Range    Glucose 131 (H) 70 - 99 mg/dL    BUN 21 8 - 23 mg/dL    CREATININE 0.96 0.70 - 1.20 mg/dL    Bun/Cre Ratio NOT REPORTED 9 - 20    Calcium 8.9 8.6 - 10.4 mg/dL    Sodium 137 135 - 144 mmol/L    Potassium 3.6 (L) 3.7 - 5.3 mmol/L    Chloride 104 98 - 107 mmol/L    CO2 23 20 - 31 mmol/L    Anion Gap 10 9 - 17 mmol/L    GFR Non-African American >60 >60 mL/min    GFR African American >60 >60 mL/min    GFR Comment          GFR Staging NOT REPORTED    CBC Auto Differential    Collection Time: 01/31/21  4:29 AM   Result Value Ref Range    WBC 13.5 (H) 3.5 - 11.0 k/uL    RBC 4.39 (L) 4.5 - 5.9 m/uL    Hemoglobin 13.7 13.5 - 17.5 g/dL    Hematocrit 42.0 41 - 53 %    MCV 95.7 80 - 100 fL    MCH 31.2 26 - 34 pg    MCHC 32.6 31 - 37 g/dL    RDW 13.8 11.5 - 14.9 %    Platelets 135 100 - 001 k/uL    MPV 9.7 6.0 - 12.0 fL    NRBC Automated NOT REPORTED per 100 WBC    Differential Type NOT REPORTED     Seg Neutrophils 74 (H) 36 - 66 %    Lymphocytes 16 (L) 24 - 44 %    Monocytes 9 (H) 1 - 7 %    Eosinophils % 0 0 - 4 %    Basophils 1 0 - 2 %    Immature Granulocytes NOT REPORTED 0 %    Segs Absolute 9.90 (H) 1.3 - 9.1 k/uL    Absolute Lymph # 2.20 1.0 - 4.8 k/uL    Absolute Mono # 1.20 0.1 - 1.3 k/uL    Absolute Eos # 0.00 0.0 - 0.4 k/uL    Basophils Absolute 0.10 0.0 - 0.2 k/uL    Absolute Immature Granulocyte NOT REPORTED 0.00 - 0.30 k/uL    WBC Morphology NOT REPORTED     RBC Morphology NOT REPORTED     Platelet Estimate NOT REPORTED        Blood Culture: No results for input(s): BC, BLOODCULT2, ORG in the last 72 hours. GRAM STAIN  No results for input(s): LABGRAM, LABANAE, ORG, WNDABS in the last 72 hours.     Resp Culture Brief : No results found for: CULTRESP    Body Fluid : No results found for: BFCX    MRSA : No results found for: [facility]    Urine Culture Brief : No results found for: LABURIN     Organism Brief : No results found for: ORG      Echo Complete 2d W Doppler W Color    Result Date: 1/30/2021  1604 [facility] Transthoracic Echocardiography Report (TTE)  Patient Name SANTHOSH   Date of Study               01/30/2021               [facility] T   Date of      1955  Gender                      Male  Birth   Age          72 year(s)  Race                           Room Number  1578 Height:                     67 inch, 170.18 cm   Corporate ID [id number]    Weight:                     220 pounds, 99.8 kg  #   Patient Acct [de-identified]   BSA:          2.11 m^2      BMI:      34.46  #                                                              kg/m^2   MR #         [id number]      Sonographer                 Jackeline Arrington   Accession #  [id number]  Interpreting Physician      Stevo Ng                   Referring Nurse                           Practitioner   Interpreting             Referring Physician         Stevo Ng  Type of Study   TTE procedure:2D Echocardiogram, M-Mode, Doppler, Color Doppler. Procedure Date Date: 01/30/2021 Start: 10:46 AM Study Location: [address] [place] [address] Technical Quality: Fair visualization Indications:Chest pain. History / Tech. Comments: HTN Patient Status: Inpatient Height: 67 inches Weight: 220 pounds BSA: 2.11 m^2 BMI: 34.46 kg/m^2 Rhythm: Within normal limits HR: 67 bpm BP: 184/92 mmHg CONCLUSIONS Summary Normal left ventricle size and function with an estimated EF > 55%. No segmental wall motion abnormalities seen. Moderate left ventricular hypertrophy. Normal right ventricular size and function. Trivial mitral regurgitation. Trivial tricuspid regurgitation. Normal right ventricular systolic pressure. Aortic root is mildly dilated. (3.9 cm) No significant pericardial effusion is seen. Signature ----------------------------------------------------------------------------  Electronically signed by Jackeline Arrington(Sonographer) on 01/30/2021 11:13  AM ---------------------------------------------------------------------------- ----------------------------------------------------------------------------  Electronically signed by Mulugeta ManzanoInterpreting physician) on  01/30/2021 11:38 AM ---------------------------------------------------------------------------- FINDINGS Left Atrium Left atrium is normal in size.  Left Ventricle Normal left ventricle size and function with an estimated EF > 55%. No segmental wall motion abnormalities seen. Moderate left ventricular hypertrophy. Right Atrium Right atrium is normal in size. Right Ventricle Normal right ventricular size and function. Mitral Valve Normal mitral valve structure and function. Trivial mitral regurgitation. Aortic Valve Normal aortic valve structure and function without stenosis or regurgitation. Tricuspid Valve Normal tricuspid valve structure and function. Trivial tricuspid regurgitation. Normal right ventricular systolic pressure. Pulmonic Valve Pulmonic valve not well visualized but Doppler velocities are normal. No pulmonic insufficiency. Pericardial Effusion No significant pericardial effusion is seen. Miscellaneous Aortic root is mildly dilated. (3.9 cm) E/e' average 9.5 IVC not well visualized.  M-mode / 2D Measurements & Calculations:   LVIDd:5.44 cm(3.7 - 5.6 cm)      Diastolic HBBFGQ:386 ml  KRGEY:0.53 cm(2.2 - 4.0 cm)      Systolic OOUEPJ:81.2 ml  POXE:4.19 cm(0.6 - 1.1 cm)       Aortic Root:3.9 cm(2.0 - 3.7 cm)  LVPWd:1.66 cm(0.6 - 1.1 cm)      LA Dimension: 4.6 cm(1.9 - 4.0 cm)  Fractional Shortenin.07 %    LA volume/Index: 55.5 ml /26m^2  Calculated LVEF (%): 67.27 %     LVOT:2.2 cm   Mitral:                               Aortic   Peak E-Wave: 0.70 m/s                 Peak Velocity: 1.29 m/s  Peak A-Wave: 1.01 m/s                 Mean Velocity: 0.76 m/s  E/A Ratio: 0.69                       Peak Gradient: 6.66 mmHg  Peak Gradient: 1.95 mmHg              Mean Gradient: 3 mmHg  Deceleration Time: 211 msec                                         Area (continuity): 3.83 cm^2                                        AV VTI: 23.5 cm   Tricuspid:                            Pulmonic:   Estimated RVSP: 17 mmHg  Peak TR Velocity: 1.90 m/s  Peak TR Gradient: 14.44 mmHg  Estimated RA Pressure: 3 mmHg                                        Estimated PASP: 17.44 mmHg Septal Wall E' velocity:0.07 m/s Lateral Wall E' velocity:0.08 m/s    Mri Lumbar Spine Wo Contrast    Result Date: 1/21/2021  EXAMINATION: MRI OF THE LUMBAR SPINE WITHOUT CONTRAST, 1/21/2021 2:24 pm TECHNIQUE: Multiplanar multisequence MRI of the lumbar spine was performed without the administration of intravenous contrast. COMPARISON: None. HISTORY: ORDERING SYSTEM PROVIDED HISTORY: Degeneration of lumbar or lumbosacral intervertebral disc TECHNOLOGIST PROVIDED HISTORY: Reason for Exam: Degeneration of lumbar or lumbosacral intervertebral disc, Low back pain without sciatica, unspecified back pain laterality, unspecified chronicity Additional signs and symptoms: Right sided low back pain that radiates down buttocks region through right leg down to the big toe. Pain has lasted on and off for 22 years FINDINGS: BONES/ALIGNMENT: Vertebral body heights are maintained there is age-appropriate bone marrow signal. There is degenerative endplate change most pronounced at the L3-4 level. There is multilevel degenerative disc disease with loss of disc signal.  There is disc space narrowing at L3-4. There is no spondylolisthesis. SPINAL CORD: Conus medullaris is normal in caliber and signal and terminates at the L1 level. The cauda equina is unremarkable. SOFT TISSUES: The posterior paraspinal soft tissues are unremarkable. The visualized abdominal structures are unremarkable. L1-L2: There is a mild circumferential disc bulge. There is no canal stenosis or foraminal narrowing. L2-L3: There is a circumferential disc bulge with facet and ligamentous hypertrophy. There is canal stenosis measuring 8 mm in AP dimension. There is mild left foraminal narrowing and no significant right foraminal narrowing. L3-L4: There is a circumferential disc bulge with facet and ligamentous hypertrophy. There is canal stenosis measuring 8 mm in AP dimension. There is moderate bilateral foraminal narrowing.  L4-L5: There is a circumferential disc bulge with facet hypertrophy. There is canal stenosis measuring 7 mm in AP dimension. Is moderate bilateral foraminal narrowing. L5-S1: There is a circumferential disc bulge with facet hypertrophy. There is no significant canal stenosis. There is moderate bilateral foraminal narrowing. Multilevel degenerative disc disease and multilevel degenerative facet hypertrophy with canal stenosis most pronounced at L4-5. Bilateral foraminal narrowing as described above. Nm Hepatobiliary    Result Date: 1/30/2021  EXAMINATION: NUCLEAR MEDICINE HEPATOBILIARY SCINTIGRAPHY (HIDA SCAN). 1/30/2021 4:52 pm TECHNIQUE: Approximately 6 [id number] Mebrofenin (Choletec) was administered IV. Then, dynamic images of the abdomen were obtained in the anterior projection for 60 mins. 2 are delayed images were obtained. COMPARISON: CT chest abdomen pelvis 01/29/2021. HISTORY: ORDERING SYSTEM PROVIDED HISTORY: abdominal pain TECHNOLOGIST PROVIDED HISTORY: abdominal pain Reason for Exam: abd pain Acuity: Unknown Type of Exam: Unknown FINDINGS: Prompt, homogenous uptake by the liver is noted with normal appearance of radiotracer excretion into the biliary system. Clearance of bloodpool activity appears appropriate. Gallbladder is not visualized at 60 minutes. Common bile duct and small bowel activity identified. Gallbladder activity is not visualized on delayed images. Findings suggestive of acute calculous cholecystitis. Cta Chest Abdomen Pelvis W Contrast    Result Date: 1/30/2021  EXAMINATION: CTA OF THE CHEST, ABDOMEN AND PELVIS WITH CONTRAST, 1/29/2021 11:40 pm TECHNIQUE: CTA of the chest, abdomen and pelvis was performed after the administration of intravenous contrast.  Multiplanar reformatted images are provided for review. MIP images are provided for review.  Dose modulation, iterative reconstruction, and/or weight based adjustment of the mA/kV was utilized to reduce the radiation dose to as low as reasonably achievable. COMPARISON: CT abdomen and pelvis dated 12/17/2020. HISTORY: ORDERING SYSTEM PROVIDED HISTORY: hypertension, CP, evaluate for dissection TECHNOLOGIST PROVIDED HISTORY: hypertension, CP, evaluate for dissection Decision Support Exception->Emergency Medical Condition (MA) Reason for Exam: Patient c/o midsternal chest pain radiating into his upper back x 1 day, hx of hypertension, stroke Acuity: Acute Type of Exam: Initial FINDINGS: CARDIOVASCULAR: There is no evidence of aortic dissection. The aorta is normal in course and caliber. There is a small splenic aneurysm in the distal splenic artery measuring 1.0 cm x 0.9 x 1.3  Cm. The main pulmonary artery is normal in caliber. The heart is normal in size. There is no pericardial effusion. THYROID: The visualized thyroid gland is unremarkable. LUNGS/PLEURA: There are no focal consolidations or pleural effusions. There is no pneumothorax. The trachea and central bronchi are patent. THORACIC NODES: There are no pathologically enlarged mediastinal, hilar or axillary lymph nodes. HEPATOBILIARY: There is diffuse fatty infiltration of the liver. Again noted is an area of hyperdensity within the right hepatic dome, which may reflect a cavernous hemangioma. There is no biliary ductal dilatation. There is cholelithiasis without evidence of cholecystitis. SPLEEN: Unremarkable. PANCREAS: There is fatty atrophy. ADRENAL GLANDS: Unremarkable. KIDNEYS: The kidneys are normal in size and contour and enhance symmetrically. There is no hydronephrosis or nephrolithiasis. Hypodensities are again noted in the kidneys bilaterally, likely reflecting renal cyst. These are grossly unchanged from prior CT. ABDOMINAL NODES: No adenopathy. PELVIC ORGANS: The urinary bladder is unremarkable. The prostate is normal in size. PERITONEUM/MESENTERY/BOWEL: The stomach is unremarkable. There is no bowel obstruction. There is no bowel wall thickening.   Postsurgical changes from right hemicolectomy are again noted. Irregular areas of soft tissue density is again noted near the postsurgical area is (image 90). These are likely postsurgical changes such as scarring. BONES/SOFT TISSUES: The visualized osseous structures are grossly unremarkable. No CT evidence of aortic dissection. Incidentally noted splenic aneurysm measuring 1.0 x 0.9 x 1.3 cm. Postsurgical changes from right hemicolectomy are again noted. Unchanged soft tissue density in the mesentery adjacent to the hemicolectomy, which likely reflects postsurgical changes/scarring. Hepatic steatosis. Cholelithiasis without evidence of cholecystitis. This note was dictated using M*Modal Fluency Direct so please excuse any grammatical or syntax errors as no guarantees can be provided that every mistake has been identified and corrected by editing.       Electronically signed by Diann Ramirez MD on 1/31/2021 at 9:19 AM

## 2021-01-31 NOTE — CARE COORDINATION
ONGOING DISCHARGE PLAN:    Discharge plan for the patient is home alone with declined VNS. Active orders for IV zosyn and patient NPO. Awaiting General Surgery plan. Will continue to follow for additional discharge needs.     Electronically signed by Thayne Litten, RN on 1/31/2021 at 10:01 AM

## 2021-01-31 NOTE — CONSULTS
General Surgery Consult      Pt Name: Yudy Rausch  MRN: [id number]  YOB: 1955  Date of evaluation: 1/31/2021  Primary Care Physician: Rubén Black MD   Patient evaluated at the request of  Dr. Dixie Reyes  Reason for evaluation: Abdominal pain    SUBJECTIVE:   History of Chief Complaint:    Yudy Rausch is a 72 y.o. male who presents with abdominal pain chest pain. Patient states symptoms have been going on for about a month. On Friday he thought he was having a heart attack and came into the emergency room. Patient is known to me from his previous right hemicolectomy for multiple colon polyps back in February 2020. Patient has had previous inguinal hernia repair. Patient symptoms have been going on for several days prior to this. Patient's blood work imaging all reviewed. CT angiogram revealed no evidence of aortic dissection. Splenic aneurysm. Postsurgical changes from right hemicolectomy with some scarring. Fatty liver. Cholelithiasis without evidence of cholecystitis. HIDA scan revealed findings suggestive of acute calculus cholecystitis with cystic duct obstruction. Symptom onset has been gradual for a time period of several day(s). Severity is described as moderate to severe. Course of his symptoms over time is gradual.    Past Medical History   has a past medical history of Essential hypertension, Hyperlipidemia, Hyperparathyroidism ([address] Obesity (BMI 30-39.9), Osteoporosis, Sciatica, Stroke ([address] and Vertebral fracture, osteoporotic ([address] Past Surgical History   has a past surgical history that includes Vasectomy; Colonoscopy (3/19/2014); hernia repair; Colonoscopy (N/A, 2/11/2020); hemicolectomy (Right, 2/12/2020); and omentum resection (2/12/2020). Medications  Prior to Admission medications    Medication Sig Start Date End Date Taking?  Authorizing Provider   metoprolol tartrate (LOPRESSOR) 25 MG tablet take 1 tablet by mouth twice a day 1/26/21  Yes Magnus Schilder Gaby Delatorre MD   atorvastatin (LIPITOR) 20 MG tablet take 1 tablet by mouth at bedtime 1/26/21  Yes Ronni Ferrell MD   pantoprazole (PROTONIX) 40 MG tablet take 1 tablet by mouth once daily 1/26/21  Yes Ronni Ferrell MD   fluticasone [facility]) 50 MCG/ACT nasal spray 2 sprays into each nostril daily 10/16/20  Yes Ronni Ferrell MD   loratadine (CLARITIN) 10 MG tablet Take 1 tablet by mouth daily 10/16/20  Yes Ronni Ferrell MD   lisinopril (PRINIVIL;ZESTRIL) 5 MG tablet take 1 tablet by mouth once daily 7/17/20  Yes Rosevelt Cowden, APRN - CNP   aspirin 81 MG chewable tablet Take 1 tablet by mouth daily 6/18/16  Yes Hanny Schuler MD   ibuprofen (ADVIL;MOTRIN) 800 MG tablet Take 1 tablet by mouth 3 times daily as needed for Pain (Take with food) 1/18/21   Ronni Ferrell MD   vitamin D (ERGOCALCIFEROL) 1.25 MG (91596 UT) CAPS capsule take 1 capsule by mouth every week 12/3/20   Ronni Ferrell MD     Allergies  has No Known Allergies. Family History  family history includes Heart Disease in his father. Social History   reports that he quit smoking about 11 months ago. He has a 40.00 pack-year smoking history. He has never used smokeless tobacco. He reports current alcohol use. He reports that he does not use drugs. Review of Systems:  General Denies any fever or chills  HEENT Denies any diplopia, tinnitus or vertigo  Resp chest pain  Cardiac chest pain patient thought he was having a heart attack. GI Denies any melena, hematochezia, hematemesis or pyrosis   Denies any frequency, urgency, hesitancy or incontinence  Heme Denies bruising or bleeding easily  Endocrine Denies any history of diabetes or thyroid disease  Neuro Denies any focal motor or sensory deficits    OBJECTIVE:   VITALS:  height is 5' 7\" (1.702 m) and weight is 201 lb 15.1 oz (91.6 kg). His oral temperature is 99.7 °F (37.6 °C). His blood pressure is 153/96 (abnormal) and his pulse is 66.  His respiration is 16 and oxygen saturation is 95%. CONSTITUTIONAL: Alert and oriented times 3, no acute distress and cooperative to examination with proper mood and affect. SKIN: Skin color, texture, turgor normal. No rashes or lesions. LYMPH: no cervical nodes, no inguinal nodes  HEENT: Head is normocephalic, atraumatic. EOMI, PERRLA  NECK: Supple, symmetrical, trachea midline, no adenopathy, thyroid symmetric, not enlarged and no tenderness, skin normal  CHEST/LUNGS: chest symmetric with normal A/P diameter, normal respiratory rate and rhythm, lungs clear to auscultation without wheezes, rales or rhonchi. No accessory muscle use. Scars None   CARDIOVASCULAR: Heart regular rate and rhythm Normal S1 and S2. . Carotid and femoral pulses 2+/4 and equal bilaterally  ABDOMEN: Normal shape. . Midline scar(s) present. Normal bowel sounds. No bruits. Soft, nondistended, no masses or organomegaly. no evidence of hernia. Percussion: Normal without hepatosplenomegally. Tenderness: RUQ and epigastric  RECTAL: deferred, not clinically indicated  NEUROLOGIC: There are no focalizing motor or sensory deficits. CN II-XII are grossly intact.   EXTREMITIES: no cyanosis, no clubbing and no edema    LABS:   CBC with Differential:    Lab Results   Component Value Date    WBC 13.5 01/31/2021    RBC 4.39 01/31/2021    HGB 13.7 01/31/2021    HCT 42.0 01/31/2021     01/31/2021    MCV 95.7 01/31/2021    MCH 31.2 01/31/2021    MCHC 32.6 01/31/2021    RDW 13.8 01/31/2021    LYMPHOPCT 16 01/31/2021    MONOPCT 9 01/31/2021    BASOPCT 1 01/31/2021    MONOSABS 1.20 01/31/2021    LYMPHSABS 2.20 01/31/2021    EOSABS 0.00 01/31/2021    BASOSABS 0.10 01/31/2021    DIFFTYPE NOT REPORTED 01/31/2021     BMP:    Lab Results   Component Value Date     01/31/2021    K 3.6 01/31/2021     01/31/2021    CO2 23 01/31/2021    BUN 21 01/31/2021    LABALBU 4.2 01/30/2021    CREATININE 0.96 01/31/2021    CALCIUM 8.9 01/31/2021    GFRAA >60 01/31/2021    LABGLOM >60 01/31/2021    GLUCOSE 131 01/31/2021     Hepatic Function Panel:    Lab Results   Component Value Date    ALKPHOS 59 01/30/2021    ALT 58 01/30/2021    AST 27 01/30/2021    PROT 7.3 01/30/2021    BILITOT 0.27 01/30/2021    BILIDIR 0.09 01/30/2021    IBILI 0.18 01/30/2021    LABALBU 4.2 01/30/2021     Calcium:    Lab Results   Component Value Date    CALCIUM 8.9 01/31/2021     Magnesium:    Lab Results   Component Value Date    MG 2.1 12/17/2020     Phosphorus:  No results found for: PHOS  PT/INR:    Lab Results   Component Value Date    PROTIME 12.7 01/31/2021    INR 1.0 01/31/2021     ABG:  No results found for: PHART, PH, QPB8YGO, PCO2, PO2ART, PO2, OAZ8EBJ, HCO3, BEART, BE, THGBART, THB, MEN3IBH, J9UMJFCY, O2SAT  Urine Culture:  No components found for: CURINE  Blood Culture:  No components found for: CBLOOD, CFUNGUSBL  Stool Culture:  No components found for: CSTOOL    RADIOLOGY:   I have personally reviewed the following films:  Echo Complete 2d W Doppler W Color    Result Date: 1/30/2021  [facility] Transthoracic Echocardiography Report (TTE)  Patient Name SANTHOSH   Date of Study               01/30/2021               [facility] T   Date of      1955  Gender                      Male  Birth   Age          72 year(s)  Race                           Room Number  2121        Height:                     67 inch, 170.18 cm   Corporate ID [id number]    Weight:                     220 pounds, 99.8 kg  #   Patient Acct [de-identified]   BSA:          2.11 m^2      BMI:      34.46  #                                                              kg/m^2   MR #         [id number]      Sonographer                 DarrionJackeline blankenship   Accession #  [id number]  Interpreting Physician      Ana Ng                   Referring Nurse                           Practitioner   Interpreting             Referring Physician         Ana Ng  Type of Study   TTE procedure:2D Echocardiogram, M-Mode, Doppler, Color Doppler. Procedure Date Date: 01/30/2021 Start: 10:46 AM Study Location: [address] [place] [address] Technical Quality: Fair visualization Indications:Chest pain. History / Tech. Comments: HTN Patient Status: Inpatient Height: 67 inches Weight: 220 pounds BSA: 2.11 m^2 BMI: 34.46 kg/m^2 Rhythm: Within normal limits HR: 67 bpm BP: 184/92 mmHg CONCLUSIONS Summary Normal left ventricle size and function with an estimated EF > 55%. No segmental wall motion abnormalities seen. Moderate left ventricular hypertrophy. Normal right ventricular size and function. Trivial mitral regurgitation. Trivial tricuspid regurgitation. Normal right ventricular systolic pressure. Aortic root is mildly dilated. (3.9 cm) No significant pericardial effusion is seen. Signature ----------------------------------------------------------------------------  Electronically signed by Jackeline Arrington(Sonographer) on 01/30/2021 11:13  AM ---------------------------------------------------------------------------- ----------------------------------------------------------------------------  Electronically signed by Huber Riojas(Interpreting physician) on  01/30/2021 11:38 AM ---------------------------------------------------------------------------- FINDINGS Left Atrium Left atrium is normal in size. Left Ventricle Normal left ventricle size and function with an estimated EF > 55%. No segmental wall motion abnormalities seen. Moderate left ventricular hypertrophy. Right Atrium Right atrium is normal in size. Right Ventricle Normal right ventricular size and function. Mitral Valve Normal mitral valve structure and function. Trivial mitral regurgitation. Aortic Valve Normal aortic valve structure and function without stenosis or regurgitation. Tricuspid Valve Normal tricuspid valve structure and function. Trivial tricuspid regurgitation. Normal right ventricular systolic pressure.  Pulmonic Valve Pulmonic valve not well visualized but Doppler velocities are normal. No pulmonic insufficiency. Pericardial Effusion No significant pericardial effusion is seen. Miscellaneous Aortic root is mildly dilated. (3.9 cm) E/e' average 9.5 IVC not well visualized. M-mode / 2D Measurements & Calculations:   LVIDd:5.44 cm(3.7 - 5.6 cm)      Diastolic MOIOTV:558 ml  QAIST:2.72 cm(2.2 - 4.0 cm)      Systolic LDVNKV:50.8 ml  VLSX:3.58 cm(0.6 - 1.1 cm)       Aortic Root:3.9 cm(2.0 - 3.7 cm)  LVPWd:1.66 cm(0.6 - 1.1 cm)      LA Dimension: 4.6 cm(1.9 - 4.0 cm)  Fractional Shortenin.07 %    LA volume/Index: 55.5 ml /26m^2  Calculated LVEF (%): 67.27 %     LVOT:2.2 cm   Mitral:                               Aortic   Peak E-Wave: 0.70 m/s                 Peak Velocity: 1.29 m/s  Peak A-Wave: 1.01 m/s                 Mean Velocity: 0.76 m/s  E/A Ratio: 0.69                       Peak Gradient: 6.66 mmHg  Peak Gradient: 1.95 mmHg              Mean Gradient: 3 mmHg  Deceleration Time: 211 msec                                         Area (continuity): 3.83 cm^2                                        AV VTI: 23.5 cm   Tricuspid:                            Pulmonic:   Estimated RVSP: 17 mmHg  Peak TR Velocity: 1.90 m/s  Peak TR Gradient: 14.44 mmHg  Estimated RA Pressure: 3 mmHg                                        Estimated PASP: 17.44 mmHg  Septal Wall E' velocity:0.07 m/s Lateral Wall E' velocity:0.08 m/s    Nm Hepatobiliary    Result Date: 2021  EXAMINATION: NUCLEAR MEDICINE HEPATOBILIARY SCINTIGRAPHY (HIDA SCAN). 2021 4:52 pm TECHNIQUE: Approximately 6 [id number] Mebrofenin (Choletec) was administered IV. Then, dynamic images of the abdomen were obtained in the anterior projection for 60 mins. 2 are delayed images were obtained. COMPARISON: CT chest abdomen pelvis 2021.  HISTORY: ORDERING SYSTEM PROVIDED HISTORY: abdominal pain TECHNOLOGIST PROVIDED HISTORY: abdominal pain Reason for Exam: abd pain Acuity: Unknown Type of Exam: Unknown FINDINGS: Prompt, homogenous uptake by the liver is noted with normal appearance of radiotracer excretion into the biliary system. Clearance of bloodpool activity appears appropriate. Gallbladder is not visualized at 60 minutes. Common bile duct and small bowel activity identified. Gallbladder activity is not visualized on delayed images. Findings suggestive of acute calculous cholecystitis. Cta Chest Abdomen Pelvis W Contrast    Result Date: 1/30/2021  EXAMINATION: CTA OF THE CHEST, ABDOMEN AND PELVIS WITH CONTRAST, 1/29/2021 11:40 pm TECHNIQUE: CTA of the chest, abdomen and pelvis was performed after the administration of intravenous contrast.  Multiplanar reformatted images are provided for review. MIP images are provided for review. Dose modulation, iterative reconstruction, and/or weight based adjustment of the mA/kV was utilized to reduce the radiation dose to as low as reasonably achievable. COMPARISON: CT abdomen and pelvis dated 12/17/2020. HISTORY: ORDERING SYSTEM PROVIDED HISTORY: hypertension, CP, evaluate for dissection TECHNOLOGIST PROVIDED HISTORY: hypertension, CP, evaluate for dissection Decision Support Exception->Emergency Medical Condition (MA) Reason for Exam: Patient c/o midsternal chest pain radiating into his upper back x 1 day, hx of hypertension, stroke Acuity: Acute Type of Exam: Initial FINDINGS: CARDIOVASCULAR: There is no evidence of aortic dissection. The aorta is normal in course and caliber. There is a small splenic aneurysm in the distal splenic artery measuring 1.0 cm x 0.9 x 1.3  Cm. The main pulmonary artery is normal in caliber. The heart is normal in size. There is no pericardial effusion. THYROID: The visualized thyroid gland is unremarkable. LUNGS/PLEURA: There are no focal consolidations or pleural effusions. There is no pneumothorax. The trachea and central bronchi are patent.  THORACIC NODES: There are no pathologically enlarged mediastinal, hilar or axillary lymph nodes. HEPATOBILIARY: There is diffuse fatty infiltration of the liver. Again noted is an area of hyperdensity within the right hepatic dome, which may reflect a cavernous hemangioma. There is no biliary ductal dilatation. There is cholelithiasis without evidence of cholecystitis. SPLEEN: Unremarkable. PANCREAS: There is fatty atrophy. ADRENAL GLANDS: Unremarkable. KIDNEYS: The kidneys are normal in size and contour and enhance symmetrically. There is no hydronephrosis or nephrolithiasis. Hypodensities are again noted in the kidneys bilaterally, likely reflecting renal cyst. These are grossly unchanged from prior CT. ABDOMINAL NODES: No adenopathy. PELVIC ORGANS: The urinary bladder is unremarkable. The prostate is normal in size. PERITONEUM/MESENTERY/BOWEL: The stomach is unremarkable. There is no bowel obstruction. There is no bowel wall thickening. Postsurgical changes from right hemicolectomy are again noted. Irregular areas of soft tissue density is again noted near the postsurgical area is (image 90). These are likely postsurgical changes such as scarring. BONES/SOFT TISSUES: The visualized osseous structures are grossly unremarkable. No CT evidence of aortic dissection. Incidentally noted splenic aneurysm measuring 1.0 x 0.9 x 1.3 cm. Postsurgical changes from right hemicolectomy are again noted. Unchanged soft tissue density in the mesentery adjacent to the hemicolectomy, which likely reflects postsurgical changes/scarring. Hepatic steatosis. Cholelithiasis without evidence of cholecystitis. IMPRESSION:   1. Acute calculus cholecystitis. Leukocytosis. 2. Symptoms going on for several days. 3. Previous right hemicolectomy for multiple colon polyps February 2020. does not have any pertinent problems on file. PLAN:   1. Discussed with patient and the nursing staff at length.   Given the duration of symptoms I will ask interventional radiology for percutaneous cholecystostomy tube placement. Continue IV antibiotic. PT/INR. N.p.o. after midnight. Hold blood thinners. Patient may eat today but then n.p.o. after midnight. Patient understands and agrees. We will schedule him for interval cholecystectomy in the next couple weeks. Thank you for this interesting consult and for allowing us to participate in the care of this patient. If you have any questions please don't hesitate to call.           Electronically signed by Tahmina Ernst MD  on 1/31/2021 at 3:31 PM

## 2021-01-31 NOTE — PLAN OF CARE
Problem: Pain:  Goal: Pain level will decrease  Outcome: Ongoing     Problem: SAFETY  Goal: Free from accidental physical injury  Outcome: Ongoing     Problem: DAILY CARE  Goal: Daily care needs are met  Outcome: Ongoing     Problem: PAIN  Goal: Patient's pain/discomfort is manageable  Outcome: Ongoing     Problem: SKIN INTEGRITY  Goal: Skin integrity is maintained or improved  Outcome: Ongoing     Problem: KNOWLEDGE DEFICIT  Goal: Patient/S.O. demonstrates understanding of disease process, treatment plan, medications, and discharge instructions.   Outcome: Ongoing     Problem: DISCHARGE BARRIERS  Goal: Patient's continuum of care needs are met  Outcome: Ongoing

## 2021-01-31 NOTE — PLAN OF CARE
Problem: Pain:  Goal: Pain level will decrease  Description: Pain level will decrease  1/31/2021 0409 by Rebecca Calvillo RN  Outcome: Ongoing  Note: Pt has not complained of any pain for this RN. Will monitor      Problem: SAFETY  Goal: Free from accidental physical injury  1/31/2021 0409 by Molli Cogan, RN  Outcome: Ongoing  Note: Pt remains free from any injury this RN's shift.  Will monitor

## 2021-02-01 ENCOUNTER — APPOINTMENT (OUTPATIENT)
Dept: INTERVENTIONAL RADIOLOGY/VASCULAR | Age: 66
End: 2021-02-01
Payer: COMMERCIAL

## 2021-02-01 LAB
-: NORMAL
ABSOLUTE EOS #: 0.1 K/UL (ref 0–0.4)
ABSOLUTE IMMATURE GRANULOCYTE: ABNORMAL K/UL (ref 0–0.3)
ABSOLUTE LYMPH #: 1.6 K/UL (ref 1–4.8)
ABSOLUTE MONO #: 1.3 K/UL (ref 0.1–1.3)
ANION GAP SERPL CALCULATED.3IONS-SCNC: 11 MMOL/L (ref 9–17)
BASOPHILS # BLD: 1 % (ref 0–2)
BASOPHILS ABSOLUTE: 0.1 K/UL (ref 0–0.2)
BUN BLDV-MCNC: 18 MG/DL (ref 8–23)
BUN/CREAT BLD: ABNORMAL (ref 9–20)
CALCIUM SERPL-MCNC: 9 MG/DL (ref 8.6–10.4)
CHLORIDE BLD-SCNC: 104 MMOL/L (ref 98–107)
CO2: 22 MMOL/L (ref 20–31)
CREAT SERPL-MCNC: 1.08 MG/DL (ref 0.7–1.2)
DIFFERENTIAL TYPE: ABNORMAL
EKG ATRIAL RATE: 60 BPM
EKG ATRIAL RATE: 62 BPM
EKG P AXIS: 44 DEGREES
EKG P AXIS: 48 DEGREES
EKG P-R INTERVAL: 186 MS
EKG P-R INTERVAL: 188 MS
EKG Q-T INTERVAL: 378 MS
EKG Q-T INTERVAL: 416 MS
EKG QRS DURATION: 102 MS
EKG QRS DURATION: 98 MS
EKG QTC CALCULATION (BAZETT): 383 MS
EKG QTC CALCULATION (BAZETT): 416 MS
EKG R AXIS: -4 DEGREES
EKG R AXIS: 7 DEGREES
EKG T AXIS: 33 DEGREES
EKG T AXIS: 38 DEGREES
EKG VENTRICULAR RATE: 60 BPM
EKG VENTRICULAR RATE: 62 BPM
EOSINOPHILS RELATIVE PERCENT: 1 % (ref 0–4)
GFR AFRICAN AMERICAN: >60 ML/MIN
GFR NON-AFRICAN AMERICAN: >60 ML/MIN
GFR SERPL CREATININE-BSD FRML MDRD: ABNORMAL ML/MIN/{1.73_M2}
GFR SERPL CREATININE-BSD FRML MDRD: ABNORMAL ML/MIN/{1.73_M2}
GLUCOSE BLD-MCNC: 120 MG/DL (ref 70–99)
HCT VFR BLD CALC: 41.4 % (ref 41–53)
HEMOGLOBIN: 14 G/DL (ref 13.5–17.5)
IMMATURE GRANULOCYTES: ABNORMAL %
LYMPHOCYTES # BLD: 13 % (ref 24–44)
MCH RBC QN AUTO: 32.2 PG (ref 26–34)
MCHC RBC AUTO-ENTMCNC: 33.7 G/DL (ref 31–37)
MCV RBC AUTO: 95.4 FL (ref 80–100)
MONOCYTES # BLD: 11 % (ref 1–7)
NRBC AUTOMATED: ABNORMAL PER 100 WBC
PDW BLD-RTO: 13.7 % (ref 11.5–14.9)
PLATELET # BLD: 188 K/UL (ref 150–450)
PLATELET ESTIMATE: ABNORMAL
PMV BLD AUTO: 10.4 FL (ref 6–12)
POTASSIUM SERPL-SCNC: 4 MMOL/L (ref 3.7–5.3)
RBC # BLD: 4.34 M/UL (ref 4.5–5.9)
RBC # BLD: ABNORMAL 10*6/UL
REASON FOR REJECTION: NORMAL
SARS-COV-2, RAPID: NORMAL
SARS-COV-2: NORMAL
SARS-COV-2: NOT DETECTED
SEG NEUTROPHILS: 74 % (ref 36–66)
SEGMENTED NEUTROPHILS ABSOLUTE COUNT: 9.1 K/UL (ref 1.3–9.1)
SODIUM BLD-SCNC: 137 MMOL/L (ref 135–144)
SOURCE: NORMAL
WBC # BLD: 12.2 K/UL (ref 3.5–11)
WBC # BLD: ABNORMAL 10*3/UL
ZZ NTE CLEAN UP: ORDERED TEST: NORMAL
ZZ NTE WITH NAME CLEAN UP: SPECIMEN SOURCE: NORMAL

## 2021-02-01 PROCEDURE — 87205 SMEAR GRAM STAIN: CPT

## 2021-02-01 PROCEDURE — 2709999900 IR BILIARY DRAIN EXTERNAL

## 2021-02-01 PROCEDURE — 2580000003 HC RX 258: Performed by: SURGERY

## 2021-02-01 PROCEDURE — 6370000000 HC RX 637 (ALT 250 FOR IP): Performed by: INTERNAL MEDICINE

## 2021-02-01 PROCEDURE — 2500000003 HC RX 250 WO HCPCS: Performed by: SURGERY

## 2021-02-01 PROCEDURE — 93010 ELECTROCARDIOGRAM REPORT: CPT | Performed by: INTERNAL MEDICINE

## 2021-02-01 PROCEDURE — G0378 HOSPITAL OBSERVATION PER HR: HCPCS

## 2021-02-01 PROCEDURE — 6370000000 HC RX 637 (ALT 250 FOR IP): Performed by: RADIOLOGY

## 2021-02-01 PROCEDURE — 87075 CULTR BACTERIA EXCEPT BLOOD: CPT

## 2021-02-01 PROCEDURE — 2580000003 HC RX 258: Performed by: STUDENT IN AN ORGANIZED HEALTH CARE EDUCATION/TRAINING PROGRAM

## 2021-02-01 PROCEDURE — 6360000002 HC RX W HCPCS: Performed by: FAMILY MEDICINE

## 2021-02-01 PROCEDURE — 87070 CULTURE OTHR SPECIMN AEROBIC: CPT

## 2021-02-01 PROCEDURE — 6360000002 HC RX W HCPCS: Performed by: RADIOLOGY

## 2021-02-01 PROCEDURE — 6370000000 HC RX 637 (ALT 250 FOR IP): Performed by: STUDENT IN AN ORGANIZED HEALTH CARE EDUCATION/TRAINING PROGRAM

## 2021-02-01 PROCEDURE — 85025 COMPLETE CBC W/AUTO DIFF WBC: CPT

## 2021-02-01 PROCEDURE — 47490 INCISION OF GALLBLADDER: CPT | Performed by: RADIOLOGY

## 2021-02-01 PROCEDURE — 36415 COLL VENOUS BLD VENIPUNCTURE: CPT

## 2021-02-01 PROCEDURE — 6360000002 HC RX W HCPCS: Performed by: SURGERY

## 2021-02-01 PROCEDURE — 80048 BASIC METABOLIC PNL TOTAL CA: CPT

## 2021-02-01 RX ORDER — KETOROLAC TROMETHAMINE 30 MG/ML
15 INJECTION, SOLUTION INTRAMUSCULAR; INTRAVENOUS EVERY 8 HOURS PRN
Status: DISCONTINUED | OUTPATIENT
Start: 2021-02-01 | End: 2021-02-02 | Stop reason: HOSPADM

## 2021-02-01 RX ORDER — ACETAMINOPHEN 325 MG/1
650 TABLET ORAL EVERY 4 HOURS PRN
Status: DISCONTINUED | OUTPATIENT
Start: 2021-02-01 | End: 2021-02-01 | Stop reason: ALTCHOICE

## 2021-02-01 RX ORDER — MIDAZOLAM HYDROCHLORIDE 1 MG/ML
INJECTION INTRAMUSCULAR; INTRAVENOUS
Status: COMPLETED | OUTPATIENT
Start: 2021-02-01 | End: 2021-02-01

## 2021-02-01 RX ADMIN — ASPIRIN 81 MG: 81 TABLET, CHEWABLE ORAL at 12:29

## 2021-02-01 RX ADMIN — FAMOTIDINE 20 MG: 10 INJECTION INTRAVENOUS at 12:29

## 2021-02-01 RX ADMIN — KETOROLAC TROMETHAMINE 15 MG: 30 INJECTION, SOLUTION INTRAMUSCULAR; INTRAVENOUS at 21:03

## 2021-02-01 RX ADMIN — CARVEDILOL 6.25 MG: 6.25 TABLET, FILM COATED ORAL at 12:29

## 2021-02-01 RX ADMIN — Medication 10 ML: at 12:30

## 2021-02-01 RX ADMIN — FAMOTIDINE 20 MG: 10 INJECTION INTRAVENOUS at 21:03

## 2021-02-01 RX ADMIN — PIPERACILLIN SODIUM AND TAZOBACTAM SODIUM 3375 MG: 3; .375 INJECTION, POWDER, LYOPHILIZED, FOR SOLUTION INTRAVENOUS at 12:34

## 2021-02-01 RX ADMIN — ATORVASTATIN CALCIUM 20 MG: 20 TABLET, FILM COATED ORAL at 21:03

## 2021-02-01 RX ADMIN — MIDAZOLAM 0.5 MG: 1 INJECTION INTRAMUSCULAR; INTRAVENOUS at 11:10

## 2021-02-01 RX ADMIN — PIPERACILLIN SODIUM AND TAZOBACTAM SODIUM 3375 MG: 3; .375 INJECTION, POWDER, LYOPHILIZED, FOR SOLUTION INTRAVENOUS at 21:08

## 2021-02-01 RX ADMIN — LISINOPRIL 20 MG: 20 TABLET ORAL at 12:29

## 2021-02-01 RX ADMIN — SODIUM CHLORIDE: 9 INJECTION, SOLUTION INTRAVENOUS at 12:34

## 2021-02-01 RX ADMIN — LISINOPRIL 20 MG: 20 TABLET ORAL at 21:03

## 2021-02-01 RX ADMIN — CARVEDILOL 6.25 MG: 6.25 TABLET, FILM COATED ORAL at 17:27

## 2021-02-01 RX ADMIN — ACETAMINOPHEN 650 MG: 325 TABLET ORAL at 12:29

## 2021-02-01 RX ADMIN — AMLODIPINE BESYLATE 5 MG: 5 TABLET ORAL at 12:29

## 2021-02-01 RX ADMIN — PIPERACILLIN SODIUM AND TAZOBACTAM SODIUM 3375 MG: 3; .375 INJECTION, POWDER, LYOPHILIZED, FOR SOLUTION INTRAVENOUS at 04:28

## 2021-02-01 RX ADMIN — Medication 10 ML: at 21:03

## 2021-02-01 ASSESSMENT — PAIN SCALES - GENERAL
PAINLEVEL_OUTOF10: 0
PAINLEVEL_OUTOF10: 0
PAINLEVEL_OUTOF10: 8
PAINLEVEL_OUTOF10: 2
PAINLEVEL_OUTOF10: 4

## 2021-02-01 NOTE — CARE COORDINATION
ONGOING DISCHARGE PLAN:    Reviewed patients chart regarding discharge plan and chart still confirms the plan is to discharge to home with no needs  Patient will have a mary tube placed today in IR  Will need a Cholecystectomy   Will discharge to home on oral atb      Will review plan with patient and or family      Will continue to follow for additional discharge needs.      Electronically signed by Kalina aKtz RN on 2/1/2021 at 1:01 PM

## 2021-02-01 NOTE — PROGRESS NOTES
[facility]                 PATIENT NAME: Nahid Ojeda     TODAY'S DATE: 2/1/2021, 10:14 AM    SUBJECTIVE:    Pt seen and examined. Afebrile, VSS. Leukocytosis improved, hemoglobin stable. COVID negative. Patient irritated today, stating he wants to eat. He denies abdominal pain, N/V. HIDA scan results positive. Patient to have IR cholecystostomy tube today. OBJECTIVE:   VITALS:  BP (!) 159/91   Pulse 77   Temp 99.4 °F (37.4 °C) (Oral)   Resp 18   Ht 5' 7\" (1.702 m)   Wt 201 lb 15.1 oz (91.6 kg)   SpO2 93%   BMI 31.63 kg/m²      INTAKE/OUTPUT:      Intake/Output Summary (Last 24 hours) at 2/1/2021 1014  Last data filed at 2/1/2021 1580  Gross per 24 hour   Intake 928.96 ml   Output --   Net 928.96 ml                 CONSTITUTIONAL:  awake and alert. No acute distress  HEART:   RRR  LUNGS:   Decreased air entry at bases, no wheezing   ABDOMEN:   Abdomen soft, non-tender, non-distended  EXTREMITIES:   Trace pedal edema    Data:  CBC:   Lab Results   Component Value Date    WBC 12.2 02/01/2021    RBC 4.34 02/01/2021    HGB 14.0 02/01/2021    HCT 41.4 02/01/2021    MCV 95.4 02/01/2021    MCH 32.2 02/01/2021    MCHC 33.7 02/01/2021    RDW 13.7 02/01/2021     02/01/2021    MPV 10.4 02/01/2021     BMP:    Lab Results   Component Value Date     02/01/2021    K 4.0 02/01/2021     02/01/2021    CO2 22 02/01/2021    BUN 18 02/01/2021    LABALBU 4.2 01/30/2021    CREATININE 1.08 02/01/2021    CALCIUM 9.0 02/01/2021    GFRAA >60 02/01/2021    LABGLOM >60 02/01/2021    GLUCOSE 120 02/01/2021       Radiology Review:       NM HEPATOBILIARY [[id number]] Collected: 01/30/21 1919     Order Status: Completed Updated: 01/30/21 1953     Narrative:       EXAMINATION:   NUCLEAR MEDICINE HEPATOBILIARY SCINTIGRAPHY (HIDA SCAN). 1/30/2021 4:52 pm     TECHNIQUE:   Approximately 6 [id number] Mebrofenin (Choletec) was administered IV.    Then, dynamic images of the March 19, 2019     Patient: Ally Jesus   YOB: 1988   Date of Visit: 3/19/2019       To Whom it May Concern:    Ally Jesus is under my professional care  She was seen in my office on 3/19/2019  Please excuse Renee Valenzuela from work as she was accompanying her daughter to her appointment  If you have any questions or concerns, please don't hesitate to call           Sincerely,          Sampson Simons MD        CC: No Recipients abdomen were obtained in the anterior projection   for 60 mins.  2 are delayed images were obtained. COMPARISON:   CT chest abdomen pelvis 01/29/2021. HISTORY:   ORDERING SYSTEM PROVIDED HISTORY: abdominal pain   TECHNOLOGIST PROVIDED HISTORY:   abdominal pain   Reason for Exam: abd pain   Acuity: Unknown   Type of Exam: Unknown     FINDINGS:   Prompt, homogenous uptake by the liver is noted with normal appearance of   radiotracer excretion into the biliary system.  Clearance of bloodpool   activity appears appropriate. Gallbladder is not visualized at 60 minutes.  Common bile duct and small   bowel activity identified.  Gallbladder activity is not visualized on delayed   images.      Impression:       Findings suggestive of acute calculous cholecystitis. ASSESSMENT     Active Problems:    Uncontrolled hypertension    Chest pain    Splenic artery aneurysm (HCC)    Acute acalculous cholecystitis  Resolved Problems:    * No resolved hospital problems. *      Plan  1. NPO  2. IV Zosyn   3. IR cholecystostomy tube today  4. Patient will need interval cholecystectomy in future  5. Surgically stable  6. Continue medical management   7. Patient was seen and examined. Downstairs getting cholecystostomy tube placed by IR. No plans to send him home on IV antibiotics. We will send him home on oral antibiotics and cholecystostomy tube with interval cholecystectomy in the next couple weeks.       Electronically signed by Donis Almonte PA-C  [id number] [address]

## 2021-02-01 NOTE — PLAN OF CARE
Problem: Pain:  Goal: Pain level will decrease  Description: Pain level will decrease  2/1/2021 0544 by Rebecca Calvillo RN  Outcome: Ongoing  Note: Pt complained of pain once for this RN. Pt received tylenol. Will monitor      Problem: SAFETY  Goal: Free from accidental physical injury  2/1/2021 0544 by Leonardo Verma RN  Outcome: Ongoing  Note: Pt remained free from injury this RN's shift.  Will monitor

## 2021-02-01 NOTE — BRIEF OP NOTE
Brief Postoperative Note    Zak Padilla  YOB: 1955  [id number]    Pre-operative Diagnosis: Acute cholecystitis    Post-operative Diagnosis: Same    Procedure: Percutaneous 8 fr cholecystostomy tube placement    Anesthesia: Moderate Sedation    Surgeons/Assistants: Jose    Estimated Blood Loss: less than 50     Complications: None    Specimens: Was Obtained: 3 cc of viscous straw colored fluid    Electronically signed by Maria A Richards MD on 2/1/2021 at 11:28 AM

## 2021-02-02 VITALS
HEIGHT: 67 IN | OXYGEN SATURATION: 91 % | WEIGHT: 195.55 LBS | SYSTOLIC BLOOD PRESSURE: 132 MMHG | TEMPERATURE: 98.4 F | RESPIRATION RATE: 17 BRPM | BODY MASS INDEX: 30.69 KG/M2 | HEART RATE: 64 BPM | DIASTOLIC BLOOD PRESSURE: 85 MMHG

## 2021-02-02 LAB
ABSOLUTE EOS #: 0.3 K/UL (ref 0–0.4)
ABSOLUTE IMMATURE GRANULOCYTE: ABNORMAL K/UL (ref 0–0.3)
ABSOLUTE LYMPH #: 2.6 K/UL (ref 1–4.8)
ABSOLUTE MONO #: 0.9 K/UL (ref 0.1–1.3)
ANION GAP SERPL CALCULATED.3IONS-SCNC: 12 MMOL/L (ref 9–17)
BASOPHILS # BLD: 1 % (ref 0–2)
BASOPHILS ABSOLUTE: 0.1 K/UL (ref 0–0.2)
BUN BLDV-MCNC: 23 MG/DL (ref 8–23)
BUN/CREAT BLD: ABNORMAL (ref 9–20)
CALCIUM SERPL-MCNC: 8.5 MG/DL (ref 8.6–10.4)
CHLORIDE BLD-SCNC: 106 MMOL/L (ref 98–107)
CO2: 21 MMOL/L (ref 20–31)
CREAT SERPL-MCNC: 1.17 MG/DL (ref 0.7–1.2)
DIFFERENTIAL TYPE: ABNORMAL
EOSINOPHILS RELATIVE PERCENT: 3 % (ref 0–4)
GFR AFRICAN AMERICAN: >60 ML/MIN
GFR NON-AFRICAN AMERICAN: >60 ML/MIN
GFR SERPL CREATININE-BSD FRML MDRD: ABNORMAL ML/MIN/{1.73_M2}
GFR SERPL CREATININE-BSD FRML MDRD: ABNORMAL ML/MIN/{1.73_M2}
GLUCOSE BLD-MCNC: 106 MG/DL (ref 70–99)
HCT VFR BLD CALC: 38 % (ref 41–53)
HEMOGLOBIN: 12.7 G/DL (ref 13.5–17.5)
IMMATURE GRANULOCYTES: ABNORMAL %
LYMPHOCYTES # BLD: 30 % (ref 24–44)
MCH RBC QN AUTO: 32.6 PG (ref 26–34)
MCHC RBC AUTO-ENTMCNC: 33.5 G/DL (ref 31–37)
MCV RBC AUTO: 97.2 FL (ref 80–100)
MONOCYTES # BLD: 10 % (ref 1–7)
NRBC AUTOMATED: ABNORMAL PER 100 WBC
PDW BLD-RTO: 13.8 % (ref 11.5–14.9)
PLATELET # BLD: 179 K/UL (ref 150–450)
PLATELET ESTIMATE: ABNORMAL
PMV BLD AUTO: 10 FL (ref 6–12)
POTASSIUM SERPL-SCNC: 3.7 MMOL/L (ref 3.7–5.3)
RBC # BLD: 3.91 M/UL (ref 4.5–5.9)
RBC # BLD: ABNORMAL 10*6/UL
SEG NEUTROPHILS: 56 % (ref 36–66)
SEGMENTED NEUTROPHILS ABSOLUTE COUNT: 5 K/UL (ref 1.3–9.1)
SODIUM BLD-SCNC: 139 MMOL/L (ref 135–144)
WBC # BLD: 8.8 K/UL (ref 3.5–11)
WBC # BLD: ABNORMAL 10*3/UL

## 2021-02-02 PROCEDURE — 2500000003 HC RX 250 WO HCPCS: Performed by: SURGERY

## 2021-02-02 PROCEDURE — 6360000002 HC RX W HCPCS: Performed by: SURGERY

## 2021-02-02 PROCEDURE — 80048 BASIC METABOLIC PNL TOTAL CA: CPT

## 2021-02-02 PROCEDURE — 6370000000 HC RX 637 (ALT 250 FOR IP): Performed by: STUDENT IN AN ORGANIZED HEALTH CARE EDUCATION/TRAINING PROGRAM

## 2021-02-02 PROCEDURE — G0378 HOSPITAL OBSERVATION PER HR: HCPCS

## 2021-02-02 PROCEDURE — 36415 COLL VENOUS BLD VENIPUNCTURE: CPT

## 2021-02-02 PROCEDURE — 97161 PT EVAL LOW COMPLEX 20 MIN: CPT

## 2021-02-02 PROCEDURE — 6370000000 HC RX 637 (ALT 250 FOR IP): Performed by: INTERNAL MEDICINE

## 2021-02-02 PROCEDURE — 2580000003 HC RX 258: Performed by: SURGERY

## 2021-02-02 PROCEDURE — 85025 COMPLETE CBC W/AUTO DIFF WBC: CPT

## 2021-02-02 RX ORDER — DOCUSATE SODIUM 100 MG/1
100 CAPSULE, LIQUID FILLED ORAL 2 TIMES DAILY
Qty: 30 CAPSULE | Refills: 2 | Status: ON HOLD | OUTPATIENT
Start: 2021-02-02 | End: 2021-02-19

## 2021-02-02 RX ORDER — CARVEDILOL 6.25 MG/1
6.25 TABLET ORAL 2 TIMES DAILY WITH MEALS
Qty: 60 TABLET | Refills: 1 | Status: SHIPPED | OUTPATIENT
Start: 2021-02-02 | End: 2021-04-08 | Stop reason: SDUPTHER

## 2021-02-02 RX ORDER — LEVOFLOXACIN 500 MG/1
500 TABLET, FILM COATED ORAL DAILY
Qty: 7 TABLET | Refills: 0 | Status: SHIPPED | OUTPATIENT
Start: 2021-02-02 | End: 2021-02-09

## 2021-02-02 RX ORDER — AMLODIPINE BESYLATE 5 MG/1
5 TABLET ORAL DAILY
Qty: 30 TABLET | Refills: 3 | Status: SHIPPED | OUTPATIENT
Start: 2021-02-03 | End: 2021-06-14 | Stop reason: SDUPTHER

## 2021-02-02 RX ADMIN — ASPIRIN 81 MG: 81 TABLET, CHEWABLE ORAL at 07:59

## 2021-02-02 RX ADMIN — PIPERACILLIN SODIUM AND TAZOBACTAM SODIUM 3375 MG: 3; .375 INJECTION, POWDER, LYOPHILIZED, FOR SOLUTION INTRAVENOUS at 04:22

## 2021-02-02 RX ADMIN — CARVEDILOL 6.25 MG: 6.25 TABLET, FILM COATED ORAL at 07:58

## 2021-02-02 RX ADMIN — AMLODIPINE BESYLATE 5 MG: 5 TABLET ORAL at 07:59

## 2021-02-02 RX ADMIN — FAMOTIDINE 20 MG: 10 INJECTION INTRAVENOUS at 07:59

## 2021-02-02 RX ADMIN — SODIUM CHLORIDE: 9 INJECTION, SOLUTION INTRAVENOUS at 01:49

## 2021-02-02 RX ADMIN — LISINOPRIL 20 MG: 20 TABLET ORAL at 07:59

## 2021-02-02 ASSESSMENT — PAIN DESCRIPTION - DESCRIPTORS: DESCRIPTORS: ACHING

## 2021-02-02 ASSESSMENT — PAIN DESCRIPTION - ONSET: ONSET: ON-GOING

## 2021-02-02 ASSESSMENT — PAIN DESCRIPTION - PAIN TYPE: TYPE: ACUTE PAIN

## 2021-02-02 ASSESSMENT — PAIN DESCRIPTION - LOCATION: LOCATION: ABDOMEN

## 2021-02-02 ASSESSMENT — PAIN - FUNCTIONAL ASSESSMENT: PAIN_FUNCTIONAL_ASSESSMENT: ACTIVITIES ARE NOT PREVENTED

## 2021-02-02 ASSESSMENT — PAIN DESCRIPTION - ORIENTATION: ORIENTATION: MID;LOWER

## 2021-02-02 ASSESSMENT — PAIN DESCRIPTION - PROGRESSION: CLINICAL_PROGRESSION: GRADUALLY IMPROVING

## 2021-02-02 ASSESSMENT — PAIN SCALES - GENERAL: PAINLEVEL_OUTOF10: 5

## 2021-02-02 ASSESSMENT — PAIN DESCRIPTION - FREQUENCY: FREQUENCY: CONTINUOUS

## 2021-02-02 NOTE — PLAN OF CARE
Problem: Pain:  Goal: Pain level will decrease  Description: Pain level will decrease  2/1/2021 1933 by Eligio Nolasco RN  Outcome: Ongoing  Note: Patient given pain medication as ordered. Problem: SKIN INTEGRITY  Goal: Skin integrity is maintained or improved  Outcome: Ongoing  Note: No new alterations in skin integrity.

## 2021-02-02 NOTE — PROGRESS NOTES
[facility]   Progress Note                   Date:   2/1/21  Patient name: Armando Lorenzo  Date of admission:  1/29/2021 10:12 PM  MRN:   [id number]  YOB: 1955  PCP: Lenora Bhandari MD    Reason for Admission:     Subjective:       Clinical Changes / Abnormalities:  Doing well after IR placement of cholecystostomy tube, with mild RUQ discomfort and good BP control    Medications:   Scheduled Meds:   lisinopril  20 mg Oral BID    amLODIPine  5 mg Oral Daily    carvedilol  6.25 mg Oral BID WC    aspirin  81 mg Oral Daily    atorvastatin  20 mg Oral Nightly    sodium chloride flush  10 mL Intravenous 2 times per day    piperacillin-tazobactam (ZOSYN) 3375 mg in dextrose 5% IVPB extended infusion (mini-bag)  3,375 mg Intravenous Q8H    famotidine (PEPCID) injection  20 mg Intravenous BID     Continuous Infusions:   sodium chloride 75 mL/hr at 02/01/21 1234     CBC:   Recent Labs     01/30/21 0432 01/31/21 0429 02/01/21  0519   WBC 14.3* 13.5* 12.2*   HGB 13.9 13.7 14.0    204 188     BMP:    Recent Labs     01/30/21 0432 01/31/21  0429 02/01/21  0718    137 137   K 4.3 3.6* 4.0    104 104   CO2 22 23 22   BUN 19 21 18   CREATININE 0.98 0.96 1.08   GLUCOSE 157* 131* 120*     Hepatic:   Recent Labs     01/30/21 0432   AST 27   ALT 58*   BILITOT 0.27*   ALKPHOS 59     Troponin: No results for input(s): TROPONINI in the last 72 hours. BNP: No results for input(s): BNP in the last 72 hours. Lipids: No results for input(s): CHOL, HDL in the last 72 hours. Invalid input(s): LDLCALCU  INR:   Recent Labs     01/30/21 2032 01/31/21  1426   INR 1.0 1.0       Objective:   Vitals: /77   Pulse 76   Temp 99.7 °F (37.6 °C) (Oral)   Resp 18   Ht 5' 7\" (1.702 m)   Wt 201 lb 15.1 oz (91.6 kg)   SpO2 91%   BMI 31.63 kg/m²   General appearance: alert and cooperative with exam  HEENT: Head: Normocephalic, no lesions, without obvious abnormality.   Neck: no adenopathy, no carotid bruit, no JVD, supple, symmetrical, trachea midline and thyroid not enlarged, symmetric, no tenderness/mass/nodules  Lungs: clear to auscultation bilaterally  Heart: regular rate and rhythm, S1, S2 normal, no murmur, click, rub or gallop  Abdomen: soft, non-tender; bowel sounds normal; no masses,  no organomegaly  Extremities: extremities normal, atraumatic, no cyanosis or edema  Neurologic: Mental status: Alert, oriented, thought content appropriate    TTE 01/30/21  Summary  Normal left ventricle size and function with an estimated EF > 55%. No segmental wall motion abnormalities seen. Moderate left ventricular hypertrophy. Normal right ventricular size and function. Trivial mitral regurgitation. Trivial tricuspid regurgitation. Normal right ventricular systolic pressure. Aortic root is mildly dilated. (3.9 cm)  No significant pericardial effusion is seen. Assessment / Acute Cardiac Problems:   1. Acute cholecystitis, s/p placement of cholecystostomy tube 2/1/21  2. Essential HTN, improved control on higher dose lisinopril with addition of amlodipine  3. Preserved LVEF; no CHF on exam  4. Splenic artery aneursym    Patient Active Problem List:     Osteoporosis     Vitamin D deficiency     History of stroke     Degeneration of lumbar or lumbosacral intervertebral disc     Back pain     Low back pain     Allergic rhinitis     Impaired fasting glucose     Mixed hyperlipidemia     Uncontrolled hypertension     Obesity (BMI 30.0-34. 9)     Tear of left rotator cuff     Left bicipital tenosynovitis     Colon polyp     Acute blood loss as cause of postoperative anemia     S/P right hemicolectomy     History of malignant neoplasm of skin     Acute postoperative pain     Anemia     Ex-cigarette smoker     Chest pain     Splenic artery aneurysm (HCC)     Acute acalculous cholecystitis      Plan of Treatment:   1. Continue lisinopril 20 mg bid  2. Continue Norvasc 5 mg daily  3. Continue carvedilol 6.25 mg po bid    Electronically signed by Ki Linares MD on 2/2/2021 at 1:03 [address] [facility]  [phone]

## 2021-02-02 NOTE — FLOWSHEET NOTE
02/02/21 1129   Encounter Summary   Services provided to: Patient   Referral/Consult From: [facility]   Continue Visiting   (2-2-21)   Complexity of Encounter Low   Length of Encounter 15 minutes   Routine   Type Initial   Assessment Calm; Approachable   Intervention Active listening;Explored feelings, thoughts, concerns;[place];Sustaining presence/ Ministry of presence; Discussed illness/injury and it's impact   Outcome Expressed gratitude;Engaged in conversation;Expressed feelings/needs/concerns;Coping;Receptive

## 2021-02-02 NOTE — PROGRESS NOTES
Physical Therapy    Facility/Department: [address] [place] [address] CARE  Initial Assessment    NAME: Dionna Magana  : 1955  MRN: [id number]    Date of Service: 2021    Discharge Recommendations:      PT Equipment Recommendations  Equipment Needed: No    Assessment   Assessment: Pt demo's IND mobility and normal strength, balance and ROM. No further PT warranted  Decision Making: Low Complexity  History: 69-year-old male history of hypertension, hyperlipidemia, CVA, GERD presents for evaluation of epigastric and chest pain. Symptoms started acutely around 8 PM tonight after the patient ate dinner. Described as nonradiating moderate pressure type pain. Has been constant since onset. Did have some nausea and dry heaves but no other vomiting. No diaphoresis or shortness of breath. No other recent illness. Patient has had this pain in the past.  Did not take anything at home prior to arrival.  No fever or chills. No diarrhea. Urinary symptoms. No cough  Exam: ROM, MMT, balance, sensation, mobility  Clinical Presentation: no distress  PT Education: PT Role  Barriers to Learning: none  No Skilled PT: Independent with functional mobility   REQUIRES PT FOLLOW UP: No  Activity Tolerance  Activity Tolerance: Patient Tolerated treatment well       Patient Diagnosis(es): The encounter diagnosis was Chest pain, unspecified type. has a past medical history of Essential hypertension, Hyperlipidemia, Hyperparathyroidism ([address] Obesity (BMI 30-39.9), Osteoporosis, Sciatica, Stroke ([address] and Vertebral fracture, osteoporotic ([address] has a past surgical history that includes Vasectomy; Colonoscopy (3/19/2014); hernia repair; Colonoscopy (N/A, 2020); hemicolectomy (Right, 2020); omentum resection (2020); and IR Biliary Drain External (2021).     Restrictions  Restrictions/Precautions  Restrictions/Precautions: Up as Tolerated  Required Braces or Orthoses?: No  Position Activity Restriction  Other position/activity restrictions: R biliary drain  Vision/Hearing  Vision: Within Functional Limits  Hearing: Within functional limits     Subjective  General  Chart Reviewed: No  Patient assessed for rehabilitation services?: Yes  Response To Previous Treatment: Not applicable  Family / Caregiver Present: No  Follows Commands: Within Functional Limits  General Comment  Comments: Chayito WATSON ok's session, states pt has been on bedrest and needs an assessment to ensure safety  Subjective  Subjective: Pt is resting in bed and states he wants to shower, pt is agreeable to PT eval  Pain Screening  Patient Currently in Pain: Yes  Pain Assessment  Pain Assessment: 0-10  Pain Level: 5  Patient's Stated Pain Goal: No pain  Pain Type: Acute pain  Pain Location: Abdomen  Pain Orientation: Mid;Lower  Pain Descriptors: Aching  Pain Frequency: Continuous  Pain Onset: On-going  Clinical Progression: Gradually improving  Functional Pain Assessment: Activities are not prevented  Vital Signs  Patient Currently in Pain: Yes  Pre Treatment Pain Screening  Intervention List: Patient able to continue with treatment    Orientation  Orientation  Overall Orientation Status: Within Normal Limits  Social/Functional History  Social/Functional History  Lives With: Alone  Type of Home: House  Home Layout: One level  Home Access: Stairs to enter with rails  Entrance Stairs - Number of Steps: 3-4  Entrance Stairs - Rails: Left  Bathroom Shower/Tub: Walk-in shower  Bathroom Toilet: Standard  Home Equipment: (no DME)  ADL Assistance: Independent  Homemaking Assistance: Independent  Homemaking Responsibilities: Yes  Ambulation Assistance: Independent  Transfer Assistance: Independent  Active :  Yes  Additional Comments: Denies concerns for going home  Cognition        Objective     Observation/Palpation  Posture: Good  Observation: no distress noted, no balancce deficits, needs education re: care taken of drain during mobility    AROM RLE (degrees)  RLE AROM: WNL  AROM LLE (degrees)  LLE AROM : WNL  AROM RUE (degrees)  RUE AROM : WNL  AROM LUE (degrees)  LUE AROM : WNL  Strength RLE  Strength RLE: WFL  Strength LLE  Strength LLE: WFL  Strength RUE  Strength RUE: WFL  Strength LUE  Strength LUE: WFL  Tone RLE  RLE Tone: Normotonic  Tone LLE  LLE Tone: Normotonic  Motor Control  Gross Motor?: WFL  Sensation  Overall Sensation Status: Impaired  Light Touch: Partial deficits in the RLE;Partial deficits in the LLE(distal neuropathy d/t chronic back pain)  Bed mobility  Supine to Sit: Independent  Sit to Supine: Independent  Scooting: Independent  Transfers  Sit to Stand: Independent  Stand to sit:  Independent  Bed to Chair: Independent  Ambulation  Ambulation?: Yes  More Ambulation?: No  Ambulation 1  Surface: level tile  Device: No Device  Assistance: Independent  Gait Deviations: None  Distance: 50 ft  Stairs/Curb  Stairs?: No(pt denies concerns for steps to enter home)     Balance  Posture: Good  Sitting - Static: Good  Sitting - Dynamic: Good  Standing - Static: Good  Standing - Dynamic: Good  Comments: no device        Plan   Plan  Times per week: no PT warranted  Safety Devices  Type of devices: Left in chair, Call light within reach, Nurse notified    G-Code       OutComes Score                                                  AM-PAC Score     AM-PAC Inpatient Mobility without Stair Climbing Raw Score : 20 (02/02/21 1247)  AM-PAC Inpatient without Stair Climbing T-Scale Score : 60.57 (02/02/21 1247)  Mobility Inpatient CMS 0-100% Score: 0 (02/02/21 1247)  Mobility Inpatient without Stair CMS G-Code Modifier : [address] (02/02/21 1247)       Goals  Short term goals  Time Frame for Short term goals: No further PT intervention needed at this time  Patient Goals   Patient goals : go home       Therapy Time   Individual Concurrent Group Co-treatment   Time In 1150         Time Out [address] PT

## 2021-02-02 NOTE — PROGRESS NOTES
Hospitalist Progress Note  2/1/2021 8:20 PM  Subjective:   Admit Date: 1/29/2021  PCP: Ronit Shah MD     Full Code      C/c:  Chief Complaint   Patient presents with    Chest Pain         Interval History: pt had IR place cholecystostomy today,complaining of pain in surgical site,wbc is better    Diet: DIET LOW FAT;                                ip days:0  Medications:   Scheduled Meds:   lisinopril  20 mg Oral BID    amLODIPine  5 mg Oral Daily    carvedilol  6.25 mg Oral BID WC    aspirin  81 mg Oral Daily    atorvastatin  20 mg Oral Nightly    sodium chloride flush  10 mL Intravenous 2 times per day    piperacillin-tazobactam (ZOSYN) 3375 mg in dextrose 5% IVPB extended infusion (mini-bag)  3,375 mg Intravenous Q8H    famotidine (PEPCID) injection  20 mg Intravenous BID     Continuous Infusions:   sodium chloride 75 mL/hr at 02/01/21 1234     PRN Meds:.ketorolac, potassium chloride, sodium chloride flush, acetaminophen **OR** acetaminophen, polyethylene glycol, ondansetron, hydrALAZINE, perflutren lipid microspheres, sodium chloride flush, nitroGLYCERIN     CBC:   Recent Labs     01/30/21  0432 01/31/21  0429 02/01/21  0519   WBC 14.3* 13.5* 12.2*   HGB 13.9 13.7 14.0    204 188     BMP:    Recent Labs     01/30/21  0432 01/31/21  0429 02/01/21  0718    137 137   K 4.3 3.6* 4.0    104 104   CO2 22 23 22   BUN 19 21 18   CREATININE 0.98 0.96 1.08   GLUCOSE 157* 131* 120*     Hepatic:   Recent Labs     01/29/21  2244 01/30/21  0432   AST 36 27   ALT 61* 58*   BILITOT 0.21* 0.27*   ALKPHOS 63 59     Troponin: No results for input(s): TROPONINI in the last 72 hours. BNP: No results for input(s): BNP in the last 72 hours. Lipids: No results for input(s): CHOL, HDL in the last 72 hours.     Invalid input(s): LDLCALCU  INR:   Recent Labs     01/30/21 2032 01/31/21  1426   INR 1.0 1.0       Objective:   Vitals: /86   Pulse 73   Temp 99.3 °F (37.4 °C) (Oral)   Resp 19 Ht 5' 7\" (1.702 m)   Wt 201 lb 15.1 oz (91.6 kg)   SpO2 90%   BMI 31.63 kg/m²   General appearance: alert, appears stated age and cooperative  Skin: Skin color, texture, turgor normal. No rashes or lesions  Lungs: clear to auscultation bilaterally  Heart: regular rate and rhythm, S1, S2 normal, no murmur, click, rub or gallop  Abdomen: soft, non-tender; bowel sounds normal; no masses,  no organomegaly  Extremities: extremities normal, atraumatic, no cyanosis or edema  Neurologic: Mental status: Alert, oriented, thought content appropriate    Prophylaxis:   DVT with  [] lovenox        [] heparin        [] Scd        [x] none:     Radiology:  Echo Complete 2d W Doppler W Color    Result Date: 1/30/2021  [address] Transthoracic Echocardiography Report (TTE)  Patient Name SANTHOSH   Date of Study               01/30/2021               [facility] T   Date of      1955  Gender                      Male  Birth   Age          72 year(s)  Race                           Room Number  2121        Height:                     67 inch, 170.18 cm   Corporate ID [id number]    Weight:                     220 pounds, 99.8 kg  #   Patient [place] [de-identified]   BSA:          2.11 m^2      BMI:      34.46  #                                                              kg/m^2   MR #         [id number]      Sonographer                 Jackeline Arrington   Accession #  [id number]  Interpreting Physician      Lynda Watson   Fellow                   Referring Nurse                           Practitioner   Interpreting             Referring Physician         Lynda Watson  Fellow  Type of Study   TTE procedure:2D Echocardiogram, M-Mode, Doppler, Color Doppler. Procedure Date Date: 01/30/2021 Start: 10:46 AM Study Location: [address] [place] [address] Technical Quality: Fair visualization Indications:Chest pain. History / Tech.  Comments: HTN Patient Status: Inpatient Height: 67 inches Weight: 220 pounds BSA: 2.11 m^2 BMI: 34.46 kg/m^2 Rhythm: Within normal limits HR: 67 bpm BP: 184/92 mmHg CONCLUSIONS Summary Normal left ventricle size and function with an estimated EF > 55%. No segmental wall motion abnormalities seen. Moderate left ventricular hypertrophy. Normal right ventricular size and function. Trivial mitral regurgitation. Trivial tricuspid regurgitation. Normal right ventricular systolic pressure. Aortic root is mildly dilated. (3.9 cm) No significant pericardial effusion is seen. Signature ----------------------------------------------------------------------------  Electronically signed by Jackeline Arrington(Sonographer) on 01/30/2021 11:13  AM ---------------------------------------------------------------------------- ----------------------------------------------------------------------------  Electronically signed by Huber Riojas(Interpreting physician) on  01/30/2021 11:38 AM ---------------------------------------------------------------------------- FINDINGS Left Atrium Left atrium is normal in size. Left Ventricle Normal left ventricle size and function with an estimated EF > 55%. No segmental wall motion abnormalities seen. Moderate left ventricular hypertrophy. Right Atrium Right atrium is normal in size. Right Ventricle Normal right ventricular size and function. Mitral Valve Normal mitral valve structure and function. Trivial mitral regurgitation. Aortic Valve Normal aortic valve structure and function without stenosis or regurgitation. Tricuspid Valve Normal tricuspid valve structure and function. Trivial tricuspid regurgitation. Normal right ventricular systolic pressure. Pulmonic Valve Pulmonic valve not well visualized but Doppler velocities are normal. No pulmonic insufficiency. Pericardial Effusion No significant pericardial effusion is seen. Miscellaneous Aortic root is mildly dilated. (3.9 cm) E/e' average 9.5 IVC not well visualized.  M-mode / 2D Measurements & Calculations:   LVIDd:5.44 cm(3.7 - Gallbladder activity is not visualized on delayed images. Findings suggestive of acute calculous cholecystitis. Cta Chest Abdomen Pelvis W Contrast    Result Date: 1/30/2021  EXAMINATION: CTA OF THE CHEST, ABDOMEN AND PELVIS WITH CONTRAST, 1/29/2021 11:40 pm TECHNIQUE: CTA of the chest, abdomen and pelvis was performed after the administration of intravenous contrast.  Multiplanar reformatted images are provided for review. MIP images are provided for review. Dose modulation, iterative reconstruction, and/or weight based adjustment of the mA/kV was utilized to reduce the radiation dose to as low as reasonably achievable. COMPARISON: CT abdomen and pelvis dated 12/17/2020. HISTORY: ORDERING SYSTEM PROVIDED HISTORY: hypertension, CP, evaluate for dissection TECHNOLOGIST PROVIDED HISTORY: hypertension, CP, evaluate for dissection Decision Support Exception->Emergency Medical Condition (MA) Reason for Exam: Patient c/o midsternal chest pain radiating into his upper back x 1 day, hx of hypertension, stroke Acuity: Acute Type of Exam: Initial FINDINGS: CARDIOVASCULAR: There is no evidence of aortic dissection. The aorta is normal in course and caliber. There is a small splenic aneurysm in the distal splenic artery measuring 1.0 cm x 0.9 x 1.3  Cm. The main pulmonary artery is normal in caliber. The heart is normal in size. There is no pericardial effusion. THYROID: The visualized thyroid gland is unremarkable. LUNGS/PLEURA: There are no focal consolidations or pleural effusions. There is no pneumothorax. The trachea and central bronchi are patent. THORACIC NODES: There are no pathologically enlarged mediastinal, hilar or axillary lymph nodes. HEPATOBILIARY: There is diffuse fatty infiltration of the liver. Again noted is an area of hyperdensity within the right hepatic dome, which may reflect a cavernous hemangioma. There is no biliary ductal dilatation.   There is cholelithiasis without evidence of cholecystitis. SPLEEN: Unremarkable. PANCREAS: There is fatty atrophy. ADRENAL GLANDS: Unremarkable. KIDNEYS: The kidneys are normal in size and contour and enhance symmetrically. There is no hydronephrosis or nephrolithiasis. Hypodensities are again noted in the kidneys bilaterally, likely reflecting renal cyst. These are grossly unchanged from prior CT. ABDOMINAL NODES: No adenopathy. PELVIC ORGANS: The urinary bladder is unremarkable. The prostate is normal in size. PERITONEUM/MESENTERY/BOWEL: The stomach is unremarkable. There is no bowel obstruction. There is no bowel wall thickening. Postsurgical changes from right hemicolectomy are again noted. Irregular areas of soft tissue density is again noted near the postsurgical area is (image 90). These are likely postsurgical changes such as scarring. BONES/SOFT TISSUES: The visualized osseous structures are grossly unremarkable. No CT evidence of aortic dissection. Incidentally noted splenic aneurysm measuring 1.0 x 0.9 x 1.3 cm. Postsurgical changes from right hemicolectomy are again noted. Unchanged soft tissue density in the mesentery adjacent to the hemicolectomy, which likely reflects postsurgical changes/scarring. Hepatic steatosis. Cholelithiasis without evidence of cholecystitis. Assessment :   1. Chest pain/acs r/o / cardio seen /  2. hida positive for ac cholecystitis/s/p IR cholecystostomy  3. htn     Plan:   1. Continue zosyn/follow labs/dvt prophylaxis start in am  2. Iv toradol  3. Surgery on board    Patient Active Problem List:     Osteoporosis     Vitamin D deficiency     History of stroke     Degeneration of lumbar or lumbosacral intervertebral disc     Back pain     Low back pain     Allergic rhinitis     Impaired fasting glucose     Mixed hyperlipidemia     Uncontrolled hypertension     Obesity (BMI 30.0-34. 9)     Tear of left rotator cuff     Left bicipital tenosynovitis     Colon polyp     Acute blood loss as cause of postoperative anemia     S/P right hemicolectomy     History of malignant neoplasm of skin     Acute postoperative pain     Anemia     Ex-cigarette smoker     Chest pain     Splenic artery aneurysm (HCC)     Acute acalculous cholecystitis      Anticipated Disposition upon discharge: [] Home                                                                         [] Home with Home Health                                                                         [] [facility]                                                                         [] [address]      Patient is admitted as inpatient status because of co-morbidities listed above, severity of signs and symptoms as outlined, requirement for current medical therapies and most importantly because of direct risk to patient if care not provided in a hospital setting.           Jose Maldonado MD  [facility]

## 2021-02-02 NOTE — PROGRESS NOTES
[facility]   Progress Note                   Date:   2/1/21  Patient name: Chepe Dudley  Date of admission:  1/29/2021 10:12 PM  MRN:   [id number]  YOB: 1955  PCP: Hai Heath MD    Reason for Admission:     Subjective:       Clinical Changes / Abnormalities:  No cp, sob, orthopnea, pnd, le edema. Medications:   Scheduled Meds:   lisinopril  20 mg Oral BID    amLODIPine  5 mg Oral Daily    carvedilol  6.25 mg Oral BID WC    aspirin  81 mg Oral Daily    atorvastatin  20 mg Oral Nightly    sodium chloride flush  10 mL Intravenous 2 times per day    piperacillin-tazobactam (ZOSYN) 3375 mg in dextrose 5% IVPB extended infusion (mini-bag)  3,375 mg Intravenous Q8H    famotidine (PEPCID) injection  20 mg Intravenous BID     Continuous Infusions:   sodium chloride 75 mL/hr at 02/02/21 0149     CBC:   Recent Labs     01/31/21 0429 02/01/21  0519 02/02/21  0434   WBC 13.5* 12.2* 8.8   HGB 13.7 14.0 12.7*    188 179     BMP:    Recent Labs     01/31/21  0429 02/01/21  0718 02/02/21  0434    137 139   K 3.6* 4.0 3.7    104 106   CO2 23 22 21   BUN 21 18 23   CREATININE 0.96 1.08 1.17   GLUCOSE 131* 120* 106*     Hepatic:   No results for input(s): AST, ALT, ALB, BILITOT, ALKPHOS in the last 72 hours. Troponin: No results for input(s): TROPONINI in the last 72 hours. BNP: No results for input(s): BNP in the last 72 hours. Lipids: No results for input(s): CHOL, HDL in the last 72 hours. Invalid input(s): LDLCALCU  INR:   Recent Labs     01/30/21  2032 01/31/21  1426   INR 1.0 1.0       Objective:   Vitals: /85   Pulse 64   Temp 98.4 °F (36.9 °C) (Oral)   Resp 17   Ht 5' 7\" (1.702 m)   Wt 195 lb 8.8 oz (88.7 kg)   SpO2 92%   BMI 30.63 kg/m²   General appearance: alert and cooperative with exam  HEENT: Head: Normocephalic, no lesions, without obvious abnormality.   Neck: no adenopathy, no carotid bruit, no JVD, supple, symmetrical, trachea midline and thyroid not enlarged, symmetric, no tenderness/mass/nodules  Lungs: clear to auscultation bilaterally  Heart: regular rate and rhythm, S1, S2 normal, no murmur, click, rub or gallop  Abdomen: soft, non-tender; bowel sounds normal; no masses,  no organomegaly  Extremities: extremities normal, atraumatic, no cyanosis or edema  Neurologic: Mental status: Alert, oriented, thought content appropriate    TTE 01/30/21  Summary  Normal left ventricle size and function with an estimated EF > 55%. No segmental wall motion abnormalities seen. Moderate left ventricular hypertrophy. Normal right ventricular size and function. Trivial mitral regurgitation. Trivial tricuspid regurgitation. Normal right ventricular systolic pressure. Aortic root is mildly dilated. (3.9 cm)  No significant pericardial effusion is seen. Assessment / Acute Cardiac Problems:   1. Acute cholecystitis, s/p placement of cholecystostomy tube 2/1/21  2. Essential HTN, improved control on higher dose lisinopril with addition of amlodipine  3. Preserved LVEF; no CHF on exam  4. Splenic artery aneursym      Plan of Treatment:   1. Continue lisinopril 20 mg bid  2. Continue Norvasc 5 mg daily  3. Continue carvedilol 6.25 mg po bid    Will sign off, follow up in 2 weeks post d/c. D/w patient and nursing    Thank you for allowing me to participate in the care of this patient, please do not hesitate to call if you have any questions. Nicki Lynch DO, [address] [facility], [address] [facility]  [facility]  [phone]           \

## 2021-02-02 NOTE — DISCHARGE SUMMARY
Hospitalist Discharge Summary    Jenna Chatman  :  1955  MRN:  [id number]    Admit date:  2021  Discharge date:  21    Admitting Physician:  Lincoln Slater MD    Discharge Diagnoses:   Patient Active Problem List   Diagnosis    Osteoporosis    Vitamin D deficiency    History of stroke    Degeneration of lumbar or lumbosacral intervertebral disc    Back pain    Low back pain    Allergic rhinitis    Impaired fasting glucose    Mixed hyperlipidemia    Uncontrolled hypertension    Obesity (BMI 30.0-34. 9)    Tear of left rotator cuff    Left bicipital tenosynovitis    Colon polyp    Acute blood loss as cause of postoperative anemia    S/P right hemicolectomy    History of malignant neoplasm of skin    Acute postoperative pain    Anemia    Ex-cigarette smoker    Chest pain    Splenic artery aneurysm (HCC)    Acute acalculous cholecystitis        Admission Condition:  fair      Discharged Condition:  good    Hospital Course/Treatments   72 yr old  W/m admitted with h/o of rt upper quadrant region,,pt thought he was having heart attack,pt underwent ctand ac dissection was r/o,pt had fatty liver,pt also had cholelithiasis, hida scan showed ac cholecystitis with ductal obstruction,pt had cosult with general surgery, pt underwent percutaneous choleccystostomy tube,pt did well, pt was d/c with advise to follow up for cholecystectomy    Discharge Medications:       Alta James   Home Medication Instructions OWP:[id number]    Printed on:21 1208   Medication Information                      amLODIPine (NORVASC) 5 MG tablet  Take 1 tablet by mouth daily             aspirin 81 MG chewable tablet  Take 1 tablet by mouth daily             atorvastatin (LIPITOR) 20 MG tablet  take 1 tablet by mouth at bedtime             carvedilol (COREG) 6.25 MG tablet  Take 1 tablet by mouth 2 times daily (with meals)             docusate sodium (COLACE) 100 MG capsule  Take 1 capsule by mouth 2 times daily             fluticasone (FLONASE) 50 MCG/ACT nasal spray  2 sprays into each nostril daily             ibuprofen (ADVIL;MOTRIN) 800 MG tablet  Take 1 tablet by mouth 3 times daily as needed for Pain (Take with food)             lisinopril (PRINIVIL;ZESTRIL) 5 MG tablet  take 1 tablet by mouth once daily             loratadine (CLARITIN) 10 MG tablet  Take 1 tablet by mouth daily             pantoprazole (PROTONIX) 40 MG tablet  take 1 tablet by mouth once daily             vitamin D (ERGOCALCIFEROL) 1.25 MG (83770 UT) CAPS capsule  take 1 capsule by mouth every week                 Consults:  IP CONSULT TO VASCULAR SURGERY  IP CONSULT TO INTERNAL MEDICINE  IP CONSULT TO CARDIOLOGY  IP CONSULT TO GENERAL SURGERY    Significant Diagnostic Studies:  Echo Complete 2d W Doppler W Color    Result Date: 1/30/2021  [facility] Transthoracic Echocardiography Report (TTE)  Patient Name SANTHOSH   Date of Study               01/30/2021               [facility] T   Date of      1955  Gender                      Male  Birth   Age          72 year(s)  Race                           Room Number  2121        Height:                     67 inch, 170.18 cm   Corporate ID [id number]    Weight:                     220 pounds, 99.8 kg  #   Patient Acct [de-identified]   BSA:          2.11 m^2      BMI:      34.46  #                                                              kg/m^2   MR #         [id number]      Sonographer                 DarrionJackeline blankenship   Accession #  [id number]  Interpreting Physician      Adelaide Ng                   Referring Nurse                           Practitioner   Interpreting             Referring Physician         Adelaide Ng  Type of Study   TTE procedure:2D Echocardiogram, M-Mode, Doppler, Color Doppler.   Procedure Date Date: 01/30/2021 Start: 10:46 AM Study Location: [facility] Technical Quality: Fair visualization Indications:Chest pain. History / Tech. Comments: HTN Patient Status: Inpatient Height: 67 inches Weight: 220 pounds BSA: 2.11 m^2 BMI: 34.46 kg/m^2 Rhythm: Within normal limits HR: 67 bpm BP: 184/92 mmHg CONCLUSIONS Summary Normal left ventricle size and function with an estimated EF > 55%. No segmental wall motion abnormalities seen. Moderate left ventricular hypertrophy. Normal right ventricular size and function. Trivial mitral regurgitation. Trivial tricuspid regurgitation. Normal right ventricular systolic pressure. Aortic root is mildly dilated. (3.9 cm) No significant pericardial effusion is seen. Signature ----------------------------------------------------------------------------  Electronically signed by Jackeline Arrington(Sonographer) on 01/30/2021 11:13  AM ---------------------------------------------------------------------------- ----------------------------------------------------------------------------  Electronically signed by Tere Riojas(Interpreting physician) on  01/30/2021 11:38 AM ---------------------------------------------------------------------------- FINDINGS Left Atrium Left atrium is normal in size. Left Ventricle Normal left ventricle size and function with an estimated EF > 55%. No segmental wall motion abnormalities seen. Moderate left ventricular hypertrophy. Right Atrium Right atrium is normal in size. Right Ventricle Normal right ventricular size and function. Mitral Valve Normal mitral valve structure and function. Trivial mitral regurgitation. Aortic Valve Normal aortic valve structure and function without stenosis or regurgitation. Tricuspid Valve Normal tricuspid valve structure and function. Trivial tricuspid regurgitation. Normal right ventricular systolic pressure. Pulmonic Valve Pulmonic valve not well visualized but Doppler velocities are normal. No pulmonic insufficiency. Pericardial Effusion No significant pericardial effusion is seen.  Miscellaneous Aortic root is mildly dilated. (3.9 cm) E/e' average 9.5 IVC not well visualized. M-mode / 2D Measurements & Calculations:   LVIDd:5.44 cm(3.7 - 5.6 cm)      Diastolic JHUMOA:961 ml  SWAWH:4.39 cm(2.2 - 4.0 cm)      Systolic KQNDAS:21.9 ml  OXPT:7.94 cm(0.6 - 1.1 cm)       Aortic Root:3.9 cm(2.0 - 3.7 cm)  LVPWd:1.66 cm(0.6 - 1.1 cm)      LA Dimension: 4.6 cm(1.9 - 4.0 cm)  Fractional Shortenin.07 %    LA volume/Index: 55.5 ml /26m^2  Calculated LVEF (%): 67.27 %     LVOT:2.2 cm   Mitral:                               Aortic   Peak E-Wave: 0.70 m/s                 Peak Velocity: 1.29 m/s  Peak A-Wave: 1.01 m/s                 Mean Velocity: 0.76 m/s  E/A Ratio: 0.69                       Peak Gradient: 6.66 mmHg  Peak Gradient: 1.95 mmHg              Mean Gradient: 3 mmHg  Deceleration Time: 211 msec                                         Area (continuity): 3.83 cm^2                                        AV VTI: 23.5 cm   Tricuspid:                            Pulmonic:   Estimated RVSP: 17 mmHg  Peak TR Velocity: 1.90 m/s  Peak TR Gradient: 14.44 mmHg  Estimated RA Pressure: 3 mmHg                                        Estimated PASP: 17.44 mmHg  Septal Wall E' velocity:0.07 m/s Lateral Wall E' velocity:0.08 m/s    Nm Hepatobiliary    Result Date: 2021  EXAMINATION: NUCLEAR MEDICINE HEPATOBILIARY SCINTIGRAPHY (HIDA SCAN). 2021 4:52 pm TECHNIQUE: Approximately 6 [id number] Mebrofenin (Choletec) was administered IV. Then, dynamic images of the abdomen were obtained in the anterior projection for 60 mins. 2 are delayed images were obtained. COMPARISON: CT chest abdomen pelvis 2021. HISTORY: ORDERING SYSTEM PROVIDED HISTORY: abdominal pain TECHNOLOGIST PROVIDED HISTORY: abdominal pain Reason for Exam: abd pain Acuity: Unknown Type of Exam: Unknown FINDINGS: Prompt, homogenous uptake by the liver is noted with normal appearance of radiotracer excretion into the biliary system.   Clearance of bloodpool activity appears appropriate. Gallbladder is not visualized at 60 minutes. Common bile duct and small bowel activity identified. Gallbladder activity is not visualized on delayed images. Findings suggestive of acute calculous cholecystitis. Cta Chest Abdomen Pelvis W Contrast    Result Date: 1/30/2021  EXAMINATION: CTA OF THE CHEST, ABDOMEN AND PELVIS WITH CONTRAST, 1/29/2021 11:40 pm TECHNIQUE: CTA of the chest, abdomen and pelvis was performed after the administration of intravenous contrast.  Multiplanar reformatted images are provided for review. MIP images are provided for review. Dose modulation, iterative reconstruction, and/or weight based adjustment of the mA/kV was utilized to reduce the radiation dose to as low as reasonably achievable. COMPARISON: CT abdomen and pelvis dated 12/17/2020. HISTORY: ORDERING SYSTEM PROVIDED HISTORY: hypertension, CP, evaluate for dissection TECHNOLOGIST PROVIDED HISTORY: hypertension, CP, evaluate for dissection Decision Support Exception->Emergency Medical Condition (MA) Reason for Exam: Patient c/o midsternal chest pain radiating into his upper back x 1 day, hx of hypertension, stroke Acuity: Acute Type of Exam: Initial FINDINGS: CARDIOVASCULAR: There is no evidence of aortic dissection. The aorta is normal in course and caliber. There is a small splenic aneurysm in the distal splenic artery measuring 1.0 cm x 0.9 x 1.3  Cm. The main pulmonary artery is normal in caliber. The heart is normal in size. There is no pericardial effusion. THYROID: The visualized thyroid gland is unremarkable. LUNGS/PLEURA: There are no focal consolidations or pleural effusions. There is no pneumothorax. The trachea and central bronchi are patent. THORACIC NODES: There are no pathologically enlarged mediastinal, hilar or axillary lymph nodes. HEPATOBILIARY: There is diffuse fatty infiltration of the liver.   Again noted is an area of hyperdensity within the right hepatic dome, which may reflect a cavernous hemangioma. There is no biliary ductal dilatation. There is cholelithiasis without evidence of cholecystitis. SPLEEN: Unremarkable. PANCREAS: There is fatty atrophy. ADRENAL GLANDS: Unremarkable. KIDNEYS: The kidneys are normal in size and contour and enhance symmetrically. There is no hydronephrosis or nephrolithiasis. Hypodensities are again noted in the kidneys bilaterally, likely reflecting renal cyst. These are grossly unchanged from prior CT. ABDOMINAL NODES: No adenopathy. PELVIC ORGANS: The urinary bladder is unremarkable. The prostate is normal in size. PERITONEUM/MESENTERY/BOWEL: The stomach is unremarkable. There is no bowel obstruction. There is no bowel wall thickening. Postsurgical changes from right hemicolectomy are again noted. Irregular areas of soft tissue density is again noted near the postsurgical area is (image 90). These are likely postsurgical changes such as scarring. BONES/SOFT TISSUES: The visualized osseous structures are grossly unremarkable. No CT evidence of aortic dissection. Incidentally noted splenic aneurysm measuring 1.0 x 0.9 x 1.3 cm. Postsurgical changes from right hemicolectomy are again noted. Unchanged soft tissue density in the mesentery adjacent to the hemicolectomy, which likely reflects postsurgical changes/scarring. Hepatic steatosis. Cholelithiasis without evidence of cholecystitis. Disposition:   home    Discharge Instructions: Activity: activity as tolerated  Diet:  regular diet    Follow up with Smiley Jamil MD in 1 weeks.     Signed:  William Prater  2/2/2021, 12:09 PM    Time spent in discharge of this pt is more than 30 minutes in examination,evaluvation,  counseling and review of medication and discharge plan

## 2021-02-02 NOTE — CARE COORDINATION
ONGOING DISCHARGE PLAN:    Spoke with patient regarding discharge plan and patient confirms that plan is still to go home with no needs. Refusing VNS    Patient states he can empty his own Jessie drain himself. POD # 1- Percutaneous 8 fr cholecystostomy tube placement    Probable discharge home today    Will continue to follow for additional discharge needs.     Electronically signed by Wojciech Rhodes RN on 2/2/2021 at 1:38 PM

## 2021-02-02 NOTE — PLAN OF CARE
Problem: Pain:  Goal: Pain level will decrease  Description: Pain level will decrease  2/2/2021 0503 by Fozia Machuca RN  Outcome: Ongoing     Problem: Pain:  Goal: Control of acute pain  Description: Control of acute pain  Outcome: Ongoing     Problem: Pain:  Goal: Control of chronic pain  Description: Control of chronic pain  Outcome: Ongoing     Problem: SAFETY  Goal: Free from accidental physical injury  Outcome: Ongoing     Problem: SAFETY  Goal: Free from intentional harm  Outcome: Ongoing     Problem: DAILY CARE  Goal: Daily care needs are met  Outcome: Ongoing     Prob  Problem: PAIN  Goal: Patient's pain/discomfort is manageable  Outcome: Ongoing     Problem: SKIN INTEGRITY  Goal: Skin integrity is maintained or improved  2/2/2021 0503 by Fozia Machuca RN  Outcome: Ongoing     Problem: KNOWLEDGE DEFICIT  Goal: Patient/S.O. demonstrates understanding of disease process, treatment plan, medications, and discharge instructions.   Outcome: Ongoing     Problem: DISCHARGE BARRIERS  Goal: Patient's continuum of care needs are met  Outcome: Ongoing

## 2021-02-03 ENCOUNTER — TELEPHONE (OUTPATIENT)
Dept: FAMILY MEDICINE CLINIC | Age: 66
End: 2021-02-03

## 2021-02-03 NOTE — TELEPHONE ENCOUNTER
[facility] Transitions Initial Follow Up Call    Call within 2 business days of discharge: Yes     Patient: Kimberley Meyer Patient : 1955 MRN: [id number]    [unfilled]    RARS: Readmission Risk Score: 7       Spoke with: the patient. Discharge department/facility: D/C [facility] 21 CHEST PAIN    Non-face-to-face services provided:  Scheduled appointment with Specialist-he saw Dr Landon Garcia earlier today and had cholecystectomy scheduled.   Obtained and reviewed discharge summary and/or continuity of care documents    Follow Up  Future Appointments   Date Time Provider Miryam Goncalves   2/10/2021  2:30 PM [facility] PAT  3 [facility]   2/15/2021  2:00 PM SCHEDULE, [address]   3/17/2021 10:00 AM 30 Royce Otero MD [address]   2021 10:00 AM Rubén Linda MD [address], RN

## 2021-02-05 RX ORDER — LISINOPRIL 5 MG/1
TABLET ORAL
Qty: 90 TABLET | Refills: 0 | Status: SHIPPED | OUTPATIENT
Start: 2021-02-05 | End: 2021-05-10 | Stop reason: SDUPTHER

## 2021-02-06 LAB
CULTURE: ABNORMAL
DIRECT EXAM: ABNORMAL
DIRECT EXAM: ABNORMAL
Lab: ABNORMAL
SPECIMEN DESCRIPTION: ABNORMAL

## 2021-02-15 ENCOUNTER — HOSPITAL ENCOUNTER (OUTPATIENT)
Dept: LAB | Age: 66
Setting detail: SPECIMEN
Discharge: HOME | End: 2021-02-15
Payer: COMMERCIAL

## 2021-02-15 DIAGNOSIS — Z01.818 PREOP TESTING: Primary | ICD-10-CM

## 2021-02-15 PROCEDURE — U0005 INFEC AGEN DETEC AMPLI PROBE: HCPCS

## 2021-02-15 PROCEDURE — U0003 INFECTIOUS AGENT DETECTION BY NUCLEIC ACID (DNA OR RNA); SEVERE ACUTE RESPIRATORY SYNDROME CORONAVIRUS 2 (SARS-COV-2) (CORONAVIRUS DISEASE [COVID-19]), AMPLIFIED PROBE TECHNIQUE, MAKING USE OF HIGH THROUGHPUT TECHNOLOGIES AS DESCRIBED BY CMS-2020-01-R: HCPCS

## 2021-02-16 LAB
SARS-COV-2: NORMAL
SARS-COV-2: NOT DETECTED
SOURCE: NORMAL

## 2021-02-17 ENCOUNTER — TELEPHONE (OUTPATIENT)
Dept: PRIMARY CARE CLINIC | Age: 66
End: 2021-02-17

## 2021-02-19 ENCOUNTER — ANESTHESIA EVENT (OUTPATIENT)
Dept: OPERATING ROOM | Age: 66
End: 2021-02-19
Payer: COMMERCIAL

## 2021-02-19 ENCOUNTER — ANESTHESIA (OUTPATIENT)
Dept: OPERATING ROOM | Age: 66
End: 2021-02-19
Payer: COMMERCIAL

## 2021-02-19 ENCOUNTER — APPOINTMENT (OUTPATIENT)
Dept: GENERAL RADIOLOGY | Age: 66
End: 2021-02-19
Attending: SURGERY
Payer: COMMERCIAL

## 2021-02-19 ENCOUNTER — HOSPITAL ENCOUNTER (OUTPATIENT)
Age: 66
Setting detail: OBSERVATION
Discharge: HOME | End: 2021-02-21
Attending: SURGERY | Admitting: SURGERY
Payer: COMMERCIAL

## 2021-02-19 VITALS — OXYGEN SATURATION: 93 % | TEMPERATURE: 83.7 F | SYSTOLIC BLOOD PRESSURE: 127 MMHG | DIASTOLIC BLOOD PRESSURE: 68 MMHG

## 2021-02-19 DIAGNOSIS — K81.0 ACUTE CHOLECYSTITIS: Primary | ICD-10-CM

## 2021-02-19 LAB — GLUCOSE BLD-MCNC: 95 MG/DL (ref 75–110)

## 2021-02-19 PROCEDURE — G0378 HOSPITAL OBSERVATION PER HR: HCPCS

## 2021-02-19 PROCEDURE — 88304 TISSUE EXAM BY PATHOLOGIST: CPT

## 2021-02-19 PROCEDURE — 43262 ENDO CHOLANGIOPANCREATOGRAPH: CPT | Performed by: INTERNAL MEDICINE

## 2021-02-19 PROCEDURE — 6360000002 HC RX W HCPCS: Performed by: SURGERY

## 2021-02-19 PROCEDURE — 2580000003 HC RX 258: Performed by: SURGERY

## 2021-02-19 PROCEDURE — 3209999900 FLUORO FOR SURGICAL PROCEDURES

## 2021-02-19 PROCEDURE — 7100000000 HC PACU RECOVERY - FIRST 15 MIN: Performed by: SURGERY

## 2021-02-19 PROCEDURE — 2500000003 HC RX 250 WO HCPCS: Performed by: ANESTHESIOLOGY

## 2021-02-19 PROCEDURE — C1769 GUIDE WIRE: HCPCS | Performed by: SURGERY

## 2021-02-19 PROCEDURE — 99999 PR OFFICE/OUTPT VISIT,PROCEDURE ONLY: CPT | Performed by: PHYSICIAN ASSISTANT

## 2021-02-19 PROCEDURE — 2500000003 HC RX 250 WO HCPCS: Performed by: SURGERY

## 2021-02-19 PROCEDURE — C1726 CATH, BAL DIL, NON-VASCULAR: HCPCS | Performed by: SURGERY

## 2021-02-19 PROCEDURE — 2500000003 HC RX 250 WO HCPCS: Performed by: NURSE ANESTHETIST, CERTIFIED REGISTERED

## 2021-02-19 PROCEDURE — 3600000013 HC SURGERY LEVEL 3 ADDTL 15MIN: Performed by: SURGERY

## 2021-02-19 PROCEDURE — 88342 IMHCHEM/IMCYTCHM 1ST ANTB: CPT

## 2021-02-19 PROCEDURE — 7100000001 HC PACU RECOVERY - ADDTL 15 MIN: Performed by: SURGERY

## 2021-02-19 PROCEDURE — 2580000003 HC RX 258: Performed by: ANESTHESIOLOGY

## 2021-02-19 PROCEDURE — 2720000010 HC SURG SUPPLY STERILE: Performed by: SURGERY

## 2021-02-19 PROCEDURE — 6360000004 HC RX CONTRAST MEDICATION: Performed by: SURGERY

## 2021-02-19 PROCEDURE — 6360000002 HC RX W HCPCS: Performed by: NURSE ANESTHETIST, CERTIFIED REGISTERED

## 2021-02-19 PROCEDURE — C9113 INJ PANTOPRAZOLE SODIUM, VIA: HCPCS | Performed by: SURGERY

## 2021-02-19 PROCEDURE — 3700000001 HC ADD 15 MINUTES (ANESTHESIA): Performed by: SURGERY

## 2021-02-19 PROCEDURE — 2709999900 HC NON-CHARGEABLE SUPPLY: Performed by: SURGERY

## 2021-02-19 PROCEDURE — 88341 IMHCHEM/IMCYTCHM EA ADD ANTB: CPT

## 2021-02-19 PROCEDURE — 3600000003 HC SURGERY LEVEL 3 BASE: Performed by: SURGERY

## 2021-02-19 PROCEDURE — 82947 ASSAY GLUCOSE BLOOD QUANT: CPT

## 2021-02-19 PROCEDURE — 3700000000 HC ANESTHESIA ATTENDED CARE: Performed by: SURGERY

## 2021-02-19 RX ORDER — SODIUM CHLORIDE, SODIUM LACTATE, POTASSIUM CHLORIDE, CALCIUM CHLORIDE 600; 310; 30; 20 MG/100ML; MG/100ML; MG/100ML; MG/100ML
INJECTION, SOLUTION INTRAVENOUS CONTINUOUS
Status: DISCONTINUED | OUTPATIENT
Start: 2021-02-19 | End: 2021-02-19

## 2021-02-19 RX ORDER — MIDAZOLAM HYDROCHLORIDE 1 MG/ML
INJECTION INTRAMUSCULAR; INTRAVENOUS PRN
Status: DISCONTINUED | OUTPATIENT
Start: 2021-02-19 | End: 2021-02-19 | Stop reason: SDUPTHER

## 2021-02-19 RX ORDER — SODIUM CHLORIDE 9 MG/ML
INJECTION, SOLUTION INTRAVENOUS CONTINUOUS
Status: DISCONTINUED | OUTPATIENT
Start: 2021-02-19 | End: 2021-02-21 | Stop reason: HOSPADM

## 2021-02-19 RX ORDER — MAGNESIUM SULFATE 1 G/100ML
1000 INJECTION INTRAVENOUS PRN
Status: DISCONTINUED | OUTPATIENT
Start: 2021-02-19 | End: 2021-02-21 | Stop reason: HOSPADM

## 2021-02-19 RX ORDER — SODIUM CHLORIDE, SODIUM LACTATE, POTASSIUM CHLORIDE, AND CALCIUM CHLORIDE .6; .31; .03; .02 G/100ML; G/100ML; G/100ML; G/100ML
1000 INJECTION, SOLUTION INTRAVENOUS ONCE
Status: DISCONTINUED | OUTPATIENT
Start: 2021-02-19 | End: 2021-02-21 | Stop reason: HOSPADM

## 2021-02-19 RX ORDER — EPHEDRINE SULFATE/0.9% NACL/PF 50 MG/5 ML
SYRINGE (ML) INTRAVENOUS PRN
Status: DISCONTINUED | OUTPATIENT
Start: 2021-02-19 | End: 2021-02-19 | Stop reason: SDUPTHER

## 2021-02-19 RX ORDER — PHENYLEPHRINE HYDROCHLORIDE 10 MG/ML
INJECTION INTRAVENOUS PRN
Status: DISCONTINUED | OUTPATIENT
Start: 2021-02-19 | End: 2021-02-19 | Stop reason: SDUPTHER

## 2021-02-19 RX ORDER — HYDROCODONE BITARTRATE AND ACETAMINOPHEN 5; 325 MG/1; MG/1
1 TABLET ORAL
Status: DISCONTINUED | OUTPATIENT
Start: 2021-02-19 | End: 2021-02-19 | Stop reason: HOSPADM

## 2021-02-19 RX ORDER — METOCLOPRAMIDE HYDROCHLORIDE 5 MG/ML
10 INJECTION INTRAMUSCULAR; INTRAVENOUS
Status: DISCONTINUED | OUTPATIENT
Start: 2021-02-19 | End: 2021-02-19 | Stop reason: HOSPADM

## 2021-02-19 RX ORDER — LIDOCAINE HYDROCHLORIDE 10 MG/ML
INJECTION, SOLUTION EPIDURAL; INFILTRATION; INTRACAUDAL; PERINEURAL PRN
Status: DISCONTINUED | OUTPATIENT
Start: 2021-02-19 | End: 2021-02-19 | Stop reason: SDUPTHER

## 2021-02-19 RX ORDER — DEXTROSE MONOHYDRATE 25 G/50ML
25 INJECTION, SOLUTION INTRAVENOUS ONCE
Status: COMPLETED | OUTPATIENT
Start: 2021-02-19 | End: 2021-02-19

## 2021-02-19 RX ORDER — MEPERIDINE HYDROCHLORIDE 25 MG/ML
12.5 INJECTION INTRAMUSCULAR; INTRAVENOUS; SUBCUTANEOUS EVERY 5 MIN PRN
Status: DISCONTINUED | OUTPATIENT
Start: 2021-02-19 | End: 2021-02-19 | Stop reason: HOSPADM

## 2021-02-19 RX ORDER — ONDANSETRON 2 MG/ML
INJECTION INTRAMUSCULAR; INTRAVENOUS PRN
Status: DISCONTINUED | OUTPATIENT
Start: 2021-02-19 | End: 2021-02-19 | Stop reason: SDUPTHER

## 2021-02-19 RX ORDER — LIDOCAINE HYDROCHLORIDE 10 MG/ML
1 INJECTION, SOLUTION EPIDURAL; INFILTRATION; INTRACAUDAL; PERINEURAL
Status: COMPLETED | OUTPATIENT
Start: 2021-02-19 | End: 2021-02-19

## 2021-02-19 RX ORDER — PANTOPRAZOLE SODIUM 40 MG/10ML
40 INJECTION, POWDER, LYOPHILIZED, FOR SOLUTION INTRAVENOUS DAILY
Status: DISCONTINUED | OUTPATIENT
Start: 2021-02-19 | End: 2021-02-21 | Stop reason: HOSPADM

## 2021-02-19 RX ORDER — ONDANSETRON 2 MG/ML
4 INJECTION INTRAMUSCULAR; INTRAVENOUS EVERY 6 HOURS PRN
Status: DISCONTINUED | OUTPATIENT
Start: 2021-02-19 | End: 2021-02-21 | Stop reason: HOSPADM

## 2021-02-19 RX ORDER — PROMETHAZINE HYDROCHLORIDE 25 MG/ML
6.25 INJECTION, SOLUTION INTRAMUSCULAR; INTRAVENOUS
Status: DISCONTINUED | OUTPATIENT
Start: 2021-02-19 | End: 2021-02-19 | Stop reason: CLARIF

## 2021-02-19 RX ORDER — SODIUM CHLORIDE 0.9 % (FLUSH) 0.9 %
10 SYRINGE (ML) INJECTION EVERY 12 HOURS SCHEDULED
Status: DISCONTINUED | OUTPATIENT
Start: 2021-02-19 | End: 2021-02-21 | Stop reason: HOSPADM

## 2021-02-19 RX ORDER — HYDRALAZINE HYDROCHLORIDE 20 MG/ML
5 INJECTION INTRAMUSCULAR; INTRAVENOUS EVERY 10 MIN PRN
Status: DISCONTINUED | OUTPATIENT
Start: 2021-02-19 | End: 2021-02-19 | Stop reason: HOSPADM

## 2021-02-19 RX ORDER — DEXAMETHASONE SODIUM PHOSPHATE 4 MG/ML
INJECTION, SOLUTION INTRA-ARTICULAR; INTRALESIONAL; INTRAMUSCULAR; INTRAVENOUS; SOFT TISSUE PRN
Status: DISCONTINUED | OUTPATIENT
Start: 2021-02-19 | End: 2021-02-19 | Stop reason: SDUPTHER

## 2021-02-19 RX ORDER — PROPOFOL 10 MG/ML
INJECTION, EMULSION INTRAVENOUS PRN
Status: DISCONTINUED | OUTPATIENT
Start: 2021-02-19 | End: 2021-02-19 | Stop reason: SDUPTHER

## 2021-02-19 RX ORDER — SODIUM CHLORIDE 9 MG/ML
10 INJECTION INTRAVENOUS DAILY
Status: DISCONTINUED | OUTPATIENT
Start: 2021-02-19 | End: 2021-02-21 | Stop reason: HOSPADM

## 2021-02-19 RX ORDER — METOCLOPRAMIDE HYDROCHLORIDE 5 MG/ML
10 INJECTION INTRAMUSCULAR; INTRAVENOUS EVERY 6 HOURS
Status: DISCONTINUED | OUTPATIENT
Start: 2021-02-19 | End: 2021-02-21 | Stop reason: HOSPADM

## 2021-02-19 RX ORDER — HYDROMORPHONE HCL 110MG/55ML
PATIENT CONTROLLED ANALGESIA SYRINGE INTRAVENOUS PRN
Status: DISCONTINUED | OUTPATIENT
Start: 2021-02-19 | End: 2021-02-19 | Stop reason: SDUPTHER

## 2021-02-19 RX ORDER — ROCURONIUM BROMIDE 10 MG/ML
INJECTION, SOLUTION INTRAVENOUS PRN
Status: DISCONTINUED | OUTPATIENT
Start: 2021-02-19 | End: 2021-02-19 | Stop reason: SDUPTHER

## 2021-02-19 RX ORDER — FENTANYL CITRATE 50 UG/ML
INJECTION, SOLUTION INTRAMUSCULAR; INTRAVENOUS PRN
Status: DISCONTINUED | OUTPATIENT
Start: 2021-02-19 | End: 2021-02-19 | Stop reason: SDUPTHER

## 2021-02-19 RX ORDER — SODIUM CHLORIDE 0.9 % (FLUSH) 0.9 %
10 SYRINGE (ML) INJECTION PRN
Status: DISCONTINUED | OUTPATIENT
Start: 2021-02-19 | End: 2021-02-21 | Stop reason: HOSPADM

## 2021-02-19 RX ORDER — POTASSIUM CHLORIDE 7.45 MG/ML
10 INJECTION INTRAVENOUS PRN
Status: DISCONTINUED | OUTPATIENT
Start: 2021-02-19 | End: 2021-02-21 | Stop reason: HOSPADM

## 2021-02-19 RX ORDER — DIPHENHYDRAMINE HYDROCHLORIDE 50 MG/ML
12.5 INJECTION INTRAMUSCULAR; INTRAVENOUS
Status: DISCONTINUED | OUTPATIENT
Start: 2021-02-19 | End: 2021-02-19 | Stop reason: HOSPADM

## 2021-02-19 RX ADMIN — HYDROMORPHONE HYDROCHLORIDE 0.2 MG: 2 INJECTION, SOLUTION INTRAMUSCULAR; INTRAVENOUS; SUBCUTANEOUS at 14:39

## 2021-02-19 RX ADMIN — SODIUM CHLORIDE, PRESERVATIVE FREE 10 ML: 5 INJECTION INTRAVENOUS at 20:17

## 2021-02-19 RX ADMIN — ROCURONIUM BROMIDE 20 MG: 10 INJECTION, SOLUTION INTRAVENOUS at 16:03

## 2021-02-19 RX ADMIN — PIPERACILLIN SODIUM AND TAZOBACTAM SODIUM 3375 MG: 3; .375 INJECTION, POWDER, LYOPHILIZED, FOR SOLUTION INTRAVENOUS at 20:17

## 2021-02-19 RX ADMIN — CEFAZOLIN 2 G: 10 INJECTION, POWDER, FOR SOLUTION INTRAVENOUS at 14:20

## 2021-02-19 RX ADMIN — ROCURONIUM BROMIDE 50 MG: 10 INJECTION, SOLUTION INTRAVENOUS at 14:15

## 2021-02-19 RX ADMIN — HYDROMORPHONE HYDROCHLORIDE 0.3 MG: 2 INJECTION, SOLUTION INTRAMUSCULAR; INTRAVENOUS; SUBCUTANEOUS at 14:43

## 2021-02-19 RX ADMIN — PANTOPRAZOLE SODIUM 40 MG: 40 INJECTION, POWDER, FOR SOLUTION INTRAVENOUS at 20:17

## 2021-02-19 RX ADMIN — SODIUM CHLORIDE 10 ML: 9 INJECTION, SOLUTION INTRAMUSCULAR; INTRAVENOUS; SUBCUTANEOUS at 20:17

## 2021-02-19 RX ADMIN — Medication 20 MG: at 14:57

## 2021-02-19 RX ADMIN — HYDROMORPHONE HYDROCHLORIDE 0.2 MG: 2 INJECTION, SOLUTION INTRAMUSCULAR; INTRAVENOUS; SUBCUTANEOUS at 17:16

## 2021-02-19 RX ADMIN — LIDOCAINE HYDROCHLORIDE 1 ML: 10 INJECTION, SOLUTION EPIDURAL; INFILTRATION; INTRACAUDAL; PERINEURAL at 10:13

## 2021-02-19 RX ADMIN — METOCLOPRAMIDE 10 MG: 5 INJECTION, SOLUTION INTRAMUSCULAR; INTRAVENOUS at 20:17

## 2021-02-19 RX ADMIN — HYDROMORPHONE HYDROCHLORIDE 0.4 MG: 2 INJECTION, SOLUTION INTRAMUSCULAR; INTRAVENOUS; SUBCUTANEOUS at 16:56

## 2021-02-19 RX ADMIN — PHENYLEPHRINE HYDROCHLORIDE 100 MCG: 10 INJECTION INTRAVENOUS at 14:52

## 2021-02-19 RX ADMIN — PHENYLEPHRINE HYDROCHLORIDE 100 MCG: 10 INJECTION INTRAVENOUS at 14:27

## 2021-02-19 RX ADMIN — Medication 10 MG: at 16:04

## 2021-02-19 RX ADMIN — DEXTROSE MONOHYDRATE 25 G: 25 INJECTION, SOLUTION INTRAVENOUS at 13:18

## 2021-02-19 RX ADMIN — PHENYLEPHRINE HYDROCHLORIDE 100 MCG: 10 INJECTION INTRAVENOUS at 14:34

## 2021-02-19 RX ADMIN — SODIUM CHLORIDE, POTASSIUM CHLORIDE, SODIUM LACTATE AND CALCIUM CHLORIDE: 600; 310; 30; 20 INJECTION, SOLUTION INTRAVENOUS at 10:13

## 2021-02-19 RX ADMIN — HYDROMORPHONE HYDROCHLORIDE 0.2 MG: 2 INJECTION, SOLUTION INTRAMUSCULAR; INTRAVENOUS; SUBCUTANEOUS at 17:32

## 2021-02-19 RX ADMIN — FENTANYL CITRATE 100 MCG: 50 INJECTION, SOLUTION INTRAMUSCULAR; INTRAVENOUS at 14:15

## 2021-02-19 RX ADMIN — Medication 20 MG: at 16:42

## 2021-02-19 RX ADMIN — HYDROMORPHONE HYDROCHLORIDE 1 MG: 1 INJECTION, SOLUTION INTRAMUSCULAR; INTRAVENOUS; SUBCUTANEOUS at 20:37

## 2021-02-19 RX ADMIN — MIDAZOLAM 2 MG: 1 INJECTION INTRAMUSCULAR; INTRAVENOUS at 14:07

## 2021-02-19 RX ADMIN — ONDANSETRON 4 MG: 2 INJECTION INTRAMUSCULAR; INTRAVENOUS at 14:25

## 2021-02-19 RX ADMIN — SODIUM CHLORIDE, POTASSIUM CHLORIDE, SODIUM LACTATE AND CALCIUM CHLORIDE: 600; 310; 30; 20 INJECTION, SOLUTION INTRAVENOUS at 14:50

## 2021-02-19 RX ADMIN — FAMOTIDINE 20 MG: 10 INJECTION, SOLUTION INTRAVENOUS at 20:17

## 2021-02-19 RX ADMIN — HYDROMORPHONE HYDROCHLORIDE 0.4 MG: 2 INJECTION, SOLUTION INTRAMUSCULAR; INTRAVENOUS; SUBCUTANEOUS at 17:20

## 2021-02-19 RX ADMIN — ROCURONIUM BROMIDE 10 MG: 10 INJECTION, SOLUTION INTRAVENOUS at 14:40

## 2021-02-19 RX ADMIN — SODIUM CHLORIDE: 9 INJECTION, SOLUTION INTRAVENOUS at 20:16

## 2021-02-19 RX ADMIN — SUGAMMADEX 200 MG: 100 INJECTION, SOLUTION INTRAVENOUS at 17:13

## 2021-02-19 RX ADMIN — DEXAMETHASONE SODIUM PHOSPHATE 4 MG: 4 INJECTION, SOLUTION INTRAMUSCULAR; INTRAVENOUS at 14:25

## 2021-02-19 RX ADMIN — GLUCAGON HYDROCHLORIDE 0.5 MG: KIT at 17:19

## 2021-02-19 RX ADMIN — PHENYLEPHRINE HYDROCHLORIDE 100 MCG: 10 INJECTION INTRAVENOUS at 14:32

## 2021-02-19 RX ADMIN — HYDROMORPHONE HYDROCHLORIDE 0.3 MG: 2 INJECTION, SOLUTION INTRAMUSCULAR; INTRAVENOUS; SUBCUTANEOUS at 15:37

## 2021-02-19 RX ADMIN — ROCURONIUM BROMIDE 20 MG: 10 INJECTION, SOLUTION INTRAVENOUS at 15:21

## 2021-02-19 RX ADMIN — PHENYLEPHRINE HYDROCHLORIDE 100 MCG: 10 INJECTION INTRAVENOUS at 14:51

## 2021-02-19 RX ADMIN — PROPOFOL 150 MG: 10 INJECTION, EMULSION INTRAVENOUS at 14:15

## 2021-02-19 RX ADMIN — LIDOCAINE HYDROCHLORIDE 50 MG: 10 INJECTION, SOLUTION EPIDURAL; INFILTRATION; INTRACAUDAL; PERINEURAL at 14:15

## 2021-02-19 ASSESSMENT — PULMONARY FUNCTION TESTS
PIF_VALUE: 21
PIF_VALUE: 21
PIF_VALUE: 19
PIF_VALUE: 21
PIF_VALUE: 20
PIF_VALUE: 21
PIF_VALUE: 20
PIF_VALUE: 21
PIF_VALUE: 20
PIF_VALUE: 19
PIF_VALUE: 3
PIF_VALUE: 16
PIF_VALUE: 21
PIF_VALUE: 19
PIF_VALUE: 20
PIF_VALUE: 20
PIF_VALUE: 21
PIF_VALUE: 20
PIF_VALUE: 22
PIF_VALUE: 20
PIF_VALUE: 20
PIF_VALUE: 21
PIF_VALUE: 18
PIF_VALUE: 19
PIF_VALUE: 20
PIF_VALUE: 19
PIF_VALUE: 19
PIF_VALUE: 21
PIF_VALUE: 20
PIF_VALUE: 19
PIF_VALUE: 20
PIF_VALUE: 19
PIF_VALUE: 20
PIF_VALUE: 19
PIF_VALUE: 1
PIF_VALUE: 20
PIF_VALUE: 19
PIF_VALUE: 21
PIF_VALUE: 20
PIF_VALUE: 21
PIF_VALUE: 19
PIF_VALUE: 20
PIF_VALUE: 20
PIF_VALUE: 19
PIF_VALUE: 21
PIF_VALUE: 19
PIF_VALUE: 21
PIF_VALUE: 19
PIF_VALUE: 21
PIF_VALUE: 20
PIF_VALUE: 19
PIF_VALUE: 21
PIF_VALUE: 21
PIF_VALUE: 6
PIF_VALUE: 21
PIF_VALUE: 17
PIF_VALUE: 19
PIF_VALUE: 21
PIF_VALUE: 22
PIF_VALUE: 16
PIF_VALUE: 19
PIF_VALUE: 7
PIF_VALUE: 21
PIF_VALUE: 19
PIF_VALUE: 19
PIF_VALUE: 18
PIF_VALUE: 18
PIF_VALUE: 19
PIF_VALUE: 18
PIF_VALUE: 19
PIF_VALUE: 21
PIF_VALUE: 20
PIF_VALUE: 20
PIF_VALUE: 21
PIF_VALUE: 20
PIF_VALUE: 21
PIF_VALUE: 18
PIF_VALUE: 21
PIF_VALUE: 3
PIF_VALUE: 21
PIF_VALUE: 19
PIF_VALUE: 6
PIF_VALUE: 21
PIF_VALUE: 17
PIF_VALUE: 19
PIF_VALUE: 7
PIF_VALUE: 19

## 2021-02-19 ASSESSMENT — PAIN - FUNCTIONAL ASSESSMENT: PAIN_FUNCTIONAL_ASSESSMENT: 0-10

## 2021-02-19 ASSESSMENT — PAIN SCALES - GENERAL: PAINLEVEL_OUTOF10: 7

## 2021-02-19 NOTE — OP NOTE
[facility]     PROCEDURE DATE: 02/19/21    REFERRING PHYSICIAN: No ref. provider found     PRIMARY CARE PROVIDER: Clarisse Potter MD    ATTENDING PHYSICIAN: Sj Nelson MD [facility]    HISTORY: Mr. Rain Llanos is a 72 y.o. male who presents to the [place] [address] Endoscopy unit for ERCP. The patient's clinical history is remarkable for cholecystitis. He was undergoing cholecystectomy. Attempts at intraoperative cholangiogram were not successful. There was bile coming out of the cystic duct. The catheter could not be advanced through. We will call in to do ERCP to exclude any blockage or leak and to define anatomy. He is currently medically stable and appropriate for the planned procedure. PROCEDURES PERFORMED:   1. Transoral Endoscopic retrograde cholangiopancreatography (ERCP). 2.  Precut sphincterotomy  3. Cholangiogram  4. Balloon sweeps  5. Fluoroscopy    POSTPROCEDURE DIAGNOSIS:  No bile leak  Possible stone in cystic duct  14 mm adenoma in D2    SPECIMENS: none    INSTRUMENTS:   1. Olympus TJF-180VF flexible duodenoscope. MEDICATIONS:   General endotracheal anesthesia  Indomethacin 100 mg AK    EBL: minimal    FLUOROSCOPY TIME: 58 seconds     PREPARATION: After the risks, benefits and alternatives of the procedure were discussed, informed consent was obtained. Complications were said to include, but were not limited to: medication allergy, medication reaction, cardiovascular and respiratory problems, bleeding, perforation, infection, pancreatitis, aspiration, and/or missed diagnosis. Following arrival in the endoscopy room, the patient was placed in the supine position and a final time-out was performed in the presence of the nursing staff. The patient was then sedated and intubated, and the examination was started.      FINDINGS: The duodenoscope was inserted into the oropharynx under direct visualization and advanced smoothly into the stomach where a
Careful dissection was continued in the dome down fashion. Cystic artery was isolated was skeletonized. Cystic duct was isolated was skeletonized. Cystic duct was fairly long and somewhat narrow and fibrotic. Cystic artery was clipped and divided. At this point gallbladder was pretty much held by the cystic duct. I made a few attempts to do a trend cystic intraoperative cholangiogram using the metal catheter as well as the arrow kit. I was able to place the catheter in the cystic duct but I was not able to inject any dye. I was not able to do an intraoperative cholangiogram.  Given the inflammation and the fact that I was not able to do a cholangiogram I requested GI physician  to do postoperative ERCP. He graciously agreed. At this point cystic duct was clipped and divided. Gallbladder fossa was irrigated. Isael-Wilburn drain was left in the right upper quadrant and the liver bed was brought out and secured. Hemostasis was confirmed. Sponge needle instrument count was found to be correct. After confirming hemostasis sponge needle instrument count wound was approximated in 2 layers using 0 PDS and #1 PDS suture. Skin was approximated using staples. Clean dressing was applied. Patient tolerated procedure well. -  Recommendations: Patient will have postoperative ERCP right away. Discussed with anesthesia. Discussed with staff. Await ERCP results.   Patient will be admitted post ERCP to the hospital.

## 2021-02-19 NOTE — ANESTHESIA POSTPROCEDURE EVALUATION
Department of Anesthesiology  Postprocedure Note    Patient: Ny Wells  MRN: [id number]  YOB: 1955  Date of evaluation: 2/19/2021  Time:  6:30 PM     Procedure Summary     Date: 02/19/21 Room / Location: [address] / [facility]     Anesthesia Start: 5467 Anesthesia Stop: 4454    Procedures:       OPEN CHOLECYSTECTOMY (N/A Abdomen)      ERCP DIAGNOSTIC (N/A ) Diagnosis: (ACUTE CHOLECYSTITIS)    Surgeons: Jaswant Reid MD; Kian Mendoza MD Responsible Provider: Alize Meza MD    Anesthesia Type: general ASA Status: 2          Anesthesia Type: general    Sharla Phase I: Sharla Score: 9    Sharla Phase II:      Last vitals: Reviewed and per EMR flowsheets.        Anesthesia Post Evaluation    Patient location during evaluation: bedside  Patient participation: complete - patient participated  Level of consciousness: awake and alert  Airway patency: patent  Nausea & Vomiting: no nausea and no vomiting  Complications: no  Cardiovascular status: hemodynamically stable  Respiratory status: acceptable  Hydration status: stable

## 2021-02-19 NOTE — H&P
HISTORY and Trefan Sweeney [address]       NAME:  Larisa Osborne  MRN: [id number]   YOB: 1955   Date: 2/19/2021   Age: 72 y.o. Gender: male       COMPLAINT AND PRESENT HISTORY:     Larisa Osborne is 72 y.o.,   male, undergoing robotic laparoscopic cholecystectomy for acute cholecystitis. Pt has cholelithiasis. Pt was admitted to hospital with abdominal and chest pain in January, 2021. CT of abdomen completed on 12/17/2021-results below. HIDA scan completed and findings suggestive of calculus cholecystitis with cystic duct obstruction. Pt s/p transhepatic cholecystostomy drain placed. Pt was treated with IV antibiotics. Pt reports RUQ and Epigastric pains have subsided. Reports intermittent nausea without associated vomiting. Pt has intolerance to greasy foods. No diarrhea or constipation. Reports stools have been light in color. Denies hematochezia or melena. NPO. Took amlodipine, lisinopril and carvedilol this am with sip of water. Stopped aspirin about 6 days ago. Denies chest pain/pressure, palpitations, SOB, recent URI, fever or chills. CT abdomen/pelvis on 12/17/2021  Impression   1. No evidence of bowel obstruction, intraperitoneal free air, or abscess. 2. Postsurgical changes related to prior hemicolectomy.  Very small focal   soft tissue densities within the mesenteric fat near the prior surgery likely   represent postsurgical change; however, other etiologies cannot be excluded. Follow-up CT examination in 3-4 months is recommended for re-evaluation. 3. Cholelithiasis. 4. Fatty infiltration of the liver. 5. Visualization of focal area of intermediate attenuation with tiny nodular   area of enhancement within the right hepatic dome.  Finding may merely   represent perfusion anomaly with adjacent flash filler/cavernous hemangioma. Finding can be reassessed for stability on recommended follow-up examination.        PAST MEDICAL HISTORY     Past Medical History:   Diagnosis Date    Acute cholecystitis     Essential hypertension 2017    Hyperlipidemia 2014    [facility]     Obesity (BMI 30-39. 9) 2017    Osteoporosis     Sciatica     Stroke ([facility]     Vertebral fracture, osteoporotic (HCC)        SURGICAL HISTORY       Past Surgical History:   Procedure Laterality Date    COLONOSCOPY  3/19/2014    tubular adenoma x 2, inadequate prep, some polyps not removed, needs colonoscopy in 6-12 months    COLONOSCOPY N/A 2020    COLONOSCOPY POLYPECTOMY HOT BIOPSY performed by Edi Slater MD at [address] Right 2020    OPEN RIGHT COLECTOMY, PRIMARY ANASTOMOSIS performed by Edi Slater MD at [address] [facility] [address]      rt. inguinal    IR BILIARY DRAIN EXTERNAL  2021    IR BILIARY DRAIN EXTERNAL 2021 [facility]    OMENTUM RESECTION  2020    OMENTECTOMY -  PARTIAL GREATER OMENECTOMY [facility]       Family History   Problem Relation Age of Onset    Heart Disease Father        SOCIAL HISTORY       Social History     Socioeconomic History    Marital status:      Spouse name: Not on file    Number of children: Not on file    Years of education: Not on file    Highest education level: Not on file   Occupational History    Not on file   Social Needs    Financial resource strain: Not hard at all   [employer] insecurity     Worry: Never true     Inability: Never true   BEK[employer] needs     Medical: Not on file     Non-medical: Not on file   Tobacco Use    Smoking status: Former Smoker     Packs/day: 1.00     Years: 40.00     Pack years: 40.00     Quit date: 2020     Years since quittin.9    Smokeless tobacco: Never Used   Substance and Sexual Activity    Alcohol use: Yes     Comment: rarely    Drug use: No    Sexual activity: Yes     Partners: Female   Lifestyle    Physical activity     Days per week: Not on file     Minutes per session: Not on file    Stress: Not on file   Relationships    Social connections     Talks on phone: Not on file     Gets together: Not on file     Attends Taoism service: Not on file     Active member of club or organization: Not on file     Attends meetings of clubs or organizations: Not on file     Relationship status: Not on file    Intimate partner violence     Fear of current or ex partner: Not on file     Emotionally abused: Not on file     Physically abused: Not on file     Forced sexual activity: Not on file   Other Topics Concern    Not on file   Social History Narrative    Not on file        REVIEW OF SYSTEMS      No Known Allergies    No current facility-administered medications on file prior to encounter.       Current Outpatient Medications on File Prior to Encounter   Medication Sig Dispense Refill    lisinopril (PRINIVIL;ZESTRIL) 5 MG tablet take 1 tablet by mouth once daily 90 tablet 0    amLODIPine (NORVASC) 5 MG tablet Take 1 tablet by mouth daily 30 tablet 3    docusate sodium (COLACE) 100 MG capsule Take 1 capsule by mouth 2 times daily 30 capsule 2    carvedilol (COREG) 6.25 MG tablet Take 1 tablet by mouth 2 times daily (with meals) 60 tablet 1    atorvastatin (LIPITOR) 20 MG tablet take 1 tablet by mouth at bedtime 90 tablet 0    pantoprazole (PROTONIX) 40 MG tablet take 1 tablet by mouth once daily 90 tablet 1    ibuprofen (ADVIL;MOTRIN) 800 MG tablet Take 1 tablet by mouth 3 times daily as needed for Pain (Take with food) 30 tablet 1    vitamin D (ERGOCALCIFEROL) 1.25 MG (13027 UT) CAPS capsule take 1 capsule by mouth every week 12 capsule 1    fluticasone (FLONASE) 50 MCG/ACT nasal spray 2 sprays into each nostril daily 1 Bottle 3    loratadine (CLARITIN) 10 MG tablet Take 1 tablet by mouth daily 30 tablet 3    aspirin 81 MG chewable tablet Take 1 tablet by mouth daily 30 tablet 3   Notation: Above medications are not currently reconciled at time of signing this H&P note, to be reconciled in pre-op per RN. Negative except for what is mentioned in the HPI. GENERAL PHYSICAL EXAM     Vitals: Review vitals per RN flowsheet. GENERAL APPEARANCE:   Mervin Esparza is 72 y.o.,  male,  nourished, conscious, alert. Does not appear to be in distress or pain at this time. SKIN:  Warm, dry, no cyanosis or jaundice. HEAD:  Normocephalic, atraumatic. EYES:  Pupils equal, reactive to light. EARS:  No discharge, no marked hearing loss. NOSE:  No rhinorrhea, epistaxis or septal deformity. THROAT:  Not congested. No ulceration bleeding or discharge. NECK:  No stiffness, trachea central.  No palpable masses or L.N.                 CHEST:  Symmetrical and equal on expansion. HEART:  RRR S1 > S2. No audible murmurs or gallops. LUNGS:  Equal on expansion, normal breath sounds. No wheezing, rhonchi or rales. ABDOMEN:  RLQ drain in place with dark yellow drainage, clean dry and intact gauze dressing and tegaderm in place over insertion site. Soft on palpation. No dysphagia, No localized tenderness. No guarding or rigidity. LOCOMOTOR, BACK AND SPINE:  Antalgic gait. Right leg tenderness. No deformities. EXTREMITIES:  Symmetrical, no pretibial edema. No calf tenderness. No discoloration or ulcerations. NEUROLOGIC:  The patient is conscious, alert, oriented. No facial drooping. Speech clear. No apparent focal sensory or motor deficits.              PROVISIONAL DIAGNOSES / SURGERY:      ACUTE CHOLECYSTITIS    CHOLECYSTECTOMY LAPAROSCOPIC ROBOTIC XI    Patient Active Problem List    Diagnosis Date Noted    Acute acalculous cholecystitis 01/31/2021    Chest pain 01/30/2021    Splenic artery aneurysm ([facility] 01/30/2021    Ex-cigarette smoker 10/16/2020    History of malignant neoplasm of skin 04/22/2020    Acute postoperative pain 04/22/2020    Anemia 04/22/2020    Acute blood loss as cause of postoperative anemia 02/16/2020    S/P right hemicolectomy 02/16/2020    Colon polyp 02/12/2020    Tear of left rotator cuff 04/06/2019    Left bicipital tenosynovitis 04/06/2019    Obesity (BMI 30.0-34.9) 05/15/2018    Uncontrolled hypertension 05/04/2017    Impaired fasting glucose 09/04/2014    Mixed hyperlipidemia 09/04/2014    Allergic rhinitis 09/30/2013    Low back pain 06/04/2013    Degeneration of lumbar or lumbosacral intervertebral disc 05/09/2013    Back pain 05/09/2013    Osteoporosis 04/25/2013    Vitamin D deficiency 04/25/2013    History of stroke 04/25/2013           KAMALA Carreno - CNP on 2/19/2021 at 9:32 AM

## 2021-02-19 NOTE — ANESTHESIA PRE PROCEDURE
Department of Anesthesiology  Preprocedure Note       Name:  Salvador Yates   Age:  72 y.o.  :  1955                                          MRN:  [id number]         Date:  2021      Surgeon: Camryn Beltran):  Jorge Aden MD    Procedure: Procedure(s):  CHOLECYSTECTOMY LAPAROSCOPIC ROBOTIC XI    Medications prior to admission:   Prior to Admission medications    Medication Sig Start Date End Date Taking?  Authorizing Provider   lisinopril (PRINIVIL;ZESTRIL) 5 MG tablet take 1 tablet by mouth once daily 21   Schuyler Phan MD   amLODIPine [facility]) 5 MG tablet Take 1 tablet by mouth daily 2/3/21 3/5/21  Celia Simmons MD   carvedilol (COREG) 6.25 MG tablet Take 1 tablet by mouth 2 times daily (with meals) 2/2/21 3/4/21  Celia Simmons MD   atorvastatin (LIPITOR) 20 MG tablet take 1 tablet by mouth at bedtime 21   Schuyler Phan MD   pantoprazole (PROTONIX) 40 MG tablet take 1 tablet by mouth once daily 21   Schuyler Phan MD   ibuprofen (ADVIL;MOTRIN) 800 MG tablet Take 1 tablet by mouth 3 times daily as needed for Pain (Take with food) 21   Schuyler Phan MD   vitamin D (ERGOCALCIFEROL) 1.25 MG (58902 UT) CAPS capsule take 1 capsule by mouth every week 12/3/20   Schuyler Phan MD   fluticasone Dyane Kat) 50 MCG/ACT nasal spray 2 sprays into each nostril daily 10/16/20   Schuyler Phan MD   loratadine (CLARITIN) 10 MG tablet Take 1 tablet by mouth daily 10/16/20   Schuyler Phan MD   aspirin 81 MG chewable tablet Take 1 tablet by mouth daily 16   Celia Simmons MD       Current medications:    Current Facility-Administered Medications   Medication Dose Route Frequency Provider Last Rate Last Admin    lactated ringers infusion   Intravenous Continuous Shante Paul  mL/hr at 21 1013 New Bag at 21 1013    ceFAZolin (ANCEF) 2000 mg in dextrose 5 % 50 mL IVPB  2,000 mg Intravenous Once Jorge Aden MD Allergies:  No Known Allergies    Problem List:    Patient Active Problem List   Diagnosis Code    Osteoporosis M81.0    Vitamin D deficiency E55.9    History of stroke Z80.78    Degeneration of lumbar or lumbosacral intervertebral disc M51.37    Back pain M54.9    Low back pain M54.5    Allergic rhinitis J30.9    Impaired fasting glucose R73.01    Mixed hyperlipidemia E78.2    Uncontrolled hypertension I10    Obesity (BMI 30.0-34. 9) E66.9    Tear of left rotator cuff M75.102    Left bicipital tenosynovitis M75.22    Colon polyp K63.5    Acute blood loss as cause of postoperative anemia D62    S/P right hemicolectomy Z90.49    History of malignant neoplasm of skin Z85.828    Acute postoperative pain G89.18    Anemia D64.9    Ex-cigarette smoker Z87.891    Chest pain R07.9    Splenic artery aneurysm (HCC) I72.8    Acute acalculous cholecystitis K81.0       Past Medical History:        Diagnosis Date    Acute cholecystitis     Essential hypertension 5/4/2017    Hyperlipidemia 9/4/2014    [facility]     Obesity (BMI 30-39. 9) 5/4/2017    Osteoporosis     Sciatica     Stroke ([facility]     Vertebral fracture, osteoporotic (HCC)        Past Surgical History:        Procedure Laterality Date    COLONOSCOPY  3/19/2014    tubular adenoma x 2, inadequate prep, some polyps not removed, needs colonoscopy in 6-12 months    COLONOSCOPY N/A 2/11/2020    COLONOSCOPY POLYPECTOMY HOT BIOPSY performed by Yakelin Mart MD at [place] [address] Right 2/12/2020    OPEN RIGHT COLECTOMY, PRIMARY ANASTOMOSIS performed by Yakelin Mart MD at . [facility]      rt. inguinal    IR BILIARY DRAIN EXTERNAL  2/1/2021    IR BILIARY DRAIN EXTERNAL 2/1/2021 [facility]    OMENTUM RESECTION  2/12/2020    OMENTECTOMY -  PARTIAL GREATER OMENECTOMY performed by Yakelin Mart MD at [facility] History:    Social History     Tobacco Use  Smoking status: Former Smoker     Packs/day: 1.00     Years: 40.00     Pack years: 40.00     Quit date: 2020     Years since quittin.9    Smokeless tobacco: Never Used   Substance Use Topics    Alcohol use: Yes     Comment: rarely                                Counseling given: Not Answered      Vital Signs (Current):   Vitals:    21 0950   BP: (!) 158/94   Pulse: 86   Resp: 18   Temp: 97.1 °F (36.2 °C)   TempSrc: Temporal   SpO2: 95%   Weight: 218 lb (98.9 kg)   Height: 5' 7\" (1.702 m)                                              BP Readings from Last 3 Encounters:   21 (!) 158/94   21 132/85   20 126/86       NPO Status: Time of last liquid consumption: 0800(sip of water with meds)                        Time of last solid consumption: 1800                        Date of last liquid consumption: 21                        Date of last solid food consumption: 21    BMI:   Wt Readings from Last 3 Encounters:   21 218 lb (98.9 kg)   02/10/21 220 lb (99.8 kg)   21 195 lb 8.8 oz (88.7 kg)     Body mass index is 34.14 kg/m². CBC:   Lab Results   Component Value Date    WBC 8.8 2021    RBC 3.91 2021    HGB 12.7 2021    HCT 38.0 2021    MCV 97.2 2021    RDW 13.8 2021     2021       CMP:   Lab Results   Component Value Date     2021    K 3.7 2021     2021    CO2 21 2021    BUN 23 2021    CREATININE 1.17 2021    GFRAA >60 2021    LABGLOM >60 2021    GLUCOSE 106 2021    PROT 7.3 2021    CALCIUM 8.5 2021    BILITOT 0.27 2021    ALKPHOS 59 2021    AST 27 2021    ALT 58 2021       POC Tests: No results for input(s): POCGLU, POCNA, POCK, POCCL, POCBUN, POCHEMO, POCHCT in the last 72 hours.     Coags:   Lab Results   Component Value Date    PROTIME 12.7 2021    INR 1.0 2021    APTT 27.9 2018 HCG (If Applicable): No results found for: PREGTESTUR, PREGSERUM, HCG, HCGQUANT     ABGs: No results found for: PHART, PO2ART, EKU1ATX, UTO4RAZ, BEART, W4TIYVQI     Type & Screen (If Applicable):  No results found for: LABABO, LABRH    Drug/Infectious Status (If Applicable):  No results found for: HIV, HEPCAB    COVID-19 Screening (If Applicable):   Lab Results   Component Value Date    COVID19 Not Detected 02/15/2021         Anesthesia Evaluation  Patient summary reviewed no history of anesthetic complications:   Airway: Mallampati: III  TM distance: >3 FB   Neck ROM: full   Dental: normal exam         Pulmonary:Negative Pulmonary ROS and normal exam                               Cardiovascular:    (+) hypertension:,                   Neuro/Psych:   (+) CVA: no interval change,             GI/Hepatic/Renal: Neg GI/Hepatic/Renal ROS            Endo/Other: Negative Endo/Other ROS                    Abdominal:           Vascular:                                        Anesthesia Plan      general     ASA 2       Induction: intravenous. MIPS: Postoperative opioids intended. Anesthetic plan and risks discussed with patient. Plan discussed with CRNA.                   Gómez Carlos MD   2/19/2021

## 2021-02-20 LAB
ABSOLUTE EOS #: 0 K/UL (ref 0–0.4)
ABSOLUTE IMMATURE GRANULOCYTE: ABNORMAL K/UL (ref 0–0.3)
ABSOLUTE LYMPH #: 1.5 K/UL (ref 1–4.8)
ABSOLUTE MONO #: 0.9 K/UL (ref 0.1–1.3)
ALBUMIN SERPL-MCNC: 3.8 G/DL (ref 3.5–5.2)
ALBUMIN/GLOBULIN RATIO: ABNORMAL (ref 1–2.5)
ALP BLD-CCNC: 68 U/L (ref 40–129)
ALT SERPL-CCNC: 109 U/L (ref 5–41)
ANION GAP SERPL CALCULATED.3IONS-SCNC: 11 MMOL/L (ref 9–17)
AST SERPL-CCNC: 81 U/L
BASOPHILS # BLD: 0 % (ref 0–2)
BASOPHILS ABSOLUTE: 0 K/UL (ref 0–0.2)
BILIRUB SERPL-MCNC: 0.51 MG/DL (ref 0.3–1.2)
BILIRUBIN DIRECT: 0.11 MG/DL
BILIRUBIN, INDIRECT: 0.4 MG/DL (ref 0–1)
BUN BLDV-MCNC: 24 MG/DL (ref 8–23)
BUN/CREAT BLD: ABNORMAL (ref 9–20)
CALCIUM SERPL-MCNC: 9.1 MG/DL (ref 8.6–10.4)
CHLORIDE BLD-SCNC: 108 MMOL/L (ref 98–107)
CO2: 22 MMOL/L (ref 20–31)
CREAT SERPL-MCNC: 1.28 MG/DL (ref 0.7–1.2)
DIFFERENTIAL TYPE: ABNORMAL
EOSINOPHILS RELATIVE PERCENT: 0 % (ref 0–4)
GFR AFRICAN AMERICAN: >60 ML/MIN
GFR NON-AFRICAN AMERICAN: 56 ML/MIN
GFR SERPL CREATININE-BSD FRML MDRD: ABNORMAL ML/MIN/{1.73_M2}
GFR SERPL CREATININE-BSD FRML MDRD: ABNORMAL ML/MIN/{1.73_M2}
GLOBULIN: ABNORMAL G/DL (ref 1.5–3.8)
GLUCOSE BLD-MCNC: 139 MG/DL (ref 70–99)
HCT VFR BLD CALC: 39 % (ref 41–53)
HEMOGLOBIN: 13.2 G/DL (ref 13.5–17.5)
IMMATURE GRANULOCYTES: ABNORMAL %
LYMPHOCYTES # BLD: 13 % (ref 24–44)
MCH RBC QN AUTO: 32.4 PG (ref 26–34)
MCHC RBC AUTO-ENTMCNC: 33.8 G/DL (ref 31–37)
MCV RBC AUTO: 95.7 FL (ref 80–100)
MONOCYTES # BLD: 8 % (ref 1–7)
NRBC AUTOMATED: ABNORMAL PER 100 WBC
PDW BLD-RTO: 13.3 % (ref 11.5–14.9)
PLATELET # BLD: 200 K/UL (ref 150–450)
PLATELET ESTIMATE: ABNORMAL
PMV BLD AUTO: 10.2 FL (ref 6–12)
POTASSIUM SERPL-SCNC: 4 MMOL/L (ref 3.7–5.3)
RBC # BLD: 4.08 M/UL (ref 4.5–5.9)
RBC # BLD: ABNORMAL 10*6/UL
SEG NEUTROPHILS: 79 % (ref 36–66)
SEGMENTED NEUTROPHILS ABSOLUTE COUNT: 8.5 K/UL (ref 1.3–9.1)
SODIUM BLD-SCNC: 141 MMOL/L (ref 135–144)
TOTAL PROTEIN: 7 G/DL (ref 6.4–8.3)
WBC # BLD: 10.9 K/UL (ref 3.5–11)
WBC # BLD: ABNORMAL 10*3/UL

## 2021-02-20 PROCEDURE — 2580000003 HC RX 258: Performed by: SURGERY

## 2021-02-20 PROCEDURE — G0378 HOSPITAL OBSERVATION PER HR: HCPCS

## 2021-02-20 PROCEDURE — 85025 COMPLETE CBC W/AUTO DIFF WBC: CPT

## 2021-02-20 PROCEDURE — 80048 BASIC METABOLIC PNL TOTAL CA: CPT

## 2021-02-20 PROCEDURE — 2500000003 HC RX 250 WO HCPCS: Performed by: SURGERY

## 2021-02-20 PROCEDURE — 99213 OFFICE O/P EST LOW 20 MIN: CPT | Performed by: INTERNAL MEDICINE

## 2021-02-20 PROCEDURE — C9113 INJ PANTOPRAZOLE SODIUM, VIA: HCPCS | Performed by: SURGERY

## 2021-02-20 PROCEDURE — 36415 COLL VENOUS BLD VENIPUNCTURE: CPT

## 2021-02-20 PROCEDURE — APPSS30 APP SPLIT SHARED TIME 16-30 MINUTES: Performed by: NURSE PRACTITIONER

## 2021-02-20 PROCEDURE — 6360000002 HC RX W HCPCS: Performed by: SURGERY

## 2021-02-20 PROCEDURE — 80076 HEPATIC FUNCTION PANEL: CPT

## 2021-02-20 RX ORDER — ONDANSETRON 4 MG/1
TABLET, FILM COATED ORAL
Qty: 20 TABLET | Refills: 0 | Status: SHIPPED | OUTPATIENT
Start: 2021-02-20 | End: 2021-10-11

## 2021-02-20 RX ORDER — OXYCODONE HYDROCHLORIDE AND ACETAMINOPHEN 5; 325 MG/1; MG/1
1 TABLET ORAL EVERY 6 HOURS PRN
Qty: 28 TABLET | Refills: 0 | Status: SHIPPED | OUTPATIENT
Start: 2021-02-20 | End: 2021-02-21 | Stop reason: SDUPTHER

## 2021-02-20 RX ORDER — CEPHALEXIN 500 MG/1
CAPSULE ORAL
Qty: 21 CAPSULE | Refills: 0 | Status: SHIPPED | OUTPATIENT
Start: 2021-02-20 | End: 2021-10-11

## 2021-02-20 RX ADMIN — FAMOTIDINE 20 MG: 10 INJECTION, SOLUTION INTRAVENOUS at 08:39

## 2021-02-20 RX ADMIN — SODIUM CHLORIDE: 9 INJECTION, SOLUTION INTRAVENOUS at 13:37

## 2021-02-20 RX ADMIN — SODIUM CHLORIDE 10 ML: 9 INJECTION, SOLUTION INTRAMUSCULAR; INTRAVENOUS; SUBCUTANEOUS at 08:39

## 2021-02-20 RX ADMIN — PIPERACILLIN SODIUM AND TAZOBACTAM SODIUM 3375 MG: 3; .375 INJECTION, POWDER, LYOPHILIZED, FOR SOLUTION INTRAVENOUS at 04:14

## 2021-02-20 RX ADMIN — PIPERACILLIN SODIUM AND TAZOBACTAM SODIUM 3375 MG: 3; .375 INJECTION, POWDER, LYOPHILIZED, FOR SOLUTION INTRAVENOUS at 20:40

## 2021-02-20 RX ADMIN — PANTOPRAZOLE SODIUM 40 MG: 40 INJECTION, POWDER, FOR SOLUTION INTRAVENOUS at 08:39

## 2021-02-20 RX ADMIN — METOCLOPRAMIDE 10 MG: 5 INJECTION, SOLUTION INTRAMUSCULAR; INTRAVENOUS at 20:39

## 2021-02-20 RX ADMIN — FAMOTIDINE 20 MG: 10 INJECTION, SOLUTION INTRAVENOUS at 20:39

## 2021-02-20 RX ADMIN — SODIUM CHLORIDE, PRESERVATIVE FREE 10 ML: 5 INJECTION INTRAVENOUS at 20:40

## 2021-02-20 RX ADMIN — HYDROMORPHONE HYDROCHLORIDE 1 MG: 1 INJECTION, SOLUTION INTRAMUSCULAR; INTRAVENOUS; SUBCUTANEOUS at 09:51

## 2021-02-20 RX ADMIN — METOCLOPRAMIDE 10 MG: 5 INJECTION, SOLUTION INTRAMUSCULAR; INTRAVENOUS at 01:16

## 2021-02-20 RX ADMIN — PIPERACILLIN SODIUM AND TAZOBACTAM SODIUM 3375 MG: 3; .375 INJECTION, POWDER, LYOPHILIZED, FOR SOLUTION INTRAVENOUS at 13:26

## 2021-02-20 RX ADMIN — METOCLOPRAMIDE 10 MG: 5 INJECTION, SOLUTION INTRAMUSCULAR; INTRAVENOUS at 13:26

## 2021-02-20 RX ADMIN — HYDROMORPHONE HYDROCHLORIDE 0.5 MG: 1 INJECTION, SOLUTION INTRAMUSCULAR; INTRAVENOUS; SUBCUTANEOUS at 15:18

## 2021-02-20 RX ADMIN — ENOXAPARIN SODIUM 40 MG: 40 INJECTION SUBCUTANEOUS at 08:39

## 2021-02-20 RX ADMIN — HYDROMORPHONE HYDROCHLORIDE 1 MG: 1 INJECTION, SOLUTION INTRAMUSCULAR; INTRAVENOUS; SUBCUTANEOUS at 04:28

## 2021-02-20 RX ADMIN — METOCLOPRAMIDE 10 MG: 5 INJECTION, SOLUTION INTRAMUSCULAR; INTRAVENOUS at 08:39

## 2021-02-20 RX ADMIN — HYDROMORPHONE HYDROCHLORIDE 1 MG: 1 INJECTION, SOLUTION INTRAMUSCULAR; INTRAVENOUS; SUBCUTANEOUS at 20:39

## 2021-02-20 ASSESSMENT — PAIN SCALES - GENERAL
PAINLEVEL_OUTOF10: 3
PAINLEVEL_OUTOF10: 5
PAINLEVEL_OUTOF10: 4
PAINLEVEL_OUTOF10: 8
PAINLEVEL_OUTOF10: 4
PAINLEVEL_OUTOF10: 7

## 2021-02-20 ASSESSMENT — PAIN DESCRIPTION - ONSET: ONSET: ON-GOING

## 2021-02-20 ASSESSMENT — PAIN DESCRIPTION - PAIN TYPE: TYPE: SURGICAL PAIN

## 2021-02-20 ASSESSMENT — PAIN DESCRIPTION - LOCATION: LOCATION: ABDOMEN

## 2021-02-20 ASSESSMENT — PAIN DESCRIPTION - PROGRESSION: CLINICAL_PROGRESSION: NOT CHANGED

## 2021-02-20 ASSESSMENT — PAIN - FUNCTIONAL ASSESSMENT: PAIN_FUNCTIONAL_ASSESSMENT: PREVENTS OR INTERFERES SOME ACTIVE ACTIVITIES AND ADLS

## 2021-02-20 NOTE — DISCHARGE INSTR - COC
Continuity of Care Form    Patient Name: Aron Ortiz   :  1955  MRN:  [id number]    Admit date:  2021  Discharge date:  ***    Code Status Order: Full Code   Advance Directives:   Advance Care Flowsheet Documentation       Date/Time Healthcare Directive Type of Healthcare Directive Copy in [address] Agent's Name Healthcare Agent's Phone Number    21 1001  No, patient does not have an advance directive for healthcare treatment -- -- -- -- --            Admitting Physician:  James Rendon MD  PCP: Frank Navarrete MD    Discharging Nurse: [facility] Unit/Room#: 2111/2111-01  Discharging Unit Phone Number: ***    Emergency Contact:   Extended Emergency Contact Information  Primary Emergency Contact: [facility] [place] [address] Phone: [phone]  Work Phone: [phone]  Mobile Phone: [phone]  Relation: Brother/Sister   needed?  No    Past Surgical History:  Past Surgical History:   Procedure Laterality Date    COLONOSCOPY  3/19/2014    tubular adenoma x 2, inadequate prep, some polyps not removed, needs colonoscopy in 6-12 months    COLONOSCOPY N/A 2020    COLONOSCOPY POLYPECTOMY HOT BIOPSY performed by James Rendon MD at [facility] Right 2020    OPEN RIGHT COLECTOMY, PRIMARY ANASTOMOSIS performed by James Rendon MD at [address]      rt. inguinal    IR BILIARY DRAIN EXTERNAL  2021    IR BILIARY DRAIN EXTERNAL 2021 [facility]    OMENTUM RESECTION  2020    OMENTECTOMY -  PARTIAL GREATER [facility]         Immunization History:   Immunization History   Administered Date(s) Administered    Hepatitis B 2003, 2003, 2003    Influenza Vaccine, unspecified formulation 10/18/2016, 08/15/2017    Influenza Virus Vaccine 10/08/2015, 10/03/2018, 2019, 2020    Influenza, Sheryle Rude, IM, PF (6 mo and older Fluzone, Flulaval, Fluarix, and 3 yrs and older Afluria) 09/23/2020    Tdap (Boostrix, Adacel) 06/12/2015       Active Problems:  Patient Active Problem List   Diagnosis Code    Osteoporosis M81.0    Vitamin D deficiency E55.9    History of stroke Z86.73    Degeneration of lumbar or lumbosacral intervertebral disc M51.37    Back pain M54.9    Low back pain M54.5    Allergic rhinitis J30.9    Impaired fasting glucose R73.01    Mixed hyperlipidemia E78.2    Uncontrolled hypertension I10    Obesity (BMI 30.0-34. 9) E66.9    Tear of left rotator cuff M75.102    Left bicipital tenosynovitis M75.22    Colon polyp K63.5    Acute blood loss as cause of postoperative anemia D62    S/P right hemicolectomy Z90.49    History of malignant neoplasm of skin Z85.828    Acute postoperative pain G89.18    Anemia D64.9    Ex-cigarette smoker Z87.891    Chest pain R07.9    Splenic artery aneurysm (HCC) I72.8    Acute acalculous cholecystitis K81.0    Acute cholecystitis K81.0       Isolation/Infection:   Isolation            No Isolation          Patient Infection Status       Infection Onset Added Last Indicated Last Indicated By Review Planned Expiration Resolved Resolved By    None active    Resolved    COVID-19 Rule Out 02/15/21 02/15/21 02/15/21 COVID-19 (Ordered)   02/16/21 Rule-Out Test Resulted            Nurse Assessment:  Last Vital Signs: /85   Pulse 80   Temp 98 °F (36.7 °C) (Oral)   Resp 17   Ht 5' 7\" (1.702 m)   Wt 218 lb (98.9 kg)   SpO2 92%   BMI 34.14 kg/m²     Last documented pain score (0-10 scale): Pain Level: 4  Last Weight:   Wt Readings from Last 1 Encounters:   02/19/21 218 lb (98.9 kg)     Mental Status:  {IP PT MENTAL STATUS:20030:::0}    IV Access:  { HAYLEY IV ACCESS:427114094:::0}    Nursing Mobility/ADLs:  Walking   {[facility] DME ADLs:690036752:::0}  Transfer  {[facility] DME ADLs:315464799:::0}  Bathing  {P DME ADLs:046592674:::0}  Dressing  {CHP DME ADLs:423224509:::0}  Toileting  {CHP DME ADLs:048627181:::0}  Feeding  {CHP DME ADLs:033585588:::0}  Med Admin  {CHP DME ADLs:737359421:::0}  Med Delivery   { HAYLEY MED Delivery:267477978:::0}    Wound Care Documentation and Therapy:        Elimination:  Continence: Bowel: {YES / QV:43578}  Bladder: {YES / PK:78962}  Urinary Catheter: {Urinary Catheter:563389128:::0}   Colostomy/Ileostomy/Ileal Conduit: {YES / DL:06092}       Date of Last BM: ***    Intake/Output Summary (Last 24 hours) at 2021 0804  Last data filed at 2021 0600  Gross per 24 hour   Intake .08 ml   Output 25 ml   Net .08 ml     I/O last 3 completed shifts:   In: .1 [I.V.:2005; IV Piggyback:72.1]  Out: 25 [Drains:25]    Safety Concerns:     [address] Safety Concerns:465286223:::0}    Impairments/Disabilities:      [address] Impairments/Disabilities:126642324:::0}    Nutrition Therapy:  Current Nutrition Therapy:   [address] [employer] Diet List:769183489:::0}    Routes of Feeding: {P DME Other Feedings:040955113:::0}  Liquids: {Slp liquid thickness:37780}  Daily Fluid Restriction: {CHP DME Yes amt example:383446589:::0}  Last Modified Barium Swallow with Video (Video Swallowing Test): {Done Not Done ZOUE:370574506:::2}    Treatments at the Time of Hospital Discharge:   Respiratory Treatments: ***  Oxygen Therapy:  {Therapy; copd oxygen:61730:::0}  Ventilator:    { CC Vent List:606294017:::0}    Rehab Therapies: {THERAPEUTIC INTERVENTION:7217011609}  Weight Bearing Status/Restrictions: [address] Weight Bearin:::0}  Other Medical Equipment (for information only, NOT a DME order):  {EQUIPMENT:463894786}  Other Treatments: ***    Patient's personal belongings (please select all that are sent with patient):  {CHP DME Belongings:527860636:::0}    RN SIGNATURE:  {Esignature:351956137:::0}    CASE MANAGEMENT/SOCIAL WORK SECTION    Inpatient Status Date: ***    Readmission Risk Assessment Score:  Readmission Risk              Risk of Unplanned Readmission:        0 Discharging to Facility/ Agency   Name:   Address:  Phone:  Fax:    Dialysis Facility (if applicable)   Name:  Address:  Dialysis Schedule:  Phone:  Fax:    / signature: {Esignature:066864939:::0}    PHYSICIAN SECTION    Prognosis: Good    Condition at Discharge: Stable    Rehab Potential (if transferring to Rehab): Good    Recommended Labs or Other Treatments After Discharge: see DC inst    Physician Certification: I certify the above information and transfer of Reino Poser  is necessary for the continuing treatment of the diagnosis listed and that he requires Home Care for less 30 days.      Update Admission H&P: No change in H&P    PHYSICIAN SIGNATURE:  Electronically signed by Miguel Odom MD on 2/20/21 at 8:04 AM EST

## 2021-02-20 NOTE — PLAN OF CARE
Problem: Pain:  Goal: Pain level will decrease  Description: Pain level will decrease  Outcome: Ongoing  Note: Pt complained of abd pain once for this Rn and received dilaudid. Will monitor      Problem: Falls - Risk of:  Goal: Will remain free from falls  Description: Will remain free from falls  Outcome: Ongoing  Note: Pt gets up per self. Gait is steady, remains free from falls. Will monitor      Problem: Falls - Risk of:  Goal: Absence of physical injury  Description: Absence of physical injury  Outcome: Ongoing  Note: Pt remains free from injury this RN's shift. Will monitor      Problem: Physical Regulation:  Goal: Will remain free from infection  Description: Will remain free from infection  Outcome: Ongoing  Note: Pt shows no signs or symptoms of infection at this time. VS stable, alert and oriented x4. Hand hygiene utilized upon entry and exit. Will continue to monitor.

## 2021-02-20 NOTE — CARE COORDINATION
CASE MANAGEMENT NOTE:    Admission Date:  2/19/2021 Amber Miranda is a 72 y.o.  male    Admitted for : Acute cholecystitis [K81.0]    Met with:  Patient    PCP:  Dr Jack Faith:  [address]:  independently at home           Current Services PTA:  No    Is patient agreeable to VNS: No    Freedom of choice provided:  Yes    List of [address] provided: NA    VNS chosen:  NA    DME:  none    Home Oxygen: No    Nebulizer: No    CPAP/BIPAP: No    Supplier: N/A    Potential Assistance Needed: Will follow    SNF needed: No    Freedom of choice and list provided: NA    Pharmacy:  [address]        Does Patient want to use MEDS to BEDS? No    Is patient currently receiving oral anticoagulation therapy? No    Is the Patient an [facility] with Readmission Risk Score greater than 14%? Reads NA  If yes, pt needs a follow up appointment made within 7 days. Family Members/Caregivers that pt would like involved in their care:    Yes    If yes, list name here:  Davis Puckett    Transportation Provider:  Patient             Discharge Plan:  2/20/2021 [facility]; Readmission Tool Complete; From single story home alone- Independent and Drives; DME- None; Declines VNS- stated has emptied CANDIS drains in the past and can do again; POD #1 Open cholecystectomy; General Diet; [facility]- stated already has a follow-up appt with Dr Marycarmen Cormier, but cannot remember date//LEONEL                 Electronically signed by:  Aicha Madsen RN on 2/20/2021 at 9:44 AM

## 2021-02-20 NOTE — PROGRESS NOTES
GI Progress notes    2/20/2021   2:32 PM    Name:  Daja Serna  MRN:    [id number]     Acct:     [de-identified]   Room:  2111/2111-01   Day: 0     Admit Date: 2/19/2021  9:28 AM  PCP: Viet Tao MD    Subjective:     C/C: No chief complaint on file. Interval History: Status: improved. Patient seen and examined. No acute events overnight. Status post lap mary  Status post ERCP  No biliary leak. Possible stone cystic duct  14 mm adenoma in the D2  Patient is tolerating clear liquid diet. No significant abdominal pain, nausea, vomiting. ROS:  Constitutional: negative for chills, fevers and sweats  Gastrointestinal: negative for abdominal pain, constipation, diarrhea, nausea and vomiting      Medications:      Allergies: No Known Allergies    Current Meds:     HYDROmorphone (DILAUDID) injection 0.5 mg, Q3H PRN    Or      HYDROmorphone (DILAUDID) injection 1 mg, Q3H PRN      0.9 % sodium chloride infusion, Continuous      sodium chloride flush 0.9 % injection 10 mL, 2 times per day      sodium chloride flush 0.9 % injection 10 mL, PRN      potassium chloride 10 mEq/100 mL IVPB (Peripheral Line), PRN      magnesium sulfate 1000 mg in dextrose 5% 100 mL IVPB, PRN      ondansetron (ZOFRAN) injection 4 mg, Q6H PRN      famotidine (PEPCID) injection 20 mg, BID      metoclopramide (REGLAN) injection 10 mg, Q6H      piperacillin-tazobactam (ZOSYN) 3,375 mg in dextrose 5 % 50 mL IVPB extended infusion (mini-bag), Q8H      enoxaparin (LOVENOX) injection 40 mg, Daily      lactated ringers bolus, Once      pantoprazole (PROTONIX) injection 40 mg, Daily    And      sodium chloride (PF) 0.9 % injection 10 mL, Daily        Data:     Code Status:  Full Code    Family History   Problem Relation Age of Onset    Heart Disease Father        Social History     Socioeconomic History    Marital status:      Spouse name: Not on file    Number of children: Not on file    Years of education: Not on file    Highest education level: Not on file   Occupational History    Not on file   Social Needs    Financial resource strain: Not hard at all    Food insecurity     Worry: Never true     Inability: Never true   [employer] needs     Medical: Not on file     Non-medical: Not on file   Tobacco Use    Smoking status: Former Smoker     Packs/day: 1.00     Years: 40.00     Pack years: 40.00     Quit date: 2020     Years since quittin.9    Smokeless tobacco: Never Used   Substance and Sexual Activity    Alcohol use: Yes     Comment: rarely    Drug use: No    Sexual activity: Yes     Partners: Female   Lifestyle    Physical activity     Days per week: Not on file     Minutes per session: Not on file    Stress: Not on file   Relationships    Social connections     Talks on phone: Not on file     Gets together: Not on file     Attends Church service: Not on file     Active member of club or organization: Not on file     Attends meetings of clubs or organizations: Not on file     Relationship status: Not on file    Intimate partner violence     Fear of current or ex partner: Not on file     Emotionally abused: Not on file     Physically abused: Not on file     Forced sexual activity: Not on file   Other Topics Concern    Not on file   Social History Narrative    Not on file       Vitals:  BP (!) 134/90   Pulse 89   Temp 98.2 °F (36.8 °C) (Oral)   Resp 20   Ht 5' 7\" (1.702 m)   Wt 218 lb (98.9 kg)   SpO2 (!) 88%   BMI 34.14 kg/m²   Temp (24hrs), Av.1 °F (36.7 °C), Min:83.7 °F (28.7 °C), Max:99.1 °F (37.3 °C)    Recent Labs     21  1313   POCGLU 95       I/O (24Hr):     Intake/Output Summary (Last 24 hours) at 2021 1432  Last data filed at 2021 1215  Gross per 24 hour   Intake 2177.08 ml   Output 25 ml   Net 2152.08 ml       Labs:      CBC:   Lab Results   Component Value Date    WBC 10.9 2021    RBC 4.08 2021    HGB 13.2 2021    HCT 39.0 02/20/2021    MCV 95.7 02/20/2021    MCH 32.4 02/20/2021    MCHC 33.8 02/20/2021    RDW 13.3 02/20/2021     02/20/2021    MPV 10.2 02/20/2021     CBC with Differential:    Lab Results   Component Value Date    WBC 10.9 02/20/2021    RBC 4.08 02/20/2021    HGB 13.2 02/20/2021    HCT 39.0 02/20/2021     02/20/2021    MCV 95.7 02/20/2021    MCH 32.4 02/20/2021    MCHC 33.8 02/20/2021    RDW 13.3 02/20/2021    LYMPHOPCT 13 02/20/2021    MONOPCT 8 02/20/2021    BASOPCT 0 02/20/2021    MONOSABS 0.90 02/20/2021    LYMPHSABS 1.50 02/20/2021    EOSABS 0.00 02/20/2021    BASOSABS 0.00 02/20/2021    DIFFTYPE NOT REPORTED 02/20/2021     Hemoglobin/Hematocrit:    Lab Results   Component Value Date    HGB 13.2 02/20/2021    HCT 39.0 02/20/2021     CMP:    Lab Results   Component Value Date     02/20/2021    K 4.0 02/20/2021     02/20/2021    CO2 22 02/20/2021    BUN 24 02/20/2021    CREATININE 1.28 02/20/2021    GFRAA >60 02/20/2021    LABGLOM 56 02/20/2021    GLUCOSE 139 02/20/2021    PROT 7.0 02/20/2021    LABALBU 3.8 02/20/2021    CALCIUM 9.1 02/20/2021    BILITOT 0.51 02/20/2021    ALKPHOS 68 02/20/2021    AST 81 02/20/2021     02/20/2021     BMP:    Lab Results   Component Value Date     02/20/2021    K 4.0 02/20/2021     02/20/2021    CO2 22 02/20/2021    BUN 24 02/20/2021    LABALBU 3.8 02/20/2021    CREATININE 1.28 02/20/2021    CALCIUM 9.1 02/20/2021    GFRAA >60 02/20/2021    LABGLOM 56 02/20/2021    GLUCOSE 139 02/20/2021     PT/INR:    Lab Results   Component Value Date    PROTIME 12.7 01/31/2021    INR 1.0 01/31/2021     PTT:    Lab Results   Component Value Date    APTT 27.9 05/11/2018   [APTT}    Physical Examination:        General appearance: alert, cooperative and no distress  Mental Status: oriented to person, place and time and normal affect  Abdomen: soft, mild postop tenderness, nondistended, bowel sounds present     Assessment:        Primary Problem  <principal problem not specified>     Active Hospital Problems    Diagnosis Date Noted    Acute cholecystitis [K81.0] 02/19/2021     Past Medical History:   Diagnosis Date    Acute cholecystitis     Essential hypertension 5/4/2017    Hyperlipidemia 9/4/2014    Hyperparathyroidism ([address]     Obesity (BMI 30-39. 9) 5/4/2017    Osteoporosis     Sciatica     Stroke ([facility]     Vertebral fracture, osteoporotic ([facility]         Plan:        1. Acute cholecystitis status post lap mary and ERCP  1. Advance diet as tolerated  2. Likely d/c tomorrow if stable  2. Duodenal polyp  1. Outpatient EMR removal     Explained to the patient and d/W Nursing Staff  Will F/U with you  Please call or Page for any issues or change in status  Thanks    Electronically signed by KAMALA Santos NP on 2/20/2021 at 2:32 PM       I independently performed an evaluation on [employer]. I have reviewed the above documentation completed by the Nurse Practitioner and agree with the documented findings and plan of care. I have personally evaluated this patient with the APRN and have completed at least one if not all key elements of the E/M (history, physical exam, and MDM). Please see my additional contributions to the HPI, physical exam, assessment, and medical decision making. Chart reviewed.

## 2021-02-20 NOTE — PROGRESS NOTES
Patient was seen and examined. Doing well. Stable postop. Afebrile vital signs are stable. Tolerating diet. Voiding well. Abdomen is benign. CANDIS drain is serous. Incision is clean dry intact. Extremity nontender. Blood work was reviewed. Surgically stable. Likely discharge tomorrow. Prescriptions called in.

## 2021-02-21 VITALS
SYSTOLIC BLOOD PRESSURE: 164 MMHG | WEIGHT: 218 LBS | RESPIRATION RATE: 16 BRPM | HEART RATE: 84 BPM | OXYGEN SATURATION: 92 % | BODY MASS INDEX: 34.21 KG/M2 | HEIGHT: 67 IN | DIASTOLIC BLOOD PRESSURE: 97 MMHG | TEMPERATURE: 98.5 F

## 2021-02-21 LAB
ABSOLUTE EOS #: 0.2 K/UL (ref 0–0.4)
ABSOLUTE IMMATURE GRANULOCYTE: ABNORMAL K/UL (ref 0–0.3)
ABSOLUTE LYMPH #: 2.4 K/UL (ref 1–4.8)
ABSOLUTE MONO #: 0.9 K/UL (ref 0.1–1.3)
ANION GAP SERPL CALCULATED.3IONS-SCNC: 9 MMOL/L (ref 9–17)
BASOPHILS # BLD: 1 % (ref 0–2)
BASOPHILS ABSOLUTE: 0.1 K/UL (ref 0–0.2)
BUN BLDV-MCNC: 15 MG/DL (ref 8–23)
BUN/CREAT BLD: ABNORMAL (ref 9–20)
CALCIUM SERPL-MCNC: 8.6 MG/DL (ref 8.6–10.4)
CHLORIDE BLD-SCNC: 110 MMOL/L (ref 98–107)
CO2: 23 MMOL/L (ref 20–31)
CREAT SERPL-MCNC: 1.17 MG/DL (ref 0.7–1.2)
DIFFERENTIAL TYPE: ABNORMAL
EOSINOPHILS RELATIVE PERCENT: 2 % (ref 0–4)
GFR AFRICAN AMERICAN: >60 ML/MIN
GFR NON-AFRICAN AMERICAN: >60 ML/MIN
GFR SERPL CREATININE-BSD FRML MDRD: ABNORMAL ML/MIN/{1.73_M2}
GFR SERPL CREATININE-BSD FRML MDRD: ABNORMAL ML/MIN/{1.73_M2}
GLUCOSE BLD-MCNC: 114 MG/DL (ref 70–99)
HCT VFR BLD CALC: 37 % (ref 41–53)
HEMOGLOBIN: 12.5 G/DL (ref 13.5–17.5)
IMMATURE GRANULOCYTES: ABNORMAL %
LYMPHOCYTES # BLD: 23 % (ref 24–44)
MCH RBC QN AUTO: 32.4 PG (ref 26–34)
MCHC RBC AUTO-ENTMCNC: 33.9 G/DL (ref 31–37)
MCV RBC AUTO: 95.8 FL (ref 80–100)
MONOCYTES # BLD: 8 % (ref 1–7)
NRBC AUTOMATED: ABNORMAL PER 100 WBC
PDW BLD-RTO: 13.3 % (ref 11.5–14.9)
PLATELET # BLD: 161 K/UL (ref 150–450)
PLATELET ESTIMATE: ABNORMAL
PMV BLD AUTO: 9.9 FL (ref 6–12)
POTASSIUM SERPL-SCNC: 3.8 MMOL/L (ref 3.7–5.3)
RBC # BLD: 3.86 M/UL (ref 4.5–5.9)
RBC # BLD: ABNORMAL 10*6/UL
SEG NEUTROPHILS: 66 % (ref 36–66)
SEGMENTED NEUTROPHILS ABSOLUTE COUNT: 6.8 K/UL (ref 1.3–9.1)
SODIUM BLD-SCNC: 142 MMOL/L (ref 135–144)
WBC # BLD: 10.4 K/UL (ref 3.5–11)
WBC # BLD: ABNORMAL 10*3/UL

## 2021-02-21 PROCEDURE — 2580000003 HC RX 258: Performed by: SURGERY

## 2021-02-21 PROCEDURE — APPSS30 APP SPLIT SHARED TIME 16-30 MINUTES: Performed by: NURSE PRACTITIONER

## 2021-02-21 PROCEDURE — 2500000003 HC RX 250 WO HCPCS: Performed by: SURGERY

## 2021-02-21 PROCEDURE — 85025 COMPLETE CBC W/AUTO DIFF WBC: CPT

## 2021-02-21 PROCEDURE — 80048 BASIC METABOLIC PNL TOTAL CA: CPT

## 2021-02-21 PROCEDURE — 36415 COLL VENOUS BLD VENIPUNCTURE: CPT

## 2021-02-21 PROCEDURE — C9113 INJ PANTOPRAZOLE SODIUM, VIA: HCPCS | Performed by: SURGERY

## 2021-02-21 PROCEDURE — 6360000002 HC RX W HCPCS: Performed by: SURGERY

## 2021-02-21 PROCEDURE — 97161 PT EVAL LOW COMPLEX 20 MIN: CPT

## 2021-02-21 PROCEDURE — 99213 OFFICE O/P EST LOW 20 MIN: CPT | Performed by: INTERNAL MEDICINE

## 2021-02-21 PROCEDURE — G0378 HOSPITAL OBSERVATION PER HR: HCPCS

## 2021-02-21 PROCEDURE — 97116 GAIT TRAINING THERAPY: CPT

## 2021-02-21 RX ORDER — OXYCODONE HYDROCHLORIDE AND ACETAMINOPHEN 5; 325 MG/1; MG/1
1 TABLET ORAL EVERY 6 HOURS PRN
Qty: 28 TABLET | Refills: 0 | Status: SHIPPED | OUTPATIENT
Start: 2021-02-21 | End: 2021-02-28

## 2021-02-21 RX ADMIN — HYDROMORPHONE HYDROCHLORIDE 1 MG: 1 INJECTION, SOLUTION INTRAMUSCULAR; INTRAVENOUS; SUBCUTANEOUS at 01:38

## 2021-02-21 RX ADMIN — ENOXAPARIN SODIUM 40 MG: 40 INJECTION SUBCUTANEOUS at 09:59

## 2021-02-21 RX ADMIN — SODIUM CHLORIDE: 9 INJECTION, SOLUTION INTRAVENOUS at 01:38

## 2021-02-21 RX ADMIN — PIPERACILLIN SODIUM AND TAZOBACTAM SODIUM 3375 MG: 3; .375 INJECTION, POWDER, LYOPHILIZED, FOR SOLUTION INTRAVENOUS at 12:12

## 2021-02-21 RX ADMIN — FAMOTIDINE 20 MG: 10 INJECTION, SOLUTION INTRAVENOUS at 09:59

## 2021-02-21 RX ADMIN — METOCLOPRAMIDE 10 MG: 5 INJECTION, SOLUTION INTRAMUSCULAR; INTRAVENOUS at 09:59

## 2021-02-21 RX ADMIN — SODIUM CHLORIDE 10 ML: 9 INJECTION, SOLUTION INTRAMUSCULAR; INTRAVENOUS; SUBCUTANEOUS at 09:59

## 2021-02-21 RX ADMIN — METOCLOPRAMIDE 10 MG: 5 INJECTION, SOLUTION INTRAMUSCULAR; INTRAVENOUS at 00:32

## 2021-02-21 RX ADMIN — SODIUM CHLORIDE: 9 INJECTION, SOLUTION INTRAVENOUS at 12:18

## 2021-02-21 RX ADMIN — METOCLOPRAMIDE 10 MG: 5 INJECTION, SOLUTION INTRAMUSCULAR; INTRAVENOUS at 12:12

## 2021-02-21 RX ADMIN — HYDROMORPHONE HYDROCHLORIDE 1 MG: 1 INJECTION, SOLUTION INTRAMUSCULAR; INTRAVENOUS; SUBCUTANEOUS at 09:59

## 2021-02-21 RX ADMIN — PIPERACILLIN SODIUM AND TAZOBACTAM SODIUM 3375 MG: 3; .375 INJECTION, POWDER, LYOPHILIZED, FOR SOLUTION INTRAVENOUS at 03:50

## 2021-02-21 RX ADMIN — HYDROMORPHONE HYDROCHLORIDE 1 MG: 1 INJECTION, SOLUTION INTRAMUSCULAR; INTRAVENOUS; SUBCUTANEOUS at 15:27

## 2021-02-21 RX ADMIN — PANTOPRAZOLE SODIUM 40 MG: 40 INJECTION, POWDER, FOR SOLUTION INTRAVENOUS at 09:59

## 2021-02-21 ASSESSMENT — PAIN DESCRIPTION - FREQUENCY
FREQUENCY: CONTINUOUS
FREQUENCY: CONTINUOUS

## 2021-02-21 ASSESSMENT — PAIN DESCRIPTION - PAIN TYPE: TYPE: SURGICAL PAIN

## 2021-02-21 ASSESSMENT — PAIN DESCRIPTION - PROGRESSION
CLINICAL_PROGRESSION: NOT CHANGED
CLINICAL_PROGRESSION: NOT CHANGED

## 2021-02-21 ASSESSMENT — PAIN DESCRIPTION - DESCRIPTORS: DESCRIPTORS: ACHING

## 2021-02-21 ASSESSMENT — PAIN SCALES - GENERAL
PAINLEVEL_OUTOF10: 8
PAINLEVEL_OUTOF10: 7
PAINLEVEL_OUTOF10: 8
PAINLEVEL_OUTOF10: 3
PAINLEVEL_OUTOF10: 6

## 2021-02-21 ASSESSMENT — PAIN DESCRIPTION - ORIENTATION: ORIENTATION: RIGHT

## 2021-02-21 ASSESSMENT — PAIN - FUNCTIONAL ASSESSMENT: PAIN_FUNCTIONAL_ASSESSMENT: PREVENTS OR INTERFERES SOME ACTIVE ACTIVITIES AND ADLS

## 2021-02-21 NOTE — PROGRESS NOTES
General Surgery Progress Note  S/p open cholecystectomy     Subjective:     24 Hour Events: doing well. Tolerating regular diet. Had a BM. Pain controlled. Objective:     No Known Allergies    Intake/Output last 3 shifts:  I/O last 3 completed shifts: In: 2089 [P.O.:786; I.V.:1303]  Out: 1360 [Urine:1325; Drains:35]    Vitals:    02/21/21 1246   BP:    Pulse:    Resp:    Temp:    SpO2: 92%       Physical Exam  General Appearance: Awake, Alert & Oriented x3, No Acute Distress  Pulmonary: Unlabored breathing. Clear to auscultation. No expiratory wheezes. Cardiac: Regular rate, and rhythm. No murmurs. Abdomen: Soft, non-tender, non-distended   Skin: Dry. No Rash. Incision clean dry and intact. CANDIS serosanguinous        Laboratory Data:          CBC:  Lab Results   Component Value Date    WBC 10.4 02/21/2021    WBC 10.9 02/20/2021    WBC 8.8 02/02/2021    HGB 12.5 02/21/2021    HGB 13.2 02/20/2021    HGB 12.7 02/02/2021    HCT 37.0 02/21/2021    HCT 39.0 02/20/2021    HCT 38.0 02/02/2021    MCV 95.8 02/21/2021     02/21/2021     02/20/2021     02/02/2021        BMP:    Lab Results   Component Value Date     02/21/2021    K 3.8 02/21/2021     02/21/2021    CO2 23 02/21/2021    BUN 15 02/21/2021    CREATININE 1.17 02/21/2021    GLUCOSE 114 02/21/2021      LIVER PROFILE:   Lab Results   Component Value Date     02/20/2021    AST 81 02/20/2021    PROT 7.0 02/20/2021    BILITOT 0.51 02/20/2021    BILIDIR 0.11 02/20/2021    LABALBU 3.8 02/20/2021        COAGULATION PROFILE:   Protime   Date Value Ref Range Status   01/31/2021 12.7 11.8 - 14.6 sec Final     INR   Date Value Ref Range Status   01/31/2021 1.0  Final     Comment:           Non-therapeutic Range:     INR = 0.9-1.2  Therapeutic Range:    Moderate Anticoagulant Intensity:     INR = 2.0-3.0   High Anticoagulant Intensity:     INR = 2.5-3.5              Amylase/Lipase:   Lipase   Date Value Ref Range Status   01/29/2021 25 13 - 60 U/L Final   12/17/2020 31 13 - 60 U/L Final       Medications:   sodium chloride flush  10 mL Intravenous 2 times per day    famotidine (PEPCID) injection  20 mg Intravenous BID    metoclopramide  10 mg Intravenous Q6H    piperacillin-tazobactam (ZOSYN) 3375 mg in dextrose 5% IVPB extended infusion (mini-bag)  3,375 mg Intravenous Q8H    enoxaparin  40 mg Subcutaneous Daily    lactated ringers bolus  1,000 mL Intravenous Once    pantoprazole  40 mg Intravenous Daily    And    sodium chloride (PF)  10 mL Intravenous Daily     HYDROmorphone **OR** HYDROmorphone, sodium chloride flush, potassium chloride, magnesium sulfate, ondansetron      Patient Active Problem List   Diagnosis    Osteoporosis    Vitamin D deficiency    History of stroke    Degeneration of lumbar or lumbosacral intervertebral disc    Back pain    Low back pain    Allergic rhinitis    Impaired fasting glucose    Mixed hyperlipidemia    Uncontrolled hypertension    Obesity (BMI 30.0-34. 9)    Tear of left rotator cuff    Left bicipital tenosynovitis    Colon polyp    Acute blood loss as cause of postoperative anemia    S/P right hemicolectomy    History of malignant neoplasm of skin    Acute postoperative pain    Anemia    Ex-cigarette smoker    Chest pain    Splenic artery aneurysm (HCC)    Acute acalculous cholecystitis    Acute cholecystitis       Assessment and Plan:     1. Doing well.  OK for discharge home    - Electronically signed by Maty Cook MD, FACS  at 2/21/2021 5:14 PM

## 2021-02-21 NOTE — PROGRESS NOTES
212Physical Therapy    Facility/Department: [facility] PROGRESSIVE CARE  Initial Assessment    NAME: Jose Guerra  : 1955  MRN: [id number]    Date of Service: 2021    Discharge Recommendations:  Home with assist PRN   PT Equipment Recommendations-none needed  Other: pt has lifting precautions ( surgery protocols)    Assessment   Body structures, Functions, Activity limitations: Decreased functional mobility ; Decreased ROM; Decreased strength  Assessment: 71 y/o male s/p ERCP lap mary  demeonstrates good mobility with ambulation and trnasfers, butlimited by pain. Pt would benefit from continued PT while here. No equipment needs. Treatment Diagnosis: impaired mobility s/p Lap mary  Specific instructions for Next Treatment: gait and stairs for d/c. Prognosis: Good  Decision Making: Low Complexity  PT Education: Goals;PT Role;Plan of Care  REQUIRES PT FOLLOW UP: Yes  Activity Tolerance  Activity Tolerance: Patient limited by pain; Patient limited by endurance  Activity Tolerance: fair       Patient Diagnosis(es): The encounter diagnosis was Acute cholecystitis. has a past medical history of Acute cholecystitis, Essential hypertension, Hyperlipidemia, Hyperparathyroidism ([address] Obesity (BMI 30-39.9), Osteoporosis, Sciatica, Stroke ([address] and Vertebral fracture, osteoporotic ([address] has a past surgical history that includes Vasectomy; Colonoscopy (3/19/2014); hernia repair; Colonoscopy (N/A, 2020); hemicolectomy (Right, 2020); omentum resection (2020); IR Biliary Drain External (2021); Cholecystectomy (N/A, 2021); and ERCP (N/A, 2021).     Restrictions  Restrictions/Precautions  Restrictions/Precautions: Fall Risk, Surgical Protocols(CANDIS x1)  Required Braces or Orthoses?: Yes  Required Braces or Orthoses  Other: Abdominal Binder  Vision/Hearing  Vision: Within Functional Limits  Hearing: Within functional limits     Subjective  General  Chart Reviewed: Yes  Patient assessed for rehabilitation services?: Yes  Response To Previous Treatment: Not applicable  Family / Caregiver Present: No  Referring Practitioner: Myriam Lozano  Referral Date : 02/19/21  Diagnosis: lap mary ERCP 2/19/21  Follows Commands: Within Functional Limits  General Comment  Comments: Pt appears knowledgeable regarding precautions from prior abdominal sx.   Pain Screening  Patient Currently in Pain: Yes  Pain Assessment  Pain Assessment: 0-10  Pain Level: 3  Patient's Stated Pain Goal: No pain  Pain Type: Surgical pain  Pain Location: Abdomen  Pain Orientation: Mid  Pain Descriptors: Aching  Pain Frequency: Continuous  Pain Onset: On-going  Clinical Progression: Not changed  Functional Pain Assessment: Activities are not prevented  Non-Pharmaceutical Pain Intervention(s): Ambulation/Increased Activity  Response to Pain Intervention: Patient Satisfied  Vital Signs  Patient Currently in Pain: Yes  Oxygen Therapy  SpO2: 92 %  Pulse Oximeter Device Mode: Intermittent  Pulse Oximeter Device Location: Left  O2 Device: None (Room air)  Patient Observation  Observations: Pt not SOB while ambulating, self -regulates walking pace  Pre Treatment Pain Screening  Intervention List: Patient able to continue with treatment    Orientation  Orientation  Overall Orientation Status: Within Normal Limits  Social/Functional History  Social/Functional History  Lives With: Alone  Type of Home: House  Home Layout: One level  Home Access: Stairs to enter with rails  Entrance Stairs - Number of Steps: 3  Bathroom Shower/Tub: Tub/Shower unit  Bathroom Toilet: Standard  Bathroom Accessibility: Accessible  Receives Help From: Family  ADL Assistance: Independent  Homemaking Assistance: Independent  Homemaking Responsibilities: Yes  Ambulation Assistance: Independent  Transfer Assistance: Independent  Active : Yes  Occupation: Retired  Additional Comments: Pt has no DME  Cognition   Cognition  Overall Cognitive Status: WNL    Objective Observation/Palpation  Observation: CANDIS drain x1;  abdominal binder fit to pt prior to ambulation    AROM RLE (degrees)  RLE AROM: WFL  AROM LLE (degrees)  LLE AROM : WFL  AROM RUE (degrees)  RUE AROM : WFL  AROM LUE (degrees)  LUE AROM : WFL  Strength RLE  Strength RLE: WFL  Strength LLE  Strength LLE: WFL  Strength RUE  Strength RUE: WFL  Strength LUE  Strength LUE: WFL     Sensation  Overall Sensation Status: WNL  Bed mobility  Rolling to Left: Independent(pt able to demo good log roll technique with HOB ~ 20 deg.)  Supine to Sit: Independent  Sit to Supine: Independent  Transfers  Sit to Stand: Modified independent  Stand to sit: Modified independent  Bed to Chair: (pt deferred up to chair)  Ambulation  Ambulation?: Yes  Ambulation 1  Surface: level tile  Device: No Device  Assistance: Modified Independent  Gait Deviations: Slow Aleshia;Decreased arm swing  Distance: 85 ft x1     Balance  Posture: Good  Sitting - Static: Good  Sitting - Dynamic: Good  Standing - Static: Good  Standing - Dynamic: Fair;+        Plan   Plan  Times per week: 6-7  Specific instructions for Next Treatment: gait and stairs for d/c. Current Treatment Recommendations: Balance Training, Gait Training, Stair training, Transfer Training, Functional Mobility Training  Safety Devices  Type of devices:  All fall risk precautions in place, Left in bed, Call light within reach, Nurse notified    G-Code       OutComes Score                                                  AM-PAC Score     AM-PAC Inpatient Mobility without Stair Climbing Raw Score : 19 (02/21/21 1241)  AM-PAC Inpatient without Stair Climbing T-Scale Score : 56.04 (02/21/21 1241)  Mobility Inpatient CMS 0-100% Score: 12.25 (02/21/21 1241)  Mobility Inpatient without Stair CMS G-Code Modifier : CI (02/21/21 1241)       Goals  Short term goals  Time Frame for Short term goals: 1-2 visits  Short term goal 1: mod ind amb 150 ft  Short term goal 2: mod nd climbing 3 steps with rail to enter home  Short term goal 3: good standing balance  for ADL to tolerate 5min or longer  Patient Goals   Patient goals : return home today       Therapy Time   Individual Concurrent Group Co-treatment   Time In 1002         Time Out 1025         Minutes [facility],

## 2021-02-21 NOTE — CARE COORDINATION
ONGOING DISCHARGE PLAN:    Discharge plan for the patient is home alone. VNS has been declined. POD #2 Open cholecystectomy. Patient stated has emptied CANDIS drains in the past and can do again. General Diet. Will continue to follow for additional discharge needs.     Electronically signed by Camden Gan RN on 2/21/2021 at 9:09 AM

## 2021-02-21 NOTE — FLOWSHEET NOTE
PC to [facility], spoke with Gisele Turner and cancelled script for percocet. Patient given written script at discharge.

## 2021-02-21 NOTE — PROGRESS NOTES
GI Progress notes    2/21/2021   1:06 PM    Name:  Ny Wells  MRN:    [id number]     Acct:     [de-identified]   Room:  2111/2111-01   Day: 0     Admit Date: 2/19/2021  9:28 AM  PCP: Benitez Santos MD    Subjective:     C/C: No chief complaint on file. Interval History: Status: improved. Patient seen and examined. No acute events overnight. Status post lap mary  Status post ERCP  No biliary leak. Possible stone cystic duct  14 mm adenoma in the D2  Patient is tolerating general diet. No significant abdominal pain, nausea, vomiting. ROS:  Constitutional: negative for chills, fevers and sweats  Gastrointestinal: negative for abdominal pain, constipation, diarrhea, nausea and vomiting      Medications:      Allergies: No Known Allergies    Current Meds:     HYDROmorphone (DILAUDID) injection 0.5 mg, Q3H PRN    Or      HYDROmorphone (DILAUDID) injection 1 mg, Q3H PRN      0.9 % sodium chloride infusion, Continuous      sodium chloride flush 0.9 % injection 10 mL, 2 times per day      sodium chloride flush 0.9 % injection 10 mL, PRN      potassium chloride 10 mEq/100 mL IVPB (Peripheral Line), PRN      magnesium sulfate 1000 mg in dextrose 5% 100 mL IVPB, PRN      ondansetron (ZOFRAN) injection 4 mg, Q6H PRN      famotidine (PEPCID) injection 20 mg, BID      metoclopramide (REGLAN) injection 10 mg, Q6H      piperacillin-tazobactam (ZOSYN) 3,375 mg in dextrose 5 % 50 mL IVPB extended infusion (mini-bag), Q8H      enoxaparin (LOVENOX) injection 40 mg, Daily      lactated ringers bolus, Once      pantoprazole (PROTONIX) injection 40 mg, Daily    And      sodium chloride (PF) 0.9 % injection 10 mL, Daily        Data:     Code Status:  Full Code    Family History   Problem Relation Age of Onset    Heart Disease Father        Social History     Socioeconomic History    Marital status:      Spouse name: Not on file    Number of children: Not on file    Years of education: Not on file    Highest education level: Not on file   Occupational History    Not on file   Social Needs    Financial resource strain: Not hard at all    Food insecurity     Worry: Never true     Inability: Never true   [employer] needs     Medical: Not on file     Non-medical: Not on file   Tobacco Use    Smoking status: Former Smoker     Packs/day: 1.00     Years: 40.00     Pack years: 40.00     Quit date: 2020     Years since quittin.9    Smokeless tobacco: Never Used   Substance and Sexual Activity    Alcohol use: Yes     Comment: rarely    Drug use: No    Sexual activity: Yes     Partners: Female   Lifestyle    Physical activity     Days per week: Not on file     Minutes per session: Not on file    Stress: Not on file   Relationships    Social connections     Talks on phone: Not on file     Gets together: Not on file     Attends [employer] service: Not on file     Active member of club or organization: Not on file     Attends meetings of clubs or organizations: Not on file     Relationship status: Not on file    Intimate partner violence     Fear of current or ex partner: Not on file     Emotionally abused: Not on file     Physically abused: Not on file     Forced sexual activity: Not on file   Other Topics Concern    Not on file   Social History Narrative    Not on file       Vitals:  BP (!) 164/97   Pulse 84   Temp 98.5 °F (36.9 °C) (Oral)   Resp 16   Ht 5' 7\" (1.702 m)   Wt 218 lb (98.9 kg)   SpO2 92%   BMI 34.14 kg/m²   Temp (24hrs), Av.7 °F (37.1 °C), Min:98.5 °F (36.9 °C), Max:99 °F (37.2 °C)    Recent Labs     21  1313   POCGLU 95       I/O (24Hr):     Intake/Output Summary (Last 24 hours) at 2021 1306  Last data filed at 2021 0836  Gross per 24 hour   Intake 2089 ml   Output 1360 ml   Net 729 ml       Labs:      CBC:   Lab Results   Component Value Date    WBC 10.4 2021    RBC 3.86 2021    HGB 12.5 2021    HCT 37.0 2021 MCV 95.8 02/21/2021    MCH 32.4 02/21/2021    MCHC 33.9 02/21/2021    RDW 13.3 02/21/2021     02/21/2021    MPV 9.9 02/21/2021     CBC with Differential:    Lab Results   Component Value Date    WBC 10.4 02/21/2021    RBC 3.86 02/21/2021    HGB 12.5 02/21/2021    HCT 37.0 02/21/2021     02/21/2021    MCV 95.8 02/21/2021    MCH 32.4 02/21/2021    MCHC 33.9 02/21/2021    RDW 13.3 02/21/2021    LYMPHOPCT 23 02/21/2021    MONOPCT 8 02/21/2021    BASOPCT 1 02/21/2021    MONOSABS 0.90 02/21/2021    LYMPHSABS 2.40 02/21/2021    EOSABS 0.20 02/21/2021    BASOSABS 0.10 02/21/2021    DIFFTYPE NOT REPORTED 02/21/2021     Hemoglobin/Hematocrit:    Lab Results   Component Value Date    HGB 12.5 02/21/2021    HCT 37.0 02/21/2021     CMP:    Lab Results   Component Value Date     02/21/2021    K 3.8 02/21/2021     02/21/2021    CO2 23 02/21/2021    BUN 15 02/21/2021    CREATININE 1.17 02/21/2021    GFRAA >60 02/21/2021    LABGLOM >60 02/21/2021    GLUCOSE 114 02/21/2021    PROT 7.0 02/20/2021    LABALBU 3.8 02/20/2021    CALCIUM 8.6 02/21/2021    BILITOT 0.51 02/20/2021    ALKPHOS 68 02/20/2021    AST 81 02/20/2021     02/20/2021     BMP:    Lab Results   Component Value Date     02/21/2021    K 3.8 02/21/2021     02/21/2021    CO2 23 02/21/2021    BUN 15 02/21/2021    LABALBU 3.8 02/20/2021    CREATININE 1.17 02/21/2021    CALCIUM 8.6 02/21/2021    GFRAA >60 02/21/2021    LABGLOM >60 02/21/2021    GLUCOSE 114 02/21/2021     PT/INR:    Lab Results   Component Value Date    PROTIME 12.7 01/31/2021    INR 1.0 01/31/2021     PTT:    Lab Results   Component Value Date    APTT 27.9 05/11/2018   [APTT}    Physical Examination:        General appearance: alert, cooperative and no distress  Mental Status: oriented to person, place and time and normal affect  Abdomen: soft, nontender, nondistended, bowel sounds present, CANDIS present    Assessment:        Primary Problem  <principal problem not specified>     Active Hospital Problems    Diagnosis Date Noted    Acute cholecystitis [K81.0] 02/19/2021     Past Medical History:   Diagnosis Date    Acute cholecystitis     Essential hypertension 5/4/2017    Hyperlipidemia 9/4/2014    [facility] [address]     Obesity (BMI 30-39. 9) 5/4/2017    Osteoporosis     Sciatica     Stroke ([facility] [address]     Vertebral fracture, osteoporotic ([facility] [address]         Plan:        1. Acute cholecystitis status post lap mary and ERCP  2. Duodenal polyp  1. Outpatient EMR removal   May d/c per GI    Explained to the patient and d/W Nursing Staff  Will F/U with you  Please call or Page for any issues or change in status  Thanks    Electronically signed by KAMALA Hanson NP on 2/21/2021 at 1:06 PM       I independently performed an evaluation on 20x200. I have reviewed the above documentation completed by the Nurse Practitioner and agree with the documented findings and plan of care. I have personally evaluated this patient with the APRN and have completed at least one if not all key elements of the E/M (history, physical exam, and MDM). Please see my additional contributions to the HPI, physical exam, assessment, and medical decision making. Patient looks stable. Tolerating diet. Wishes to go home. He is aware of the lesion in the duodenum and need outpatient management.   Patient is aware of that and he will be in touch with the office in the next 2 to 3 weeks

## 2021-02-22 NOTE — DISCHARGE SUMMARY
tablet by mouth daily, Disp-30 tablet, R-3Normal      carvedilol (COREG) 6.25 MG tablet Take 1 tablet by mouth 2 times daily (with meals), Disp-60 tablet, R-1Normal      atorvastatin (LIPITOR) 20 MG tablet take 1 tablet by mouth at bedtime, Disp-90 tablet, R-0Normal      pantoprazole (PROTONIX) 40 MG tablet take 1 tablet by mouth once daily, Disp-90 tablet, R-1Normal      ibuprofen (ADVIL;MOTRIN) 800 MG tablet Take 1 tablet by mouth 3 times daily as needed for Pain (Take with food), Disp-30 tablet, R-1Normal      vitamin D (ERGOCALCIFEROL) 1.25 MG (71911 UT) CAPS capsule take 1 capsule by mouth every week, Disp-12 capsule, R-1Normal      fluticasone (FLONASE) 50 MCG/ACT nasal spray 2 sprays into each nostril daily, Disp-1 Bottle,R-3Normal      loratadine (CLARITIN) 10 MG tablet Take 1 tablet by mouth daily, Disp-30 tablet,R-3Normal      aspirin 81 MG chewable tablet Take 1 tablet by mouth daily, Disp-30 tablet, R-3         STOP taking these medications       docusate sodium (COLACE) 100 MG capsule Comments:   Reason for Stopping:               Condition at Discharge: good    Electronically signed by Cecelia Prince MD on 2/22/2021 at 11:04 AM

## 2021-02-22 NOTE — FLOWSHEET NOTE
Patient discharged to home per orders. IV discontinued intact. Discharge instructions reviewed written and verbal.  Patient provided with script for percocet. Keflex and zofran escribed to [facility]. CANDIS drain remains in place with serosanguenous drainage. Patient taken per wheelchair to awaiting vehicle.

## 2021-02-23 LAB — SURGICAL PATHOLOGY REPORT: NORMAL

## 2021-02-26 ASSESSMENT — ENCOUNTER SYMPTOMS
BACK PAIN: 1
RESPIRATORY NEGATIVE: 1
EYES NEGATIVE: 1
ALLERGIC/IMMUNOLOGIC NEGATIVE: 1

## 2021-02-26 ASSESSMENT — PAIN DESCRIPTION - PAIN TYPE: TYPE: CHRONIC PAIN

## 2021-02-26 ASSESSMENT — PAIN SCALES - GENERAL: PAINLEVEL_OUTOF10: 8

## 2021-02-26 ASSESSMENT — PAIN DESCRIPTION - DESCRIPTORS: DESCRIPTORS: ACHING;CONSTANT;SHARP;SHOOTING

## 2021-02-26 ASSESSMENT — PAIN - FUNCTIONAL ASSESSMENT: PAIN_FUNCTIONAL_ASSESSMENT: PREVENTS OR INTERFERES SOME ACTIVE ACTIVITIES AND ADLS

## 2021-02-26 ASSESSMENT — PAIN DESCRIPTION - PROGRESSION: CLINICAL_PROGRESSION: NOT CHANGED

## 2021-02-26 NOTE — PROGRESS NOTES
[facility] Pain Management  Patient Pain Assessment  Dr. Gerry Soler Consultation/ Follow Up     Primary Care Physician: Linda Luong MD  Patient is referred to the pain clinic in consultation for back pain by Demetrius Humphreys MD  Chief complaint:   Chief Complaint   Patient presents with    Lower Back Pain   . HISTORY OF PRESENT ILLNESS:  Jose Guerra is 72 y.o. male with    Referring diagnosis  M54.5 (ICD-10-CM) - Low back pain without sciatica, unspecified back pain laterality, unspecified chronicity  M51.37 (ICD-10-CM) - Degeneration of lumbar or lumbosacral intervertebral disc    Patient is a 77-year-old male referred to the pain clinic with a chief complaint of pain involving the low back area longstanding duration of about 20 years also. Patient reports he had a fall about 12 years ago and had a fracture in underwent physical therapy. He reports he had undergone physical therapy several times in the past which helped only for a short period of time. Patient pain started getting worse since January and is becoming more constant. Patient is unable to function because of the severe pain. Patient's pain is decreased to a certain extent with the use of hot showers. He is evaluated by Dr. Klever Vann who recommended a trial of lumbar epidural steroid injections. Back Pain  This is a chronic problem. The current episode started more than 1 year ago (got worse in January). The problem occurs constantly. The problem has been gradually worsening since onset. The pain is present in the lumbar spine and sacro-iliac. The quality of the pain is described as aching and burning (sharp). The pain radiates to the right foot. The pain is at a severity of 9/10. The pain is severe. The pain is worse during the night. The symptoms are aggravated by sitting, bending, standing and lying down (walking, lifting, ADLs).  Pertinent negatives include no abdominal pain, chest pain, dysuria, fever, numbness, tingling or weakness. (Rt leg) Risk factors include obesity and lack of exercise (former smoker). Treatments tried: PT. The treatment provided no relief. RX Monitoring 3/14/2021   Periodic Controlled Substance Monitoring No signs of potential drug abuse or diversion identified. ;Assessed functional status. Current Pain Assessment  Pain Assessment  Pain Assessment: 0-10  Pain Level: 8  Patient's Stated Pain Goal: 2(to decrease pain with increased activity)  Pain Type: Chronic pain  Pain Location: Back, Buttocks, Leg, Knee  Pain Orientation: Left, Right  Pain Radiating Towards: back to knee on left side  Pain Descriptors: Aching, Constant, Sharp, Shooting  Pain Frequency: Continuous  Pain Onset: On-going  Clinical Progression: Not changed  Functional Pain Assessment: Prevents or interferes some active activities and ADLs  Non-Pharmaceutical Pain Intervention(s): Repositioned, Relaxation techniques, Rest     ADVERSE MEDICATION EFFECTS:   Constipation: no  Bowel Regimen: Yes  Diet: common adult  Appetite:  ok  Sedation:  no  Urinary Retention: no    FOCUSED PAIN SCALE:  Highest : 10  Lowest :5  Average: Range-8  When and What  was your last procedure:  Na     Was your procedureeffective:  not applicable    ACTIVITY/SOCIAL/EMOTIONAL:  Sleep Pattern: 4 hours per night. difficulty falling asleep, nightime awakenings and difficulty falling back asleep if awakened  Energy Level: Tired/Fatigued  Currently attending Physical Therapy:  Nom cannot afford, causes more pain  Chiropractic Treatments: No  Home Exercises: stretching at home in hot shower, had to pause d/t gallbladder removal  Mobility: Painful to walk  Currently seeing a Psychiatristor Psychologist:  No  Emotional Issues: normal   Mood: appropriate     ABERRANT BEHAVIORS SINCE LAST VISIT:  Have you ever been treated in another Pain Clinic no  Refills for prescriptions appropriate: not applicable  Lost rx/pills: not applicable  Taking more medication than prescribed:  not applicable  Are you receiving PAIN medications from  other doctors: Percocet d/t to gall bladder removal   Last Urine/Serum Drug Screen : will obtain in office  Was Serum/UDS as anticipated? Will obtain in office  Brought pill bottles in :not applicable   Was Pill count appropriate? :not applicable   Are currently pregnant? not applicable  Recent ER visits: No    Vitals:    03/17/21 0952   BP: (!) 162/105   Pulse: 78   Resp: 16   Temp: 98 °F (36.7 °C)   SpO2: 96%           Diagnosis Date    Acute cholecystitis     Essential hypertension 5/4/2017    Hyperlipidemia 9/4/2014    [facility]     Obesity (BMI 30-39. 9) 5/4/2017    Osteoporosis     Sciatica     Stroke ([facility]     Vertebral fracture, osteoporotic ([facility] [facility]        Surgical History  Past Surgical History:   Procedure Laterality Date    CHOLECYSTECTOMY N/A 2/19/2021    OPEN CHOLECYSTECTOMY performed by Vincent Last MD at [address] [place] [address] COLONOSCOPY  3/19/2014    tubular adenoma x 2, inadequate prep, some polyps not removed, needs colonoscopy in 6-12 months    COLONOSCOPY N/A 2/11/2020    COLONOSCOPY POLYPECTOMY HOT BIOPSY performed by Vincent Last MD at [facility] ERCP N/A 2/19/2021    ERCP DIAGNOSTIC performed by Eva Sherwood MD at [address] [facility] Floor Right 2/12/2020    OPEN RIGHT COLECTOMY, PRIMARY ANASTOMOSIS performed by Vincent Last MD at [address] [place] [address]      rt. inguinal    IR BILIARY DRAIN EXTERNAL  2/1/2021    IR BILIARY DRAIN EXTERNAL 2/1/2021 [facility]ED[facility]    OMENTUM RESECTION  2/12/2020    OMENTECTOMY -  PARTIAL GREATER OMENECTOMY performed by Vincent Last MD at [address] [place] [address] VASECTOMY         Medications  Current Outpatient Medications   Medication Sig Dispense Refill    traMADol (ULTRAM) 50 MG tablet Take 1 tablet by mouth every 6 hours as needed for Pain for up to 15 days.  60 tablet 0    cephALEXin (KEFLEX) 500 MG capsule 500 mgTake three times daily 21 capsule 0  ondansetron (ZOFRAN) 4 MG tablet Take every six hours as needed 20 tablet 0    lisinopril (PRINIVIL;ZESTRIL) 5 MG tablet take 1 tablet by mouth once daily 90 tablet 0    amLODIPine (NORVASC) 5 MG tablet Take 1 tablet by mouth daily 30 tablet 3    carvedilol (COREG) 6.25 MG tablet Take 1 tablet by mouth 2 times daily (with meals) 60 tablet 1    atorvastatin (LIPITOR) 20 MG tablet take 1 tablet by mouth at bedtime 90 tablet 0    pantoprazole (PROTONIX) 40 MG tablet take 1 tablet by mouth once daily 90 tablet 1    ibuprofen (ADVIL;MOTRIN) 800 MG tablet Take 1 tablet by mouth 3 times daily as needed for Pain (Take with food) 30 tablet 1    vitamin D (ERGOCALCIFEROL) 1.25 MG (34868 UT) CAPS capsule take 1 capsule by mouth every week 12 capsule 1    fluticasone (FLONASE) 50 MCG/ACT nasal spray 2 sprays into each nostril daily 1 Bottle 3    loratadine (CLARITIN) 10 MG tablet Take 1 tablet by mouth daily 30 tablet 3    aspirin 81 MG chewable tablet Take 1 tablet by mouth daily 30 tablet 3     No current facility-administered medications for this encounter. Allergies  Patient has no known allergies. Family History  family history includes Heart Disease in his father.       Social History  Social History     Socioeconomic History    Marital status:      Spouse name: None    Number of children: None    Years of education: None    Highest education level: None   Occupational History    None   Social Needs    Financial resource strain: Not hard at all   Brie-Sharla insecurity     Worry: Never true     Inability: Never true   [employer] needs     Medical: None     Non-medical: None   Tobacco Use    Smoking status: Former Smoker     Packs/day: 1.00     Years: 40.00     Pack years: 40.00     Quit date: 2020     Years since quittin.0    Smokeless tobacco: Never Used   Substance and Sexual Activity    Alcohol use: Yes     Comment: rarely    Drug use: No    Sexual activity: Yes Partners: Female   Lifestyle    Physical activity     Days per week: None     Minutes per session: None    Stress: None   Relationships    Social connections     Talks on phone: None     Gets together: None     Attends [employer] service: None     Active member of club or organization: None     Attends meetings of clubs or organizations: None     Relationship status: None    Intimate partner violence     Fear of current or ex partner: None     Emotionally abused: None     Physically abused: None     Forced sexual activity: None   Other Topics Concern    None   Social History Narrative    None      reports no history of drug use. REVIEW OF SYSTEMS:  Review of Systems   Constitutional: Negative. Negative for activity change, appetite change, fever and unexpected weight change. HENT: Negative. Negative for congestion, ear pain, sore throat and voice change. Eyes: Negative. Negative for pain and visual disturbance. Respiratory: Negative. Negative for cough and shortness of breath. Cardiovascular: Negative. Negative for chest pain and palpitations. Gastrointestinal: Negative for abdominal pain and constipation. Gallbladder removal   Endocrine: Negative. Negative for polydipsia and polyuria. Genitourinary: Negative. Negative for dysuria, enuresis and hematuria. Musculoskeletal: Positive for back pain. Negative for arthralgias. Skin: Negative. Negative for rash. Allergic/Immunologic: Negative. Neurological: Negative. Negative for tingling, weakness and numbness. Hematological: Negative. Psychiatric/Behavioral: Negative. Negative for self-injury, sleep disturbance and suicidal ideas. The patient is not nervous/anxious. GENERAL PHYSICAL EXAM:  Vitals: BP (!) 162/105   Pulse 78   Temp 98 °F (36.7 °C) (Oral)   Resp 16   Ht 5' 7\" (1.702 m)   Wt 215 lb (97.5 kg)   SpO2 96%   BMI 33.67 kg/m² , Body mass index is 33.67 kg/m².   Physical Muscle Strength   The patient has normal right hip strength. Left Hip Exam     Muscle Strength   The patient has normal left hip strength. Back Exam     Tenderness   The patient is experiencing tenderness in the lumbar and sacroiliac. Range of Motion   Back extension: Limited and painful. Back flexion: Limited and painful. Back lateral bend right: Limited and painful. Back lateral bend left: Limited and painful. Back rotation right: Limited and painful. Back rotation left: Limited and painful. Tests   Straight leg raise right: positive  Straight leg raise left: negative    Other   Sensation: normal  Gait: antalgic   Erythema: no back redness  Scars: absent    Comments:  Skin --normal appearance  Surgical Scar --Absent   Mild kyphosis present and mild scoliosis present  Alignment of her shoulders, scapulae and iliac crests--symmetric  Paraspinal tenderness:Present  worse on the right side  Lumbar lordosis-----------Decreased  Movements of the lumbar spine indicated above are diminished range with pain   Facet loading---  : Right side--    Pain-Moderate                                   Left side----   Pain  Severe  Robin's test -------------- Right side---positive                                           Left side-----negative            Nurses Notes and Vital Signs reviewed.     DATA  Labs:   2/21/2021  5:20 AM - Josep, Deangelo Incoming Lab Results From [facility]    Component Value Ref Range & Units Status Collected Lab   Glucose 114High   70 - 99 mg/dL Final 02/21/2021  4:46 AM [facility] [address] Lab   BUN 15  8 - 23 mg/dL Final 02/21/2021  4:46 AM [facility] [address] Lab   CREATININE 1.17  0.70 - 1.20 mg/dL Final 02/21/2021  4:46 AM [facility] [address] Lab   Bun/Cre Ratio NOT REPORTED  9 - 20 Final 02/21/2021  4:46 AM [facility] [address] Lab   Calcium 8.6  8.6 - 10.4 mg/dL Final 02/21/2021  4:46 AM [facility] [address] Lab   Sodium 142  135 - 144 mmol/L Final 02/21/2021  4:46 AM [facility] Lab   Potassium 3.8  3.7 - 5.3 mmol/L Final 02/21/2021  4:46 AM [facility] Lab   Chloride 110High   98 - 107 mmol/L Final 02/21/2021  4:46 AM [facility] Lab   CO2 23  20 - 31 mmol/L Final 02/21/2021  4:46 AM [facility] Lab   Anion Gap 9  9 - 17 mmol/L Final 02/21/2021  4:46 AM [facility] Lab   GFR Non-African American >60  >60 mL/min Final 02/21/2021  4:46 AM [facility] Lab   GFR African American >60  >60 mL/min Final 02/21/2021  4:46 AM [facility] Lab   GFR Comment        Final           Imaging:  Radiology Images and Reports reviewed where indicated and necessary  EXAMINATION:   MRI OF THE LUMBAR SPINE WITHOUT CONTRAST, 1/21/2021 2:24 pm       TECHNIQUE:   Multiplanar multisequence MRI of the lumbar spine was performed without the   administration of intravenous contrast.       COMPARISON:   None.       HISTORY:   ORDERING SYSTEM PROVIDED HISTORY: Degeneration of lumbar or lumbosacral   intervertebral disc   TECHNOLOGIST PROVIDED HISTORY:   Reason for Exam: Degeneration of lumbar or lumbosacral intervertebral disc,   Low back pain without sciatica, unspecified back pain laterality, unspecified   chronicity   Additional signs and symptoms: Right sided low back pain that radiates down   buttocks region through right leg down to the big toe. Pain has lasted on and   off for 22 years       FINDINGS:   BONES/ALIGNMENT: Vertebral body heights are maintained there is   age-appropriate bone marrow signal. There is degenerative endplate change   most pronounced at the L3-4 level. There is multilevel degenerative disc   disease with loss of disc signal.  There is disc space narrowing at L3-4. There is no spondylolisthesis.       SPINAL CORD: Conus medullaris is normal in caliber and signal and terminates   at the L1 level.  The cauda equina is unremarkable.       SOFT TISSUES: The posterior paraspinal soft tissues are unremarkable.  The   visualized abdominal structures are unremarkable.       L1-L2: There is a mild circumferential disc bulge.  There is no canal   stenosis or foraminal narrowing.       L2-L3: There is a circumferential disc bulge with facet and ligamentous   hypertrophy.  There is canal stenosis measuring 8 mm in AP dimension.  There   is mild left foraminal narrowing and no significant right foraminal narrowing.       L3-L4: There is a circumferential disc bulge with facet and ligamentous   hypertrophy.  There is canal stenosis measuring 8 mm in AP dimension.  There   is moderate bilateral foraminal narrowing.       L4-L5: There is a circumferential disc bulge with facet hypertrophy.  There   is canal stenosis measuring 7 mm in AP dimension.  Is moderate bilateral   foraminal narrowing.       L5-S1: There is a circumferential disc bulge with facet hypertrophy.  There   is no significant canal stenosis.  There is moderate bilateral foraminal   narrowing.           Impression   Multilevel degenerative disc disease and multilevel degenerative facet   hypertrophy with canal stenosis most pronounced at L4-5.       Bilateral foraminal narrowing as described above. Patient Active Problem List   Diagnosis    Osteoporosis    Vitamin D deficiency    History of stroke    Degeneration of lumbar or lumbosacral intervertebral disc    Back pain    Low back pain    Allergic rhinitis    Impaired fasting glucose    Mixed hyperlipidemia    Uncontrolled hypertension    Obesity (BMI 30.0-34. 9)    Tear of left rotator cuff    Left bicipital tenosynovitis    Colon polyp    Acute blood loss as cause of postoperative anemia    S/P right hemicolectomy    History of malignant neoplasm of skin    Acute postoperative pain    Anemia    Ex-cigarette smoker    Chest pain    Splenic artery aneurysm (HCC)    Acute acalculous cholecystitis    Acute cholecystitis        ASSESSMENT    Macey Kearns Quentin Chen is a 72 y.o. male with     1. Lumbar radiculopathy    2. DDD (degenerative disc disease), lumbar    3. Lumbosacral spondylosis without myelopathy    4. Encounter for medication monitoring    5. Sacroiliitis ([address]    6. Degenerative lumbar spinal stenosis           PLAN  Patient's   [] x-ray    [] CT scan    [x] MRI  Were/was  Reviewed. These findings are consistent with the patient's symptoms and physical examination. [x] Patient's findings on the x-ray were explained to the patient using a bone modal.    Other reports reviewed include    [] Bone scan   [] EMG and nerve conduction studies   [] Referral reports-  I also discussed with him the following treatment options Including advantages and disadvantages of each:    [x] Physical therapy    [x] Interventional pain treatment    [x] Medication management    [] Surgical options    Patient's OARRS were reviewed. It is acceptable and appears patient is not receiving prescriptions from multiple prescribers. Patient is  forthcoming regarding prescriptions for pain medication in the past  Controlled Substances Monitoring: Periodic Controlled Substance Monitoring: No signs of potential drug abuse or diversion identified. , Assessed functional status. (Monica Fallon MD)    The following screens were also reviewed  SOAPP- the score is 4. (Values:cates patient is  <4minimal potential  4-7 Moderate potential  >7 High potential  for drug addiction  Counselling/Preventive measures for pain  Control:    [x]  Spine strengthening exercises are discussed with patient in detail. Patient is counseled on importance of exercise and,core strengthening. Some  specific exercises to strengthen the abdominal muscles and low back muscles Were discussed. Also aquatic (water) physical therapy and benefits were explained to patient. including \" Water supports the body and minimizes the effect of gravity, making it easier for patients to start an exercise program.\"   The Medication safety, Treatment options. Level of diagnosis and management options of this case is multiple: involving the following management options: Interventions as needed, medication management as appropriate, future visits, activity modification, heat/ice as needed, Urine drug screen as required. [x]The patient's questions were answered to the best of my abilities. .    This note was created using voice recognition software. There may be inaccuracies of transcription  that are inadvertently overlooked prior to the signature. There is any questions about the transcription please contact me.     Electronically signed by Christina Nolasco MD on 3/17/2021 at 11:07 AM

## 2021-03-17 ENCOUNTER — HOSPITAL ENCOUNTER (OUTPATIENT)
Dept: PAIN MANAGEMENT | Age: 66
Discharge: HOME | End: 2021-03-17
Payer: COMMERCIAL

## 2021-03-17 VITALS
HEIGHT: 67 IN | SYSTOLIC BLOOD PRESSURE: 162 MMHG | WEIGHT: 215 LBS | HEART RATE: 78 BPM | OXYGEN SATURATION: 96 % | DIASTOLIC BLOOD PRESSURE: 105 MMHG | BODY MASS INDEX: 33.74 KG/M2 | TEMPERATURE: 98 F | RESPIRATION RATE: 16 BRPM

## 2021-03-17 DIAGNOSIS — M47.817 LUMBOSACRAL SPONDYLOSIS WITHOUT MYELOPATHY: ICD-10-CM

## 2021-03-17 DIAGNOSIS — M46.1 SACROILIITIS (HCC): ICD-10-CM

## 2021-03-17 DIAGNOSIS — Z51.81 ENCOUNTER FOR MEDICATION MONITORING: ICD-10-CM

## 2021-03-17 DIAGNOSIS — M54.16 LUMBAR RADICULOPATHY: Primary | ICD-10-CM

## 2021-03-17 DIAGNOSIS — M51.36 DDD (DEGENERATIVE DISC DISEASE), LUMBAR: ICD-10-CM

## 2021-03-17 DIAGNOSIS — M48.061 DEGENERATIVE LUMBAR SPINAL STENOSIS: ICD-10-CM

## 2021-03-17 PROCEDURE — 99204 OFFICE O/P NEW MOD 45 MIN: CPT | Performed by: PAIN MEDICINE

## 2021-03-17 PROCEDURE — 99203 OFFICE O/P NEW LOW 30 MIN: CPT

## 2021-03-17 PROCEDURE — 80307 DRUG TEST PRSMV CHEM ANLYZR: CPT

## 2021-03-17 RX ORDER — TRAMADOL HYDROCHLORIDE 50 MG/1
50 TABLET ORAL EVERY 6 HOURS PRN
Qty: 60 TABLET | Refills: 0 | Status: SHIPPED | OUTPATIENT
Start: 2021-03-17 | End: 2021-04-12 | Stop reason: SDUPTHER

## 2021-03-17 ASSESSMENT — ENCOUNTER SYMPTOMS
VOICE CHANGE: 0
COUGH: 0
CONSTIPATION: 0
SHORTNESS OF BREATH: 0
EYE PAIN: 0
ABDOMINAL PAIN: 0
SORE THROAT: 0

## 2021-03-20 LAB
6-ACETYLMORPHINE, UR: NOT DETECTED
7-AMINOCLONAZEPAM, URINE: NOT DETECTED
ALPHA-OH-ALPRAZ, URINE: NOT DETECTED
ALPHA-OH-MIDAZOLAM, URINE: NOT DETECTED
ALPRAZOLAM, URINE: NOT DETECTED
AMPHETAMINES, URINE: NOT DETECTED
BARBITURATES, URINE: NOT DETECTED
BENZOYLECGONINE, UR: NOT DETECTED
BUPRENORPHINE URINE: NOT DETECTED
CARISOPRODOL, UR: NOT DETECTED
CLONAZEPAM, URINE: NOT DETECTED
CODEINE, URINE: NOT DETECTED
CREATININE URINE: 163.8 MG/DL (ref 20–400)
DIAZEPAM, URINE: NOT DETECTED
DRUGS EXPECTED, UR: NORMAL
EER HI RES INTERP UR: NORMAL
ETHYL GLUCURONIDE UR: NOT DETECTED
FENTANYL URINE: NOT DETECTED
GABAPENTIN: NOT DETECTED
HYDROCODONE, URINE: NOT DETECTED
HYDROMORPHONE, URINE: NOT DETECTED
LORAZEPAM, URINE: NOT DETECTED
MARIJUANA METAB, UR: NOT DETECTED
MDA, UR: NOT DETECTED
MDEA, EVE, UR: NOT DETECTED
MDMA URINE: NOT DETECTED
MEPERIDINE METAB, UR: NOT DETECTED
METHADONE, URINE: NOT DETECTED
METHAMPHETAMINE, URINE: NOT DETECTED
METHYLPHENIDATE: NOT DETECTED
MIDAZOLAM, URINE: NOT DETECTED
MORPHINE URINE: NOT DETECTED
NALOXONE URINE: NOT DETECTED
NORBUPRENORPHINE, URINE: NOT DETECTED
NORDIAZEPAM, URINE: NOT DETECTED
NORFENTANYL, URINE: NOT DETECTED
NORHYDROCODONE, URINE: NOT DETECTED
NOROXYCODONE, URINE: NOT DETECTED
NOROXYMORPHONE, URINE: NOT DETECTED
OXAZEPAM, URINE: NOT DETECTED
OXYCODONE URINE: NOT DETECTED
OXYMORPHONE, URINE: NOT DETECTED
PAIN MANAGEMENT DRUG PANEL INTERP, URINE: NORMAL
PAIN MGT DRUG PANEL, HI RES, UR: NORMAL
PCP,URINE: NOT DETECTED
PHENTERMINE, UR: NOT DETECTED
PREGABALIN: NOT DETECTED
TAPENTADOL, URINE: NOT DETECTED
TAPENTADOL-O-SULFATE, URINE: NOT DETECTED
TEMAZEPAM, URINE: NOT DETECTED
TRAMADOL, URINE: NOT DETECTED
ZOLPIDEM METABOLITE (ZCA), URINE: NOT DETECTED
ZOLPIDEM, URINE: NOT DETECTED

## 2021-03-25 ENCOUNTER — TELEPHONE (OUTPATIENT)
Dept: PAIN MANAGEMENT | Age: 66
End: 2021-03-25

## 2021-03-29 ENCOUNTER — HOSPITAL ENCOUNTER (OUTPATIENT)
Dept: GENERAL RADIOLOGY | Age: 66
Discharge: HOME | End: 2021-03-31
Payer: COMMERCIAL

## 2021-03-29 ENCOUNTER — HOSPITAL ENCOUNTER (OUTPATIENT)
Dept: PAIN MANAGEMENT | Age: 66
Discharge: HOME | End: 2021-03-29
Payer: COMMERCIAL

## 2021-03-29 VITALS
BODY MASS INDEX: 33.74 KG/M2 | WEIGHT: 215 LBS | HEIGHT: 67 IN | TEMPERATURE: 97.8 F | OXYGEN SATURATION: 97 % | RESPIRATION RATE: 16 BRPM | SYSTOLIC BLOOD PRESSURE: 125 MMHG | HEART RATE: 73 BPM | DIASTOLIC BLOOD PRESSURE: 77 MMHG

## 2021-03-29 DIAGNOSIS — M51.36 DDD (DEGENERATIVE DISC DISEASE), LUMBAR: ICD-10-CM

## 2021-03-29 DIAGNOSIS — M47.817 LUMBOSACRAL SPONDYLOSIS WITHOUT MYELOPATHY: ICD-10-CM

## 2021-03-29 DIAGNOSIS — M54.16 LUMBAR RADICULOPATHY: Primary | ICD-10-CM

## 2021-03-29 DIAGNOSIS — M54.16 LUMBAR RADICULOPATHY: ICD-10-CM

## 2021-03-29 PROCEDURE — 6360000002 HC RX W HCPCS: Performed by: PAIN MEDICINE

## 2021-03-29 PROCEDURE — 62323 NJX INTERLAMINAR LMBR/SAC: CPT | Performed by: PAIN MEDICINE

## 2021-03-29 PROCEDURE — 6360000004 HC RX CONTRAST MEDICATION: Performed by: PAIN MEDICINE

## 2021-03-29 PROCEDURE — 3209999900 FLUORO FOR SURGICAL PROCEDURES

## 2021-03-29 PROCEDURE — 62323 NJX INTERLAMINAR LMBR/SAC: CPT

## 2021-03-29 RX ORDER — TRIAMCINOLONE ACETONIDE 40 MG/ML
INJECTION, SUSPENSION INTRA-ARTICULAR; INTRAMUSCULAR
Status: COMPLETED | OUTPATIENT
Start: 2021-03-29 | End: 2021-03-29

## 2021-03-29 RX ORDER — MIDAZOLAM HYDROCHLORIDE 1 MG/ML
INJECTION INTRAMUSCULAR; INTRAVENOUS
Status: COMPLETED | OUTPATIENT
Start: 2021-03-29 | End: 2021-03-29

## 2021-03-29 RX ADMIN — TRIAMCINOLONE ACETONIDE 80 MG: 40 INJECTION, SUSPENSION INTRA-ARTICULAR; INTRAMUSCULAR at 08:54

## 2021-03-29 RX ADMIN — MIDAZOLAM 2 MG: 1 INJECTION INTRAMUSCULAR; INTRAVENOUS at 08:51

## 2021-03-29 RX ADMIN — IOHEXOL 2 ML: 180 INJECTION INTRAVENOUS at 08:53

## 2021-03-29 ASSESSMENT — PAIN DESCRIPTION - FREQUENCY: FREQUENCY: INTERMITTENT

## 2021-03-29 ASSESSMENT — PAIN SCALES - GENERAL: PAINLEVEL_OUTOF10: 8

## 2021-03-29 ASSESSMENT — PAIN DESCRIPTION - ONSET: ONSET: ON-GOING

## 2021-03-29 ASSESSMENT — PAIN DESCRIPTION - DESCRIPTORS: DESCRIPTORS: ACHING

## 2021-03-29 ASSESSMENT — PAIN DESCRIPTION - LOCATION: LOCATION: BACK

## 2021-03-29 NOTE — PLAN OF CARE
Patient did not bring in Tramadol bottle for count. Patient denies need for refill.  States \"has plenty\" until virutal visit on 4-12-21

## 2021-03-29 NOTE — PROCEDURES
Pre-Procedure Note    Patient Name: Rain Llanos   YOB: 1955  Room/Bed: Room/bed info not found  Medical Record Number: [id number]  Date: 3/29/2021       Indication:    1. Lumbar radiculopathy    2. DDD (degenerative disc disease), lumbar    3. Lumbosacral spondylosis without myelopathy        Consent: On file. Vital Signs:   Vitals:    03/29/21 0855   BP: 136/86   Pulse: 78   Resp: 16   Temp:    SpO2: 94%       Past Medical History:   has a past medical history of Acute cholecystitis, Essential hypertension, Hyperlipidemia, Hyperparathyroidism ([address] Obesity (BMI 30-39.9), Osteoporosis, Sciatica, Stroke ([address] and Vertebral fracture, osteoporotic ([facility] [address] Past Surgical History:   has a past surgical history that includes Vasectomy; Colonoscopy (3/19/2014); hernia repair; Colonoscopy (N/A, 2/11/2020); hemicolectomy (Right, 2/12/2020); omentum resection (2/12/2020); IR Biliary Drain External (2/1/2021); Cholecystectomy (N/A, 2/19/2021); and ERCP (N/A, 2/19/2021). Pre-Sedation Documentation and Exam:   Vital signs have been reviewed (see flow sheet for vitals). Mallampati Airway Assessment:      ASA Classification:  Class 3 - A patient with severe systemic disease that limits activity but is not incapacitating  Mallampati Class I - (soft palate, fauces, uvula & anterior/posterior tonsillar pillars are visible)  Sedation/ Anesthesia Plan:   intravenous sedation   as needed. Medications Planned:   midazolam (Versed) / Fentanyl  Intravenously  as needed. Patient is an appropriate candidate for plan of sedation: yes  Patient's History and Physical examination was reviewed and there is no change. Electronically signed by Ana Cristina Laboy MD on 3/29/2021 at 8:59 AM        Preoperative Diagnosis:    1. Lumbar radiculopathy    2. DDD (degenerative disc disease), lumbar    3. Lumbosacral spondylosis without myelopathy        Postoperative Diagnosis:    1. Lumbar radiculopathy    2.  DDD (degenerative disc disease), lumbar    3. Lumbosacral spondylosis without myelopathy        Procedure Performed:  Lumbar epidural steroid injection under fluoroscopy guidance with IV sedation. Indication for the Procedure: The patient failed conservative management  for pain in the low back radiating to lower extremities. As the patient is not responding to conservative management and it is interfering with activities of daily living we decided to proceed with lumbar epidural steroid injection. The procedure and risks were discussed with the patient and an informed consent was obtained  Current Pain Assessment  Pain Assessment  Pain Assessment: 0-10  Pain Level: 8  Pain Type: Chronic pain  Pain Location: Back  Pain Orientation: Right, Lower  Pain Radiating Towards: right leg to toes  Pain Descriptors: Aching  Pain Frequency: Intermittent  Pain Onset: On-going  Clinical Progression: Gradually improving  Functional Pain Assessment: Prevents or interferes some active activities and ADLs  Non-Pharmaceutical Pain Intervention(s): Rest, Repositioned, Relaxation techniques     Procedure:    After starting an IV, the patient was sedated with 2 mg of Midazolam and 0 mcg of Fentanyl Intravenously by the RN under my direct supervision. Patient's vital signs including BP, EKG and SaO2 were monitored by the RN and they remained stable during the procedure. A meaningful communication was kept up with the patient throughout  the procedure. The patient is placed in prone position. Skin over the back was prepped and draped in sterile manner. Then using fluoroscopy the L3, L4 interspace was observed and the skin and deep tissues in the right paramedian area were infiltrated with 4 ml of 1% lidocaine. The #20-gauge, 3-1/2 inch Tuohy needle was inserted through the skin wheal and the epidural space was identified using loss of resistance technique with normal saline.      This was confirmed with AP and lateral views using fluoroscopy after injecting about 2 ml of Omnipaque-180 and observing the spread of the contrast in the epidural space. Then after negative aspiration a total of 80 mg of triamcinolone with 8 ml of normal saline was injected into the epidural space. The needle is removed and a Band-Aid was placed over the needle insertion site. Patient's vital signs remained stable and the patient tolerated the procedure well. The patient was discharged home in stable condition and will be followed in the pain clinic in the next few weeks for further planning.     Electronically signed by Terrie Walton MD on 3/29/2021 at 8:59 AM

## 2021-03-29 NOTE — PROGRESS NOTES
Discharge criteria met. Patient alert and oriented x3  Post procedure dressing dry and intact. Sensory and motor function intact as per pre-procedure. Instructions and follow up reviewed with pt at patient at discharge. Patient discharged per wheelchair. Denies questions.  -Keaton @ [phone]

## 2021-03-30 ENCOUNTER — TELEPHONE (OUTPATIENT)
Dept: PAIN MANAGEMENT | Age: 66
End: 2021-03-30

## 2021-04-07 NOTE — PROGRESS NOTES
Completed forms faxed back to [facility] pharmacy at [phone].   Originals sent to be scanned.       Yakelin Rubin        Subjective:      Patient ID: Andrea Perez is a 61 y.o. male. Visit Information    Have you changed or started any medications since your last visit including any over-the-counter medicines, vitamins, or herbal medicines? no   Are you having any side effects from any of your medications? -  no  Have you stopped taking any of your medications? Is so, why? -  no    Have you seen any other physician or provider since your last visit? No  Have you had any other diagnostic tests since your last visit? No  Have you been seen in the emergency room and/or had an admission to a hospital since we last saw you? No  Have you had your routine dental cleaning in the past 6 months? yes -     Have you activated your [employer] account? If not, what are your barriers? Yes     Patient Care Team:  Osito Munoz MD as PCP - General (Family Medicine)  KAMALA Allen CNP as PCP - S Attributed Provider  Yadira Vivas MD as Orthopedic Surgeon (Orthopedic Surgery)  Shana Pool MD (Neurology)  Carrie Mckeon MD as Consulting Physician (Gastroenterology)    Medical History Review  Past Medical, Family, and Social History reviewed and does not contribute to the patient presenting condition    Health Maintenance   Topic Date Due    Hepatitis C screen  1955    A1C test (Diabetic or Prediabetic)  09/03/1965    HIV screen  09/03/1970    Pneumococcal med risk (1 of 1 - PPSV23) 09/03/1974    Shingles Vaccine (1 of 2 - 2 Dose Series) 09/03/2005    Flu vaccine (1) 09/01/2018    Potassium monitoring  05/11/2019    Creatinine monitoring  05/11/2019    Lipid screen  05/14/2023    Colon cancer screen colonoscopy  03/19/2024    DTaP/Tdap/Td vaccine (2 - Td) 06/12/2025     HPI     61year old male presents with nasal congestion/pressure post nasal drainage cough for 4-5 days. he says cough is productive with greenish sputum. Took mucinex without relief. he denies fever chills body ache malaise or nv abd pain.   Pt

## 2021-04-08 ENCOUNTER — IMMUNIZATION (OUTPATIENT)
Dept: PRIMARY CARE CLINIC | Age: 66
End: 2021-04-08
Payer: COMMERCIAL

## 2021-04-08 PROCEDURE — 0001A COVID-19, PFIZER VACCINE 30MCG/0.3ML DOSE: CPT | Performed by: INTERNAL MEDICINE

## 2021-04-08 PROCEDURE — 91300 COVID-19, PFIZER VACCINE 30MCG/0.3ML DOSE: CPT | Performed by: INTERNAL MEDICINE

## 2021-04-08 RX ORDER — CARVEDILOL 6.25 MG/1
6.25 TABLET ORAL 2 TIMES DAILY WITH MEALS
Qty: 60 TABLET | Refills: 3 | Status: SHIPPED | OUTPATIENT
Start: 2021-04-08 | End: 2021-07-30

## 2021-04-12 ENCOUNTER — HOSPITAL ENCOUNTER (OUTPATIENT)
Dept: PAIN MANAGEMENT | Age: 66
Discharge: HOME | End: 2021-04-12
Payer: COMMERCIAL

## 2021-04-12 VITALS
SYSTOLIC BLOOD PRESSURE: 125 MMHG | TEMPERATURE: 98.4 F | WEIGHT: 215 LBS | BODY MASS INDEX: 33.74 KG/M2 | OXYGEN SATURATION: 98 % | HEIGHT: 67 IN | DIASTOLIC BLOOD PRESSURE: 77 MMHG | RESPIRATION RATE: 20 BRPM | HEART RATE: 76 BPM

## 2021-04-12 DIAGNOSIS — M47.817 LUMBOSACRAL SPONDYLOSIS WITHOUT MYELOPATHY: ICD-10-CM

## 2021-04-12 DIAGNOSIS — M51.36 DDD (DEGENERATIVE DISC DISEASE), LUMBAR: ICD-10-CM

## 2021-04-12 DIAGNOSIS — M54.16 LUMBAR RADICULOPATHY: Primary | ICD-10-CM

## 2021-04-12 PROCEDURE — 99214 OFFICE O/P EST MOD 30 MIN: CPT | Performed by: PAIN MEDICINE

## 2021-04-12 PROCEDURE — 99213 OFFICE O/P EST LOW 20 MIN: CPT

## 2021-04-12 RX ORDER — TRAMADOL HYDROCHLORIDE 50 MG/1
50 TABLET ORAL EVERY 6 HOURS PRN
Qty: 60 TABLET | Refills: 0 | Status: SHIPPED | OUTPATIENT
Start: 2021-04-12 | End: 2021-04-27

## 2021-04-12 ASSESSMENT — ENCOUNTER SYMPTOMS
RESPIRATORY NEGATIVE: 1
EYES NEGATIVE: 1
ABDOMINAL PAIN: 0
BACK PAIN: 1
GASTROINTESTINAL NEGATIVE: 1
ALLERGIC/IMMUNOLOGIC NEGATIVE: 1

## 2021-04-12 ASSESSMENT — PAIN DESCRIPTION - PAIN TYPE: TYPE: CHRONIC PAIN

## 2021-04-12 ASSESSMENT — PAIN DESCRIPTION - PROGRESSION: CLINICAL_PROGRESSION: GRADUALLY IMPROVING

## 2021-04-12 ASSESSMENT — PAIN DESCRIPTION - LOCATION: LOCATION: BACK

## 2021-04-12 ASSESSMENT — PAIN SCALES - GENERAL: PAINLEVEL_OUTOF10: 8

## 2021-04-12 ASSESSMENT — PAIN DESCRIPTION - ONSET: ONSET: ON-GOING

## 2021-04-12 ASSESSMENT — PAIN DESCRIPTION - ORIENTATION: ORIENTATION: RIGHT;LOWER

## 2021-04-12 NOTE — PROGRESS NOTES
[facility]  Patient Pain Assessment  RECHECK - Dr. Theresa Hemphill    Primary Care Physician: Yennifer Holt MD    Chief complaint:   Chief Complaint   Patient presents with    Back Pain   . Patient identification was verified at the start of the visit: Yes    Chief complaint: Edouard Beach is 72 y.o.,  male, with  Back Pain  . HISTORY OF PRESENT ILLNESS:  Edouard Beach is 72 y.o. male with    Referring diagnosis  M54.5 (ICD-10-CM) - Low back pain without sciatica, unspecified back pain laterality, unspecified chronicity  M51.37 (ICD-10-CM) - Degeneration of lumbar or lumbosacral intervertebral disc    Patient is complaining of pain involving the low back area with pain radiating mostly towards the right lower extremity. Patient had undergone lumbar epidural steroid injection on 3/29/2021 with 85% improvement in the pain that lasted for about 4 to 5 days. Patient reports since then the pain returned to its original extent. He reports he was able to increase his functional levels while he had the pain relief but now the pain returning his function has decreased. Patient is requesting another lumbar epidural steroid injection because of the severe pain. Back Pain  This is a chronic problem. The current episode started more than 1 year ago (got worse in January). The problem occurs constantly. The problem has been gradually worsening (Getting worse again) since onset. The pain is present in the lumbar spine and sacro-iliac. The quality of the pain is described as aching and burning (sharp). The pain radiates to the right foot. The pain is at a severity of 8/10 (5-10). The pain is severe. The pain is worse during the night. The symptoms are aggravated by sitting, bending, standing and lying down (walking, lifting, ADLs). Pertinent negatives include no abdominal pain, chest pain, dysuria, fever, numbness, tingling or weakness.  (Rt leg) Risk factors include obesity, lack of exercise and sedentary lifestyle (former smoker). Treatments tried: LESI. The treatment provided significant (For 5 days then the pain returned to its original extent.) relief. RX Monitoring 3/14/2021   Periodic Controlled Substance Monitoring No signs of potential drug abuse or diversion identified. ;Assessed functional status. Current Pain Assessment  Pain Assessment  Pain Assessment: 0-10  Pain Level: 8  Patient's Stated Pain Goal: 2(decrease pain and increase activity)  Pain Type: Chronic pain  Pain Location: Back  Pain Orientation: Right, Lower  Pain Radiating Towards: Right legs to toes  Pain Descriptors: Aching  Pain Frequency: Intermittent  Pain Onset: On-going  Clinical Progression: Gradually improving  Functional Pain Assessment: Prevents or interferes some active activities and ADLs  Non-Pharmaceutical Pain Intervention(s): Rest  Multiple Pain Sites: No              ADVERSE MEDICATION EFFECTS:   Constipation: no  Bowel Regimen: Yes  Diet: common adult  Appetite:  ok  Sedation:  no  Urinary Retention: no     FOCUSED PAIN SCALE:  Highest : 8  Lowest :0-1-2  Average: Range-   When and What  was your last procedure: Lumbar Epidural Steroid Injection      Was your procedureeffective:  yes, 80% x 4days    ACTIVITY/SOCIAL/EMOTIONAL:  Sleep Pattern: 4-6 hours per night.nightime awakenings  Energy Level:  Normal  Currently attending Physical Therapy:  No  Home Exercises: states he tries and stretches in the shower  Mobility: no current mobility problem   Do you use assistive devices?  No  Have you fallen in the last 30 days?:  No  Currently seeing a Psychiatrist or Psychologist:  No  Emotional Issues: denies  Mood:  denies      ABERRANT BEHAVIORS SINCE LAST VISIT:  Have you ever been treated in another Pain Clinic no  Refills for prescriptions appropriate: yes  Lost rx/pills: no   Taking more medication than prescribed:  no   Are you receiving PAIN medications from  other doctors: no  Last Urine/Serum Drug Screen :  3/17/2021  Was Serum/UDS as anticipated? yes  Brought pill bottles in :no    Was Pill count appropriate? :not applicable   Are currently pregnant? not applicable  Recent ER visits: No  Sandra Schmitt - April 8,2021               Past Medical History      Diagnosis Date    Acute cholecystitis     Essential hypertension 5/4/2017    Hyperlipidemia 9/4/2014    [facility]     Obesity (BMI 30-39. 9) 5/4/2017    Osteoporosis     Sciatica     Stroke [facility]     Vertebral fracture, osteoporotic ([facility]        Surgical History  Past Surgical History:   Procedure Laterality Date    CHOLECYSTECTOMY N/A 2/19/2021    OPEN CHOLECYSTECTOMY performed by Cristobal Phoenix, MD at [address] [place] [address] COLONOSCOPY  3/19/2014    tubular adenoma x 2, inadequate prep, some polyps not removed, needs colonoscopy in 6-12 months    COLONOSCOPY N/A 2/11/2020    COLONOSCOPY POLYPECTOMY HOT BIOPSY performed by Cristobal Phoenix, MD at [address] ERCP N/A 2/19/2021    ERCP DIAGNOSTIC performed by Tomasz Roque MD at [address] [facility] Right 2/12/2020    OPEN RIGHT COLECTOMY, PRIMARY ANASTOMOSIS performed by Cristobal Phoenix, MD at [address]      rt. inguinal    IR BILIARY DRAIN EXTERNAL  2/1/2021    IR BILIARY DRAIN EXTERNAL 2/1/2021 [facility] PROCEDURES    OMENTUM RESECTION  2/12/2020    OMENTECTOMY -  PARTIAL GREATER OMENECTOMY performed by Cristobal Phoenix, MD at [address] [place] [address] VASECTOMY         Medications  Current Outpatient Medications   Medication Sig Dispense Refill    carvedilol (COREG) 6.25 MG tablet Take 1 tablet by mouth 2 times daily (with meals) 60 tablet 3    cephALEXin (KEFLEX) 500 MG capsule 500 mgTake three times daily 21 capsule 0    ondansetron (ZOFRAN) 4 MG tablet Take every six hours as needed 20 tablet 0    lisinopril (PRINIVIL;ZESTRIL) 5 MG tablet take 1 tablet by mouth once daily 90 tablet 0    amLODIPine (NORVASC) 5 MG tablet Take 1 tablet by mouth daily 30 tablet 3    atorvastatin (LIPITOR) 20 MG tablet take 1 tablet by mouth at bedtime 90 tablet 0    pantoprazole (PROTONIX) 40 MG tablet take 1 tablet by mouth once daily 90 tablet 1    ibuprofen (ADVIL;MOTRIN) 800 MG tablet Take 1 tablet by mouth 3 times daily as needed for Pain (Take with food) 30 tablet 1    vitamin D (ERGOCALCIFEROL) 1.25 MG (05843 UT) CAPS capsule take 1 capsule by mouth every week 12 capsule 1    fluticasone (FLONASE) 50 MCG/ACT nasal spray 2 sprays into each nostril daily 1 Bottle 3    loratadine (CLARITIN) 10 MG tablet Take 1 tablet by mouth daily 30 tablet 3    aspirin 81 MG chewable tablet Take 1 tablet by mouth daily 30 tablet 3     No current facility-administered medications for this encounter. Allergies  Patient has no known allergies. Family History  family history includes Heart Disease in his father; No Known Problems in his mother.     Social History  Social History     Socioeconomic History    Marital status: Single     Spouse name: None    Number of children: None    Years of education: None    Highest education level: None   Occupational History    None   Social Needs    Financial resource strain: Not hard at all   [employer] insecurity     Worry: Never true     Inability: Never true   [employer] needs     Medical: None     Non-medical: None   Tobacco Use    Smoking status: Former Smoker     Packs/day: 1.00     Years: 40.00     Pack years: 40.00     Quit date: 2020     Years since quittin.1    Smokeless tobacco: Never Used   Substance and Sexual Activity    Alcohol use: Yes     Comment: rarely    Drug use: No    Sexual activity: Yes     Partners: Female   Lifestyle    Physical activity     Days per week: None     Minutes per session: None    Stress: None   Relationships    Social connections     Talks on phone: None     Gets together: None     Attends [employer] service: None     Active member of club or organization: bend left: Limited and painful. Back rotation right: Limited and painful. Back rotation left: Limited and painful. Tests   Straight leg raise right: positive  Straight leg raise left: negative    Other   Sensation: normal  Gait: antalgic (Uses a cane to help with ambulation)   Scars: absent          Nurses Notes and Vital Signs reviewed. DATA  Labs: Nic Morales MD   3/20/2021  4:01 PM EDT      Urine screen normal  No medication formed as expected  Electronically signed by Raquel Chavez MD on 3/20/2021 at 4:00 PM         Imaging:  Radiology Images and Reports reviewed where indicated and necessary  EXAMINATION:   MRI OF THE LUMBAR SPINE WITHOUT CONTRAST, 1/21/2021 2:24 pm       TECHNIQUE:   Multiplanar multisequence MRI of the lumbar spine was performed without the   administration of intravenous contrast.       COMPARISON:   None. HISTORY:   ORDERING SYSTEM PROVIDED HISTORY: Degeneration of lumbar or lumbosacral   intervertebral disc   TECHNOLOGIST PROVIDED HISTORY:   Reason for Exam: Degeneration of lumbar or lumbosacral intervertebral disc,   Low back pain without sciatica, unspecified back pain laterality, unspecified   chronicity   Additional signs and symptoms: Right sided low back pain that radiates down   buttocks region through right leg down to the big toe. Pain has lasted on and   off for 22 years       FINDINGS:   BONES/ALIGNMENT: Vertebral body heights are maintained there is   age-appropriate bone marrow signal. There is degenerative endplate change   most pronounced at the L3-4 level. There is multilevel degenerative disc   disease with loss of disc signal.  There is disc space narrowing at L3-4. There is no spondylolisthesis. SPINAL CORD: Conus medullaris is normal in caliber and signal and terminates   at the L1 level. The cauda equina is unremarkable. SOFT TISSUES: The posterior paraspinal soft tissues are unremarkable.   The   visualized abdominal structures are unremarkable. L1-L2: There is a mild circumferential disc bulge. There is no canal   stenosis or foraminal narrowing. L2-L3: There is a circumferential disc bulge with facet and ligamentous   hypertrophy. There is canal stenosis measuring 8 mm in AP dimension. There   is mild left foraminal narrowing and no significant right foraminal narrowing. L3-L4: There is a circumferential disc bulge with facet and ligamentous   hypertrophy. There is canal stenosis measuring 8 mm in AP dimension. There   is moderate bilateral foraminal narrowing. L4-L5: There is a circumferential disc bulge with facet hypertrophy. There   is canal stenosis measuring 7 mm in AP dimension. Is moderate bilateral   foraminal narrowing. L5-S1: There is a circumferential disc bulge with facet hypertrophy. There   is no significant canal stenosis. There is moderate bilateral foraminal   narrowing. Impression   Multilevel degenerative disc disease and multilevel degenerative facet   hypertrophy with canal stenosis most pronounced at L4-5. Bilateral foraminal narrowing as described above. Patient Active Problem List   Diagnosis    Osteoporosis    Vitamin D deficiency    History of stroke    Degeneration of lumbar or lumbosacral intervertebral disc    Back pain    Low back pain    Allergic rhinitis    Impaired fasting glucose    Mixed hyperlipidemia    Uncontrolled hypertension    Obesity (BMI 30.0-34. 9)    Tear of left rotator cuff    Left bicipital tenosynovitis    Colon polyp    Acute blood loss as cause of postoperative anemia    S/P right hemicolectomy    History of malignant neoplasm of skin    Acute postoperative pain    Anemia    Ex-cigarette smoker    Chest pain    Splenic artery aneurysm (HCC)    Acute acalculous cholecystitis    Acute cholecystitis    Lumbar radiculopathy        ASSESSMENT    Beaulauren Gia is a 72 y.o. male with 1. Lumbar radiculopathy    2. DDD (degenerative disc disease), lumbar    3. Lumbosacral spondylosis without myelopathy           PLAN    We will continue current pain medications  Current medications are being tolerated without any Adverse side effects. Orders Placed This Encounter   Medications    traMADol (ULTRAM) 50 MG tablet     Sig: Take 1 tablet by mouth every 6 hours as needed for Pain for up to 15 days. Dispense:  60 tablet     Refill:  0     Reduce doses taken as pain becomes manageable     Urine drug screens have been appropriate. No aberrant activity noted. Analgesia is achieved. Activities of daily living are possible because of medications. Safe use of medications explained to patient. Counselling/Preventive measures for pain  Control:    [x]  Spine strengthening exercises are discussed with patient in detail. [x] Ill effects of being on chronic pain medications such as sleep disturbances, hormonal changes, withdrawal symptoms,  chronic opioid dependence and tolerance were discussed with patient. I had asked the patient to minimize medication use and utilize pain medications only for uncontrolled rest pain or pain with exertional activities. I advised patient not to self escalate pain medications without consulting with us. At each of patient's future visits we will try to taper pain medications, while adjusting the adjunct medications, and re-evaluating for Physical Therapy to improve spinal and joint strength. We will continue to have discussions to decrease pain medications as tolerated. I also discussed with the patient regarding the dangers of combining narcotic pain medication with tranquilizers, alcohol or illegal drugs or taking the medication any other than prescribed. The dangers including the respiratory depression and death. Patient was told to tell  to all  physicians regarding the medications he is getting from pain clinic.  Patient is warned not to take any unprescribed medications over-the-counter medications that can depress breathing . Patient is advised to talk to the pharmacist or physicians if planning to take any over-the-counter medications before  takeing them. Patient is strongly advised to avoid tranquilizers or  Relaxants for any medications that can depress breathing or recreational drugs. Patient is also advised to tell us if there is any changes in his medications from other physicians. We discussed the same at today's visit and have not been to implement it, as the patient's pain is not under control with current medications. The following treatment plan was developed after discussion with patient:    We discussed Lumbar Epidural steroid Injections x 1  at L4 - L5. Patient tried and failed NSAIDS,Home exercises, Physical Therapy, Chiropractic manipulations without relief. Patient exhibited signs of radiculopathy with positive straight leg raising test on right    Patient has not had prior Lumbar Surgery. Patient had good pain relief in the past with lumbar epidural steroid injection. We will see the patient in 2 weeks after the procedures and re-evaluate symptoms. Orders Placed This Encounter   Procedures    Lumbar Epidural Steroid Injection/Caudal     Standing Status:   Future     Standing Expiration Date:   4/12/2022    FLUORO FOR SURGICAL PROCEDURES     Standing Status:   Future     Standing Expiration Date:   4/12/2022    Saline lock IV     Standing Status:   Future     Standing Expiration Date:   10/12/2022       Decision Making Process : Patient's health history and referral records thoroughly reviewed before focused physical examination and discussion with patient. I have spent 25 Over 50% of today's visit is spent on examining the patient and counseling and coordinating the care. Level of complexity of date to be reviewed is Moderate. The chart date reviewed include the following: Imaging Reports. Summary of Care. Time spent reviewing with patient the below reports:   Medication safety, Treatment options. Level of diagnosis and management options of this case is multiple: involving the following management options: Interventions as needed, medication management as appropriate, future visits, activity modification, heat/ice as needed, Urine drug screen as required. [x]The patient's questions were answered to the best of my abilities. This note was created using voice recognition software. There may be inaccuracies of transcription  that are inadvertently overlooked prior to the signature. There is any questions about the transcription please contact me. Return 2 weeks after his last dose of Covid vaccine with physician  CNP  for further plan of treatment. Documentation:  I communicated with the patient and/or health care decision maker about plan of care  Details of this discussion including any medical advice provided: Total Time: minutes: 21-30 minutes    This note was created using voice recognition software. There may be inaccuracies of transcription  that are inadvertently overlooked prior to the signature. There is any questions about the transcription please contact me.     Electronically signed by Kimberly Bo RN on 4/12/2021 at 10:29 AM

## 2021-04-13 ASSESSMENT — ENCOUNTER SYMPTOMS
VOMITING: 0
CONSTIPATION: 0
SORE THROAT: 0
PHOTOPHOBIA: 0
TROUBLE SWALLOWING: 0
VOICE CHANGE: 0
NAUSEA: 0

## 2021-04-16 ENCOUNTER — OFFICE VISIT (OUTPATIENT)
Dept: FAMILY MEDICINE CLINIC | Age: 66
End: 2021-04-16
Payer: COMMERCIAL

## 2021-04-16 VITALS
RESPIRATION RATE: 16 BRPM | BODY MASS INDEX: 33.99 KG/M2 | TEMPERATURE: 97.3 F | DIASTOLIC BLOOD PRESSURE: 70 MMHG | SYSTOLIC BLOOD PRESSURE: 112 MMHG | WEIGHT: 217 LBS | HEART RATE: 68 BPM

## 2021-04-16 DIAGNOSIS — E78.2 MIXED HYPERLIPIDEMIA: Chronic | ICD-10-CM

## 2021-04-16 DIAGNOSIS — I72.8 SPLENIC ARTERY ANEURYSM (HCC): ICD-10-CM

## 2021-04-16 DIAGNOSIS — E55.9 VITAMIN D DEFICIENCY: ICD-10-CM

## 2021-04-16 DIAGNOSIS — I10 ESSENTIAL HYPERTENSION: Primary | ICD-10-CM

## 2021-04-16 DIAGNOSIS — R73.01 IMPAIRED FASTING GLUCOSE: ICD-10-CM

## 2021-04-16 PROCEDURE — 99214 OFFICE O/P EST MOD 30 MIN: CPT | Performed by: FAMILY MEDICINE

## 2021-04-16 ASSESSMENT — ENCOUNTER SYMPTOMS
SHORTNESS OF BREATH: 0
CHEST TIGHTNESS: 0
ABDOMINAL PAIN: 0
BLOOD IN STOOL: 0

## 2021-04-16 ASSESSMENT — PATIENT HEALTH QUESTIONNAIRE - PHQ9
SUM OF ALL RESPONSES TO PHQ QUESTIONS 1-9: 0
2. FEELING DOWN, DEPRESSED OR HOPELESS: 0
1. LITTLE INTEREST OR PLEASURE IN DOING THINGS: 0

## 2021-04-16 NOTE — PROGRESS NOTES
Subjective:      Patient ID: Armando Lorenzo is a 72 y.o. male. HYPERTENSION visit     BP Readings from Last 3 Encounters:   04/12/21 125/77   03/29/21 125/77   03/17/21 (!) 162/105       LDL Cholesterol (mg/dL)   Date Value   10/26/2020 106     HDL (mg/dL)   Date Value   10/26/2020 41     BUN (mg/dL)   Date Value   02/21/2021 15     CREATININE (mg/dL)   Date Value   02/21/2021 1.17     Glucose (mg/dL)   Date Value   02/21/2021 114 (H)              Have you changed or started any medications since your last visit including any over-the-counter medicines, vitamins, or herbal medicines? no   Have you stopped taking any of your medications? Is so, why? -  no  Are you having any side effects from any of your medications? - no  How often do you miss doses of your medication? no      Have you seen any other physician or provider since your last visit? yes -    Have you had any other diagnostic tests since your last visit? yes -    Have you been seen in the emergency room and/or had an admission in a hospital since we last saw you?  no   Have you had your routine dental cleaning in the past 6 months?  no     Do you have an active MyChart account? If no, what is the barrier?   Yes    Patient Care Team:  Lenora Bhandari MD as PCP - General (Family Medicine)  Lenora Bhandari MD as PCP - [facility]  Brittany Arvizu MD as Orthopedic Surgeon (Orthopedic Surgery)  Yudith Yost MD (Neurology)  Aletha Real MD as Consulting Physician (Gastroenterology)    Medical History Review  Past Medical, Family, and Social History reviewed and does contribute to the patient presenting condition    Health Maintenance   Topic Date Due    Hepatitis C screen  Never done    HIV screen  Never done    Shingles Vaccine (1 of 2) Never done    Low dose CT lung screening  Never done    Pneumococcal 65+ years Vaccine (1 of 1 - PPSV23) Never done   [employer] Visit (AWV)  Never done    COVID-19 Vaccine (2 - Pfizer 2-dose series) 04/29/2021    A1C test (Diabetic or Prediabetic)  06/18/2021    Lipid screen  10/26/2021    Potassium monitoring  02/21/2022    Creatinine monitoring  02/21/2022    Colon cancer screen colonoscopy  02/11/2023    DTaP/Tdap/Td vaccine (2 - Td) 06/12/2025    Flu vaccine  Completed    AAA screen  Completed    Hepatitis A vaccine  Aged Out    Hepatitis B vaccine  Aged Out    Hib vaccine  Aged Out    Meningococcal (ACWY) vaccine  Aged Out       HPI    Patient is a 77-year-old obese white male who presents for hypertension, hyperlipidemia, impaired fasting glucose, vitamin D deficiency. CT of abdomen on 1/29/2021 found a splenic artery aneurysm along with cholelithiasis and hepatic steatosis. Patient underwent an open cholecystectomy by Dr. Dk Garrison on 2/19/2021. He denies any fever, chills, chest pain, abdominal pain, shortness of breath. He states he is taking and tolerating his routine medication. He has a good appetite and remains active. Review of Systems   Constitutional: Negative for chills and fever. HENT: Negative for congestion. Respiratory: Negative for chest tightness and shortness of breath. Cardiovascular: Negative for chest pain. Gastrointestinal: Negative for abdominal pain and blood in stool. Genitourinary: Negative for dysuria and hematuria. Skin: Negative for rash. Neurological: Negative for dizziness. Psychiatric/Behavioral: Negative for dysphoric mood. Objective:   Physical Exam  Vitals signs and nursing note reviewed. Constitutional:       General: He is not in acute distress. Appearance: He is well-developed. HENT:      Head: Normocephalic and atraumatic. Right Ear: Tympanic membrane, ear canal and external ear normal.      Left Ear: Tympanic membrane, ear canal and external ear normal.      Nose: Nose normal.      Mouth/Throat:      Mouth: Mucous membranes are moist.      Pharynx: Oropharynx is clear.    Eyes: Blood pressure is normal. Treatment plan consists of Weight Reduction. Fall Risk 10/16/2020   2 or more falls in past year? no   Fall with injury in past year? no     The patient does not have a history of falls. I did not - not indicated , complete a risk assessment for falls. A plan of care for falls No Treatment plan indicated    Lab Results   Component Value Date    LDLCHOLESTEROL 106 10/26/2020    (goal LDL reduction with dx if diabetes is 50% LDL reduction)    PHQ Scores 4/16/2021 11/13/2020 6/17/2020 1/13/2020 3/5/2019 5/15/2018 5/4/2017   PHQ2 Score 0 0 0 0 0 0 0   PHQ9 Score 0 0 0 0 0 0 0     Interpretation of Total Score Depression Severity: 1-4 = Minimal depression, 5-9 = Mild depression, 10-14 = Moderate depression, 15-19 = Moderately severe depression, 20-27 = Severe depression    Orders Placed This Encounter   Procedures    ALT     Standing Status:   Future     Standing Expiration Date:   4/16/2022    AST     Standing Status:   Future     Standing Expiration Date:   4/16/2022    Basic Metabolic Panel     Fasting     Standing Status:   Future     Standing Expiration Date:   4/16/2022    Lipid Panel     Standing Status:   Future     Standing Expiration Date:   4/16/2022     Order Specific Question:   Is Patient Fasting?/# of Hours     Answer:    Fast 8-10 hours    Magnesium     Standing Status:   Future     Standing Expiration Date:   4/16/2022    Vitamin D 25 Hydroxy     Standing Status:   Future     Standing Expiration Date:   4/16/2022    Hemoglobin A1C     Standing Status:   Future     Standing Expiration Date:   4/16/2022         Continue routine medication  Patient referred to vascular surgery for his splenic artery aneurysm  Follow-up in 6 months or sooner if needed

## 2021-04-19 ENCOUNTER — TELEPHONE (OUTPATIENT)
Dept: VASCULAR SURGERY | Age: 66
End: 2021-04-19

## 2021-04-19 DIAGNOSIS — I72.8 SPLENIC ARTERY ANEURYSM (HCC): Primary | ICD-10-CM

## 2021-04-19 NOTE — TELEPHONE ENCOUNTER
Called and spoke to Evelia Mckeon regarding this she verbalized understanding and states she will put a referral in .

## 2021-04-19 NOTE — TELEPHONE ENCOUNTER
----- Message from Schuyler Simpson MD sent at 4/16/2021  5:57 PM EDT -----  No problem at all  Let them know not to be too concerned about it, at this size they are of little risk of bleeding  But will gladly see him when we can  ----- Message -----  From: Adeline Trujillo MA  Sent: 4/16/2021  10:40 AM EDT  To: Schuyler Simpson MD, #    Dr. Pamela Dick office wants to know if you will see a Splenic Artery Aneurysm is was accidentally found on a CTA. Measuring at 1.0 x 0.9 x 1.3 cm.     Danny Nemesio [phone]

## 2021-04-26 RX ORDER — ATORVASTATIN CALCIUM 20 MG/1
TABLET, FILM COATED ORAL
Qty: 90 TABLET | Refills: 0 | Status: SHIPPED | OUTPATIENT
Start: 2021-04-26 | End: 2021-07-26

## 2021-04-29 ENCOUNTER — IMMUNIZATION (OUTPATIENT)
Dept: PRIMARY CARE CLINIC | Age: 66
End: 2021-04-29
Payer: COMMERCIAL

## 2021-04-29 PROCEDURE — 0002A COVID-19, PFIZER VACCINE 30MCG/0.3ML DOSE: CPT | Performed by: INTERNAL MEDICINE

## 2021-04-29 PROCEDURE — 91300 COVID-19, PFIZER VACCINE 30MCG/0.3ML DOSE: CPT | Performed by: INTERNAL MEDICINE

## 2021-05-10 RX ORDER — LISINOPRIL 5 MG/1
TABLET ORAL
Qty: 90 TABLET | Refills: 0 | Status: SHIPPED | OUTPATIENT
Start: 2021-05-10 | End: 2021-08-02

## 2021-05-17 ENCOUNTER — HOSPITAL ENCOUNTER (OUTPATIENT)
Dept: PAIN MANAGEMENT | Age: 66
Discharge: HOME | End: 2021-05-17
Payer: COMMERCIAL

## 2021-05-17 DIAGNOSIS — M47.817 LUMBOSACRAL SPONDYLOSIS WITHOUT MYELOPATHY: ICD-10-CM

## 2021-05-17 DIAGNOSIS — M48.061 DEGENERATIVE LUMBAR SPINAL STENOSIS: ICD-10-CM

## 2021-05-17 DIAGNOSIS — M51.36 DDD (DEGENERATIVE DISC DISEASE), LUMBAR: ICD-10-CM

## 2021-05-17 DIAGNOSIS — M54.16 LUMBAR RADICULOPATHY: Primary | ICD-10-CM

## 2021-05-21 ENCOUNTER — INITIAL CONSULT (OUTPATIENT)
Dept: VASCULAR SURGERY | Age: 66
End: 2021-05-21
Payer: COMMERCIAL

## 2021-05-21 VITALS
OXYGEN SATURATION: 95 % | SYSTOLIC BLOOD PRESSURE: 150 MMHG | DIASTOLIC BLOOD PRESSURE: 90 MMHG | RESPIRATION RATE: 17 BRPM | HEART RATE: 78 BPM | HEIGHT: 67 IN | BODY MASS INDEX: 33.74 KG/M2 | WEIGHT: 215 LBS

## 2021-05-21 DIAGNOSIS — I72.8 SPLENIC ARTERY ANEURYSM (HCC): Primary | ICD-10-CM

## 2021-05-21 PROCEDURE — 99204 OFFICE O/P NEW MOD 45 MIN: CPT | Performed by: SURGERY

## 2021-05-21 ASSESSMENT — ENCOUNTER SYMPTOMS
RESPIRATORY NEGATIVE: 1
GASTROINTESTINAL NEGATIVE: 1
ALLERGIC/IMMUNOLOGIC NEGATIVE: 1
EYES NEGATIVE: 1

## 2021-05-21 NOTE — PROGRESS NOTES
Division of Vascular Surgery        New Consult      Physician Requesting Consult:  Dr. Maritza Martinez    Reason for Consult:   Splenic artery aneurysm    Chief Complaint:     Splenic artery aneurysm    History of Present Illness:      Yudy Rausch is a 72 y.o. gentleman who presents for incidentally found 1 cm splenic artery aneurysm on CTA when he was being evaluated for severe abdominal pain in January. He has undergone right hemicolectomy for multiple polyps last year as well as inguinal hernia repair. He was found to have cholecystitis and was treated with cholecystostomy tube placement followed by open cholecystectomy. He as recovered well from this and denies any further abdominal or back pain. Denies any family history of aneurysmal disease. Medical History:     Past Medical History:   Diagnosis Date    Acute cholecystitis     Essential hypertension 5/4/2017    Hyperlipidemia 9/4/2014    Hyperparathyroidism ([address]     Obesity (BMI 30-39. 9) 5/4/2017    Osteoporosis     Sciatica     Stroke [facility]     Vertebral fracture, osteoporotic ([address]        Surgical History:     Past Surgical History:   Procedure Laterality Date    CHOLECYSTECTOMY N/A 2/19/2021    OPEN CHOLECYSTECTOMY performed by Zeny Franco MD at [address] COLONOSCOPY  3/19/2014    tubular adenoma x 2, inadequate prep, some polyps not removed, needs colonoscopy in 6-12 months    COLONOSCOPY N/A 2/11/2020    COLONOSCOPY POLYPECTOMY HOT BIOPSY performed by Zeny Franco MD at [address] [facility] ERCP N/A 2/19/2021    ERCP DIAGNOSTIC performed by Donald Loja MD at [address] [facility] Right 2/12/2020    OPEN RIGHT COLECTOMY, PRIMARY ANASTOMOSIS performed by Zeny Franco MD at [address] [facility]      rt. inguinal    IR BILIARY DRAIN EXTERNAL  2/1/2021    IR BILIARY DRAIN EXTERNAL 2/1/2021 [facility]    OMENTUM RESECTION  2/12/2020    OMENTECTOMY -  PARTIAL GREATER OMENECTOMY performed by Tim Monroy Negative. Genitourinary: Negative. Musculoskeletal: Negative. Skin: Negative. Allergic/Immunologic: Negative. Neurological: Negative. Hematological: Negative. Psychiatric/Behavioral: Negative. Physical Exam:     Vitals: There were no vitals taken for this visit. Physical Exam  Vitals and nursing note reviewed. Constitutional:       Appearance: Normal appearance. HENT:      Head: Normocephalic. Right Ear: External ear normal.      Left Ear: External ear normal.      Nose: Nose normal.      Mouth/Throat:      Mouth: Mucous membranes are moist.   Eyes:      Extraocular Movements: Extraocular movements intact. Conjunctiva/sclera: Conjunctivae normal.   Cardiovascular:      Rate and Rhythm: Normal rate and regular rhythm. Pulses:           Radial pulses are 2+ on the right side and 1+ on the left side. Pulmonary:      Effort: Pulmonary effort is normal. No respiratory distress. Abdominal:      Palpations: Abdomen is soft. There is no pulsatile mass. Tenderness: There is no abdominal tenderness. Musculoskeletal:         General: Normal range of motion. Cervical back: Normal range of motion. Right lower leg: No edema. Left lower leg: No edema. Skin:     General: Skin is warm. Capillary Refill: Capillary refill takes less than 2 seconds. Neurological:      General: No focal deficit present. Mental Status: He is alert and oriented to person, place, and time. Mental status is at baseline. Sensory: Sensation is intact. Motor: Motor function is intact. Psychiatric:         Mood and Affect: Mood normal.         Behavior: Behavior normal.       Imaging/Labs:     CTA OF THE CHEST, ABDOMEN AND PELVIS WITH CONTRAST, 1/29/2021 11:40 pm  IMPRESSION:   No CT evidence of aortic dissection. Incidentally noted splenic aneurysm measuring 1.0 x 0.9 x 1.3 cm. Postsurgical changes from right hemicolectomy are again noted.   Unchanged  soft tissue density in the mesentery adjacent to the hemicolectomy, which  likely reflects postsurgical changes/scarring. Hepatic steatosis. Cholelithiasis without evidence of cholecystitis. Assessment and Plan:     Splenic artery aneurysm    -CTA reviewed and enlarged splenic artery segment is likely due to to the fact that it originates near a branch point and is not truly an aneurysm.  -No vascular intervention needed and does not need continued surveillance  -Patient may follow-up as needed. Electronically signed by Germania Gould MD       [address] [place] [address]  Office: [phone]  Cell: [phone]  Email: [email or web address]

## 2021-05-24 DIAGNOSIS — E55.9 VITAMIN D DEFICIENCY: ICD-10-CM

## 2021-05-24 RX ORDER — ERGOCALCIFEROL 1.25 MG/1
CAPSULE ORAL
Qty: 12 CAPSULE | Refills: 1 | Status: SHIPPED | OUTPATIENT
Start: 2021-05-24 | End: 2021-11-08

## 2021-06-14 RX ORDER — AMLODIPINE BESYLATE 5 MG/1
5 TABLET ORAL DAILY
Qty: 30 TABLET | Refills: 3 | Status: SHIPPED | OUTPATIENT
Start: 2021-06-14 | End: 2021-10-11

## 2021-07-26 RX ORDER — PANTOPRAZOLE SODIUM 40 MG/1
TABLET, DELAYED RELEASE ORAL
Qty: 90 TABLET | Refills: 1 | Status: SHIPPED | OUTPATIENT
Start: 2021-07-26 | End: 2022-01-17

## 2021-07-26 RX ORDER — ATORVASTATIN CALCIUM 20 MG/1
TABLET, FILM COATED ORAL
Qty: 90 TABLET | Refills: 0 | Status: SHIPPED | OUTPATIENT
Start: 2021-07-26 | End: 2021-10-21

## 2021-07-30 RX ORDER — CARVEDILOL 6.25 MG/1
TABLET ORAL
Qty: 60 TABLET | Refills: 3 | Status: SHIPPED | OUTPATIENT
Start: 2021-07-30 | End: 2022-01-17 | Stop reason: SDUPTHER

## 2021-08-02 RX ORDER — LISINOPRIL 5 MG/1
TABLET ORAL
Qty: 90 TABLET | Refills: 1 | Status: SHIPPED | OUTPATIENT
Start: 2021-08-02 | End: 2022-01-17 | Stop reason: SDUPTHER

## 2021-10-11 ENCOUNTER — VIRTUAL VISIT (OUTPATIENT)
Dept: FAMILY MEDICINE CLINIC | Age: 66
End: 2021-10-11
Payer: COMMERCIAL

## 2021-10-11 DIAGNOSIS — J01.10 ACUTE NON-RECURRENT FRONTAL SINUSITIS: Primary | ICD-10-CM

## 2021-10-11 PROCEDURE — 99442 PR PHYS/QHP TELEPHONE EVALUATION 11-20 MIN: CPT | Performed by: NURSE PRACTITIONER

## 2021-10-11 RX ORDER — AMLODIPINE BESYLATE 5 MG/1
TABLET ORAL
Qty: 30 TABLET | Refills: 3 | Status: SHIPPED | OUTPATIENT
Start: 2021-10-11 | End: 2022-01-17 | Stop reason: SDUPTHER

## 2021-10-11 RX ORDER — GUAIFENESIN 600 MG/1
600 TABLET, EXTENDED RELEASE ORAL 2 TIMES DAILY
Qty: 20 TABLET | Refills: 0 | Status: ON HOLD | COMMUNITY
Start: 2021-10-11 | End: 2022-08-27

## 2021-10-11 RX ORDER — AMOXICILLIN AND CLAVULANATE POTASSIUM 875; 125 MG/1; MG/1
1 TABLET, FILM COATED ORAL 2 TIMES DAILY
Qty: 14 TABLET | Refills: 0 | Status: SHIPPED | OUTPATIENT
Start: 2021-10-11 | End: 2021-10-18

## 2021-10-11 SDOH — ECONOMIC STABILITY: INCOME INSECURITY: HOW HARD IS IT FOR YOU TO PAY FOR THE VERY BASICS LIKE FOOD, HOUSING, MEDICAL CARE, AND HEATING?: NOT HARD AT ALL

## 2021-10-11 SDOH — ECONOMIC STABILITY: TRANSPORTATION INSECURITY
IN THE PAST 12 MONTHS, HAS THE LACK OF TRANSPORTATION KEPT YOU FROM MEDICAL APPOINTMENTS OR FROM GETTING MEDICATIONS?: NO

## 2021-10-11 SDOH — ECONOMIC STABILITY: FOOD INSECURITY: WITHIN THE PAST 12 MONTHS, THE FOOD YOU BOUGHT JUST DIDN'T LAST AND YOU DIDN'T HAVE MONEY TO GET MORE.: NEVER TRUE

## 2021-10-11 SDOH — ECONOMIC STABILITY: TRANSPORTATION INSECURITY
IN THE PAST 12 MONTHS, HAS LACK OF TRANSPORTATION KEPT YOU FROM MEETINGS, WORK, OR FROM GETTING THINGS NEEDED FOR DAILY LIVING?: NO

## 2021-10-11 SDOH — ECONOMIC STABILITY: FOOD INSECURITY: WITHIN THE PAST 12 MONTHS, YOU WORRIED THAT YOUR FOOD WOULD RUN OUT BEFORE YOU GOT MONEY TO BUY MORE.: NEVER TRUE

## 2021-10-11 NOTE — PROGRESS NOTES
Angelica Feng is a 77 y.o. male evaluated via telephone on 10/11/2021. Consent:  He and/or health care decision maker is aware that that he may receive a bill for this telephone service, depending on his insurance coverage, and has provided verbal consent to proceed: Yes      Documentation:  I communicated with the patient and/or health care decision maker about sinusitis . Details of this discussion including any medical advice provided:     77year old male presents with headache nasal congestion/pressure post nasal drainage cough for 4-5 days. cough is productive with greenish sputum. She denies fever chills body ache malaise sob or nv abd pain. Pt is fully vaccinated with covid vaccines    1. Acute non-recurrent frontal sinusitis  - amoxicillin-clavulanate (AUGMENTIN) 875-125 MG per tablet; Take 1 tablet by mouth 2 times daily for 7 days  Dispense: 14 tablet; Refill: 0  - guaiFENesin (MUCINEX) 600 MG extended release tablet; Take 1 tablet by mouth 2 times daily  Dispense: 20 tablet; Refill: 0  - advised to increase fluids and rest   - enc to call if symptoms are not improving. Requested Prescriptions     Signed Prescriptions Disp Refills    amoxicillin-clavulanate (AUGMENTIN) 875-125 MG per tablet 14 tablet 0     Sig: Take 1 tablet by mouth 2 times daily for 7 days    guaiFENesin (MUCINEX) 600 MG extended release tablet 20 tablet 0     Sig: Take 1 tablet by mouth 2 times daily       Medications Discontinued During This Encounter   Medication Reason    cephALEXin (KEFLEX) 500 MG capsule LIST CLEANUP    fluticasone (FLONASE) 50 MCG/ACT nasal spray LIST CLEANUP    ibuprofen (ADVIL;MOTRIN) 800 MG tablet LIST CLEANUP    ondansetron (ZOFRAN) 4 MG tablet LIST CLEANUP     Discussed use, benefit, and side effects of prescribed medications. Barriers to medication compliance addressed. All patient questions answered. Pt voiced understanding. No follow-ups on file.       I affirm this is a Patient Initiated Episode with a Patient who has not had a related appointment within my department in the past 7 days or scheduled within the next 24 hours. Patient identification was verified at the start of the visit: Yes    Total Time: minutes: 11  minutes    The visit was conducted pursuant to the emergency declaration under the [address] [place] [address], [address] [place] [address] and the [employer] General Act. Patient identification was verified, and a caregiver was present when appropriate. The patient was located in a state where the provider was credentialed to provide care.     Note: not billable if this call serves to triage the patient into an appointment for the relevant concern

## 2021-10-15 ENCOUNTER — OFFICE VISIT (OUTPATIENT)
Dept: FAMILY MEDICINE CLINIC | Age: 66
End: 2021-10-15
Payer: COMMERCIAL

## 2021-10-15 VITALS
BODY MASS INDEX: 35.08 KG/M2 | DIASTOLIC BLOOD PRESSURE: 80 MMHG | TEMPERATURE: 97.2 F | SYSTOLIC BLOOD PRESSURE: 126 MMHG | HEART RATE: 74 BPM | RESPIRATION RATE: 16 BRPM | WEIGHT: 224 LBS

## 2021-10-15 DIAGNOSIS — J30.2 SEASONAL ALLERGIC RHINITIS, UNSPECIFIED TRIGGER: ICD-10-CM

## 2021-10-15 DIAGNOSIS — R73.01 IMPAIRED FASTING GLUCOSE: ICD-10-CM

## 2021-10-15 DIAGNOSIS — E55.9 VITAMIN D DEFICIENCY: ICD-10-CM

## 2021-10-15 DIAGNOSIS — Z23 NEED FOR INFLUENZA VACCINATION: ICD-10-CM

## 2021-10-15 DIAGNOSIS — E66.9 OBESITY (BMI 30-39.9): ICD-10-CM

## 2021-10-15 DIAGNOSIS — I10 ESSENTIAL HYPERTENSION: Primary | ICD-10-CM

## 2021-10-15 DIAGNOSIS — E78.2 MIXED HYPERLIPIDEMIA: Chronic | ICD-10-CM

## 2021-10-15 PROCEDURE — G0008 ADMIN INFLUENZA VIRUS VAC: HCPCS | Performed by: FAMILY MEDICINE

## 2021-10-15 PROCEDURE — 90694 VACC AIIV4 NO PRSRV 0.5ML IM: CPT | Performed by: FAMILY MEDICINE

## 2021-10-15 PROCEDURE — 99214 OFFICE O/P EST MOD 30 MIN: CPT | Performed by: FAMILY MEDICINE

## 2021-10-15 RX ORDER — LORATADINE 10 MG/1
10 TABLET ORAL DAILY
Qty: 30 TABLET | Refills: 3 | Status: SHIPPED | OUTPATIENT
Start: 2021-10-15

## 2021-10-15 RX ORDER — FLUTICASONE PROPIONATE 50 MCG
SPRAY, SUSPENSION (ML) NASAL
Qty: 16 G | Refills: 3 | Status: SHIPPED | OUTPATIENT
Start: 2021-10-15

## 2021-10-15 ASSESSMENT — ENCOUNTER SYMPTOMS
ABDOMINAL PAIN: 0
CHEST TIGHTNESS: 0
BLOOD IN STOOL: 0
SHORTNESS OF BREATH: 0

## 2021-10-15 NOTE — PROGRESS NOTES
vaccine (1) 09/01/2021    Lipid screen  10/26/2021    Potassium monitoring  02/21/2022    Creatinine monitoring  02/21/2022    Colon cancer screen colonoscopy  02/11/2023    DTaP/Tdap/Td vaccine (2 - Td or Tdap) 06/12/2025    COVID-19 Vaccine  Completed    AAA screen  Completed    Hepatitis A vaccine  Aged Out    Hepatitis B vaccine  Aged Out    Hib vaccine  Aged Out    Meningococcal (ACWY) vaccine  Aged Out       HPI  Patient is a 26-year-old obese white male who presents for hypertension, hyperlipidemia, impaired fasting glucose, vitamin D deficiency, seasonal allergies. He states he is taking and tolerating his routine medication and is also on Augmentin for a sinus infection. Patient did not get lab work done that was ordered months ago and needs lab orders reprinted. He denies any fever, chills, chest pain abdominal pain, shortness of breath. He has a good appetite and remains active. He also requests a flu shot today. Review of Systems   Constitutional: Negative for chills and fever. HENT: Negative for congestion. Respiratory: Negative for chest tightness and shortness of breath. Cardiovascular: Negative for chest pain. Gastrointestinal: Negative for abdominal pain and blood in stool. Genitourinary: Negative for dysuria and hematuria. Skin: Negative for rash. Neurological: Negative for dizziness. Psychiatric/Behavioral: Negative for dysphoric mood. Objective:   Physical Exam  Vitals and nursing note reviewed. Constitutional:       General: He is not in acute distress. Appearance: He is well-developed. HENT:      Head: Normocephalic and atraumatic. Right Ear: Tympanic membrane, ear canal and external ear normal.      Left Ear: Tympanic membrane, ear canal and external ear normal.      Nose: Nose normal.      Mouth/Throat:      Mouth: Mucous membranes are moist.      Pharynx: Oropharynx is clear. Eyes:      General: No scleral icterus.         Right eye: No discharge. Left eye: No discharge. Conjunctiva/sclera: Conjunctivae normal.   Cardiovascular:      Rate and Rhythm: Normal rate and regular rhythm. Heart sounds: Normal heart sounds. Pulmonary:      Effort: Pulmonary effort is normal. No respiratory distress. Breath sounds: Normal breath sounds. No wheezing. Abdominal:      General: There is no distension. Palpations: Abdomen is soft. Tenderness: There is no abdominal tenderness. Musculoskeletal:      Cervical back: Neck supple. Skin:     General: Skin is warm and dry. Findings: No rash. Neurological:      Mental Status: He is alert and oriented to person, place, and time. Psychiatric:         Mood and Affect: Mood normal.         Behavior: Behavior normal.         Assessment:       Diagnosis Orders   1. Essential hypertension     2. Mixed hyperlipidemia     3. Impaired fasting glucose     4. Vitamin D deficiency     5. Seasonal allergic rhinitis, unspecified trigger  fluticasone (FLONASE) 50 MCG/ACT nasal spray    loratadine (CLARITIN) 10 MG tablet   6. Obesity (BMI 30-39.9)     7.  Need for influenza vaccination  INFLUENZA, QUADV, ADJUVANTED, 65 YRS =, IM, PF, PREFILL SYR, 0.5ML (FLUAD)             Plan:      Orders Placed This Encounter   Procedures    INFLUENZA, QUADV, ADJUVANTED, 65 YRS =, IM, PF, PREFILL SYR, 0.5ML (FLUAD)         Orders Placed This Encounter   Medications    fluticasone (FLONASE) 50 MCG/ACT nasal spray     Si sprays into each nostril daily     Dispense:  16 g     Refill:  3    loratadine (CLARITIN) 10 MG tablet     Sig: Take 1 tablet by mouth daily     Dispense:  30 tablet     Refill:  3      This lab orders reprinted and given to patient  Continue routine medication

## 2021-10-16 ENCOUNTER — HOSPITAL ENCOUNTER (OUTPATIENT)
Age: 66
Discharge: HOME | End: 2021-10-16
Payer: COMMERCIAL

## 2021-10-16 DIAGNOSIS — R73.01 IMPAIRED FASTING GLUCOSE: ICD-10-CM

## 2021-10-16 DIAGNOSIS — E78.2 MIXED HYPERLIPIDEMIA: Chronic | ICD-10-CM

## 2021-10-16 DIAGNOSIS — E55.9 VITAMIN D DEFICIENCY: ICD-10-CM

## 2021-10-16 DIAGNOSIS — I10 ESSENTIAL HYPERTENSION: ICD-10-CM

## 2021-10-16 LAB
ALT SERPL-CCNC: 59 U/L (ref 5–41)
ANION GAP SERPL CALCULATED.3IONS-SCNC: 13 MMOL/L (ref 9–17)
AST SERPL-CCNC: 28 U/L
BUN BLDV-MCNC: 18 MG/DL (ref 8–23)
BUN/CREAT BLD: ABNORMAL (ref 9–20)
CALCIUM SERPL-MCNC: 8.9 MG/DL (ref 8.6–10.4)
CHLORIDE BLD-SCNC: 107 MMOL/L (ref 98–107)
CHOLESTEROL/HDL RATIO: 4.9
CHOLESTEROL: 181 MG/DL
CO2: 21 MMOL/L (ref 20–31)
CREAT SERPL-MCNC: 0.84 MG/DL (ref 0.7–1.2)
GFR AFRICAN AMERICAN: >60 ML/MIN
GFR NON-AFRICAN AMERICAN: >60 ML/MIN
GFR SERPL CREATININE-BSD FRML MDRD: ABNORMAL ML/MIN/{1.73_M2}
GFR SERPL CREATININE-BSD FRML MDRD: ABNORMAL ML/MIN/{1.73_M2}
GLUCOSE BLD-MCNC: 112 MG/DL (ref 70–99)
HDLC SERPL-MCNC: 37 MG/DL
LDL CHOLESTEROL: 114 MG/DL (ref 0–130)
MAGNESIUM: 2.1 MG/DL (ref 1.6–2.6)
POTASSIUM SERPL-SCNC: 3.9 MMOL/L (ref 3.7–5.3)
SODIUM BLD-SCNC: 141 MMOL/L (ref 135–144)
TRIGL SERPL-MCNC: 149 MG/DL
VITAMIN D 25-HYDROXY: 36.1 NG/ML (ref 30–100)
VLDLC SERPL CALC-MCNC: ABNORMAL MG/DL (ref 1–30)

## 2021-10-16 PROCEDURE — 82306 VITAMIN D 25 HYDROXY: CPT

## 2021-10-16 PROCEDURE — 80061 LIPID PANEL: CPT

## 2021-10-16 PROCEDURE — 80048 BASIC METABOLIC PNL TOTAL CA: CPT

## 2021-10-16 PROCEDURE — 36415 COLL VENOUS BLD VENIPUNCTURE: CPT

## 2021-10-16 PROCEDURE — 83036 HEMOGLOBIN GLYCOSYLATED A1C: CPT

## 2021-10-16 PROCEDURE — 84450 TRANSFERASE (AST) (SGOT): CPT

## 2021-10-16 PROCEDURE — 84460 ALANINE AMINO (ALT) (SGPT): CPT

## 2021-10-16 PROCEDURE — 83735 ASSAY OF MAGNESIUM: CPT

## 2021-10-17 LAB
ESTIMATED AVERAGE GLUCOSE: 137 MG/DL
HBA1C MFR BLD: 6.4 % (ref 4–6)

## 2021-10-21 ENCOUNTER — TELEPHONE (OUTPATIENT)
Dept: FAMILY MEDICINE CLINIC | Age: 66
End: 2021-10-21

## 2021-10-21 RX ORDER — ATORVASTATIN CALCIUM 20 MG/1
TABLET, FILM COATED ORAL
Qty: 90 TABLET | Refills: 0 | Status: SHIPPED | OUTPATIENT
Start: 2021-10-21 | End: 2022-01-17

## 2021-10-21 NOTE — TELEPHONE ENCOUNTER
----- Message from Keysha Booker sent at 10/21/2021 12:33 PM EDT -----  Subject: Medication Problem    QUESTIONS  Name of Medication? amLODIPine (NORVASC) 5 MG tablet  Patient-reported dosage and instructions? 5 mg   What question or problem do you have with the medication? Pt is calling   about a refill on this medication and the pharmacy is telling him they   can't refill it. Pt would like a call from the office asap at [phone]  Preferred Pharmacy? [facility] [phone]  Pharmacy phone number (if available)? [phone]  Additional Information for Provider?   ---------------------------------------------------------------------------  --------------  CALL BACK INFO  What is the best way for the office to contact you? OK to leave message on   voicemail  Preferred Call Back Phone Number? [phone]  ---------------------------------------------------------------------------  --------------  SCRIPT ANSWERS  Relationship to Patient?  Self

## 2021-10-21 NOTE — TELEPHONE ENCOUNTER
----- Message from Esdras Carballo sent at 10/21/2021 12:33 PM EDT -----  Subject: Medication Problem    QUESTIONS  Name of Medication? amLODIPine (NORVASC) 5 MG tablet  Patient-reported dosage and instructions? 5 mg   What question or problem do you have with the medication? Pt is calling   about a refill on this medication and the pharmacy is telling him they   can't refill it. Pt would like a call from the office asap at [phone]  Preferred Pharmacy? [facility] [address], [address] [phone]  Pharmacy phone number (if available)? [phone]  Additional Information for Provider?   ---------------------------------------------------------------------------  --------------  CALL BACK INFO  What is the best way for the office to contact you? OK to leave message on   voicemail  Preferred Call Back Phone Number? [phone]  ---------------------------------------------------------------------------  --------------  SCRIPT ANSWERS  Relationship to Patient?  Self

## 2021-10-21 NOTE — TELEPHONE ENCOUNTER
Left a message for the patient to call the office to let him know that I spoke to Paulino at [facility] on [facility] and she stated that he picked up the Norvasc and antibiotic on 10/11/21 at 7:40 pm thru the drive thru.

## 2021-10-29 ENCOUNTER — TELEPHONE (OUTPATIENT)
Dept: FAMILY MEDICINE CLINIC | Age: 66
End: 2021-10-29

## 2021-10-29 RX ORDER — DOXYCYCLINE HYCLATE 100 MG
100 TABLET ORAL 2 TIMES DAILY
Qty: 14 TABLET | Refills: 0 | Status: SHIPPED | OUTPATIENT
Start: 2021-10-29 | End: 2021-11-05

## 2021-10-29 NOTE — TELEPHONE ENCOUNTER
Patient called requesting another antibiotic. You saw him virtually on 10-11-21. He finished his augmentin 5 days ago, states he feels better but still has sinus pressure, ringing in ears. He is taking Mucinex and using nasal spray. Please advise.

## 2021-11-05 ENCOUNTER — TELEPHONE (OUTPATIENT)
Dept: GASTROENTEROLOGY | Age: 66
End: 2021-11-05

## 2021-11-05 NOTE — TELEPHONE ENCOUNTER
Pt LVM to schedule appt w/Dr. David Villegas. Returned call LVM for pt to return call and schedule appt.      Pt is a new return, last seen 4/7/2014 OV

## 2021-11-08 DIAGNOSIS — E55.9 VITAMIN D DEFICIENCY: ICD-10-CM

## 2021-11-08 RX ORDER — ERGOCALCIFEROL 1.25 MG/1
CAPSULE ORAL
Qty: 12 CAPSULE | Refills: 1 | Status: SHIPPED | OUTPATIENT
Start: 2021-11-08 | End: 2022-02-14

## 2021-11-09 ENCOUNTER — HOSPITAL ENCOUNTER (OUTPATIENT)
Dept: PAIN MANAGEMENT | Age: 66
Discharge: HOME | End: 2021-11-09
Payer: COMMERCIAL

## 2021-11-09 DIAGNOSIS — M51.37 DEGENERATION OF LUMBAR OR LUMBOSACRAL INTERVERTEBRAL DISC: ICD-10-CM

## 2021-11-09 DIAGNOSIS — M54.16 LUMBAR RADICULOPATHY: Primary | ICD-10-CM

## 2021-11-09 PROCEDURE — 99213 OFFICE O/P EST LOW 20 MIN: CPT

## 2021-11-09 PROCEDURE — 99213 OFFICE O/P EST LOW 20 MIN: CPT | Performed by: NURSE PRACTITIONER

## 2021-11-09 ASSESSMENT — ENCOUNTER SYMPTOMS
COUGH: 0
CONSTIPATION: 0
BACK PAIN: 1
SHORTNESS OF BREATH: 0

## 2021-11-09 NOTE — PROGRESS NOTES
Patient completed a telephone visit today    Chief Complaint: back pain    PMH  Pt complains of back pain. MRI lumbar with multilevel degenerative disc disease and multilevel degenerative facet hypertrophy with canal stenosis most pronounced at L4-5. He has tried PT with some benefit and continues exercises at home daily. He has LESI L3-L4 3/29/21 with 85% relief of pain. Back pain has been gradually worsening. He was scheduled to repeat LESI in May but had to postpone procedure - he would like to reschedule this now. Back Pain  This is a chronic problem. The current episode started more than 1 year ago. The problem occurs constantly. The problem has been gradually worsening since onset. The pain is present in the lumbar spine. The quality of the pain is described as aching (pressure). Radiates to: down right leg to the foot. The pain is at a severity of 3/10. The pain is moderate. The symptoms are aggravated by position and sitting. Associated symptoms include tingling. Pertinent negatives include no chest pain or fever. He has tried walking, analgesics and home exercises (LESI) for the symptoms. The treatment provided moderate relief. Patient denies any new neurological symptoms. No bowel or bladder incontinence, no weakness, and no falling. Past Medical History:   Diagnosis Date    Acute cholecystitis     Essential hypertension 5/4/2017    Hyperlipidemia 9/4/2014    Hyperparathyroidism ([address]     Obesity (BMI 30-39. 9) 5/4/2017    Osteoporosis     Sciatica     Stroke ([address]     Vertebral fracture, osteoporotic ([address]        Past Surgical History:   Procedure Laterality Date    CHOLECYSTECTOMY N/A 2/19/2021    OPEN CHOLECYSTECTOMY performed by Angel Schulz MD at [facility] COLONOSCOPY  3/19/2014    tubular adenoma x 2, inadequate prep, some polyps not removed, needs colonoscopy in 6-12 months    COLONOSCOPY N/A 2/11/2020    COLONOSCOPY POLYPECTOMY HOT BIOPSY performed by Eladio Whitaker Apolinar Douglas MD at [address] [facility] ERCP N/A 2/19/2021    ERCP DIAGNOSTIC performed by Rudi Reynoso MD at [address] [facility] Right 2/12/2020    OPEN RIGHT COLECTOMY, PRIMARY ANASTOMOSIS performed by Ahsan Quinones MD at [address] [facility]      rt. inguinal    IR BILIARY DRAIN EXTERNAL  2/1/2021    IR BILIARY DRAIN EXTERNAL 2/1/2021 [facility]    OMENTUM RESECTION  2/12/2020    OMENTECTOMY -  PARTIAL GREATER OMENECTOMY performed by Ahsan Quinones MD at [facility]         No Known Allergies      Current Outpatient Medications:     vitamin D (ERGOCALCIFEROL) 1.25 MG (41697 UT) CAPS capsule, take 1 capsule by mouth every week, Disp: 12 capsule, Rfl: 1    atorvastatin (LIPITOR) 20 MG tablet, take 1 tablet by mouth at bedtime, Disp: 90 tablet, Rfl: 0    fluticasone (FLONASE) 50 MCG/ACT nasal spray, 2 sprays into each nostril daily, Disp: 16 g, Rfl: 3    loratadine (CLARITIN) 10 MG tablet, Take 1 tablet by mouth daily, Disp: 30 tablet, Rfl: 3    amLODIPine (NORVASC) 5 MG tablet, take 1 tablet by mouth once daily, Disp: 30 tablet, Rfl: 3    guaiFENesin (MUCINEX) 600 MG extended release tablet, Take 1 tablet by mouth 2 times daily, Disp: 20 tablet, Rfl: 0    lisinopril (PRINIVIL;ZESTRIL) 5 MG tablet, take 1 tablet by mouth once daily, Disp: 90 tablet, Rfl: 1    carvedilol (COREG) 6.25 MG tablet, take 1 tablet by mouth twice a day with meals, Disp: 60 tablet, Rfl: 3    pantoprazole (PROTONIX) 40 MG tablet, take 1 tablet by mouth once daily, Disp: 90 tablet, Rfl: 1    aspirin 81 MG chewable tablet, Take 1 tablet by mouth daily, Disp: 30 tablet, Rfl: 3    Family History   Problem Relation Age of Onset    Heart Disease Father     No Known Problems Mother        Social History     Socioeconomic History    Marital status: Single     Spouse name: Not on file    Number of children: Not on file    Years of education: Not on file    Highest education level: Not on file Occupational History    Not on file   Tobacco Use    Smoking status: Former Smoker     Packs/day: 1.00     Years: 40.00     Pack years: 40.00     Quit date: 2020     Years since quittin.7    Smokeless tobacco: Never Used   Vaping Use    Vaping Use: Never used   Substance and Sexual Activity    Alcohol use: Yes     Comment: rarely    Drug use: No    Sexual activity: Yes     Partners: Female   Other Topics Concern    Not on file   Social History Narrative    Not on file     Social Determinants of Health     Financial Resource Strain: Low Risk     Difficulty of Paying Living Expenses: Not hard at all   Food Insecurity: No Food Insecurity    Worried About Running Out of Food in the Last Year: Never true    [address] in the Last Year: Never true   Transportation Needs: No Transportation Needs    Lack of Transportation (Medical): No    Lack of Transportation (Non-Medical): No   Physical Activity:     Days of Exercise per Week: Not on file    Minutes of Exercise per Session: Not on file   Stress:     Feeling of Stress : Not on file   Social Connections:     Frequency of Communication with Friends and Family: Not on file    Frequency of Social Gatherings with Friends and Family: Not on file    Attends Sikh Services: Not on file    Active Member of [address] [place] [address] or Organizations: Not on file    Attends Club or Organization Meetings: Not on file    Marital Status: Not on file   Intimate Partner Violence:     Fear of Current or Ex-Partner: Not on file    Emotionally Abused: Not on file    Physically Abused: Not on file    Sexually Abused: Not on file   Housing Stability:     Unable to Pay for Housing in the Last Year: Not on file    Number of [place] in the Last Year: Not on file    Unstable Housing in the Last Year: Not on file       Review of Systems:  Review of Systems   Constitutional: Negative for chills and fever. Cardiovascular: Negative for chest pain and palpitations. Respiratory: Negative for cough and shortness of breath. Musculoskeletal: Positive for back pain. Gastrointestinal: Negative for constipation. Neurological: Positive for tingling. Negative for disturbances in coordination and loss of balance. Physical Exam:  There were no vitals taken for this visit. Physical Exam  Neurological:      Mental Status: He is alert. Psychiatric:         Mood and Affect: Mood normal.         Record/Diagnostics Review:    As reviewed in PMH    Assessment:  Problem List Items Addressed This Visit     Degeneration of lumbar or lumbosacral intervertebral disc    Relevant Orders    Lumbar Epidural Steroid Injection/Caudal    Saline lock IV    FLUORO FOR SURGICAL PROCEDURES    Lumbar radiculopathy - Primary    Relevant Orders    Lumbar Epidural Steroid Injection/Caudal    Saline lock IV    FLUORO FOR SURGICAL PROCEDURES             Treatment Plan:  Pain in the low back and legs continues despite conservative measures  Significant benefit from epidural steroid injections in the past and requesting to repeat  LESI x1 L3-L4  Pre-procedural instructions are reviewed   Follow up after procedure    Meeta Solo, was evaluated through a synchronous (real-time) audio-video encounter. The patient (or guardian if applicable) is aware that this is a billable service. Verbal consent to proceed has been obtained within the past 12 months. The visit was conducted pursuant to the emergency declaration under the [address] [place] [address], [address] [place] [address] authority and the [employer] and [employer] General Act. Patient identification was verified, and a caregiver was present when appropriate. The patient was located in a state where the provider was credentialed to provide care.     Total time spent for this encounter: 20 minutes    --KAMALA Kumar CNP on 11/9/2021 at 9:47 AM    An electronic signature was used to authenticate this note.

## 2021-11-10 ENCOUNTER — TELEPHONE (OUTPATIENT)
Dept: PAIN MANAGEMENT | Age: 66
End: 2021-11-10

## 2021-11-10 NOTE — TELEPHONE ENCOUNTER
Called patient to schedule  an injection appointment. Chart reviewed along with medication list. Pre procedure instructions given. Patient acknowledges an understanding. Covid-19 screening questions asked. Information given where to be dropped off, a person to remain in the car and which door to enter in. Temp must be taken. Patient advised to avoid vaccines 2 weeks prior and after Injection.

## 2021-11-11 NOTE — PRE-PROCEDURE INSTRUCTIONS
Called patient for pre procedure instructions for his  injection appointment. Chart reviewed along with medication list. Pre procedure instructions given. Patient acknowledges an understanding. Covid-19 screening questions asked. Information given where to be dropped off, a person to remain in the car and which door to enter in. Temp must be taken. Patient advised to avoid vaccines 2 weeks prior and after Injection.

## 2021-11-15 ENCOUNTER — HOSPITAL ENCOUNTER (OUTPATIENT)
Dept: PAIN MANAGEMENT | Age: 66
Discharge: HOME | End: 2021-11-15
Payer: COMMERCIAL

## 2021-11-23 ENCOUNTER — TELEPHONE (OUTPATIENT)
Dept: PAIN MANAGEMENT | Age: 66
End: 2021-11-23

## 2021-11-29 ENCOUNTER — HOSPITAL ENCOUNTER (OUTPATIENT)
Dept: GENERAL RADIOLOGY | Age: 66
Discharge: HOME | End: 2021-12-01
Payer: COMMERCIAL

## 2021-11-29 ENCOUNTER — HOSPITAL ENCOUNTER (OUTPATIENT)
Dept: PAIN MANAGEMENT | Age: 66
Discharge: HOME | End: 2021-11-29
Payer: COMMERCIAL

## 2021-11-29 VITALS
TEMPERATURE: 97.5 F | BODY MASS INDEX: 33.74 KG/M2 | WEIGHT: 215 LBS | OXYGEN SATURATION: 94 % | HEART RATE: 74 BPM | HEIGHT: 67 IN | SYSTOLIC BLOOD PRESSURE: 119 MMHG | RESPIRATION RATE: 16 BRPM | DIASTOLIC BLOOD PRESSURE: 71 MMHG

## 2021-11-29 DIAGNOSIS — M51.37 DEGENERATION OF LUMBAR OR LUMBOSACRAL INTERVERTEBRAL DISC: ICD-10-CM

## 2021-11-29 DIAGNOSIS — M47.817 LUMBOSACRAL SPONDYLOSIS WITHOUT MYELOPATHY: ICD-10-CM

## 2021-11-29 DIAGNOSIS — M51.36 DDD (DEGENERATIVE DISC DISEASE), LUMBAR: ICD-10-CM

## 2021-11-29 DIAGNOSIS — M48.061 DEGENERATIVE LUMBAR SPINAL STENOSIS: ICD-10-CM

## 2021-11-29 DIAGNOSIS — M54.16 LUMBAR RADICULOPATHY: ICD-10-CM

## 2021-11-29 DIAGNOSIS — M54.16 LUMBAR RADICULOPATHY: Primary | ICD-10-CM

## 2021-11-29 PROCEDURE — 62323 NJX INTERLAMINAR LMBR/SAC: CPT

## 2021-11-29 PROCEDURE — 62323 NJX INTERLAMINAR LMBR/SAC: CPT | Performed by: PAIN MEDICINE

## 2021-11-29 PROCEDURE — 3209999900 FLUORO FOR SURGICAL PROCEDURES

## 2021-11-29 PROCEDURE — 6360000002 HC RX W HCPCS: Performed by: PAIN MEDICINE

## 2021-11-29 PROCEDURE — 6360000004 HC RX CONTRAST MEDICATION: Performed by: PAIN MEDICINE

## 2021-11-29 RX ORDER — TRIAMCINOLONE ACETONIDE 40 MG/ML
INJECTION, SUSPENSION INTRA-ARTICULAR; INTRAMUSCULAR
Status: COMPLETED | OUTPATIENT
Start: 2021-11-29 | End: 2021-11-29

## 2021-11-29 RX ADMIN — IOHEXOL 2 ML: 180 INJECTION INTRAVENOUS at 12:08

## 2021-11-29 RX ADMIN — TRIAMCINOLONE ACETONIDE 80 MG: 40 INJECTION, SUSPENSION INTRA-ARTICULAR; INTRAMUSCULAR at 12:08

## 2021-11-29 ASSESSMENT — PAIN DESCRIPTION - ONSET: ONSET: ON-GOING

## 2021-11-29 ASSESSMENT — PAIN DESCRIPTION - ORIENTATION: ORIENTATION: LOWER;RIGHT

## 2021-11-29 ASSESSMENT — PAIN DESCRIPTION - DESCRIPTORS: DESCRIPTORS: ACHING;PRESSURE

## 2021-11-29 ASSESSMENT — PAIN - FUNCTIONAL ASSESSMENT
PAIN_FUNCTIONAL_ASSESSMENT: PREVENTS OR INTERFERES SOME ACTIVE ACTIVITIES AND ADLS
PAIN_FUNCTIONAL_ASSESSMENT: 0-10

## 2021-11-29 ASSESSMENT — PAIN DESCRIPTION - DIRECTION: RADIATING_TOWARDS: RIGHT LEG TO TOES

## 2021-11-29 ASSESSMENT — PAIN DESCRIPTION - PAIN TYPE: TYPE: CHRONIC PAIN

## 2021-11-29 ASSESSMENT — PAIN DESCRIPTION - LOCATION: LOCATION: BACK

## 2021-11-29 ASSESSMENT — PAIN DESCRIPTION - FREQUENCY: FREQUENCY: INTERMITTENT

## 2021-11-29 ASSESSMENT — PAIN DESCRIPTION - PROGRESSION: CLINICAL_PROGRESSION: GRADUALLY WORSENING

## 2021-11-29 ASSESSMENT — PAIN SCALES - GENERAL: PAINLEVEL_OUTOF10: 3

## 2021-11-29 NOTE — PROCEDURES
Pre-Procedure Note    Patient Name: Archie Sanon   YOB: 1955  Room/Bed: Room/bed info not found  Medical Record Number: [id number]  Date: 11/29/2021       Indication:    1. Lumbar radiculopathy    2. Degeneration of lumbar or lumbosacral intervertebral disc    3. DDD (degenerative disc disease), lumbar    4. Lumbosacral spondylosis without myelopathy    5. Degenerative lumbar spinal stenosis        Consent: On file. Vital Signs:   Vitals:    11/29/21 1219   BP: 119/71   Pulse: 74   Resp: 16   Temp:    SpO2: 94%       Past Medical History:   has a past medical history of Acute cholecystitis, Essential hypertension, Hyperlipidemia, Hyperparathyroidism ([address] Obesity (BMI 30-39.9), Osteoporosis, Sciatica, Stroke ([address] and Vertebral fracture, osteoporotic ([address] Past Surgical History:   has a past surgical history that includes Vasectomy; Colonoscopy (3/19/2014); hernia repair; Colonoscopy (N/A, 2/11/2020); hemicolectomy (Right, 2/12/2020); omentum resection (2/12/2020); IR Biliary Drain External (2/1/2021); Cholecystectomy (N/A, 2/19/2021); and ERCP (N/A, 2/19/2021). Pre-Sedation Documentation and Exam:   Vital signs have been reviewed (see flow sheet for vitals). Mallampati Airway Assessment:  normal    ASA Classification:  Class 3 - A patient with severe systemic disease that limits activity but is not incapacitating    Sedation/ Anesthesia Plan:   intravenous sedation   as needed. Medications Planned:   midazolam (Versed) / Fentanyl  Intravenously  as needed. Patient is an appropriate candidate for plan of sedation: yes  Patient's History and Physical examination was reviewed and there is no change. Electronically signed by Caden Panda MD on 11/29/2021 at 12:20 PM        Preoperative Diagnosis:    1. Lumbar radiculopathy    2. Degeneration of lumbar or lumbosacral intervertebral disc    3. DDD (degenerative disc disease), lumbar    4.  Lumbosacral spondylosis without myelopathy    5. Degenerative lumbar spinal stenosis        Postoperative Diagnosis:    1. Lumbar radiculopathy    2. Degeneration of lumbar or lumbosacral intervertebral disc    3. DDD (degenerative disc disease), lumbar    4. Lumbosacral spondylosis without myelopathy    5. Degenerative lumbar spinal stenosis        Procedure Performed:  Lumbar epidural steroid injection under fluoroscopy guidance without IV sedation. Indication for the Procedure: The patient failed conservative management  for pain in the low back radiating to lower extremities. As the patient is not responding to conservative management and it is interfering with activities of daily living we decided to proceed with lumbar epidural steroid injection. The procedure and risks were discussed with the patient and an informed consent was obtained  Current Pain Assessment  Pain Assessment  Pain Assessment: 0-10  Pain Level: 2  Patient's Stated Pain Goal: 2 (increase activity)  Pain Type: Chronic pain  Pain Location: Back  Pain Orientation: Lower, Right  Pain Radiating Towards: right leg to toes  Pain Descriptors: Aching, Pressure  Pain Frequency: Intermittent  Pain Onset: On-going  Clinical Progression: Gradually worsening  Functional Pain Assessment: Prevents or interferes some active activities and ADLs  Non-Pharmaceutical Pain Intervention(s): Rest  POSS Score (Patient Ctrl Analgesia): 1     Procedure:       Patient's vital signs including BP, EKG and SaO2 were monitored by the RN and they remained stable during the procedure. A meaningful communication was kept up with the patient throughout  the procedure. The patient is placed in prone position. Skin over the back was prepped and draped in sterile manner. Then using fluoroscopy the L3, L4 interspace was observed and the skin and deep tissues in the right paramedian area were infiltrated with 4 ml of 1% lidocaine.  The #20-gauge, 3-1/2 inch Tuohy needle was inserted through the skin wheal and the epidural space was identified using loss of resistance technique with normal saline. This was confirmed with AP and lateral views using fluoroscopy after injecting about 2 ml of Omnipaque-180 and observing the spread of the contrast in the epidural space. Then after negative aspiration a total of 80 mg of triamcinolone with 8 ml of normal saline was injected into the epidural space. The needle is removed and a Band-Aid was placed over the needle insertion site. Patient's vital signs remained stable and the patient tolerated the procedure well. The patient was discharged home in stable condition and will be followed in the pain clinic in the next few weeks for further planning.     Electronically signed by Canelo Quiñones MD on 11/29/2021 at 12:20 PM

## 2021-11-29 NOTE — PROGRESS NOTES
Discharge criteria met. Patient alert and oriented x3  Post procedure dressing dry and intact. Sensory and motor function intact as per pre-procedure. Instructions and follow up reviewed with pt at patient at discharge.   Patient discharged ambulatory @ 1225 with friend Saritha Lackey

## 2021-11-30 ENCOUNTER — TELEPHONE (OUTPATIENT)
Dept: PAIN MANAGEMENT | Age: 66
End: 2021-11-30

## 2021-12-16 ENCOUNTER — HOSPITAL ENCOUNTER (OUTPATIENT)
Dept: PAIN MANAGEMENT | Age: 66
Discharge: HOME | End: 2021-12-16
Payer: COMMERCIAL

## 2021-12-16 VITALS
HEIGHT: 67 IN | BODY MASS INDEX: 33.74 KG/M2 | OXYGEN SATURATION: 97 % | WEIGHT: 215 LBS | SYSTOLIC BLOOD PRESSURE: 133 MMHG | HEART RATE: 82 BPM | DIASTOLIC BLOOD PRESSURE: 88 MMHG | RESPIRATION RATE: 16 BRPM | TEMPERATURE: 96.9 F

## 2021-12-16 DIAGNOSIS — M54.50 LOW BACK PAIN WITHOUT SCIATICA, UNSPECIFIED BACK PAIN LATERALITY, UNSPECIFIED CHRONICITY: ICD-10-CM

## 2021-12-16 DIAGNOSIS — M54.16 LUMBAR RADICULOPATHY: ICD-10-CM

## 2021-12-16 DIAGNOSIS — M51.37 DEGENERATION OF LUMBAR OR LUMBOSACRAL INTERVERTEBRAL DISC: Primary | ICD-10-CM

## 2021-12-16 PROCEDURE — 99213 OFFICE O/P EST LOW 20 MIN: CPT | Performed by: NURSE PRACTITIONER

## 2021-12-16 PROCEDURE — 99213 OFFICE O/P EST LOW 20 MIN: CPT

## 2021-12-16 ASSESSMENT — ENCOUNTER SYMPTOMS
RESPIRATORY NEGATIVE: 1
HEARTBURN: 1
EYES NEGATIVE: 1
BACK PAIN: 1

## 2021-12-16 NOTE — PROGRESS NOTES
Joshua 89 PROGRESS NOTE      Patient  completed []  video visit   []   phone call:         Minutes :   [x] in person visit to pain clinic    []    to  review Medication Agreement    []  Follow up after procedure   []  Discuss treatment options      Location:  Provider:  working from    []    home    [x]   [facility] ,   patient at   [x] pain clinic     []  home         Chief Complaint: low back pain    He c/o low back pain. He had lumbar epidural injection L3, L4 on 11- with 80% relief. He is still getting relief. Previous injection was in 3/2021 with 85% relief. He states sometimes he gets pain in his right shin. He does home exercises. He did PT in the past, He has seen Dr Keith Rodriguez and surgery was discussed. At this time, he states does not want surgery. He states has been retired for 2 years and wants to go back to work. He sleeps well. Back Pain  This is a chronic problem. The current episode started more than 1 year ago. The problem occurs intermittently. The problem has been gradually improving since onset. The pain is present in the lumbar spine. Quality: annoying. Radiates to: right shin  sometimes. The pain is at a severity of 2/10. The pain is mild. The pain is the same all the time. The symptoms are aggravated by sitting. Associated symptoms include headaches. Treatments tried: laying down. Treatment goals:  Functional status: get rid of pain      Aberrancy:   Any alcoholic beverages            Any illegal drugs         Analgesia:      2               Adverse  Effects :no    ADL;s :home exercises      Data:    When was thelast UDS:   3-         Was the UDS appropriate:  [x] yes []   no      Record/Diagnostics Review:      As above, I did review the imaging       DRUG SCREEN, PAIN  Order: [id number]   Status: Final result     Visible to patient: Yes (not seen)     Next appt:  Today at 09:40 AM in Pain Management [facility])     1 Result Note     Ref Range & Units 3/17/21 [address] [place] [address]   Pain Management Drug Panel Interp, Urine  See Note  [facility]   Comment: (NOTE)   ________________________________________________________________   NO DRUGS PROVIDED   ________________________________________________________________   Please call [address] at [phone] for   Medical Director interpretation if applicable. Alternatively,   please consider the PAIN HYB U (2100151) which does not require   medication information or provide compliance interpretation. ________________________________________________________________   INTERPRETIVE INFORMATION: Targeted drug profile Interp   Interpretation depends on accuracy and completeness of patient   medication information submitted by client. 6-Acetylmorphine, Ur  Not Detected  [facility]   7-Aminoclonazepam, Urine  Not Detected  [facility]   Alpha-OH-Alpraz, Urine  Not Detected  [facility]   Alprazolam, Urine  Not Detected  [facility]   Amphetamines, urine  Not Detected  [facility]   Barbiturates, Ur  Not Detected  [facility]   Benzoylecgonine, Ur  Not Detected  [facility]   Buprenorphine Urine  Not Detected  [facility]   Carisoprodol, Ur  Not Detected  [facility]   Comment: (NOTE)   The carisoprodol immunoassay has cross-reactivity to carisoprodol   and meprobamate.     Clonazepam, Urine  Not Detected  [facility]   Codeine, Urine  Not Detected  [facility]   MDA, Ur  Not Detected  [facility]   Diazepam, Urine  Not Detected  [facility]   Ethyl Glucuronide Ur  Not Detected  [facility]   Fentanyl, Ur  Not Detected  [facility]   Hydrocodone, Urine  Not Detected  [facility]   Hydromorphone, Urine  Not Detected  [facility]   Lorazepam, Urine  Not Detected  [facility]   Marijuana Metab, Ur  Not Detected  [facility]   MDEA, WATSON, Ur  Not Detected  [facility]   MDMA, Urine  Not Detected  [facility]   Meperidine Metab, Ur  Not Detected  [facility]   Methadone, Urine  Not Detected  [facility]   Methamphetamine, Urine  Not Detected  [facility]   Methylphenidate  Not Detected  [facility]   Midazolam, Urine  Not Detected  [facility]   Morphine Urine  Not Detected  [facility]   Norbuprenorphine, Urine  Not Detected  [facility]   Nordiazepam, Urine  Not Detected  [facility]   Norfentanyl, Urine  Not Detected  [facility]   NORHYDROCODONE, URINE  Not Detected  [facility]   Noroxycodone, Urine  Not Detected  [facility]   NOROXYMORPHONE, URINE  Not Detected  [facility]   Oxazepam, Urine  Not Detected  [facility]   Oxycodone Urine  Not Detected  [facility]   Oxymorphone, Urine  Not Detected  [facility]   PCP, Urine  Not Detected  [facility]   Phentermine, Ur  Not Detected  [facility]   Tapentadol-O-Sulfate, Urine  Not Detected  [facility]   Tapentadol, Urine  Not Detected  [facility]   Temazepam, Urine  Not Detected  [facility]   Tramadol, Urine  Not Detected  [facility]   Zolpidem, Urine  Not Detected  [facility]   Drugs Expected, Ur  NONE LISTED  - [address] [facility]   Creatinine, Ur 20.0 - 400.0 mg/dL 163.8  [facility]   Pain Mgt Drug Panel, Hi Res, Ur  See Below  [facility]   Comment: (NOTE)   Methodology: Qualitative Enzyme Immunoassay and Qualitative Liquid   Chromatography-Tandem Mass Spectrometry, Quantitative   Spectrophotometry   The absence of expected drug(s) and/or drug metabolite(s) may   indicate non-compliance, inappropriate timing of specimen   collection relative to drug administration, poor drug absorption,   diluted/adulterated urine, or limitations of testing. The   concentration must be greater than or equal to the cutoff to be   reported as present.  If specific drug concentrations are   required, contact the laboratory within two weeks of specimen   collection to request quantification by a second analytical   technique. Interpretive questions should be directed to the   laboratory. Results based on immunoassay detection that do not match clinical   expectations should be   interpreted with caution. Confirmatory testing by mass   spectrometry for immunoassay-based results is available, if   ordered within two weeks of specimen collection. Additional   charges apply. For medical purposes only; not valid for forensic use.    This test was developed and its performance characteristics   determined by [facility]. It has not been cleared or   approved by the [employer] and Drug Administration. This test was   performed in a CLIA certified laboratory and is intended for   clinical purposes. EER Hi Res Interp Ur  See Note  [facility]   Comment: (NOTE)   Access [facility] Enhanced Report using the link below:   -Direct access: [email or web address]   Performed By: [facility] [address]   [place], [facility]   : Dominique Hooker. Madiha Asher MD    Naloxone Urine  Not Detected  [facility]   Gabapentin  Not Detected  [facility]   Pregabalin  Not Detected  [facility]   Alpha-OH-Midazolam, Urine  Not Detected  [facility]   Zolpidem Metabolite (ZCA), Urine  Not Detected  [facility]              Specimen Collected: 03/17/21 11:00 Last Resulted: 03/20/21            EXAMINATION:   MRI OF THE LUMBAR SPINE WITHOUT CONTRAST, 1/21/2021 2:24 pm       TECHNIQUE:   Multiplanar multisequence MRI of the lumbar spine was performed without the   administration of intravenous contrast.       COMPARISON:   None.       HISTORY:   ORDERING SYSTEM PROVIDED HISTORY: Degeneration of lumbar or lumbosacral   intervertebral disc   TECHNOLOGIST PROVIDED HISTORY:   Reason for Exam: Degeneration of lumbar or lumbosacral intervertebral disc,   Low back pain without sciatica, unspecified back pain laterality, unspecified   chronicity   Additional signs and symptoms: Right sided low back pain that radiates down   buttocks region through right leg down to the big toe. Pain has lasted on and   off for 22 years       FINDINGS:   BONES/ALIGNMENT: Vertebral body heights are maintained there is   age-appropriate bone marrow signal. There is degenerative endplate change   most pronounced at the L3-4 level. There is multilevel degenerative disc   disease with loss of disc signal.  There is disc space narrowing at L3-4.    There is no spondylolisthesis.       SPINAL CORD: Conus medullaris is normal in caliber and signal and terminates   at the L1 level. The cauda equina is unremarkable.       SOFT TISSUES: The posterior paraspinal soft tissues are unremarkable.  The   visualized abdominal structures are unremarkable.       L1-L2: There is a mild circumferential disc bulge.  There is no canal   stenosis or foraminal narrowing.       L2-L3: There is a circumferential disc bulge with facet and ligamentous   hypertrophy.  There is canal stenosis measuring 8 mm in AP dimension.  There   is mild left foraminal narrowing and no significant right foraminal narrowing.       L3-L4: There is a circumferential disc bulge with facet and ligamentous   hypertrophy.  There is canal stenosis measuring 8 mm in AP dimension.  There   is moderate bilateral foraminal narrowing.       L4-L5: There is a circumferential disc bulge with facet hypertrophy.  There   is canal stenosis measuring 7 mm in AP dimension.  Is moderate bilateral   foraminal narrowing.       L5-S1: There is a circumferential disc bulge with facet hypertrophy.  There   is no significant canal stenosis.  There is moderate bilateral foraminal   narrowing.           Impression   Multilevel degenerative disc disease and multilevel degenerative facet   hypertrophy with canal stenosis most pronounced at L4-5.       Bilateral foraminal narrowing as described above.                       Pill count: appropriate    fill date :loast script 4-    Morphine equivalent dose as reported on OARRS:     Review ofOARRS does not show any aberrant prescription behavior. Medication is helping the patient stay active. Patient denies any side effects and reports adequate analgesia. No sign of misuse/abuse. Past Medical History:   Diagnosis Date    Acute cholecystitis     Essential hypertension 5/4/2017    Hyperlipidemia 9/4/2014    [facility] [address]     Obesity (BMI 30-39. 9) 5/4/2017    Osteoporosis     Sciatica     Stroke ([address]  Vertebral fracture, osteoporotic ([facility] [address]        Past Surgical History:   Procedure Laterality Date    CHOLECYSTECTOMY N/A 2/19/2021    OPEN CHOLECYSTECTOMY performed by Mat Archibald MD at [facility] COLONOSCOPY  3/19/2014    tubular adenoma x 2, inadequate prep, some polyps not removed, needs colonoscopy in 6-12 months    COLONOSCOPY N/A 2/11/2020    COLONOSCOPY POLYPECTOMY HOT BIOPSY performed by Mat Archibald MD at [facility] ERCP N/A 2/19/2021    ERCP DIAGNOSTIC performed by Marilyn Augustine MD at [address] [facility] [facility] Right 2/12/2020    OPEN RIGHT COLECTOMY, PRIMARY ANASTOMOSIS performed by Mat Archibald MD at [facility] N      rt. inguinal    IR BILIARY DRAIN EXTERNAL  2/1/2021    IR BILIARY DRAIN EXTERNAL 2/1/2021 [facility]    OMENTUM RESECTION  2/12/2020    OMENTECTOMY -  PARTIAL GREATER OMENECTOMY performed by Mat Archibald MD at [facility]         No Known Allergies      Current Outpatient Medications:     vitamin D (ERGOCALCIFEROL) 1.25 MG (90856 UT) CAPS capsule, take 1 capsule by mouth every week, Disp: 12 capsule, Rfl: 1    atorvastatin (LIPITOR) 20 MG tablet, take 1 tablet by mouth at bedtime, Disp: 90 tablet, Rfl: 0    fluticasone (FLONASE) 50 MCG/ACT nasal spray, 2 sprays into each nostril daily, Disp: 16 g, Rfl: 3    loratadine (CLARITIN) 10 MG tablet, Take 1 tablet by mouth daily, Disp: 30 tablet, Rfl: 3    amLODIPine (NORVASC) 5 MG tablet, take 1 tablet by mouth once daily, Disp: 30 tablet, Rfl: 3    guaiFENesin (MUCINEX) 600 MG extended release tablet, Take 1 tablet by mouth 2 times daily, Disp: 20 tablet, Rfl: 0    lisinopril (PRINIVIL;ZESTRIL) 5 MG tablet, take 1 tablet by mouth once daily, Disp: 90 tablet, Rfl: 1    carvedilol (COREG) 6.25 MG tablet, take 1 tablet by mouth twice a day with meals, Disp: 60 tablet, Rfl: 3    pantoprazole (PROTONIX) 40 MG tablet, take 1 tablet by mouth once daily, Disp: 90 tablet, Rfl: 1   aspirin 81 MG chewable tablet, Take 1 tablet by mouth daily, Disp: 30 tablet, Rfl: 3    Family History   Problem Relation Age of Onset    Heart Disease Father     No Known Problems Mother        Social History     Socioeconomic History    Marital status: Single     Spouse name: Not on file    Number of children: Not on file    Years of education: Not on file    Highest education level: Not on file   Occupational History    Not on file   Tobacco Use    Smoking status: Former Smoker     Packs/day: 1.00     Years: 40.00     Pack years: 40.00     Quit date: 2020     Years since quittin.8    Smokeless tobacco: Never Used   Vaping Use    Vaping Use: Never used   Substance and Sexual Activity    Alcohol use: Yes     Comment: rarely    Drug use: No    Sexual activity: Yes     Partners: Female   Other Topics Concern    Not on file   Social History Narrative    Not on file     Social Determinants of Health     Financial Resource Strain: Low Risk     Difficulty of Paying Living Expenses: Not hard at all   Food Insecurity: No Food Insecurity    Worried About [address] in the Last Year: Never true    [address] [employer] in the Last Year: Never true   Transportation Needs: No Transportation Needs    Lack of Transportation (Medical): No    Lack of Transportation (Non-Medical):  No   Physical Activity:     Days of Exercise per Week: Not on file    Minutes of Exercise per Session: Not on file   Stress:     Feeling of Stress : Not on file   Social Connections:     Frequency of Communication with Friends and Family: Not on file    Frequency of Social Gatherings with Friends and Family: Not on file    Attends Druze Services: Not on file    Active Member of Clubs or Organizations: Not on file    Attends Club or Organization Meetings: Not on file    Marital Status: Not on file   Intimate Partner Violence:     Fear of Current or Ex-Partner: Not on file    Emotionally Abused: Not on file   [address] [place] [address] Physically Abused: Not on file    Sexually Abused: Not on file   Housing Stability:     Unable to Pay for Housing in the Last Year: Not on file    Number of Places Lived in the Last Year: Not on file    Unstable Housing in the Last Year: Not on file         Review of Systems:  Review of Systems   Constitutional: Negative. HENT: Negative. Eyes: Negative. Respiratory: Negative. Endocrine: Negative. Hematologic/Lymphatic: Negative. Skin: Negative. Musculoskeletal: Positive for back pain and joint pain. Gastrointestinal: Positive for heartburn. Genitourinary: Negative. Neurological: Positive for headaches. Psychiatric/Behavioral: Negative. Physical Exam:  There were no vitals taken for this visit. Physical Exam  HENT:      Head: Normocephalic. Pulmonary:      Effort: Pulmonary effort is normal.   Skin:         Neurological:      Mental Status: He is alert and oriented to person, place, and time. Deep Tendon Reflexes:      Reflex Scores:       Patellar reflexes are 2+ on the right side and 2+ on the left side. Achilles reflexes are 2+ on the right side and 2+ on the left side. Psychiatric:         Mood and Affect: Mood normal.         Thought Content: Thought content normal.           Assessment:    Problem List Items Addressed This Visit     Lumbar radiculopathy    Low back pain    Degeneration of lumbar or lumbosacral intervertebral disc - Primary              Treatment Plan:  DISCUSSION: Treatment options discussed withpatient and all questions answered to patient's satisfaction.      Possible side effects, risk of tolerance and or dependence and alternative treatments discussed    Obtaining appropriate analgesic effect of treatment   No signs of potential drug abuse or diversion identified    [x] Ill effects of being on chronic pain medications such as sleep disturbances, respiratory depression, hormonal changes, withdrawal symptoms, chronic opioid dependence and tolerance as well as risk of taking opioids with Benzodiazepines and taking opioids along with alcohol,  werediscussed with patient. I had asked the patient to minimize medication use and utilize pain medications only for uncontrolled rest pain or pain with exertional activities. I advised patient not to self-escalate painmedications without consulting with us. At each of patient's future visits we will try to taper pain medications, while adjusting the adjunct medications, and re-evaluating for Physical Therapy to improve spinal andjoint strength. We will continue to have discussions to decrease pain medications as tolerated. Counseled patient on effects their pain medication and /or their medical condition mayhave on their  ability to drive or operate machinery. Instructed not to drive or operate machinery if drowsy     I also discussed with the patient regarding the dangers of combining narcotic pain medication with tranquilizers, alcohol or illegal drugs or taking the medication any way other than prescribed. The dangers were discussed  including respiratory depression and death. Patient was told to tell  all  physicians regarding the medications he is getting from pain clinic. Patient is warned not to take any unprescribed medications over-the-countermedications that can depress breathing . Patient is advised to talk to the pharmacist or physicians if planning to take any over-the-counter medications before  takeing them.  Patient is strongly advised to avoid tranquilizers or  relaxants, illegal drugs  or any medications that can depress breathing  Patient is also advised to tell us if there is any changes in their medications from other physician call if pain worsens       discussed trying NSAID such as meloxicam and a lidoderm patch, he declined    TREATMENT OPTIONS:       See as needed

## 2022-01-10 ENCOUNTER — OFFICE VISIT (OUTPATIENT)
Dept: FAMILY MEDICINE CLINIC | Age: 67
End: 2022-01-10
Payer: MEDICARE

## 2022-01-10 VITALS
BODY MASS INDEX: 35.71 KG/M2 | RESPIRATION RATE: 18 BRPM | SYSTOLIC BLOOD PRESSURE: 124 MMHG | TEMPERATURE: 97.3 F | HEART RATE: 70 BPM | DIASTOLIC BLOOD PRESSURE: 72 MMHG | WEIGHT: 228 LBS

## 2022-01-10 DIAGNOSIS — R10.9 FLANK PAIN: Primary | ICD-10-CM

## 2022-01-10 DIAGNOSIS — N52.9 ERECTILE DYSFUNCTION, UNSPECIFIED ERECTILE DYSFUNCTION TYPE: ICD-10-CM

## 2022-01-10 DIAGNOSIS — N52.9 ERECTILE DYSFUNCTION, UNSPECIFIED ERECTILE DYSFUNCTION TYPE: Primary | ICD-10-CM

## 2022-01-10 DIAGNOSIS — M46.1 SACROILIITIS (HCC): ICD-10-CM

## 2022-01-10 DIAGNOSIS — M54.50 BILATERAL LOW BACK PAIN, UNSPECIFIED CHRONICITY, UNSPECIFIED WHETHER SCIATICA PRESENT: ICD-10-CM

## 2022-01-10 PROBLEM — I72.8 SPLENIC ARTERY ANEURYSM (HCC): Status: RESOLVED | Noted: 2021-01-30 | Resolved: 2022-01-10

## 2022-01-10 LAB
BILIRUBIN, POC: NEGATIVE
BLOOD URINE, POC: NEGATIVE
CLARITY, POC: NORMAL
COLOR, POC: YELLOW
GLUCOSE URINE, POC: NEGATIVE
KETONES, POC: NEGATIVE
LEUKOCYTE EST, POC: NEGATIVE
NITRITE, POC: NEGATIVE
PH, POC: 6.5
PROTEIN, POC: NEGATIVE
SPECIFIC GRAVITY, POC: 1.03
UROBILINOGEN, POC: NEGATIVE

## 2022-01-10 PROCEDURE — G8484 FLU IMMUNIZE NO ADMIN: HCPCS | Performed by: FAMILY MEDICINE

## 2022-01-10 PROCEDURE — G8427 DOCREV CUR MEDS BY ELIG CLIN: HCPCS | Performed by: FAMILY MEDICINE

## 2022-01-10 PROCEDURE — 81003 URINALYSIS AUTO W/O SCOPE: CPT | Performed by: FAMILY MEDICINE

## 2022-01-10 PROCEDURE — G8417 CALC BMI ABV UP PARAM F/U: HCPCS | Performed by: FAMILY MEDICINE

## 2022-01-10 PROCEDURE — 1123F ACP DISCUSS/DSCN MKR DOCD: CPT | Performed by: FAMILY MEDICINE

## 2022-01-10 PROCEDURE — 4040F PNEUMOC VAC/ADMIN/RCVD: CPT | Performed by: FAMILY MEDICINE

## 2022-01-10 PROCEDURE — 1036F TOBACCO NON-USER: CPT | Performed by: FAMILY MEDICINE

## 2022-01-10 PROCEDURE — 3017F COLORECTAL CA SCREEN DOC REV: CPT | Performed by: FAMILY MEDICINE

## 2022-01-10 PROCEDURE — 99213 OFFICE O/P EST LOW 20 MIN: CPT | Performed by: FAMILY MEDICINE

## 2022-01-10 RX ORDER — TIZANIDINE 4 MG/1
4 TABLET ORAL 3 TIMES DAILY PRN
Qty: 30 TABLET | Refills: 0 | Status: SHIPPED | OUTPATIENT
Start: 2022-01-10 | End: 2022-07-25 | Stop reason: ALTCHOICE

## 2022-01-10 ASSESSMENT — ENCOUNTER SYMPTOMS
SHORTNESS OF BREATH: 0
BLOOD IN STOOL: 0
CHEST TIGHTNESS: 0
ABDOMINAL PAIN: 0

## 2022-01-10 NOTE — PROGRESS NOTES
Subjective:      Patient ID: Jamir Jordan is a 77 y.o. male. Visit Information    Have you changed or started any medications since your last visit including any over-the-counter medicines, vitamins, or herbal medicines? no   Have you stopped taking any of your medications? Is so, why? -  no  Are you having any side effects from any of your medications? - no    Have you seen any other physician or provider since your last visit?  no   Have you had any other diagnostic tests since your last visit?  no   Have you been seen in the emergency room and/or had an admission in a hospital since we last saw you?  no   Have you had your routine dental cleaning in the past 6 months?  no     Do you have an active MyChart account? If no, what is the barrier?   Yes    Patient Care Team:  Lita Guerra MD as PCP - General (Family Medicine)  Lita Guerra MD [facility] PCP - [facility]  Jeimy Richards MD as Orthopedic Surgeon (Orthopedic Surgery)  Christopher Treadwell MD (Neurology)  Nathan Pacheco MD as Consulting Physician (Gastroenterology)    Medical History Review  Past Medical, Family, and Social History reviewed and does contribute to the patient presenting condition    Health Maintenance   Topic Date Due    Hepatitis C screen  Never done    Shingles Vaccine (1 of 2) Never done    Low dose CT lung screening  Never done    Pneumococcal 65+ years Vaccine (1 of 1 - PPSV23) Never done   [employer] Visit (AWV)  Never done    COVID-19 Vaccine (3 - Booster for [employer] series) 10/29/2021    Depression Screen  04/16/2022    A1C test (Diabetic or Prediabetic)  10/16/2022    Lipid screen  10/16/2022    Potassium monitoring  10/16/2022    Creatinine monitoring  10/16/2022    Colon cancer screen colonoscopy  02/11/2023    DTaP/Tdap/Td vaccine (2 - Td or Tdap) 06/12/2025    Flu vaccine  Completed    AAA screen  Completed    Hepatitis A vaccine  Aged Out    Hepatitis B vaccine  Aged C/ [place] Hib vaccine  Aged Out    Meningococcal (ACWY) vaccine  Aged Out               HPI  Patient is a 79-year-old obese white male who presents with complaint of intermittent flank pain mainly on his right side for the past 3 weeks. He also states that for the past 6 years he has been having difficulty getting an erection. Patient has a history of sacroiliitis and chronic low back pain managed by his pain management specialist.  He denies any fever, chills, chest pain, shortness of breath, hematuria or dysuria. Review of Systems   Constitutional: Negative for chills and fever. Respiratory: Negative for chest tightness and shortness of breath. Cardiovascular: Negative for chest pain. Gastrointestinal: Negative for abdominal pain and blood in stool. Genitourinary: Positive for flank pain (  Intermittent). Negative for dysuria, frequency, hematuria, penile discharge and urgency. Skin: Negative for rash. Objective:   Physical Exam  Vitals and nursing note reviewed. Constitutional:       General: He is not in acute distress. Appearance: He is well-developed. HENT:      Head: Normocephalic and atraumatic. Right Ear: Tympanic membrane, ear canal and external ear normal.      Left Ear: Tympanic membrane, ear canal and external ear normal.      Nose: Nose normal.      Mouth/Throat:      Mouth: Mucous membranes are moist.      Pharynx: Oropharynx is clear. Eyes:      General: No scleral icterus. Right eye: No discharge. Left eye: No discharge. Conjunctiva/sclera: Conjunctivae normal.   Cardiovascular:      Rate and Rhythm: Normal rate and regular rhythm. Heart sounds: Normal heart sounds. Pulmonary:      Effort: Pulmonary effort is normal. No respiratory distress. Breath sounds: Normal breath sounds. No wheezing. Abdominal:      General: There is no distension. Palpations: Abdomen is soft. Tenderness: There is no abdominal tenderness.  There is no

## 2022-01-17 RX ORDER — AMLODIPINE BESYLATE 5 MG/1
TABLET ORAL
Qty: 90 TABLET | Refills: 1 | Status: SHIPPED | OUTPATIENT
Start: 2022-01-17 | End: 2022-06-30

## 2022-01-17 RX ORDER — ATORVASTATIN CALCIUM 20 MG/1
TABLET, FILM COATED ORAL
Qty: 90 TABLET | Refills: 0 | Status: SHIPPED | OUTPATIENT
Start: 2022-01-17 | End: 2022-01-17 | Stop reason: SDUPTHER

## 2022-01-17 RX ORDER — CARVEDILOL 6.25 MG/1
TABLET ORAL
Qty: 180 TABLET | Refills: 1 | Status: SHIPPED | OUTPATIENT
Start: 2022-01-17 | End: 2022-06-30

## 2022-01-17 RX ORDER — ATORVASTATIN CALCIUM 20 MG/1
TABLET, FILM COATED ORAL
Qty: 90 TABLET | Refills: 1 | Status: SHIPPED | OUTPATIENT
Start: 2022-01-17 | End: 2022-06-30

## 2022-01-17 RX ORDER — PANTOPRAZOLE SODIUM 40 MG/1
TABLET, DELAYED RELEASE ORAL
Qty: 90 TABLET | Refills: 1 | Status: SHIPPED | OUTPATIENT
Start: 2022-01-17 | End: 2022-01-17 | Stop reason: SDUPTHER

## 2022-01-17 RX ORDER — PANTOPRAZOLE SODIUM 40 MG/1
TABLET, DELAYED RELEASE ORAL
Qty: 90 TABLET | Refills: 1 | Status: SHIPPED | OUTPATIENT
Start: 2022-01-17 | End: 2022-09-23

## 2022-01-17 RX ORDER — LISINOPRIL 5 MG/1
TABLET ORAL
Qty: 90 TABLET | Refills: 1 | Status: SHIPPED | OUTPATIENT
Start: 2022-01-17 | End: 2022-01-20

## 2022-01-17 NOTE — TELEPHONE ENCOUNTER
Left a message for the patient to call the office to find out if the patient needs refills on Lipitor, Lisinopril, Coreg, and Norvasc sent to [facility] or [facility].

## 2022-01-17 NOTE — TELEPHONE ENCOUNTER
Per patient request, [facility] [place]

## 2022-01-20 RX ORDER — LISINOPRIL 5 MG/1
TABLET ORAL
Qty: 90 TABLET | Refills: 1 | Status: SHIPPED | OUTPATIENT
Start: 2022-01-20 | End: 2022-06-30

## 2022-01-24 ENCOUNTER — OFFICE VISIT (OUTPATIENT)
Dept: UROLOGY | Age: 67
End: 2022-01-24
Payer: MEDICARE

## 2022-01-24 VITALS — BODY MASS INDEX: 35.79 KG/M2 | WEIGHT: 228 LBS | HEIGHT: 67 IN | TEMPERATURE: 95.2 F

## 2022-01-24 DIAGNOSIS — R68.82 DECREASED LIBIDO: ICD-10-CM

## 2022-01-24 DIAGNOSIS — Z12.5 PROSTATE CANCER SCREENING: ICD-10-CM

## 2022-01-24 DIAGNOSIS — N52.01 ERECTILE DYSFUNCTION DUE TO ARTERIAL INSUFFICIENCY: Primary | ICD-10-CM

## 2022-01-24 PROCEDURE — G8484 FLU IMMUNIZE NO ADMIN: HCPCS | Performed by: UROLOGY

## 2022-01-24 PROCEDURE — 1123F ACP DISCUSS/DSCN MKR DOCD: CPT | Performed by: UROLOGY

## 2022-01-24 PROCEDURE — 1036F TOBACCO NON-USER: CPT | Performed by: UROLOGY

## 2022-01-24 PROCEDURE — 3017F COLORECTAL CA SCREEN DOC REV: CPT | Performed by: UROLOGY

## 2022-01-24 PROCEDURE — G8417 CALC BMI ABV UP PARAM F/U: HCPCS | Performed by: UROLOGY

## 2022-01-24 PROCEDURE — 99204 OFFICE O/P NEW MOD 45 MIN: CPT | Performed by: UROLOGY

## 2022-01-24 PROCEDURE — 4040F PNEUMOC VAC/ADMIN/RCVD: CPT | Performed by: UROLOGY

## 2022-01-24 PROCEDURE — G8427 DOCREV CUR MEDS BY ELIG CLIN: HCPCS | Performed by: UROLOGY

## 2022-01-24 RX ORDER — TADALAFIL 20 MG/1
20 TABLET ORAL DAILY PRN
Qty: 30 TABLET | Refills: 1 | Status: SHIPPED | OUTPATIENT
Start: 2022-01-24 | End: 2022-06-13 | Stop reason: SDUPTHER

## 2022-01-24 ASSESSMENT — ENCOUNTER SYMPTOMS
EYE REDNESS: 0
SHORTNESS OF BREATH: 0
DIARRHEA: 0
CONSTIPATION: 0
BACK PAIN: 0
RESPIRATORY NEGATIVE: 1
ABDOMINAL PAIN: 0
COUGH: 0
VOMITING: 0
EYES NEGATIVE: 1
WHEEZING: 0
EYE PAIN: 0
GASTROINTESTINAL NEGATIVE: 1
NAUSEA: 0

## 2022-01-24 NOTE — PROGRESS NOTES
[address] [address] [facility] [address]  Dept: [phone]  Dept Fax: [phone] [facility] Urology Office Note - New patient    Patient:  Paulina Casillas  YOB: 1955  Date: 1/24/2022    The patient is a 77 y.o. male who presents todayfor evaluation of the following problems:   Chief Complaint   Patient presents with    New Patient     ED    referred by Jasmine Desai MD.      HPI  This is a 80-year-old gentleman who is seeing us for erectile dysfunction. He has trouble achieving and maintaining erections. He has had this problem for over 6 years. He did try sildenafil 1 time and got a bad headache and never tried it again. He does have diminished libido. His last PSA was about 2 years ago. His PSA is always been normal.  He has no bothersome lower urinary tract symptoms. (Patient's old records have been requested, reviewed and summarized in today's note.)    Summary of old records: N/A    Additional History: N/A    Procedures Today: N/A    Last several PSA's:  Lab Results   Component Value Date    PSA 0.58 06/18/2020    PSA 0.55 11/06/2018    PSA 0.59 04/19/2016     Last total testosterone:  No results found for: TESTOSTERONE  Urinalysis today:  No results found for this visit on 01/24/22. AUA Symptom Score (1/24/2022):   INCOMPLETE EMPTYING: How often have you had the sensation of not emptying your bladder?: Not at all  FREQUENCY: How often do you have to urinate less than every two hours?: Not at all  INTERMITTENCY: How often have you found you stopped and started again several times when you urinated?: Not at all  URGENCY: How often have you found it difficult to postpone urination?: Not at all  WEAK STREAM: How often have you had a weak urinary stream?: Not at all  STRAINING: How often have you had to strain to start  urination?: Not at all  NOCTURIA: How many times did you typically get up at night to uriniate?: 1 Time  TOTAL I-PSS SCORE[de-identified] 1       Last BUN and creatinine:  Lab Results   Component Value Date    BUN 18 10/16/2021     Lab Results   Component Value Date    CREATININE 0.84 10/16/2021       Additional Lab/Culture results: none    Imaging Reviewed during this Office Visit: none  (results were independently reviewed by physician and radiology report verified)    PAST MEDICAL, FAMILY AND SOCIAL HISTORY:  Past Medical History:   Diagnosis Date    Acute cholecystitis     Essential hypertension 5/4/2017    Hyperlipidemia 9/4/2014    Hyperparathyroidism ([address]     Obesity (BMI 30-39. 9) 5/4/2017    Osteoporosis     Sciatica     Stroke ([address]     Vertebral fracture, osteoporotic ([address]      Past Surgical History:   Procedure Laterality Date    CHOLECYSTECTOMY N/A 2/19/2021    OPEN CHOLECYSTECTOMY performed by Roseanne Barnes MD at [address] COLONOSCOPY  3/19/2014    tubular adenoma x 2, inadequate prep, some polyps not removed, needs colonoscopy in 6-12 months    COLONOSCOPY N/A 2/11/2020    COLONOSCOPY POLYPECTOMY HOT BIOPSY performed by Roseanne Barnes MD at [address] ERCP N/A 2/19/2021    ERCP DIAGNOSTIC performed by Emilie Monson MD at [address] [facility] Right 2/12/2020    OPEN RIGHT COLECTOMY, PRIMARY ANASTOMOSIS performed by Roseanne Barnes MD at [facility]      rt. inguinal    IR BILIARY DRAIN EXTERNAL  2/1/2021    IR BILIARY DRAIN EXTERNAL 2/1/2021 [facility] SPECIAL PROCEDURES    OMENTUM RESECTION  2/12/2020    OMENTECTOMY -  PARTIAL GREATER OMENECTOMY performed by Roseanne Barnes MD at [address] [facility]       Family History   Problem Relation Age of Onset    Heart Disease Father     No Known Problems Mother      Outpatient Medications Marked as Taking for the 1/24/22 encounter (Office Visit) with Kirstin Clements MD   Medication Sig Dispense Refill    tadalafil (CIALIS) 20 MG tablet Take 1 tablet by mouth daily as needed for Erectile Dysfunction 30 tablet 1    lisinopril (PRINIVIL;ZESTRIL) 5 MG tablet take 1 tablet by mouth once daily 90 tablet 1    atorvastatin (LIPITOR) 20 MG tablet take 1 tablet by mouth at bedtime 90 tablet 1    carvedilol (COREG) 6.25 MG tablet take 1 tablet by mouth twice a day with meals 180 tablet 1    amLODIPine (NORVASC) 5 MG tablet take 1 tablet by mouth once daily 90 tablet 1    pantoprazole (PROTONIX) 40 MG tablet take 1 tablet by mouth once daily 90 tablet 1    tiZANidine (ZANAFLEX) 4 MG tablet Take 1 tablet by mouth 3 times daily as needed (For muscle spasm) 30 tablet 0    vitamin D (ERGOCALCIFEROL) 1.25 MG (62014 UT) CAPS capsule take 1 capsule by mouth every week 12 capsule 1    fluticasone (FLONASE) 50 MCG/ACT nasal spray 2 sprays into each nostril daily 16 g 3    loratadine (CLARITIN) 10 MG tablet Take 1 tablet by mouth daily 30 tablet 3    guaiFENesin (MUCINEX) 600 MG extended release tablet Take 1 tablet by mouth 2 times daily 20 tablet 0    aspirin 81 MG chewable tablet Take 1 tablet by mouth daily 30 tablet 3        Patient has no known allergies. Social History     Tobacco Use   Smoking Status Former Smoker    Packs/day: 1.00    Years: 40.00    Pack years: 40.00    Quit date: 2020    Years since quittin.9   Smokeless Tobacco Never Used      (If patient a smoker, smoking cessation counseling offered)   Social History     Substance and Sexual Activity   Alcohol Use Yes    Comment: rarely       REVIEW OF SYSTEMS:  Review of Systems    Physical Exam:    This a 77 y.o. male   Vitals:    22 0952   Temp: 95.2 °F (35.1 °C)     Body mass index is 35.71 kg/m². Physical Exam  Constitutional: Patient in no acute distress. Neuro: Alert and oriented to person, place and time.   Psych: Mood normal, affect normal  Skin: No rash noted  HEENT: Head: Normocephalic and atraumatic  Conjunctivae and EOM are normal. Pupils are equal, round  Nose: Normal  Right External Ear: Normal; Left External Ear: Normal  Mouth: Mucosa Moist  Neck: Supple  Lungs:Respiratory effort is normal  Cardiovascular: Warm & Pink  Abdomen: Soft, non-tender, non-distendedwith no CVA,  No flank tenderness,  Orhepatosplenomegaly   Lymphatics: No palpable lymphadenopathy. Bladder non-tender and not distended. Musculoskeletal: Normal gait and station  Penis normal and circumcised  Urethral meatus normal  Scrotal exam normal      Assessment and Plan      1. Erectile dysfunction due to arterial insufficiency    2. Decreased libido    3. Prostate cancer screening           Plan:  Testosterone  psa  Tadalafil 20mg prn         Prescriptions Ordered:  Orders Placed This Encounter   Medications    tadalafil (CIALIS) 20 MG tablet     Sig: Take 1 tablet by mouth daily as needed for Erectile Dysfunction     Dispense:  30 tablet     Refill:  1      Orders Placed:  Orders Placed This Encounter   Procedures    Testosterone     Standing Status:   Future     Standing Expiration Date:   1/24/2023    PSA, Diagnostic     Standing Status:   Future     Standing Expiration Date:   1/24/2023             Roney Peng MD    Agree with the ROS entered by the MA.

## 2022-01-26 ENCOUNTER — HOSPITAL ENCOUNTER (OUTPATIENT)
Age: 67
Discharge: HOME | End: 2022-01-26
Payer: MEDICARE

## 2022-01-26 DIAGNOSIS — R68.82 DECREASED LIBIDO: ICD-10-CM

## 2022-01-26 DIAGNOSIS — N52.01 ERECTILE DYSFUNCTION DUE TO ARTERIAL INSUFFICIENCY: ICD-10-CM

## 2022-01-26 DIAGNOSIS — Z12.5 PROSTATE CANCER SCREENING: ICD-10-CM

## 2022-01-26 LAB
PROSTATE SPECIFIC ANTIGEN: 0.65 UG/L
TESTOSTERONE TOTAL: 156 NG/DL (ref 220–1000)

## 2022-01-26 PROCEDURE — 84153 ASSAY OF PSA TOTAL: CPT

## 2022-01-26 PROCEDURE — 36415 COLL VENOUS BLD VENIPUNCTURE: CPT

## 2022-01-26 PROCEDURE — 84403 ASSAY OF TOTAL TESTOSTERONE: CPT

## 2022-02-14 DIAGNOSIS — E55.9 VITAMIN D DEFICIENCY: ICD-10-CM

## 2022-02-14 RX ORDER — ERGOCALCIFEROL 1.25 MG/1
CAPSULE ORAL
Qty: 12 CAPSULE | Refills: 1 | Status: SHIPPED | OUTPATIENT
Start: 2022-02-14 | End: 2022-05-12

## 2022-05-12 DIAGNOSIS — E55.9 VITAMIN D DEFICIENCY: ICD-10-CM

## 2022-05-12 RX ORDER — ERGOCALCIFEROL 1.25 MG/1
CAPSULE ORAL
Qty: 13 CAPSULE | Refills: 1 | Status: SHIPPED | OUTPATIENT
Start: 2022-05-12 | End: 2022-10-31

## 2022-06-07 ENCOUNTER — OFFICE VISIT (OUTPATIENT)
Dept: FAMILY MEDICINE CLINIC | Age: 67
End: 2022-06-07
Payer: MEDICARE

## 2022-06-07 VITALS
TEMPERATURE: 97.3 F | HEART RATE: 80 BPM | BODY MASS INDEX: 35.4 KG/M2 | WEIGHT: 226 LBS | DIASTOLIC BLOOD PRESSURE: 72 MMHG | RESPIRATION RATE: 16 BRPM | SYSTOLIC BLOOD PRESSURE: 106 MMHG

## 2022-06-07 DIAGNOSIS — Z87.891 PERSONAL HISTORY OF TOBACCO USE: ICD-10-CM

## 2022-06-07 DIAGNOSIS — E78.2 MIXED HYPERLIPIDEMIA: Chronic | ICD-10-CM

## 2022-06-07 DIAGNOSIS — E55.9 VITAMIN D DEFICIENCY: ICD-10-CM

## 2022-06-07 DIAGNOSIS — R73.01 IMPAIRED FASTING GLUCOSE: ICD-10-CM

## 2022-06-07 DIAGNOSIS — I10 ESSENTIAL HYPERTENSION: Primary | ICD-10-CM

## 2022-06-07 DIAGNOSIS — E66.9 OBESITY (BMI 30-39.9): ICD-10-CM

## 2022-06-07 PROCEDURE — G8427 DOCREV CUR MEDS BY ELIG CLIN: HCPCS | Performed by: FAMILY MEDICINE

## 2022-06-07 PROCEDURE — 99214 OFFICE O/P EST MOD 30 MIN: CPT | Performed by: FAMILY MEDICINE

## 2022-06-07 PROCEDURE — 1123F ACP DISCUSS/DSCN MKR DOCD: CPT | Performed by: FAMILY MEDICINE

## 2022-06-07 PROCEDURE — 1036F TOBACCO NON-USER: CPT | Performed by: FAMILY MEDICINE

## 2022-06-07 PROCEDURE — G8417 CALC BMI ABV UP PARAM F/U: HCPCS | Performed by: FAMILY MEDICINE

## 2022-06-07 PROCEDURE — G0296 VISIT TO DETERM LDCT ELIG: HCPCS | Performed by: FAMILY MEDICINE

## 2022-06-07 PROCEDURE — 3017F COLORECTAL CA SCREEN DOC REV: CPT | Performed by: FAMILY MEDICINE

## 2022-06-07 ASSESSMENT — ENCOUNTER SYMPTOMS
SHORTNESS OF BREATH: 0
ABDOMINAL PAIN: 0
CHEST TIGHTNESS: 0
BLOOD IN STOOL: 0

## 2022-06-07 ASSESSMENT — PATIENT HEALTH QUESTIONNAIRE - PHQ9
SUM OF ALL RESPONSES TO PHQ QUESTIONS 1-9: 0
2. FEELING DOWN, DEPRESSED OR HOPELESS: 0
SUM OF ALL RESPONSES TO PHQ QUESTIONS 1-9: 0
SUM OF ALL RESPONSES TO PHQ QUESTIONS 1-9: 0
SUM OF ALL RESPONSES TO PHQ9 QUESTIONS 1 & 2: 0
SUM OF ALL RESPONSES TO PHQ QUESTIONS 1-9: 0
1. LITTLE INTEREST OR PLEASURE IN DOING THINGS: 0

## 2022-06-07 ASSESSMENT — LIFESTYLE VARIABLES
HOW MANY STANDARD DRINKS CONTAINING ALCOHOL DO YOU HAVE ON A TYPICAL DAY: 1 OR 2
HOW OFTEN DO YOU HAVE A DRINK CONTAINING ALCOHOL: MONTHLY OR LESS

## 2022-06-07 NOTE — PROGRESS NOTES
Subjective:      Patient ID: Lorinda Kanner is a 77 y.o. male. Visit Information    Have you changed or started any medications since your last visit including any over-the-counter medicines, vitamins, or herbal medicines? no   Are you having any side effects from any of your medications? -  no  Have you stopped taking any of your medications? Is so, why? -  yes -ATORVASTATIN    Have you seen any other physician or provider since your last visit? No  Have you had any other diagnostic tests since your last visit? No  Have you been seen in the emergency room and/or had an admission to a hospital since we last saw you? No  Have you had your routine dental cleaning in the past 6 months? no    Have you activated your [employer] account? If not, what are your barriers?  Yes     Patient Care Team:  Asuncion Mathis MD as PCP - General (Family Medicine)  Asuncion Mathis MD as PCP - [facility] Provider  Arlette Burkitt, MD as Orthopedic Surgeon (Orthopedic Surgery)  Carlitos Price MD (Neurology)  Beulah Baker MD as Consulting Physician (Gastroenterology)    Medical History Review  Past Medical, Family, and Social History reviewed and does contribute to the patient presenting condition    Health Maintenance   Topic Date Due    Annual Wellness Visit (AWV)  Never done    Hepatitis C screen  Never done    Shingles vaccine (1 of 2) Never done    Low dose CT lung screening  Never done    Pneumococcal 65+ years Vaccine (1 - PCV) Never done    Depression Screen  04/16/2022    A1C test (Diabetic or Prediabetic)  10/16/2022    Lipids  10/16/2022    Prostate Specific Antigen (PSA) Screening or Monitoring  01/26/2023    Colorectal Cancer Screen  02/11/2023    DTaP/Tdap/Td vaccine (2 - Td or Tdap) 06/12/2025    Flu vaccine  Completed    COVID-19 Vaccine  Completed    AAA screen  Completed    Hepatitis A vaccine  Aged Out    Hepatitis B vaccine  Aged Out    Hib vaccine  Aged Out    Meningococcal (ACWY) vaccine  Aged Out     HPI  Patient is a 79-year-old obese white male who presents for hypertension, hyperlipidemia, impaired fasting glucose, vitamin D deficiency. Patient is also an ex cigarette smoker and is due for low-dose CT of chest.  He states he is taking and tolerating his routine medications except he sometimes forgets to take his atorvastatin. He denies any chest pain, abdominal pain, shortness of breath, fever or chills. He has a good appetite and remains active and is currently employed as an . Review of Systems   Constitutional: Negative for chills and fever. HENT: Negative for congestion. Respiratory: Negative for chest tightness and shortness of breath. Cardiovascular: Negative for chest pain. Gastrointestinal: Negative for abdominal pain and blood in stool. Genitourinary: Negative for dysuria and hematuria. Skin: Negative for rash. Neurological: Negative for dizziness. Psychiatric/Behavioral: Negative for dysphoric mood. Objective:   Physical Exam  Vitals and nursing note reviewed. Constitutional:       General: He is not in acute distress. Appearance: He is well-developed. HENT:      Head: Normocephalic and atraumatic. Right Ear: Tympanic membrane, ear canal and external ear normal.      Left Ear: Tympanic membrane, ear canal and external ear normal.      Nose: Nose normal.      Mouth/Throat:      Mouth: Mucous membranes are moist.      Pharynx: Oropharynx is clear. Eyes:      General: No scleral icterus. Right eye: No discharge. Left eye: No discharge. Conjunctiva/sclera: Conjunctivae normal.   Cardiovascular:      Rate and Rhythm: Normal rate and regular rhythm. Heart sounds: Normal heart sounds. Pulmonary:      Effort: Pulmonary effort is normal. No respiratory distress. Breath sounds: Normal breath sounds. No wheezing. Abdominal:      General: There is no distension.       Palpations: Abdomen is soft. Tenderness: There is no abdominal tenderness. Musculoskeletal:      Cervical back: Neck supple. Skin:     General: Skin is warm and dry. Findings: No rash. Neurological:      Mental Status: He is alert and oriented to person, place, and time. Psychiatric:         Mood and Affect: Mood normal.         Behavior: Behavior normal.         Assessment:       Diagnosis Orders   1. Essential hypertension  Comprehensive Metabolic Panel    Lipid Panel    Magnesium   2. Mixed hyperlipidemia  Comprehensive Metabolic Panel    Lipid Panel   3. Impaired fasting glucose  Comprehensive Metabolic Panel    Hemoglobin A1C   4. Vitamin D deficiency  Vitamin D 25 Hydroxy   5. Personal history of tobacco use  [place] VISIT TO DISCUSS LUNG CA SCREEN W LDCT    CT Lung Screen (Annual)   6. Obesity (BMI 30-39. 9)             Plan:      Orders Placed This Encounter   Procedures    CT Lung Screen (Annual)    Comprehensive Metabolic Panel    Lipid Panel    Magnesium    Hemoglobin A1C    Vitamin D 25 Hydroxy    [place] VISIT TO DISCUSS LUNG CA SCREEN W LDCT         Continue routine medications and try to take his atorvastatin daily as prescribed  Follow-up in 5 to 6 months

## 2022-06-07 NOTE — PROGRESS NOTES
Low Dose CT (LDCT) Lung Screening criteria met:     Age 50-77(Medicare) or 50-80 ([facility])   Pack year smoking >20   Still smoking or less than 15 year since quit   No sign or symptoms of lung cancer   > 11 months since last LDCT     Risks and benefits of lung cancer screening with LDCT scans discussed:    Significance of positive screen - False-positive LDCT results often occur. 95% of all positive results do not lead to a diagnosis of cancer. Usually further imaging can resolve most false-positive results; however, some patients may require invasive procedures. Over diagnosis risk - 10% to 12% of screen-detected lung cancer cases are over diagnosed--that is, the cancer would not have been detected in the patient's lifetime without the screening. Need for follow up screens annually to continue lung cancer screening effectiveness     Risks associated with radiation from annual LDCT- Radiation exposure is about the same as for a mammogram, which is about 1/3 of the annual background radiation exposure from everyday life. Starting screening at age 54 is not likely to increase cancer risk from radiation exposure. Patients with comorbidities resulting in life expectancy of < 10 years, or that would preclude treatment of an abnormality identified on CT, should not be screened due to lack of benefit.     To obtain maximal benefit from this screening, smoking cessation and long-term abstinence from smoking is critical

## 2022-06-08 ENCOUNTER — HOSPITAL ENCOUNTER (OUTPATIENT)
Age: 67
Discharge: HOME | End: 2022-06-08
Payer: MEDICARE

## 2022-06-08 DIAGNOSIS — E55.9 VITAMIN D DEFICIENCY: ICD-10-CM

## 2022-06-08 DIAGNOSIS — I10 ESSENTIAL HYPERTENSION: ICD-10-CM

## 2022-06-08 DIAGNOSIS — R73.01 IMPAIRED FASTING GLUCOSE: ICD-10-CM

## 2022-06-08 DIAGNOSIS — E78.2 MIXED HYPERLIPIDEMIA: Chronic | ICD-10-CM

## 2022-06-08 LAB
ALBUMIN SERPL-MCNC: 4.2 G/DL (ref 3.5–5.2)
ALP BLD-CCNC: 68 U/L (ref 40–129)
ALT SERPL-CCNC: 29 U/L (ref 5–41)
ANION GAP SERPL CALCULATED.3IONS-SCNC: 12 MMOL/L (ref 9–17)
AST SERPL-CCNC: 17 U/L
BILIRUB SERPL-MCNC: 0.22 MG/DL (ref 0.3–1.2)
BUN BLDV-MCNC: 20 MG/DL (ref 8–23)
CALCIUM SERPL-MCNC: 9.4 MG/DL (ref 8.6–10.4)
CHLORIDE BLD-SCNC: 106 MMOL/L (ref 98–107)
CHOLESTEROL/HDL RATIO: 4.7
CHOLESTEROL: 192 MG/DL
CO2: 23 MMOL/L (ref 20–31)
CREAT SERPL-MCNC: 1.08 MG/DL (ref 0.7–1.2)
ESTIMATED AVERAGE GLUCOSE: 143 MG/DL
GFR AFRICAN AMERICAN: >60 ML/MIN
GFR NON-AFRICAN AMERICAN: >60 ML/MIN
GFR SERPL CREATININE-BSD FRML MDRD: ABNORMAL ML/MIN/{1.73_M2}
GLUCOSE BLD-MCNC: 108 MG/DL (ref 70–99)
HBA1C MFR BLD: 6.6 % (ref 4–6)
HDLC SERPL-MCNC: 41 MG/DL
LDL CHOLESTEROL: 112 MG/DL (ref 0–130)
MAGNESIUM: 2 MG/DL (ref 1.6–2.6)
POTASSIUM SERPL-SCNC: 4.1 MMOL/L (ref 3.7–5.3)
SODIUM BLD-SCNC: 141 MMOL/L (ref 135–144)
TOTAL PROTEIN: 7 G/DL (ref 6.4–8.3)
TRIGL SERPL-MCNC: 197 MG/DL
VITAMIN D 25-HYDROXY: 40.9 NG/ML

## 2022-06-08 PROCEDURE — 80061 LIPID PANEL: CPT

## 2022-06-08 PROCEDURE — 80053 COMPREHEN METABOLIC PANEL: CPT

## 2022-06-08 PROCEDURE — 83735 ASSAY OF MAGNESIUM: CPT

## 2022-06-08 PROCEDURE — 83036 HEMOGLOBIN GLYCOSYLATED A1C: CPT

## 2022-06-08 PROCEDURE — 82306 VITAMIN D 25 HYDROXY: CPT

## 2022-06-08 PROCEDURE — 36415 COLL VENOUS BLD VENIPUNCTURE: CPT

## 2022-06-09 DIAGNOSIS — R73.9 HYPERGLYCEMIA: Primary | ICD-10-CM

## 2022-06-10 ENCOUNTER — TELEPHONE (OUTPATIENT)
Dept: ONCOLOGY | Age: 67
End: 2022-06-10

## 2022-06-10 NOTE — LETTER
6/10/2022        [address] [facility] [address]  [address]    Dear Maria A Tamayo:    Your healthcare provider has ordered a low dose CT scan of the chest for lung cancer screening. You will find enclosed, information about CT lung screening. Please review the statement of understanding, you will be asked to sign a copy of this at the time of your CT scan    If you have not already been contacted to make the appointment for your scan, please call our scheduling department at [phone]    Keep in mind that CT lung screening does not take the place of smoking cessation. If you are a current smoker, you will find enclosed smoking cessation resources. Please do not hesitate to contact me if you have any questions or concerns.     [address] [facility],      [facility] Lung Screening Program  [phone]

## 2022-06-13 ENCOUNTER — OFFICE VISIT (OUTPATIENT)
Dept: UROLOGY | Age: 67
End: 2022-06-13
Payer: MEDICARE

## 2022-06-13 VITALS — TEMPERATURE: 96.7 F | HEIGHT: 67 IN | BODY MASS INDEX: 35.47 KG/M2 | WEIGHT: 226 LBS

## 2022-06-13 DIAGNOSIS — N52.01 ERECTILE DYSFUNCTION DUE TO ARTERIAL INSUFFICIENCY: Primary | ICD-10-CM

## 2022-06-13 DIAGNOSIS — R79.89 LOW TESTOSTERONE IN MALE: ICD-10-CM

## 2022-06-13 DIAGNOSIS — R68.82 DECREASED LIBIDO: ICD-10-CM

## 2022-06-13 DIAGNOSIS — Z12.5 PROSTATE CANCER SCREENING: ICD-10-CM

## 2022-06-13 PROCEDURE — 3017F COLORECTAL CA SCREEN DOC REV: CPT | Performed by: UROLOGY

## 2022-06-13 PROCEDURE — G8417 CALC BMI ABV UP PARAM F/U: HCPCS | Performed by: UROLOGY

## 2022-06-13 PROCEDURE — 1123F ACP DISCUSS/DSCN MKR DOCD: CPT | Performed by: UROLOGY

## 2022-06-13 PROCEDURE — G8427 DOCREV CUR MEDS BY ELIG CLIN: HCPCS | Performed by: UROLOGY

## 2022-06-13 PROCEDURE — 99214 OFFICE O/P EST MOD 30 MIN: CPT | Performed by: UROLOGY

## 2022-06-13 PROCEDURE — 1036F TOBACCO NON-USER: CPT | Performed by: UROLOGY

## 2022-06-13 RX ORDER — TADALAFIL 20 MG/1
20 TABLET ORAL DAILY PRN
Qty: 30 TABLET | Refills: 1 | Status: SHIPPED | OUTPATIENT
Start: 2022-06-13

## 2022-06-13 ASSESSMENT — ENCOUNTER SYMPTOMS
VOMITING: 0
WHEEZING: 0
EYE REDNESS: 0
DIARRHEA: 0
CONSTIPATION: 0
COUGH: 0
EYES NEGATIVE: 1
GASTROINTESTINAL NEGATIVE: 1
EYE PAIN: 0
NAUSEA: 0
SHORTNESS OF BREATH: 0
BACK PAIN: 0
RESPIRATORY NEGATIVE: 1
ABDOMINAL PAIN: 0

## 2022-06-13 NOTE — PROGRESS NOTES
[address] [address]  Dept: [address] [facility] Urology Office Note - Established    Patient:  Dena Hunter  YOB: 1955  Date: 6/13/2022    The patient is a 77 y.o. male who presents todayfor evaluation of the following problems:   Chief Complaint   Patient presents with    Erectile Dysfunction     overdue 4 week f/u with labs       HPI  This is a very pleasant 14-year-old gentleman who we saw about 5 months ago with sexual dysfunction. He was complaining of decreased libido and difficulty with achieving and maintaining erections. I had given him a prescription for tadalafil but he states that he never got it. He had a recent testosterone level checked and it was 156. He is in a relationship presently and would like to be sexually active. Summary of old records: N/A    Additional History: N/A    Procedures Today: N/A    Urinalysis today:  No results found for this visit on 06/13/22. Last several PSA's:  Lab Results   Component Value Date    PSA 0.65 01/26/2022    PSA 0.58 06/18/2020    PSA 0.55 11/06/2018     Last total testosterone:  Lab Results   Component Value Date    TESTOSTERONE 156 (L) 01/26/2022       AUA Symptom Score (6/13/2022): Last BUN and creatinine:  Lab Results   Component Value Date    BUN 20 06/08/2022     Lab Results   Component Value Date    CREATININE 1.08 06/08/2022       Additional Lab/Culture results: none    Imaging Reviewed during this Office Visit: none  (results were independently reviewed by physician and radiology report verified)    PAST MEDICAL, FAMILY AND SOCIAL HISTORY UPDATE:  Past Medical History:   Diagnosis Date    Acute cholecystitis     Essential hypertension 5/4/2017    Hyperlipidemia 9/4/2014    [address]par[address] [address]     Obesity (BMI 30-39. 9) 5/4/2017    Osteoporosis     Sciatica     Stroke 30 tablet 0    fluticasone (FLONASE) 50 MCG/ACT nasal spray 2 sprays into each nostril daily 16 g 3    loratadine (CLARITIN) 10 MG tablet Take 1 tablet by mouth daily 30 tablet 3    guaiFENesin (MUCINEX) 600 MG extended release tablet Take 1 tablet by mouth 2 times daily 20 tablet 0    aspirin 81 MG chewable tablet Take 1 tablet by mouth daily 30 tablet 3       Patient has no known allergies. Social History     Tobacco Use   Smoking Status Former Smoker    Packs/day: 1.00    Years: 40.00    Pack years: 40.00    Quit date: 2020    Years since quittin.2   Smokeless Tobacco Never Used     (Ifpatient a smoker, smoking cessation counseling offered)    Social History     Substance and Sexual Activity   Alcohol Use Yes    Comment: rarely       REVIEW OF SYSTEMS:  Review of Systems    Physical Exam:      Vitals:    22 0753   Temp: (!) 96.7 °F (35.9 °C)     Body mass index is 35.4 kg/m². Patient is a 77 y.o. male in no acute distress and alert and oriented to person, place and time. Physical Exam  Constitutional: Patient in no acute distress. Neuro: Alert and oriented to person, place and time. Psych: Mood normal, affect normal  Skin: No rash noted  HEENT: Head: Normocephalic andatraumatic      Assessment and Plan      1. Erectile dysfunction due to arterial insufficiency    2. Decreased libido    3. Prostate cancer screening    4. Low testosterone in male           Plan:   Pt to try tadalafil  Extended low T labs  F/u one month to consider TRT      Return in about 4 weeks (around 2022) for labs.     Prescriptions Ordered:  Orders Placed This Encounter   Medications    tadalafil (CIALIS) 20 MG tablet     Sig: Take 1 tablet by mouth daily as needed for Erectile Dysfunction     Dispense:  30 tablet     Refill:  1     Orders Placed:  Orders Placed This Encounter   Procedures    CBC     Standing Status:   Future     Standing Expiration Date:   2023    Testosterone     Standing Status: Future     Standing Expiration Date:   8/4/6392   [facility] Follicle Stimulating Hormone     Standing Status:   Future     Standing Expiration Date:   6/8/2023    Luteinizing Hormone     Standing Status:   Future     Standing Expiration Date:   6/8/2023    Prolactin     Standing Status:   Future     Standing Expiration Date:   6/8/2023    Hepatic Function Panel     Standing Status:   Future     Standing Expiration Date:   6/8/2023           Fazal Lemons MD    Agree with the ROS entered by the MA.

## 2022-06-17 ENCOUNTER — HOSPITAL ENCOUNTER (OUTPATIENT)
Dept: CT IMAGING | Age: 67
Discharge: HOME | End: 2022-06-19
Payer: MEDICARE

## 2022-06-17 DIAGNOSIS — Z87.891 PERSONAL HISTORY OF TOBACCO USE: ICD-10-CM

## 2022-06-17 PROCEDURE — 71271 CT THORAX LUNG CANCER SCR C-: CPT

## 2022-06-20 PROBLEM — K76.0 HEPATIC STEATOSIS: Status: ACTIVE | Noted: 2022-06-20

## 2022-06-30 RX ORDER — ATORVASTATIN CALCIUM 20 MG/1
TABLET, FILM COATED ORAL
Qty: 90 TABLET | Refills: 1 | Status: SHIPPED | OUTPATIENT
Start: 2022-06-30 | End: 2022-10-03 | Stop reason: ALTCHOICE

## 2022-06-30 RX ORDER — CARVEDILOL 6.25 MG/1
TABLET ORAL
Qty: 180 TABLET | Refills: 1 | Status: SHIPPED | OUTPATIENT
Start: 2022-06-30

## 2022-06-30 RX ORDER — AMLODIPINE BESYLATE 5 MG/1
TABLET ORAL
Qty: 90 TABLET | Refills: 1 | Status: SHIPPED | OUTPATIENT
Start: 2022-06-30

## 2022-06-30 RX ORDER — LISINOPRIL 5 MG/1
TABLET ORAL
Qty: 90 TABLET | Refills: 1 | Status: SHIPPED | OUTPATIENT
Start: 2022-06-30

## 2022-07-12 ENCOUNTER — TELEPHONE (OUTPATIENT)
Dept: UROLOGY | Age: 67
End: 2022-07-12

## 2022-07-14 ENCOUNTER — HOSPITAL ENCOUNTER (OUTPATIENT)
Age: 67
Discharge: HOME | End: 2022-07-14
Payer: MEDICARE

## 2022-07-14 DIAGNOSIS — R79.89 LOW TESTOSTERONE IN MALE: ICD-10-CM

## 2022-07-14 LAB
ALBUMIN SERPL-MCNC: 3.7 G/DL (ref 3.5–5.2)
ALP BLD-CCNC: 67 U/L (ref 40–129)
ALT SERPL-CCNC: 28 U/L (ref 5–41)
AST SERPL-CCNC: 18 U/L
BILIRUB SERPL-MCNC: 0.2 MG/DL (ref 0.3–1.2)
BILIRUBIN DIRECT: <0.08 MG/DL
BILIRUBIN, INDIRECT: ABNORMAL MG/DL (ref 0–1)
FOLLICLE STIMULATING HORMONE: 8.6 MIU/ML (ref 1.5–12.4)
HCT VFR BLD CALC: 41.7 % (ref 41–53)
HEMOGLOBIN: 14.3 G/DL (ref 13.5–17.5)
LH: 5.9 MIU/ML (ref 1.7–8.6)
MCH RBC QN AUTO: 32.3 PG (ref 26–34)
MCHC RBC AUTO-ENTMCNC: 34.3 G/DL (ref 31–37)
MCV RBC AUTO: 94.1 FL (ref 80–100)
PDW BLD-RTO: 13.6 % (ref 11.5–14.9)
PLATELET # BLD: 165 K/UL (ref 150–450)
PMV BLD AUTO: 10.3 FL (ref 6–12)
PROLACTIN: 10.73 NG/ML (ref 4.04–15.2)
RBC # BLD: 4.44 M/UL (ref 4.5–5.9)
TESTOSTERONE TOTAL: 126 NG/DL (ref 220–1000)
TOTAL PROTEIN: 6.8 G/DL (ref 6.4–8.3)
WBC # BLD: 6.2 K/UL (ref 3.5–11)

## 2022-07-14 PROCEDURE — 80076 HEPATIC FUNCTION PANEL: CPT

## 2022-07-14 PROCEDURE — 83001 ASSAY OF GONADOTROPIN (FSH): CPT

## 2022-07-14 PROCEDURE — 36415 COLL VENOUS BLD VENIPUNCTURE: CPT

## 2022-07-14 PROCEDURE — 84146 ASSAY OF PROLACTIN: CPT

## 2022-07-14 PROCEDURE — 84403 ASSAY OF TOTAL TESTOSTERONE: CPT

## 2022-07-14 PROCEDURE — 83002 ASSAY OF GONADOTROPIN (LH): CPT

## 2022-07-14 PROCEDURE — 85027 COMPLETE CBC AUTOMATED: CPT

## 2022-07-18 ENCOUNTER — OFFICE VISIT (OUTPATIENT)
Dept: UROLOGY | Age: 67
End: 2022-07-18
Payer: COMMERCIAL

## 2022-07-18 VITALS
WEIGHT: 226 LBS | DIASTOLIC BLOOD PRESSURE: 74 MMHG | BODY MASS INDEX: 35.47 KG/M2 | HEIGHT: 67 IN | OXYGEN SATURATION: 91 % | TEMPERATURE: 98.6 F | SYSTOLIC BLOOD PRESSURE: 124 MMHG | HEART RATE: 73 BPM

## 2022-07-18 DIAGNOSIS — R68.82 DECREASED LIBIDO: ICD-10-CM

## 2022-07-18 DIAGNOSIS — N52.01 ERECTILE DYSFUNCTION DUE TO ARTERIAL INSUFFICIENCY: Primary | ICD-10-CM

## 2022-07-18 DIAGNOSIS — E66.01 SEVERE OBESITY (BMI 35.0-39.9) WITH COMORBIDITY (HCC): ICD-10-CM

## 2022-07-18 DIAGNOSIS — R79.89 LOW TESTOSTERONE IN MALE: ICD-10-CM

## 2022-07-18 PROCEDURE — 1123F ACP DISCUSS/DSCN MKR DOCD: CPT | Performed by: UROLOGY

## 2022-07-18 PROCEDURE — 99214 OFFICE O/P EST MOD 30 MIN: CPT | Performed by: UROLOGY

## 2022-07-18 RX ORDER — TESTOSTERONE ENANTHATE 75 MG/.5ML
1 INJECTION SUBCUTANEOUS WEEKLY
Qty: 4 PEN | Refills: 5 | Status: SHIPPED | OUTPATIENT
Start: 2022-07-18

## 2022-07-18 ASSESSMENT — ENCOUNTER SYMPTOMS
DIARRHEA: 0
VOMITING: 0
SORE THROAT: 0
SHORTNESS OF BREATH: 0
WHEEZING: 0
NAUSEA: 0
COUGH: 0

## 2022-07-18 NOTE — PROGRESS NOTES
Review of Systems   Constitutional:  Negative for chills, fatigue and fever. HENT:  Negative for congestion and sore throat. Respiratory:  Negative for cough, shortness of breath and wheezing. Cardiovascular:  Negative for chest pain and palpitations. Gastrointestinal:  Negative for diarrhea, nausea and vomiting. Genitourinary:  Negative for difficulty urinating, dysuria, frequency, hematuria and urgency.

## 2022-07-18 NOTE — PROGRESS NOTES
Date    CHOLECYSTECTOMY N/A 2/19/2021    OPEN CHOLECYSTECTOMY performed by Radha Jay MD at [facility]  3/19/2014    tubular adenoma x 2, inadequate prep, some polyps not removed, needs colonoscopy in 6-12 months    COLONOSCOPY N/A 2/11/2020    COLONOSCOPY POLYPECTOMY HOT BIOPSY performed by Radha Jay MD at [facility] ENDO    ERCP N/A 2/19/2021    ERCP DIAGNOSTIC performed by Heri Holguin MD at [address] Right 2/12/2020    OPEN RIGHT COLECTOMY, PRIMARY ANASTOMOSIS performed by Radha Jay MD at [place] [address]      rt. inguinal    IR BILIARY DRAIN EXTERNAL  2/1/2021    IR BILIARY DRAIN EXTERNAL 2/1/2021 [facility] SPECIAL PROCEDURES    OMENTUM RESECTION  2/12/2020    OMENTECTOMY -  PARTIAL GREATER OMENECTOMY performed by Radha Jay MD at [address] [place] [address]       Family History   Problem Relation Age of Onset    Heart Disease Father     No Known Problems Mother      Outpatient Medications Marked as Taking for the 7/18/22 encounter (Office Visit) with Thomas Angelo MD   Medication Sig Dispense Refill    Testosterone Enanthate (XYOSTED) 75 MG/0.5ML SOAJ Inject 1 pen into the skin once a week 4 pen 5    carvedilol (COREG) 6.25 MG tablet TAKE 1 TABLET BY MOUTH  TWICE DAILY WITH MEALS 180 tablet 1    amLODIPine (NORVASC) 5 MG tablet TAKE 1 TABLET BY MOUTH ONCE DAILY 90 tablet 1    atorvastatin (LIPITOR) 20 MG tablet TAKE 1 TABLET BY MOUTH AT  BEDTIME 90 tablet 1    lisinopril (PRINIVIL;ZESTRIL) 5 MG tablet TAKE 1 TABLET BY MOUTH ONCE DAILY 90 tablet 1    tadalafil (CIALIS) 20 MG tablet Take 1 tablet by mouth daily as needed for Erectile Dysfunction 30 tablet 1    vitamin D (ERGOCALCIFEROL) 1.25 MG (78916 UT) CAPS capsule TAKE 1 CAPSULE BY MOUTH  WEEKLY 13 capsule 1    pantoprazole (PROTONIX) 40 MG tablet take 1 tablet by mouth once daily 90 tablet 1    tiZANidine (ZANAFLEX) 4 MG tablet Take 1 tablet by mouth 3 times daily as needed (For muscle spasm) 30 tablet 0 Date:   7/13/2023    PSA, Diagnostic     Standing Status:   Future     Standing Expiration Date:   7/13/2023    CBC     Standing Status:   Future     Standing Expiration Date:   7/13/2023    Hepatic Function Panel     Standing Status:   Future     Standing Expiration Date:   7/13/2023             Roxine Libman, MD    Agree with the ROS entered by the MA.

## 2022-07-25 ENCOUNTER — OFFICE VISIT (OUTPATIENT)
Dept: FAMILY MEDICINE CLINIC | Age: 67
End: 2022-07-25
Payer: MEDICARE

## 2022-07-25 ENCOUNTER — HOSPITAL ENCOUNTER (OUTPATIENT)
Age: 67
Discharge: HOME | End: 2022-07-25
Payer: MEDICARE

## 2022-07-25 VITALS
TEMPERATURE: 97 F | BODY MASS INDEX: 34.14 KG/M2 | HEART RATE: 72 BPM | SYSTOLIC BLOOD PRESSURE: 112 MMHG | DIASTOLIC BLOOD PRESSURE: 72 MMHG | RESPIRATION RATE: 16 BRPM | WEIGHT: 218 LBS

## 2022-07-25 DIAGNOSIS — R11.2 NAUSEA AND VOMITING, INTRACTABILITY OF VOMITING NOT SPECIFIED, UNSPECIFIED VOMITING TYPE: ICD-10-CM

## 2022-07-25 DIAGNOSIS — A09 DIARRHEA OF INFECTIOUS ORIGIN: ICD-10-CM

## 2022-07-25 DIAGNOSIS — R10.10 PAIN OF UPPER ABDOMEN: ICD-10-CM

## 2022-07-25 DIAGNOSIS — K52.9 ACUTE GASTROENTERITIS: Primary | ICD-10-CM

## 2022-07-25 DIAGNOSIS — E87.6 HYPOKALEMIA: Primary | ICD-10-CM

## 2022-07-25 LAB
ABSOLUTE EOS #: 0.1 K/UL (ref 0–0.4)
ABSOLUTE LYMPH #: 2.6 K/UL (ref 1–4.8)
ABSOLUTE MONO #: 0.9 K/UL (ref 0.1–1.3)
ALBUMIN SERPL-MCNC: 3.9 G/DL (ref 3.5–5.2)
ALP BLD-CCNC: 65 U/L (ref 40–129)
ALT SERPL-CCNC: 39 U/L (ref 5–41)
ANION GAP SERPL CALCULATED.3IONS-SCNC: 12 MMOL/L (ref 9–17)
AST SERPL-CCNC: 19 U/L
BASOPHILS # BLD: 0 % (ref 0–2)
BASOPHILS ABSOLUTE: 0 K/UL (ref 0–0.2)
BILIRUB SERPL-MCNC: 0.31 MG/DL (ref 0.3–1.2)
BUN BLDV-MCNC: 18 MG/DL (ref 8–23)
CALCIUM SERPL-MCNC: 9.3 MG/DL (ref 8.6–10.4)
CHLORIDE BLD-SCNC: 106 MMOL/L (ref 98–107)
CO2: 21 MMOL/L (ref 20–31)
CREAT SERPL-MCNC: 1.04 MG/DL (ref 0.7–1.2)
EOSINOPHILS RELATIVE PERCENT: 2 % (ref 0–4)
GFR AFRICAN AMERICAN: >60 ML/MIN
GFR NON-AFRICAN AMERICAN: >60 ML/MIN
GFR SERPL CREATININE-BSD FRML MDRD: ABNORMAL ML/MIN/{1.73_M2}
GLUCOSE BLD-MCNC: 91 MG/DL (ref 70–99)
HCT VFR BLD CALC: 44.4 % (ref 41–53)
HEMOGLOBIN: 14.8 G/DL (ref 13.5–17.5)
LYMPHOCYTES # BLD: 33 % (ref 24–44)
MCH RBC QN AUTO: 31.7 PG (ref 26–34)
MCHC RBC AUTO-ENTMCNC: 33.5 G/DL (ref 31–37)
MCV RBC AUTO: 94.6 FL (ref 80–100)
MONOCYTES # BLD: 11 % (ref 1–7)
PDW BLD-RTO: 13.8 % (ref 11.5–14.9)
PLATELET # BLD: 200 K/UL (ref 150–450)
PMV BLD AUTO: 10.2 FL (ref 6–12)
POTASSIUM SERPL-SCNC: 3.1 MMOL/L (ref 3.7–5.3)
RBC # BLD: 4.69 M/UL (ref 4.5–5.9)
SEG NEUTROPHILS: 54 % (ref 36–66)
SEGMENTED NEUTROPHILS ABSOLUTE COUNT: 4.3 K/UL (ref 1.3–9.1)
SODIUM BLD-SCNC: 139 MMOL/L (ref 135–144)
TOTAL PROTEIN: 6.9 G/DL (ref 6.4–8.3)
WBC # BLD: 7.9 K/UL (ref 3.5–11)

## 2022-07-25 PROCEDURE — 87506 IADNA-DNA/RNA PROBE TQ 6-11: CPT

## 2022-07-25 PROCEDURE — 99214 OFFICE O/P EST MOD 30 MIN: CPT | Performed by: NURSE PRACTITIONER

## 2022-07-25 PROCEDURE — 1123F ACP DISCUSS/DSCN MKR DOCD: CPT | Performed by: NURSE PRACTITIONER

## 2022-07-25 PROCEDURE — 36415 COLL VENOUS BLD VENIPUNCTURE: CPT

## 2022-07-25 PROCEDURE — 80053 COMPREHEN METABOLIC PANEL: CPT

## 2022-07-25 PROCEDURE — 85025 COMPLETE CBC W/AUTO DIFF WBC: CPT

## 2022-07-25 RX ORDER — POTASSIUM CHLORIDE 20 MEQ/1
20 TABLET, EXTENDED RELEASE ORAL DAILY
Qty: 7 TABLET | Refills: 0 | Status: ON HOLD | OUTPATIENT
Start: 2022-07-25 | End: 2022-08-27

## 2022-07-25 RX ORDER — DICYCLOMINE HCL 20 MG
20 TABLET ORAL 3 TIMES DAILY PRN
Qty: 30 TABLET | Refills: 0 | Status: ON HOLD | OUTPATIENT
Start: 2022-07-25 | End: 2022-08-27

## 2022-07-25 ASSESSMENT — ENCOUNTER SYMPTOMS
SHORTNESS OF BREATH: 0
VOMITING: 1
COUGH: 0
DIARRHEA: 1
NAUSEA: 1
ABDOMINAL PAIN: 1

## 2022-07-25 NOTE — PROGRESS NOTES
Subjective:      Patient ID: iNdia Ackerman is a 77 y.o. male. Visit Information    Have you changed or started any medications since your last visit including any over-the-counter medicines, vitamins, or herbal medicines? no   Are you having any side effects from any of your medications? -  no  Have you stopped taking any of your medications? Is so, why? -  yes - see med list    Have you seen any other physician or provider since your last visit? Yes - Records Obtained  Have you had any other diagnostic tests since your last visit? Yes - Records Obtained  Have you been seen in the emergency room and/or had an admission to a hospital since we last saw you? No  Have you had your routine dental cleaning in the past 6 months? no    Have you activated your [employer] account? If not, what are your barriers?  Yes     Patient Care Team:  Gayatri Cottrell MD as PCP - General (Family Medicine)  Gayatri Cottrell MD as PCP - [facility]  Migue Ag MD as Orthopedic Surgeon (Orthopedic Surgery)  Mesha Wang MD (Neurology)  Ailin Cruz MD as Consulting Physician (Gastroenterology)    Medical History Review  Past Medical, Family, and Social History reviewed and does contribute to the patient presenting condition    Health Maintenance   Topic Date Due    Hepatitis C screen  Never done    Shingles vaccine (1 of 2) Never done    Pneumococcal 65+ years Vaccine (1 - PCV) Never done    COVID-19 Vaccine (4 - Booster for Pfizer series) 05/18/2022    Flu vaccine (1) 09/01/2022    A1C test (Diabetic or Prediabetic)  09/08/2022    Prostate Specific Antigen (PSA) Screening or Monitoring  01/26/2023    Colorectal Cancer Screen  02/11/2023    Depression Screen  06/07/2023    Lipids  06/08/2023    Low dose CT lung screening  06/17/2023    DTaP/Tdap/Td vaccine (2 - Td or Tdap) 06/12/2025    AAA screen  Completed    Hepatitis A vaccine  Aged Out    Hepatitis B vaccine  Aged Out    Hib vaccine  Aged Out Meningococcal (ACWY) vaccine  Aged Out       HPI  77year old male presents with abd pain, diarrhea, nausea and vomiting for 4 days. States his symptoms started last Thursday after he ate breakfast at [employer]. Pain  is located in mid abd intermittent. He vomited a couple of times the first 2 days and has watery diarrhea 2-3 times a day. States he can keep food down but needs to go to bathroom several hours after eating. Denies fever chills cough sob chest pain or  blood in vomitus or stool. Hx of GERD on PPI therapy     Review of Systems   Constitutional:  Negative for chills, diaphoresis and fever. Respiratory:  Negative for cough and shortness of breath. Cardiovascular:  Negative for chest pain and palpitations. Gastrointestinal:  Positive for abdominal pain, diarrhea, nausea and vomiting. Skin:  Negative for wound. Neurological:  Negative for dizziness, weakness and numbness. Psychiatric/Behavioral:  Negative for agitation and behavioral problems. Objective:   Physical Exam  Vitals and nursing note reviewed. Constitutional:       General: He is not in acute distress. Appearance: Normal appearance. He is obese. HENT:      Nose: Nose normal.   Eyes:      Conjunctiva/sclera: Conjunctivae normal.   Cardiovascular:      Rate and Rhythm: Normal rate and regular rhythm. Heart sounds: Normal heart sounds. Pulmonary:      Effort: Pulmonary effort is normal. No respiratory distress. Breath sounds: Normal breath sounds. Abdominal:      Palpations: Abdomen is soft. Tenderness: There is abdominal tenderness. Comments: Mild tenderness to deep palpation. No guarding or rebound    Musculoskeletal:         General: No tenderness. Cervical back: Neck supple. Lymphadenopathy:      Cervical: No cervical adenopathy. Skin:     General: Skin is warm and dry. Findings: No rash. Neurological:      Mental Status: He is alert.       Cranial Nerves: No cranial nerve deficit. Psychiatric:         Mood and Affect: Mood normal.         Behavior: Behavior normal.       Assessment:      1. Acute gastroenteritis    2. Pain of upper abdomen    3. Diarrhea of infectious origin    4. Nausea and vomiting, intractability of vomiting not specified, unspecified vomiting type            Plan:      BP Readings from Last 3 Encounters:   07/25/22 112/72   07/18/22 124/74   06/07/22 106/72     /72 (Site: Left Upper Arm, Position: Sitting, Cuff Size: Large Adult)   Pulse 72   Temp 97 °F (36.1 °C) (Infrared)   Resp 16   Wt 218 lb (98.9 kg)   BMI 34.14 kg/m²   Lab Results   Component Value Date    WBC 6.2 07/14/2022    HGB 14.3 07/14/2022    HCT 41.7 07/14/2022     07/14/2022    CHOL 192 06/08/2022    TRIG 197 (H) 06/08/2022    HDL 41 06/08/2022    ALT 28 07/14/2022    AST 18 07/14/2022     06/08/2022    K 4.1 06/08/2022     06/08/2022    CREATININE 1.08 06/08/2022    BUN 20 06/08/2022    CO2 23 06/08/2022    PSA 0.65 01/26/2022    INR 1.0 01/31/2021    GLUF 109 (H) 05/14/2018    LABA1C 6.6 (H) 06/08/2022     Lab Results   Component Value Date    CALCIUM 9.4 06/08/2022     Lab Results   Component Value Date    LDLCHOLESTEROL 112 06/08/2022         1. Acute gastroenteritis/ 2. Pain of upper abdomen  - obtain stool culture   - Comprehensive Metabolic Panel; Future  - cont PPI therapy     3. Diarrhea of infectious origin/ 4. Nausea and vomiting, intractability of vomiting not specified, unspecified vomiting type  - start bentyl as needed    - CBC with Auto Differential; Future  - Gastrointestinal Panel, Molecular; Future  - dicyclomine (BENTYL) 20 MG tablet; Take 1 tablet by mouth 3 times daily as needed (prn)  Dispense: 30 tablet;  Refill: 0  - avoid heavy greasy food and avised to keep hydration with electrolytes       Requested Prescriptions     Signed Prescriptions Disp Refills    dicyclomine (BENTYL) 20 MG tablet 30 tablet 0     Sig: Take 1 tablet by mouth 3 times daily as needed (prn)       Medications Discontinued During This Encounter   Medication Reason    tiZANidine (ZANAFLEX) 4 MG tablet Therapy completed       Discussed use, benefit, and side effects of prescribed medications. Barriers to medication compliance addressed. All patient questions answered. Pt voiced understanding. No follow-ups on file.             Sadaf Bear, KAMALA - CNP

## 2022-07-26 ENCOUNTER — TELEPHONE (OUTPATIENT)
Dept: FAMILY MEDICINE CLINIC | Age: 67
End: 2022-07-26

## 2022-07-26 LAB
CAMPYLOBACTER PCR: NORMAL
E COLI ENTEROTOXIGENIC PCR: NORMAL
PLESIOMONAS SHIGELLOIDES PCR: NORMAL
SALMONELLA PCR: NORMAL
SHIGATOXIN GENE PCR: NORMAL
SHIGELLA SP PCR: NORMAL
SPECIMEN DESCRIPTION: NORMAL
VIBRIO PCR: NORMAL
YERSINIA ENTEROCOLITICA PCR: NORMAL

## 2022-07-26 NOTE — TELEPHONE ENCOUNTER
----- Message from KAMALA Gaitan CNP sent at 7/26/2022 11:37 AM EDT -----  Stool culture was normal. Have pt cont bentyl to see how he is doing.  Have pt fu as scheduled in august  ----- Message -----  From: Deangelo [facility] Incoming Lab Results From [facility]  Sent: 7/25/2022   3:04 PM EDT  To: KAMALA Gaitan CNP

## 2022-07-31 ENCOUNTER — APPOINTMENT (OUTPATIENT)
Dept: CT IMAGING | Age: 67
End: 2022-07-31
Payer: MEDICARE

## 2022-07-31 ENCOUNTER — APPOINTMENT (OUTPATIENT)
Dept: GENERAL RADIOLOGY | Age: 67
End: 2022-07-31
Payer: MEDICARE

## 2022-07-31 ENCOUNTER — HOSPITAL ENCOUNTER (EMERGENCY)
Age: 67
Discharge: HOME | End: 2022-07-31
Attending: EMERGENCY MEDICINE
Payer: MEDICARE

## 2022-07-31 VITALS
SYSTOLIC BLOOD PRESSURE: 130 MMHG | RESPIRATION RATE: 13 BRPM | HEART RATE: 71 BPM | DIASTOLIC BLOOD PRESSURE: 79 MMHG | OXYGEN SATURATION: 97 % | TEMPERATURE: 98.9 F

## 2022-07-31 DIAGNOSIS — U07.1 COVID-19: Primary | ICD-10-CM

## 2022-07-31 LAB
ABSOLUTE EOS #: 0 K/UL (ref 0–0.4)
ABSOLUTE LYMPH #: 1.1 K/UL (ref 1–4.8)
ABSOLUTE MONO #: 1.1 K/UL (ref 0.1–1.3)
ALBUMIN SERPL-MCNC: 3.8 G/DL (ref 3.5–5.2)
ALP BLD-CCNC: 57 U/L (ref 40–129)
ALT SERPL-CCNC: 45 U/L (ref 5–41)
ANION GAP SERPL CALCULATED.3IONS-SCNC: 14 MMOL/L (ref 9–17)
AST SERPL-CCNC: 32 U/L
BASOPHILS # BLD: 1 % (ref 0–2)
BASOPHILS ABSOLUTE: 0.1 K/UL (ref 0–0.2)
BILIRUB SERPL-MCNC: 0.38 MG/DL (ref 0.3–1.2)
BUN BLDV-MCNC: 9 MG/DL (ref 8–23)
C-REACTIVE PROTEIN: 40.5 MG/L (ref 0–5)
CALCIUM SERPL-MCNC: 8.8 MG/DL (ref 8.6–10.4)
CHLORIDE BLD-SCNC: 99 MMOL/L (ref 98–107)
CO2: 22 MMOL/L (ref 20–31)
CREAT SERPL-MCNC: 0.97 MG/DL (ref 0.7–1.2)
D-DIMER QUANTITATIVE: 0.53 MG/L FEU (ref 0–0.59)
EOSINOPHILS RELATIVE PERCENT: 0 % (ref 0–4)
GFR AFRICAN AMERICAN: >60 ML/MIN
GFR NON-AFRICAN AMERICAN: >60 ML/MIN
GFR SERPL CREATININE-BSD FRML MDRD: ABNORMAL ML/MIN/{1.73_M2}
GLUCOSE BLD-MCNC: 109 MG/DL (ref 70–99)
HCT VFR BLD CALC: 41.2 % (ref 41–53)
HEMOGLOBIN: 13.9 G/DL (ref 13.5–17.5)
INFLUENZA A: NOT DETECTED
INFLUENZA B: NOT DETECTED
INR BLD: 1
LACTATE DEHYDROGENASE: 197 U/L (ref 135–225)
LIPASE: 29 U/L (ref 13–60)
LYMPHOCYTES # BLD: 12 % (ref 24–44)
MAGNESIUM: 2.1 MG/DL (ref 1.6–2.6)
MCH RBC QN AUTO: 31.9 PG (ref 26–34)
MCHC RBC AUTO-ENTMCNC: 33.8 G/DL (ref 31–37)
MCV RBC AUTO: 94.3 FL (ref 80–100)
MONOCYTES # BLD: 11 % (ref 1–7)
PDW BLD-RTO: 14 % (ref 11.5–14.9)
PLATELET # BLD: 188 K/UL (ref 150–450)
PMV BLD AUTO: 9.1 FL (ref 6–12)
POTASSIUM SERPL-SCNC: 3.6 MMOL/L (ref 3.7–5.3)
PROCALCITONIN: 0.1 NG/ML
PROTHROMBIN TIME: 12.9 SEC (ref 11.8–14.6)
RBC # BLD: 4.37 M/UL (ref 4.5–5.9)
SARS-COV-2 RNA, RT PCR: DETECTED
SEDIMENTATION RATE, ERYTHROCYTE: 42 MM/HR (ref 0–20)
SEG NEUTROPHILS: 76 % (ref 36–66)
SEGMENTED NEUTROPHILS ABSOLUTE COUNT: 7.3 K/UL (ref 1.3–9.1)
SODIUM BLD-SCNC: 135 MMOL/L (ref 135–144)
SOURCE: ABNORMAL
SPECIMEN DESCRIPTION: ABNORMAL
TOTAL PROTEIN: 6.9 G/DL (ref 6.4–8.3)
TROPONIN, HIGH SENSITIVITY: 13 NG/L (ref 0–22)
TROPONIN, HIGH SENSITIVITY: 14 NG/L (ref 0–22)
WBC # BLD: 9.6 K/UL (ref 3.5–11)

## 2022-07-31 PROCEDURE — 87636 SARSCOV2 & INF A&B AMP PRB: CPT

## 2022-07-31 PROCEDURE — 93005 ELECTROCARDIOGRAM TRACING: CPT | Performed by: EMERGENCY MEDICINE

## 2022-07-31 PROCEDURE — 86140 C-REACTIVE PROTEIN: CPT

## 2022-07-31 PROCEDURE — 80053 COMPREHEN METABOLIC PANEL: CPT

## 2022-07-31 PROCEDURE — 36415 COLL VENOUS BLD VENIPUNCTURE: CPT

## 2022-07-31 PROCEDURE — 85025 COMPLETE CBC W/AUTO DIFF WBC: CPT

## 2022-07-31 PROCEDURE — 6360000002 HC RX W HCPCS: Performed by: EMERGENCY MEDICINE

## 2022-07-31 PROCEDURE — 83615 LACTATE (LD) (LDH) ENZYME: CPT

## 2022-07-31 PROCEDURE — 96374 THER/PROPH/DIAG INJ IV PUSH: CPT | Performed by: EMERGENCY MEDICINE

## 2022-07-31 PROCEDURE — 6360000004 HC RX CONTRAST MEDICATION: Performed by: EMERGENCY MEDICINE

## 2022-07-31 PROCEDURE — 71260 CT THORAX DX C+: CPT | Performed by: EMERGENCY MEDICINE

## 2022-07-31 PROCEDURE — 84145 PROCALCITONIN (PCT): CPT

## 2022-07-31 PROCEDURE — 2580000003 HC RX 258: Performed by: EMERGENCY MEDICINE

## 2022-07-31 PROCEDURE — 84484 ASSAY OF TROPONIN QUANT: CPT

## 2022-07-31 PROCEDURE — 6370000000 HC RX 637 (ALT 250 FOR IP): Performed by: EMERGENCY MEDICINE

## 2022-07-31 PROCEDURE — 85610 PROTHROMBIN TIME: CPT

## 2022-07-31 PROCEDURE — 99285 EMERGENCY DEPT VISIT HI MDM: CPT | Performed by: EMERGENCY MEDICINE

## 2022-07-31 PROCEDURE — 71045 X-RAY EXAM CHEST 1 VIEW: CPT

## 2022-07-31 PROCEDURE — 85379 FIBRIN DEGRADATION QUANT: CPT

## 2022-07-31 PROCEDURE — 85652 RBC SED RATE AUTOMATED: CPT

## 2022-07-31 PROCEDURE — 83735 ASSAY OF MAGNESIUM: CPT

## 2022-07-31 PROCEDURE — 96375 TX/PRO/DX INJ NEW DRUG ADDON: CPT | Performed by: EMERGENCY MEDICINE

## 2022-07-31 PROCEDURE — 83690 ASSAY OF LIPASE: CPT

## 2022-07-31 RX ORDER — BENZONATATE 100 MG/1
100 CAPSULE ORAL 3 TIMES DAILY PRN
Qty: 20 CAPSULE | Refills: 0 | Status: SHIPPED | OUTPATIENT
Start: 2022-07-31 | End: 2022-08-07

## 2022-07-31 RX ORDER — SODIUM CHLORIDE 0.9 % (FLUSH) 0.9 %
10 SYRINGE (ML) INJECTION PRN
Status: DISCONTINUED | OUTPATIENT
Start: 2022-07-31 | End: 2022-07-31 | Stop reason: HOSPADM

## 2022-07-31 RX ORDER — KETOROLAC TROMETHAMINE 30 MG/ML
15 INJECTION, SOLUTION INTRAMUSCULAR; INTRAVENOUS ONCE
Status: COMPLETED | OUTPATIENT
Start: 2022-07-31 | End: 2022-07-31

## 2022-07-31 RX ORDER — DEXAMETHASONE SODIUM PHOSPHATE 10 MG/ML
6 INJECTION, SOLUTION INTRAMUSCULAR; INTRAVENOUS ONCE
Status: COMPLETED | OUTPATIENT
Start: 2022-07-31 | End: 2022-07-31

## 2022-07-31 RX ORDER — BENZOCAINE AND MENTHOL, UNSPECIFIED FORM 15; 2.3 MG/1; MG/1
1 LOZENGE ORAL
Qty: 16 LOZENGE | Refills: 0 | Status: ON HOLD | OUTPATIENT
Start: 2022-07-31 | End: 2022-08-27

## 2022-07-31 RX ORDER — ACETAMINOPHEN 500 MG
1000 TABLET ORAL ONCE
Status: COMPLETED | OUTPATIENT
Start: 2022-07-31 | End: 2022-07-31

## 2022-07-31 RX ORDER — 0.9 % SODIUM CHLORIDE 0.9 %
80 INTRAVENOUS SOLUTION INTRAVENOUS ONCE
Status: COMPLETED | OUTPATIENT
Start: 2022-07-31 | End: 2022-07-31

## 2022-07-31 RX ADMIN — DEXAMETHASONE SODIUM PHOSPHATE 6 MG: 10 INJECTION, SOLUTION INTRAMUSCULAR; INTRAVENOUS at 16:41

## 2022-07-31 RX ADMIN — BENZOCAINE 1 LOZENGE: 2.6; 15 LOZENGE ORAL at 16:54

## 2022-07-31 RX ADMIN — SODIUM CHLORIDE 80 ML: 9 INJECTION, SOLUTION INTRAVENOUS at 15:23

## 2022-07-31 RX ADMIN — SODIUM CHLORIDE, PRESERVATIVE FREE 10 ML: 5 INJECTION INTRAVENOUS at 15:23

## 2022-07-31 RX ADMIN — KETOROLAC TROMETHAMINE 15 MG: 30 INJECTION, SOLUTION INTRAMUSCULAR; INTRAVENOUS at 16:52

## 2022-07-31 RX ADMIN — ACETAMINOPHEN 1000 MG: 500 TABLET ORAL at 14:05

## 2022-07-31 RX ADMIN — IOPAMIDOL 75 ML: 755 INJECTION, SOLUTION INTRAVENOUS at 15:22

## 2022-07-31 ASSESSMENT — ENCOUNTER SYMPTOMS
EYE PAIN: 0
COLOR CHANGE: 0
COUGH: 1
ABDOMINAL PAIN: 0
BACK PAIN: 0
SHORTNESS OF BREATH: 0
SORE THROAT: 1

## 2022-07-31 NOTE — ED PROVIDER NOTES
EMERGENCY DEPARTMENT ENCOUNTER    Pt Name: Yolie Hurley  MRN: [id number]  [place] 1955  Date of evaluation: 7/31/22  CHIEF COMPLAINT       Chief Complaint   Patient presents with    Concern For COVID-19     HISTORY OF PRESENT ILLNESS   58-year-old male presents for complaints of cough, sore throat, fatigue fever and chills. Patient reports symptoms started yesterday. Patient reports he was feeling worse today and presented for evaluation. Patient denies any current chest pain, denies any significant shortness of breath, nausea or vomiting or abdominal pain. Patient reports that he has received 3 COVID vaccinations. The history is provided by the patient. REVIEW OF SYSTEMS     Review of Systems   Constitutional:  Positive for fatigue and fever. Negative for chills. HENT:  Positive for sore throat. Negative for congestion and ear pain. Eyes:  Negative for pain. Respiratory:  Positive for cough. Negative for shortness of breath. Cardiovascular:  Negative for chest pain, palpitations and leg swelling. Gastrointestinal:  Negative for abdominal pain. Genitourinary:  Negative for dysuria and flank pain. Musculoskeletal:  Positive for myalgias. Negative for back pain. Skin:  Negative for color change. Neurological:  Negative for numbness and headaches. Psychiatric/Behavioral:  Negative for confusion. All other systems reviewed and are negative. PASTMEDICAL HISTORY     Past Medical History:   Diagnosis Date    Acute cholecystitis     Essential hypertension 5/4/2017    Hyperlipidemia 9/4/2014    Hyperparathyroidism ([address]     Obesity (BMI 30-39. 9) 5/4/2017    Osteoporosis     Sciatica     Stroke [facility])     Vertebral fracture, osteoporotic (HCC)      Past Problem List  Patient Active Problem List   Diagnosis Code    Osteoporosis M81.0    Vitamin D deficiency E55.9    History of stroke Z86.73    Degeneration of lumbar or lumbosacral intervertebral disc M51.37    Back pain M54.9 Low back pain M54.50    Allergic rhinitis J30.9    Impaired fasting glucose R73.01    Mixed hyperlipidemia E78.2    Essential hypertension I10    Obesity (BMI 30-39. 9) E66.9    Tear of left rotator cuff M75.102    Left bicipital tenosynovitis M75.22    Colon polyp K63.5    Acute blood loss as cause of postoperative anemia D62    S/P right hemicolectomy Z90.49    History of malignant neoplasm of skin Z85.828    Acute postoperative pain G89.18    Anemia D64.9    Ex-cigarette smoker Z87.891    Chest pain R07.9    Acute acalculous cholecystitis K81.0    Acute cholecystitis K81.0    Lumbar radiculopathy M54.16    Sacroiliitis (HCC) M46.1    Hepatic steatosis K76.0     SURGICAL HISTORY       Past Surgical History:   Procedure Laterality Date    CHOLECYSTECTOMY N/A 2/19/2021    OPEN CHOLECYSTECTOMY performed by Cristina Osborn MD at [address] [facility]  3/19/2014    tubular adenoma x 2, inadequate prep, some polyps not removed, needs colonoscopy in 6-12 months    COLONOSCOPY N/A 2/11/2020    COLONOSCOPY POLYPECTOMY HOT BIOPSY performed by Cristina Osborn MD at [facility] ENDO    ERCP N/A 2/19/2021    [facility]ST[facility] 2/12/2020    OPEN RIGHT COLECTOMY, PRIMARY ANASTOMOSIS performed by Cristina Osborn MD at [address] [facility]      rt. inguinal    IR BILIARY DRAIN EXTERNAL  2/1/2021    IR BILIARY DRAIN EXTERNAL 2/1/2021 [facility]    OMENTUM RESECTION  2/12/2020    OMENTECTOMY -  PARTIAL GREATER OMENECTOMY performed by Cristina Osborn MD at [address]       Discharge Medication List as of 7/31/2022  5:16 PM        CONTINUE these medications which have NOT CHANGED    Details   dicyclomine (BENTYL) 20 MG tablet Take 1 tablet by mouth 3 times daily as needed (prn), Disp-30 tablet, R-0Normal      potassium chloride (KLOR-CON M) 20 MEQ extended release tablet Take 1 tablet by mouth in the morning for 7 days. , Disp-7 tablet, R-0Normal      Testosterone Enanthate (XYOSTED) 75 MG/0.5ML SOAJ Inject 1 pen into the skin once a week, Disp-4 pen, R-5Normal      carvedilol (COREG) 6.25 MG tablet TAKE 1 TABLET BY MOUTH  TWICE DAILY WITH MEALS, Disp-180 tablet, R-1Requesting 1 year supplyNormal      amLODIPine (NORVASC) 5 MG tablet TAKE 1 TABLET BY MOUTH ONCE DAILY, Disp-90 tablet, R-1Requesting 1 year supplyNormal      atorvastatin (LIPITOR) 20 MG tablet TAKE 1 TABLET BY MOUTH AT  BEDTIME, Disp-90 tablet, R-1Requesting 1 year supplyNormal      lisinopril (PRINIVIL;ZESTRIL) 5 MG tablet TAKE 1 TABLET BY MOUTH ONCE DAILY, Disp-90 tablet, R-1Requesting 1 year supplyNormal      tadalafil (CIALIS) 20 MG tablet Take 1 tablet by mouth daily as needed for Erectile Dysfunction, Disp-30 tablet, R-1Normal      vitamin D (ERGOCALCIFEROL) 1.25 MG (34242 UT) CAPS capsule TAKE 1 CAPSULE BY MOUTH  WEEKLY, Disp-13 capsule, R-1Requesting 1 year supplyNormal      pantoprazole (PROTONIX) 40 MG tablet take 1 tablet by mouth once daily, Disp-90 tablet, R-1Normal      fluticasone (FLONASE) 50 MCG/ACT nasal spray 2 sprays into each nostril daily, Disp-16 g, R-3Normal      loratadine (CLARITIN) 10 MG tablet Take 1 tablet by mouth daily, Disp-30 tablet, R-3Normal      guaiFENesin (MUCINEX) 600 MG extended release tablet Take 1 tablet by mouth 2 times daily, Disp-20 tablet, R-0OTC      aspirin 81 MG chewable tablet Take 1 tablet by mouth daily, Disp-30 tablet, R-3           ALLERGIES     has No Known Allergies. FAMILY HISTORY     He indicated that his mother is . He indicated that his father is .      SOCIAL HISTORY       Social History     Tobacco Use    Smoking status: Former     Packs/day: 1.00     Years: 40.00     Pack years: 40.00     Types: Cigarettes     Quit date: 2020     Years since quittin.4    Smokeless tobacco: Never   Vaping Use    Vaping Use: Never used   Substance Use Topics    Alcohol use: Yes     Comment: rarely Drug use: No     PHYSICAL EXAM     INITIAL VITALS: /79   Pulse 71   Temp 98.9 °F (37.2 °C) (Oral)   Resp 13   SpO2 97%    Physical Exam  Vitals and nursing note reviewed. Constitutional:       General: He is not in acute distress. Appearance: Normal appearance. He is not ill-appearing. HENT:      Head: Normocephalic and atraumatic. Right Ear: External ear normal.      Left Ear: External ear normal.      Nose: Nose normal.      Mouth/Throat:      Mouth: Mucous membranes are moist.      Pharynx: Posterior oropharyngeal erythema present. Eyes:      Extraocular Movements: Extraocular movements intact. Pupils: Pupils are equal, round, and reactive to light. Cardiovascular:      Rate and Rhythm: Normal rate and regular rhythm. Pulses: Normal pulses. Heart sounds: Normal heart sounds. Pulmonary:      Effort: Pulmonary effort is normal.      Breath sounds: Normal breath sounds. Abdominal:      General: Abdomen is flat. Palpations: Abdomen is soft. Tenderness: There is no abdominal tenderness. Musculoskeletal:         General: No tenderness. Normal range of motion. Cervical back: Neck supple. No spinous process tenderness or muscular tenderness. Skin:     General: Skin is warm and dry. Capillary Refill: Capillary refill takes less than 2 seconds. Neurological:      General: No focal deficit present. Mental Status: He is alert and oriented to person, place, and time. Cranial Nerves: Cranial nerves are intact. Sensory: Sensation is intact. Motor: Motor function is intact. Psychiatric:         Behavior: Behavior normal.         Thought Content: Thought content does not include homicidal or suicidal ideation. MEDICAL DECISION MAKIN-year-old male presents for complaint of cough, body aches, fatigue fevers and chills.   On initial exam patient in no acute distress, initially when patient first walked in SPO2 was 87% on room air, after sitting and resting came up to 94%, temp was 100.2, symptoms concerning for COVID infection, will check labs and imaging    Labs reviewed patient was found to be COVID-positive, mild elevation of his D-dimer CRP and ESR are likely due to his COVID infection, chest x-ray was negative, given that he did have elevation of the D-dimer, CT chest was obtained as well, negative for PE    Patient was initially had sats in the high 80s after he had walked in, patient was observed in ED for prolonged period of time, no significant repeat episodes of hypoxia patient was to be ambulated in the ED and there was no hypoxia with ambulation, results were discussed with patient, discussed that he is COVID-positive, discussed admission versus outpatient follow-up, patient does not wish to be admitted at this time and would like to follow-up with his PCP, discussed with patient strict return precautions, patient voiced understanding and is comfortable with plan and discharge home    Patient/Guardian was informed of their diagnosis and told to follow up with PCP in 1-3 days. Patient demonstrates understanding and agreement with the plan. They were given the opportunity to ask questions and those questions were answered to the best of our ability with the available information. Patient/Guardian told to return to the ED for any new, worsening, changing or persistent symptoms. This dictation was prepared using [employer] voice recognition software.           CRITICAL CARE:       PROCEDURES:    Procedures    DIAGNOSTIC RESULTS   EKG:All EKG's are interpreted by the Emergency Department Physician who either signs or Co-signs this chart in the absence of a cardiologist.    Sinus rhythm rate of 83, normal axis, normal intervals, no ST segment elevation or depression, no significant T wave changes    RADIOLOGY:All plain film, CT, MRI, and formal ultrasound images (except ED bedside ultrasound) are read by the radiologist, see reports below, unless otherwisenoted in MDM or here. CT CHEST PULMONARY EMBOLISM W CONTRAST   Final Result   No evidence of pulmonary embolism or acute pulmonary abnormality. XR CHEST PORTABLE   Final Result   No acute process. LABS: All lab results were reviewed by myself, and all abnormals are listed below.   Labs Reviewed   COVID-19 & INFLUENZA COMBO - Abnormal; Notable for the following components:       Result Value    SARS-CoV-2 RNA, RT PCR DETECTED (*)     All other components within normal limits   CBC WITH AUTO DIFFERENTIAL - Abnormal; Notable for the following components:    RBC 4.37 (*)     Seg Neutrophils 76 (*)     Lymphocytes 12 (*)     Monocytes 11 (*)     All other components within normal limits   COMPREHENSIVE METABOLIC PANEL - Abnormal; Notable for the following components:    Glucose 109 (*)     Potassium 3.6 (*)     ALT 45 (*)     All other components within normal limits   SEDIMENTATION RATE - Abnormal; Notable for the following components:    Sed Rate 42 (*)     All other components within normal limits   C-REACTIVE PROTEIN - Abnormal; Notable for the following components:    CRP 40.5 (*)     All other components within normal limits   PROCALCITONIN - Abnormal; Notable for the following components:    Procalcitonin 0.10 (*)     All other components within normal limits   D-DIMER, QUANTITATIVE   LIPASE   MAGNESIUM   PROTIME-INR   TROPONIN   LACTATE DEHYDROGENASE   TROPONIN       EMERGENCY DEPARTMENTCOURSE:         Vitals:    Vitals:    07/31/22 1429 07/31/22 1442 07/31/22 1532 07/31/22 1706   BP:   130/79    Pulse: 77 77 72 71   Resp: 23 27 19 13   Temp:   98.9 °F (37.2 °C)    TempSrc:   Oral    SpO2: 91% 92% 90% 97%       The patient was given the following medications while in the emergency department:  Orders Placed This Encounter   Medications    acetaminophen (TYLENOL) tablet 1,000 mg    0.9 % sodium chloride bolus    DISCONTD: sodium chloride flush 0.9 % injection 10 mL    iopamidol (ISOVUE-370) 76 % injection 75 mL    dexamethasone (PF) (DECADRON) injection 6 mg    ketorolac (TORADOL) injection 15 mg    Benzocaine-Menthol (CEPACOL SORE THROAT) 15-2.6 MG lozenge 1 lozenge    benzonatate (TESSALON) 100 MG capsule     Sig: Take 1 capsule by mouth 3 times daily as needed for Cough     Dispense:  20 capsule     Refill:  0    Benzocaine-Menthol (CEPACOL) 15-2.3 MG LOZG     Sig: Take 1 lozenge by mouth every 2 hours as needed (Sore Throat)     Dispense:  16 lozenge     Refill:  0     CONSULTS:  None    FINAL IMPRESSION      1. COVID-19          DISPOSITION/PLAN   DISPOSITION Decision To Discharge 07/31/2022 05:14:02 PM      PATIENT REFERRED TO:  MD Cornel Lawrence [address] [place] [phone]    Schedule an appointment as soon as possible for a visit       [facility] ED  [place] [address]  [phone]    As needed, If symptoms worsen  DISCHARGE MEDICATIONS:  Discharge Medication List as of 7/31/2022  5:16 PM        START taking these medications    Details   benzonatate (TESSALON) 100 MG capsule Take 1 capsule by mouth 3 times daily as needed for Cough, Disp-20 capsule, R-0Print      Benzocaine-Menthol (CEPACOL) 15-2.3 MG LOZG Take 1 lozenge by mouth every 2 hours as needed (Sore Throat), Disp-16 lozenge, R-0Print           The care is provided during an unprecedented national emergency due to the novel coronavirus, COVID 19.   [address], DO                     [address], DO  07/31/22 2136

## 2022-08-01 ENCOUNTER — CARE COORDINATION (OUTPATIENT)
Dept: CARE COORDINATION | Age: 67
End: 2022-08-01

## 2022-08-01 LAB
EKG ATRIAL RATE: 83 BPM
EKG P AXIS: 30 DEGREES
EKG P-R INTERVAL: 158 MS
EKG Q-T INTERVAL: 360 MS
EKG QRS DURATION: 92 MS
EKG QTC CALCULATION (BAZETT): 423 MS
EKG R AXIS: -13 DEGREES
EKG T AXIS: 18 DEGREES
EKG VENTRICULAR RATE: 83 BPM

## 2022-08-01 SDOH — ECONOMIC STABILITY: HOUSING INSECURITY
IN THE LAST 12 MONTHS, WAS THERE A TIME WHEN YOU DID NOT HAVE A STEADY PLACE TO SLEEP OR SLEPT IN A SHELTER (INCLUDING NOW)?: NO

## 2022-08-01 SDOH — ECONOMIC STABILITY: HOUSING INSECURITY: IN THE LAST 12 MONTHS, HOW MANY PLACES HAVE YOU LIVED?: 1

## 2022-08-01 SDOH — ECONOMIC STABILITY: INCOME INSECURITY: IN THE LAST 12 MONTHS, WAS THERE A TIME WHEN YOU WERE NOT ABLE TO PAY THE MORTGAGE OR RENT ON TIME?: NO

## 2022-08-01 NOTE — CARE COORDINATION
Ambulatory Care Coordination Note  8/1/2022    ACC: Tonya Crockett RN    Summary Note: enrolled for care management. Tested COVID positive yesterday 7/31. ED follow up COVID assessment completed. Feeling better today. Sore throat is improving, had chills this morning. Not able to take temp. Breathing has been fine. Tolerating fluids and staying hydrated. Appetite is fine. Reports, overall, he's generally healthy. Had mini stroke 12 years ago. HTN; managed with meds. No headaches, vision changes. chest pain or shortness of breath  History of HTN, Hyperlipidemia, CVA, Vitamin D deficiency, osteoporosis   medication copays are manageable. Has medications on hand, takes as prescribed. No concerns or questions. Agreeable to call backs. CC plan; next call review for improving s/s COVID, review medication list, sdoh      Future Appointments   Date Time Provider Miryam Goncalves   8/18/2022  9:00 AM KAMALA Conde - NADYA Joseph   9/1/2022  5:00 PM MD Maral Mar    9/19/2022  8:10 AM Thad Chinchilla MD [address] [place] [address]               Ambulatory Care Coordination Assessment    Care Coordination Protocol  Referral from Primary Care Provider: No  Week 1 - Initial Assessment     Do you have all of your prescriptions and are they filled?: Yes  Barriers to medication adherence: None  Are you able to afford your medications?: Yes  How often do you have trouble taking your medications the way you have been told to take them?: I always take them as prescribed. Do you have Home O2 Therapy?: No      Ability to seek help/take action for Emergent Urgent situations i.e. fire, crime, inclement weather or health crisis. : Independent  Ability to ambulate to restroom: Independent  Ability handle personal hygeine needs (bathing/dressing/grooming): Independent  Ability to manage Medications:  Independent  Ability to prepare Food Preparation: Independent  Ability to maintain home (clean home, laundry): Independent  Ability to drive and/or has transportation: Independent  Ability to do shopping: Independent  Ability to manage finances: Independent  Is patient able to live independently?: Yes     Current Housing: Private Residence        Per the Fall Risk Screening, did the patient have 2 or more falls or 1 fall with injury in the past year?: No        Are you experiencing loss of meaning?: No  Are you experiencing loss of hope and peace?: No     Thinking about your patient's physical health needs, are there any symptoms or problems (risk indicators) you are unsure about that require further investigation?: No identified areas of uncertainly or problems already being investigated   Are the patients physical health problems impacting on their mental well-being?: No identified areas of concern   Are there any problems with your patients lifestyle behaviors (alcohol, drugs, diet, exercise) that are impacting on physical or mental well-being?: No identified areas of concern   Do you have any other concerns about your patients mental well-being?  How would you rate their severity and impact on the patient?: No identified areas of concern   How would you rate their home environment in terms of safety and stability (including domestic violence, insecure housing, neighbor harassment)?: Consistently safe, supportive, stable, no identified problems   How do daily activities impact on the patient's well-being? (include current or anticipated unemployment, work, caregiving, access to transportation or other): No identified problems or perceived positive benefits   How would you rate their social network (family, work, friends)?: Good participation with social networks   How would you rate their financial resources (including ability to afford all required medical care)?: Financially secure, resources adequate, no identified problems   How wells does the patient now understand their health and well-being (symptoms, signs or risk factors) and what they need to do to manage their health?: Reasonable to good understanding and already engages in managing health or is willing to undertake better management   How well do you think your patient can engage in healthcare discussions? (Barriers include language, deafness, aphasia, alcohol or drug problems, learning difficulties, concentration): Clear and open communication, no identified barriers   Do other services need to be involved to help this patient?: Other care/services not required at this time   Are current services involved with this patient well-coordinated? (Include coordination with other services you are now recommendation): All required care/services in place and well-coordinated   Suggested Interventions and Community Resources  No Identified Needs                    Prior to Admission medications    Medication Sig Start Date End Date Taking? Authorizing Provider   benzonatate (TESSALON) 100 MG capsule Take 1 capsule by mouth 3 times daily as needed for Cough 7/31/22 8/7/22  Andreas Byers, DO   Benzocaine-Menthol (CEPACOL) 15-2.3 MG LOZG Take 1 lozenge by mouth every 2 hours as needed (Sore Throat) 7/31/22   Zeny Byers,    dicyclomine (BENTYL) 20 MG tablet Take 1 tablet by mouth 3 times daily as needed (prn) 7/25/22   Tutwiler Pass, APRN - CNP   potassium chloride (KLOR-CON M) 20 MEQ extended release tablet Take 1 tablet by mouth in the morning for 7 days.  7/25/22 8/1/22  Tutwiler Pass, APRN - CNP   Testosterone Enanthate (XYOSTED) 75 MG/0.5ML SOAJ Inject 1 pen into the skin once a week 7/18/22   Desire Juan MD   carvedilol (COREG) 6.25 MG tablet TAKE 1 TABLET BY MOUTH  TWICE DAILY WITH MEALS 6/30/22   Brian Iglesias MD   amLODIPine (NORVASC) 5 MG tablet TAKE 1 TABLET BY MOUTH ONCE DAILY 6/30/22   Brian Iglesias MD   atorvastatin (LIPITOR) 20 MG tablet TAKE 1 TABLET BY MOUTH AT  BEDTIME 6/30/22   Brian Iglesias MD   lisinopril (PRINIVIL;ZESTRIL) 5 MG tablet TAKE 1 TABLET BY MOUTH ONCE DAILY 6/30/22   Carlos Burnett MD   tadalafil (CIALIS) 20 MG tablet Take 1 tablet by mouth daily as needed for Erectile Dysfunction 6/13/22   Grover Cooper MD   vitamin D (ERGOCALCIFEROL) 1.25 MG (55493 UT) CAPS capsule TAKE 1 CAPSULE BY MOUTH  WEEKLY 5/12/22   Carlos Burnett MD   pantoprazole (PROTONIX) 40 MG tablet take 1 tablet by mouth once daily 1/17/22   Carlos Burnett MD   fluticasone Antione Michelle) 50 MCG/ACT nasal spray 2 sprays into each nostril daily 10/15/21   Carlos Burnett MD   loratadine (CLARITIN) 10 MG tablet Take 1 tablet by mouth daily 10/15/21   MD familia RocaaiFENesin [facility]) 600 MG extended release tablet Take 1 tablet by mouth 2 times daily 10/11/21   KAMLAA Lockett CNP   aspirin 81 MG chewable tablet Take 1 tablet by mouth daily 6/18/16   Jason Hernandez MD       Future Appointments   Date Time Provider Miryam Ivanna   8/18/2022  9:00 AM [facility]   9/1/2022  5:00 PM Carlos Burnett MD Cori Sides   9/19/2022  8:10 AM Grover Cooper MD [facility]      and   General Assessment

## 2022-08-01 NOTE — CARE COORDINATION
Patient contacted regarding COVID-19 diagnosis. Discussed COVID-19 related testing which was available at this time. Test results were positive. Patient informed of results, if available? Yes. Ambulatory Care Manager contacted the patient by telephone to perform post discharge assessment. Call within 2 business days of discharge: Yes. Verified name and  with patient as identifiers. Provided introduction to self, and explanation of the CTN/ACM role, and reason for call due to risk factors for infection and/or exposure to COVID-19. Symptoms reviewed with patient who verbalized the following symptoms: no new symptoms  no worsening symptoms. Due to no new or worsening symptoms encounter was not routed to provider for escalation. Discussed follow-up appointments. If no appointment was previously scheduled, appointment scheduling offered: No.  [facility] follow up appointment(s):   Future Appointments   Date Time Provider Miryam Goncalves   2022  9:00 AM Hanny Hunt, APRN - [address] [employer]   2022  5:00 PM Brissa Rodriguez, [facility]   2022  8:10 AM Alison Dixon MD [address] [place] [address] follow up appointment(s): n/a    Non-face-to-face services provided:  Obtained and reviewed discharge summary and/or continuity of care documents  Education of patient/family/caregiver/guardian to support self-management-reviewed      Advance Care Planning:   Does patient have an Advance Directive:  not on file. Educated patient about risk for severe COVID-19 due to risk factors according to CDC guidelines. ACM reviewed discharge instructions, medical action plan and red flag symptoms with the patient who verbalized understanding. Discussed COVID vaccination status: Yes. Education provided on COVID-19 vaccination as appropriate. Discussed exposure protocols and quarantine with CDC Guidelines.  Patient was given an opportunity to verbalize any questions and concerns and agrees to contact ACM or health care provider for questions related to their healthcare. Reviewed and educated patient on any new and changed medications related to discharge diagnosis     Was patient discharged with a pulse oximeter? no      ACM provided contact information. Plan for follow-up call in 5-7 days based on severity of symptoms and risk factors.

## 2022-08-04 ENCOUNTER — CARE COORDINATION (OUTPATIENT)
Dept: CARE COORDINATION | Age: 67
End: 2022-08-04

## 2022-08-04 NOTE — CARE COORDINATION
My [address], walk-in clinic and right care-right place-right time information sheets was mailed out to pt.

## 2022-08-12 ENCOUNTER — CARE COORDINATION (OUTPATIENT)
Dept: CARE COORDINATION | Age: 67
End: 2022-08-12

## 2022-08-12 NOTE — CARE COORDINATION
Mary Goff MD   [facility]) 10 MG tablet Take 1 tablet by mouth daily 10/15/21   Mary Goff MD   [facility]) 600 MG extended release tablet Take 1 tablet by mouth 2 times daily 10/11/21   KAMALA Winters CNP   aspirin 81 MG chewable tablet Take 1 tablet by mouth daily 6/18/16   Clint Reza MD       Future Appointments   Date Time Provider Miryam Goncalves   8/18/2022  9:00 AM KAMALA Winters CNP [facility]   9/1/2022  5:00 PM Mary Goff MD [facility]   9/19/2022  8:10 AM Kumar Sears MD [address] [place] [address]

## 2022-08-22 ENCOUNTER — CARE COORDINATION (OUTPATIENT)
Dept: CARE COORDINATION | Age: 67
End: 2022-08-22

## 2022-08-24 NOTE — CARE COORDINATION
Ambulatory Care Coordination Note  8/24/2022    ACC: Bridgette Rocha, RN    Summary Note: Attempted to reach [facility] for follow up. Left a message on his voicemail with call back number. Will try to reach him next week. Lab Results       None            Care Coordination Interventions    Referral from Primary Care Provider: No  Suggested Interventions and Community Resources  No Identified Needs          Goals Addressed    None         Prior to Admission medications    Medication Sig Start Date End Date Taking? Authorizing Provider   Benzocaine-Menthol (CEPACOL) 15-2.3 MG LOZG Take 1 lozenge by mouth every 2 hours as needed (Sore Throat) 7/31/22   Cyn Byers DO   dicyclomine (BENTYL) 20 MG tablet Take 1 tablet by mouth 3 times daily as needed (prn) 7/25/22   KAMALA Carrasco CNP   potassium chloride (KLOR-CON M) 20 MEQ extended release tablet Take 1 tablet by mouth in the morning for 7 days.  7/25/22 8/1/22  KAMALA Carrasco CNP   Testosterone Enanthate (XYOSTED) 75 MG/0.5ML SOAJ Inject 1 pen into the skin once a week 7/18/22   Harpreet Simon MD   carvedilol (COREG) 6.25 MG tablet TAKE 1 TABLET BY MOUTH  TWICE DAILY WITH MEALS 6/30/22   Alexander Carson MD   amLODIPine (NORVASC) 5 MG tablet TAKE 1 TABLET BY MOUTH ONCE DAILY 6/30/22   Alexander Carson MD   atorvastatin (LIPITOR) 20 MG tablet TAKE 1 TABLET BY MOUTH AT  BEDTIME 6/30/22   Alexander Carson MD   lisinopril (PRINIVIL;ZESTRIL) 5 MG tablet TAKE 1 TABLET BY MOUTH ONCE DAILY 6/30/22   Alexander Carson MD   tadalafil (CIALIS) 20 MG tablet Take 1 tablet by mouth daily as needed for Erectile Dysfunction 6/13/22   Harpreet Simon MD   vitamin D (ERGOCALCIFEROL) 1.25 MG (08665 UT) CAPS capsule TAKE 1 CAPSULE BY MOUTH  WEEKLY 5/12/22   Alexander Carson MD   pantoprazole (PROTONIX) 40 MG tablet take 1 tablet by mouth once daily 1/17/22   Alexander Carson MD   fluticasone (FLONASE) 50 MCG/ACT nasal spray 2 sprays into each nostril daily 10/15/21   Lobo Catalan MD   loratadine (CLARITIN) 10 MG tablet Take 1 tablet by mouth daily 10/15/21   Lobo Catalan MD   [facility]) 600 MG extended release tablet Take 1 tablet by mouth 2 times daily 10/11/21   KAMALA Wolff CNP   aspirin 81 MG chewable tablet Take 1 tablet by mouth daily 6/18/16   Jia Rendon MD       Future Appointments   Date Time Provider Miryam Goncalves   9/6/2022  8:30 AM KAMALA Wolff CNP [facility]   9/19/2022  8:10 AM Rhona Snellen, MD [facility]   9/30/2022  8:30 AM Lobo Catalan MD [facility]

## 2022-08-27 ENCOUNTER — HOSPITAL ENCOUNTER (INPATIENT)
Age: 67
LOS: 1 days | Discharge: HOME | DRG: 247 | End: 2022-08-30
Attending: EMERGENCY MEDICINE | Admitting: EMERGENCY MEDICINE
Payer: MEDICARE

## 2022-08-27 ENCOUNTER — APPOINTMENT (OUTPATIENT)
Dept: GENERAL RADIOLOGY | Age: 67
DRG: 247 | End: 2022-08-27
Payer: MEDICARE

## 2022-08-27 ENCOUNTER — APPOINTMENT (OUTPATIENT)
Dept: CT IMAGING | Age: 67
DRG: 247 | End: 2022-08-27
Payer: MEDICARE

## 2022-08-27 DIAGNOSIS — R07.9 CHEST PAIN, UNSPECIFIED TYPE: Primary | ICD-10-CM

## 2022-08-27 LAB
ABSOLUTE EOS #: 0.28 K/UL (ref 0–0.44)
ABSOLUTE IMMATURE GRANULOCYTE: <0.03 K/UL (ref 0–0.3)
ABSOLUTE LYMPH #: 1.91 K/UL (ref 1.1–3.7)
ABSOLUTE MONO #: 0.59 K/UL (ref 0.1–1.2)
ALBUMIN SERPL-MCNC: 4.1 G/DL (ref 3.5–5.2)
ALBUMIN/GLOBULIN RATIO: 1.4 (ref 1–2.5)
ALP BLD-CCNC: 66 U/L (ref 40–129)
ALT SERPL-CCNC: 35 U/L (ref 5–41)
ANION GAP SERPL CALCULATED.3IONS-SCNC: 11 MMOL/L (ref 9–17)
AST SERPL-CCNC: 21 U/L
BASOPHILS # BLD: 1 % (ref 0–2)
BASOPHILS ABSOLUTE: 0.04 K/UL (ref 0–0.2)
BILIRUB SERPL-MCNC: 0.32 MG/DL (ref 0.3–1.2)
BUN BLDV-MCNC: 16 MG/DL (ref 8–23)
CALCIUM SERPL-MCNC: 9 MG/DL (ref 8.6–10.4)
CHLORIDE BLD-SCNC: 103 MMOL/L (ref 98–107)
CO2: 22 MMOL/L (ref 20–31)
CREAT SERPL-MCNC: 1.01 MG/DL (ref 0.7–1.2)
EOSINOPHILS RELATIVE PERCENT: 4 % (ref 1–4)
GFR AFRICAN AMERICAN: >60 ML/MIN
GFR NON-AFRICAN AMERICAN: >60 ML/MIN
GFR SERPL CREATININE-BSD FRML MDRD: ABNORMAL ML/MIN/{1.73_M2}
GLUCOSE BLD-MCNC: 135 MG/DL (ref 70–99)
HCT VFR BLD CALC: 42.9 % (ref 40.7–50.3)
HEMOGLOBIN: 14.6 G/DL (ref 13–17)
IMMATURE GRANULOCYTES: 0 %
LIPASE: 27 U/L (ref 13–60)
LYMPHOCYTES # BLD: 29 % (ref 24–43)
MCH RBC QN AUTO: 32.4 PG (ref 25.2–33.5)
MCHC RBC AUTO-ENTMCNC: 34 G/DL (ref 28.4–34.8)
MCV RBC AUTO: 95.3 FL (ref 82.6–102.9)
MONOCYTES # BLD: 9 % (ref 3–12)
NRBC AUTOMATED: 0 PER 100 WBC
PDW BLD-RTO: 13.2 % (ref 11.8–14.4)
PLATELET # BLD: 166 K/UL (ref 138–453)
PMV BLD AUTO: 11.7 FL (ref 8.1–13.5)
POTASSIUM SERPL-SCNC: 4.3 MMOL/L (ref 3.7–5.3)
PRO-BNP: 29 PG/ML
RBC # BLD: 4.5 M/UL (ref 4.21–5.77)
SARS-COV-2, RAPID: NOT DETECTED
SEG NEUTROPHILS: 57 % (ref 36–65)
SEGMENTED NEUTROPHILS ABSOLUTE COUNT: 3.84 K/UL (ref 1.5–8.1)
SODIUM BLD-SCNC: 136 MMOL/L (ref 135–144)
SPECIMEN DESCRIPTION: NORMAL
TOTAL PROTEIN: 7 G/DL (ref 6.4–8.3)
TROPONIN, HIGH SENSITIVITY: 10 NG/L (ref 0–22)
TROPONIN, HIGH SENSITIVITY: 10 NG/L (ref 0–22)
WBC # BLD: 6.7 K/UL (ref 3.5–11.3)

## 2022-08-27 PROCEDURE — 87635 SARS-COV-2 COVID-19 AMP PRB: CPT

## 2022-08-27 PROCEDURE — 6370000000 HC RX 637 (ALT 250 FOR IP): Performed by: STUDENT IN AN ORGANIZED HEALTH CARE EDUCATION/TRAINING PROGRAM

## 2022-08-27 PROCEDURE — 96372 THER/PROPH/DIAG INJ SC/IM: CPT

## 2022-08-27 PROCEDURE — 6360000002 HC RX W HCPCS: Performed by: STUDENT IN AN ORGANIZED HEALTH CARE EDUCATION/TRAINING PROGRAM

## 2022-08-27 PROCEDURE — 85025 COMPLETE CBC W/AUTO DIFF WBC: CPT

## 2022-08-27 PROCEDURE — G0378 HOSPITAL OBSERVATION PER HR: HCPCS

## 2022-08-27 PROCEDURE — 83690 ASSAY OF LIPASE: CPT

## 2022-08-27 PROCEDURE — 80053 COMPREHEN METABOLIC PANEL: CPT

## 2022-08-27 PROCEDURE — 84484 ASSAY OF TROPONIN QUANT: CPT

## 2022-08-27 PROCEDURE — 71275 CT ANGIOGRAPHY CHEST: CPT

## 2022-08-27 PROCEDURE — 93005 ELECTROCARDIOGRAM TRACING: CPT | Performed by: STUDENT IN AN ORGANIZED HEALTH CARE EDUCATION/TRAINING PROGRAM

## 2022-08-27 PROCEDURE — 2580000003 HC RX 258: Performed by: STUDENT IN AN ORGANIZED HEALTH CARE EDUCATION/TRAINING PROGRAM

## 2022-08-27 PROCEDURE — 83880 ASSAY OF NATRIURETIC PEPTIDE: CPT

## 2022-08-27 PROCEDURE — 6360000004 HC RX CONTRAST MEDICATION: Performed by: STUDENT IN AN ORGANIZED HEALTH CARE EDUCATION/TRAINING PROGRAM

## 2022-08-27 PROCEDURE — 71045 X-RAY EXAM CHEST 1 VIEW: CPT

## 2022-08-27 PROCEDURE — 99285 EMERGENCY DEPT VISIT HI MDM: CPT

## 2022-08-27 RX ORDER — ATORVASTATIN CALCIUM 20 MG/1
20 TABLET, FILM COATED ORAL DAILY
Status: DISCONTINUED | OUTPATIENT
Start: 2022-08-28 | End: 2022-08-30 | Stop reason: HOSPADM

## 2022-08-27 RX ORDER — MAGNESIUM HYDROXIDE/ALUMINUM HYDROXICE/SIMETHICONE 120; 1200; 1200 MG/30ML; MG/30ML; MG/30ML
30 SUSPENSION ORAL ONCE
Status: COMPLETED | OUTPATIENT
Start: 2022-08-27 | End: 2022-08-27

## 2022-08-27 RX ORDER — SODIUM CHLORIDE 0.9 % (FLUSH) 0.9 %
5-40 SYRINGE (ML) INJECTION PRN
Status: DISCONTINUED | OUTPATIENT
Start: 2022-08-27 | End: 2022-08-30 | Stop reason: HOSPADM

## 2022-08-27 RX ORDER — SODIUM CHLORIDE 9 MG/ML
25 INJECTION, SOLUTION INTRAVENOUS PRN
Status: DISCONTINUED | OUTPATIENT
Start: 2022-08-27 | End: 2022-08-30 | Stop reason: HOSPADM

## 2022-08-27 RX ORDER — AMLODIPINE BESYLATE 5 MG/1
5 TABLET ORAL DAILY
Status: DISCONTINUED | OUTPATIENT
Start: 2022-08-28 | End: 2022-08-30 | Stop reason: HOSPADM

## 2022-08-27 RX ORDER — SODIUM CHLORIDE 0.9 % (FLUSH) 0.9 %
5-40 SYRINGE (ML) INJECTION EVERY 12 HOURS SCHEDULED
Status: DISCONTINUED | OUTPATIENT
Start: 2022-08-27 | End: 2022-08-30 | Stop reason: HOSPADM

## 2022-08-27 RX ORDER — LIDOCAINE HYDROCHLORIDE 20 MG/ML
15 SOLUTION OROPHARYNGEAL ONCE
Status: COMPLETED | OUTPATIENT
Start: 2022-08-27 | End: 2022-08-27

## 2022-08-27 RX ORDER — SODIUM CHLORIDE 9 MG/ML
INJECTION, SOLUTION INTRAVENOUS CONTINUOUS
Status: DISCONTINUED | OUTPATIENT
Start: 2022-08-27 | End: 2022-08-30 | Stop reason: HOSPADM

## 2022-08-27 RX ORDER — POTASSIUM CHLORIDE 7.45 MG/ML
10 INJECTION INTRAVENOUS PRN
Status: DISCONTINUED | OUTPATIENT
Start: 2022-08-27 | End: 2022-08-30 | Stop reason: HOSPADM

## 2022-08-27 RX ORDER — ACETAMINOPHEN 325 MG/1
650 TABLET ORAL EVERY 6 HOURS PRN
Status: DISCONTINUED | OUTPATIENT
Start: 2022-08-27 | End: 2022-08-30 | Stop reason: HOSPADM

## 2022-08-27 RX ORDER — CARVEDILOL 12.5 MG/1
12.5 TABLET ORAL 2 TIMES DAILY
Status: DISCONTINUED | OUTPATIENT
Start: 2022-08-28 | End: 2022-08-30 | Stop reason: HOSPADM

## 2022-08-27 RX ORDER — ENOXAPARIN SODIUM 100 MG/ML
30 INJECTION SUBCUTANEOUS 2 TIMES DAILY
Status: DISCONTINUED | OUTPATIENT
Start: 2022-08-27 | End: 2022-08-30 | Stop reason: HOSPADM

## 2022-08-27 RX ORDER — ONDANSETRON 2 MG/ML
4 INJECTION INTRAMUSCULAR; INTRAVENOUS EVERY 4 HOURS PRN
Status: DISCONTINUED | OUTPATIENT
Start: 2022-08-27 | End: 2022-08-30 | Stop reason: HOSPADM

## 2022-08-27 RX ORDER — POLYETHYLENE GLYCOL 3350 17 G/17G
17 POWDER, FOR SOLUTION ORAL DAILY PRN
Status: DISCONTINUED | OUTPATIENT
Start: 2022-08-27 | End: 2022-08-30 | Stop reason: HOSPADM

## 2022-08-27 RX ORDER — FAMOTIDINE 20 MG/1
20 TABLET, FILM COATED ORAL 2 TIMES DAILY
Status: DISCONTINUED | OUTPATIENT
Start: 2022-08-27 | End: 2022-08-30 | Stop reason: HOSPADM

## 2022-08-27 RX ORDER — ACETAMINOPHEN 650 MG/1
650 SUPPOSITORY RECTAL EVERY 6 HOURS PRN
Status: DISCONTINUED | OUTPATIENT
Start: 2022-08-27 | End: 2022-08-30 | Stop reason: HOSPADM

## 2022-08-27 RX ORDER — POTASSIUM CHLORIDE 20 MEQ/1
40 TABLET, EXTENDED RELEASE ORAL PRN
Status: DISCONTINUED | OUTPATIENT
Start: 2022-08-27 | End: 2022-08-30 | Stop reason: HOSPADM

## 2022-08-27 RX ORDER — OXYCODONE HYDROCHLORIDE 5 MG/1
5 TABLET ORAL EVERY 6 HOURS PRN
Status: DISCONTINUED | OUTPATIENT
Start: 2022-08-27 | End: 2022-08-30 | Stop reason: HOSPADM

## 2022-08-27 RX ADMIN — LIDOCAINE HYDROCHLORIDE 15 ML: 20 SOLUTION ORAL; TOPICAL at 10:42

## 2022-08-27 RX ADMIN — ENOXAPARIN SODIUM 30 MG: 100 INJECTION SUBCUTANEOUS at 13:20

## 2022-08-27 RX ADMIN — ENOXAPARIN SODIUM 30 MG: 100 INJECTION SUBCUTANEOUS at 22:37

## 2022-08-27 RX ADMIN — FAMOTIDINE 20 MG: 20 TABLET, FILM COATED ORAL at 22:39

## 2022-08-27 RX ADMIN — SODIUM CHLORIDE: 9 INJECTION, SOLUTION INTRAVENOUS at 13:03

## 2022-08-27 RX ADMIN — OXYCODONE 5 MG: 5 TABLET ORAL at 12:56

## 2022-08-27 RX ADMIN — SODIUM CHLORIDE: 9 INJECTION, SOLUTION INTRAVENOUS at 20:00

## 2022-08-27 RX ADMIN — FAMOTIDINE 20 MG: 20 TABLET, FILM COATED ORAL at 12:56

## 2022-08-27 RX ADMIN — IOPAMIDOL 100 ML: 755 INJECTION, SOLUTION INTRAVENOUS at 11:26

## 2022-08-27 RX ADMIN — ALUMINUM HYDROXIDE, MAGNESIUM HYDROXIDE, AND SIMETHICONE 30 ML: 200; 200; 20 SUSPENSION ORAL at 10:42

## 2022-08-27 ASSESSMENT — PAIN SCALES - GENERAL
PAINLEVEL_OUTOF10: 0
PAINLEVEL_OUTOF10: 2
PAINLEVEL_OUTOF10: 0
PAINLEVEL_OUTOF10: 5

## 2022-08-27 ASSESSMENT — ENCOUNTER SYMPTOMS
COUGH: 0
CONSTIPATION: 0
COLOR CHANGE: 0
NAUSEA: 0
TROUBLE SWALLOWING: 0
DIARRHEA: 0
SHORTNESS OF BREATH: 0
ABDOMINAL PAIN: 0
VOMITING: 0
CHEST TIGHTNESS: 0
BACK PAIN: 0

## 2022-08-27 ASSESSMENT — PAIN - FUNCTIONAL ASSESSMENT: PAIN_FUNCTIONAL_ASSESSMENT: 0-10

## 2022-08-27 ASSESSMENT — PAIN DESCRIPTION - DESCRIPTORS: DESCRIPTORS: BURNING

## 2022-08-27 ASSESSMENT — PAIN SCALES - WONG BAKER: WONGBAKER_NUMERICALRESPONSE: 0

## 2022-08-27 ASSESSMENT — PAIN DESCRIPTION - LOCATION: LOCATION: CHEST

## 2022-08-27 NOTE — ED NOTES
Pt a&ox4, RR even and nonlabored. Pt resting on cot watching tv. Pt denies needs at this time.      Anibal Cuevas RN  08/27/22 [id number]

## 2022-08-27 NOTE — ED PROVIDER NOTES
all key elements of the E/M (history, physical exam, and MDM). Additional findings are as noted. For APC cases I have personally evaluated and examined the patient in conjunction with the APC and agree with the treatment plan and disposition of the patient as recorded by the APC.     Sagar Douglass MD  Attending Emergency  Physician        Martha Bell MD  08/27/22 [id number]

## 2022-08-27 NOTE — CARE COORDINATION
08/27/22 1836   Service Assessment   Patient Orientation Alert and Oriented   Cognition Alert   History Provided By Patient   Primary Caregiver Self   Support Systems Family Members   PCP Verified by CM Yes   Last Visit to PCP Within last 3 months   Prior Functional Level Independent in ADLs/IADLs   Current Functional Level Independent in ADLs/IADLs   Can patient return to prior living arrangement Yes   Ability to make needs known: Good   Social/Functional History   Lives With Alone   Type of [address] One level   ADL Assistance Independent   Homemaking Assistance Independent   Homemaking Responsibilities Yes   Ambulation Assistance Independent   Transfer Assistance Independent   Active  Yes   Discharge Planning   Type of [address] [place] [address] Prior To Admission None   Potential Assistance Needed N/A   Potential Assistance Purchasing Medications No   Type of Home Care Services None   Patient expects to be discharged to: House   Condition of Participation: Discharge Planning   The Plan for Transition of Care is related to the following treatment goals: increased comfort level   Home independently

## 2022-08-27 NOTE — ED NOTES
Pt received dinner tray. PT A&Ox4, RR even and nonlabored. Pt resting on cot with full monitor on. Pt denies other needs at this time.      Yuan Gonzalez RN  08/27/22 4060

## 2022-08-27 NOTE — ED NOTES
Pt updated about plan of care. Pt A&Ox4, RR even and nonlabored. Pt resting on cot with call light in reach. Pt denies needs at this time.      Flower Pop RN  08/27/22 7905

## 2022-08-27 NOTE — ED NOTES
Pt A&Ox4, RR even and nonlabored. Pt resting on cot with full cardiac monitor. Pt denies needs at this time.      Leonard Villaseñor RN  08/27/22 2030

## 2022-08-27 NOTE — CONSULTS
[facility]    Consult / H&P               Today's Date: 8/27/2022  Patient Name: Yinka Blank  Date of admission: 8/27/2022 10:11 AM  Patient's age: 77 y.o., 1955  Admission Dx: Chest pain [R07.9]    Reason for Consult:  Cardiac evaluation    Requesting Physician: Dionte Ramey MD    CHIEF COMPLAINT:  chest pain     History Obtained From:  patient, electronic medical record    HISTORY OF PRESENT ILLNESS:    This patient 24-year-old male with past medical history of hypertension, former smoker presented to the ER with 2 weeks history of substernal intermittent chest pain and epigastric pain. Tums  and pepcid does not help. He reports that symptoms are getting worse. EKG showed normal sinus rhythm, left axis deviation. No ST-T wave changes. Troponin normal.  CT PE done showed coronary calcification, central bronchial wall thickening in the lower lobe suggestive of bronchitis. Patient denied any previous history of cardiac history. Past Medical History:   has a past medical history of Acute cholecystitis, Essential hypertension, Hyperlipidemia, Hyperparathyroidism ([address] Obesity (BMI 30-39.9), Osteoporosis, Sciatica, Stroke ([address] and Vertebral fracture, osteoporotic ([address] Past Surgical History:   has a past surgical history that includes Vasectomy; Colonoscopy (3/19/2014); hernia repair; Colonoscopy (N/A, 2/11/2020); hemicolectomy (Right, 2/12/2020); omentum resection (2/12/2020); IR Biliary Drain External (2/1/2021); Cholecystectomy (N/A, 2/19/2021); and ERCP (N/A, 2/19/2021). Home Medications:    Prior to Admission medications    Medication Sig Start Date End Date Taking?  Authorizing Provider   Benzocaine-Menthol (CEPACOL) 15-2.3 MG LOZG Take 1 lozenge by mouth every 2 hours as needed (Sore Throat) 7/31/22   Jaden Byers DO   dicyclomine (BENTYL) 20 MG tablet Take 1 tablet by mouth 3 times daily as needed (prn) 7/25/22   KAMALA Douglas - CNP   potassium chloride (KLOR-CON M) 20 MEQ extended release tablet Take 1 tablet by mouth in the morning for 7 days. 7/25/22 8/1/22  KAMALA Sequeira CNP   Testosterone Enanthate (XYOSTED) 75 MG/0.5ML SOAJ Inject 1 pen into the skin once a week 7/18/22   Geni Kumar MD   carvedilol (COREG) 6.25 MG tablet TAKE 1 TABLET BY MOUTH  TWICE DAILY WITH MEALS 6/30/22   Nimisha Rodriguez MD   amLODIPine (NORVASC) 5 MG tablet TAKE 1 TABLET BY MOUTH ONCE DAILY 6/30/22   Nimisha Rodriguez MD   atorvastatin (LIPITOR) 20 MG tablet TAKE 1 TABLET BY MOUTH AT  BEDTIME 6/30/22   Nimisha Rodriguez MD   lisinopril (PRINIVIL;ZESTRIL) 5 MG tablet TAKE 1 TABLET BY MOUTH ONCE DAILY 6/30/22   Nimisha Rodriguez MD   tadalafil (CIALIS) 20 MG tablet Take 1 tablet by mouth daily as needed for Erectile Dysfunction 6/13/22   Geni Kumar MD   vitamin D (ERGOCALCIFEROL) 1.25 MG (68004 UT) CAPS capsule TAKE 1 CAPSULE BY MOUTH  WEEKLY 5/12/22   Nimisha Rodriguez MD   pantoprazole (PROTONIX) 40 MG tablet take 1 tablet by mouth once daily 1/17/22   Nimisha Rodriguez MD   fluticasone [facility]) 50 MCG/ACT nasal spray 2 sprays into each nostril daily 10/15/21   Nimisha Rodriguez MD   loratadine (CLARITIN) 10 MG tablet Take 1 tablet by mouth daily 10/15/21   Nimisha Rodriguez MD   guaiFENesin [facility]) 600 MG extended release tablet Take 1 tablet by mouth 2 times daily 10/11/21   KAMALA Sequeira CNP   aspirin 81 MG chewable tablet Take 1 tablet by mouth daily 6/18/16   Maureen Lombardi MD     Allergies:  Patient has no known allergies. Social History:   reports that he quit smoking about 2 years ago. He has a 40.00 pack-year smoking history. He has never used smokeless tobacco. He reports current alcohol use. He reports that he does not use drugs. Family History: family history includes Heart Disease in his father; No Known Problems in his mother. REVIEW OF SYSTEMS:    Constitutional: there has been no unanticipated weight loss.  There's been No change in energy level, No change in activity level. Eyes: No visual changes or diplopia. No scleral icterus. ENT: No Headaches  Cardiovascular: as above  Respiratory: No previous pulmonary problems, No cough  Gastrointestinal: No abdominal pain. No change in bowel or bladder habits. Genitourinary: No dysuria, trouble voiding, or hematuria. Musculoskeletal:  No gait disturbance, No weakness or joint complaints. Integumentary: No rash or pruritis. Neurological: No headache, diplopia, change in muscle strength, numbness or tingling. No change in gait, balance, coordination, mood, affect, memory, mentation, behavior. Psychiatric: No anxiety, or depression. Endocrine: No temperature intolerance. No excessive thirst, fluid intake, or urination. No tremor. Hematologic/Lymphatic: No abnormal bruising or bleeding, blood clots or swollen lymph nodes. Allergic/Immunologic: No nasal congestion or hives. PHYSICAL EXAM:      BP (!) 113/99   Pulse 68   Temp 97.2 °F (36.2 °C) (Oral)   Resp 18   Ht 5' 7\" (1.702 m)   Wt 225 lb (102.1 kg)   SpO2 92%   BMI 35.24 kg/m²    Constitutional and General Appearance: alert, cooperative, no distress and appears stated age  HEENT: PERRL, no cervical lymphadenopathy. No masses palpable. Normal oral mucosa  Respiratory:  Normal excursion and expansion without use of accessory muscles  Resp Auscultation: Good respiratory effort. No for increased work of breathing. On auscultation: clear to auscultation bilaterally  Cardiovascular: The apical impulse is not displaced  Heart tones are crisp and normal. regular S1 and S2.  Jugular venous pulsation Normal  The carotid upstroke is normal in amplitude and contour without delay or bruit  Peripheral pulses are symmetrical and full   Abdomen:   No masses or tenderness  Bowel sounds present  Extremities:   No Cyanosis or Clubbing   Lower extremity edema: No   Skin: Warm and dry  Neurological:  Alert and oriented.   Moves all extremities well  No abnormalities of mood, affect, memory, mentation, or behavior are noted    DATA:    Diagnostics:      Stress test: 6/17/2016  1. Normal study. 2. No evidence for left ventricular stress-induced ischemia. 3. No left ventricular wall infarct. 4. Left ventricular ejection fraction of 54%. ECHO:   TTE 01/30/21  Summary  Normal left ventricle size and function with an estimated EF > 55%. No segmental wall motion abnormalities seen. Moderate left ventricular hypertrophy. Normal right ventricular size and function. Trivial mitral regurgitation. Trivial tricuspid regurgitation. Normal right ventricular systolic pressure. Aortic root is mildly dilated. (3.9 cm)  No significant pericardial effusion is seen. Labs:   CBC:   Recent Labs     08/27/22  1029   WBC 6.7   HGB 14.6   HCT 42.9        BMP:   Recent Labs     08/27/22  1029      K 4.3   CO2 22   BUN 16   CREATININE 1.01   LABGLOM >60   GLUCOSE 135*     BNP: No results for input(s): BNP in the last 72 hours. PT/INR: No results for input(s): PROTIME, INR in the last 72 hours. APTT:No results for input(s): APTT in the last 72 hours. CARDIAC ENZYMES:No results for input(s): CKTOTAL, CKMB, CKMBINDEX, TROPONINI in the last 72 hours. FASTING LIPID PANEL:  Lab Results   Component Value Date/Time    HDL 41 06/08/2022 06:32 AM    TRIG 197 06/08/2022 06:32 AM     LIVER PROFILE:  Recent Labs     08/27/22  1029   AST 21   ALT 35   LABALBU 4.1     IMPRESSION:    Atypical  chest pain. EKG negative for ischemic changes, tropes normal.  No previous cardiac hx  Hypertension   Ex smoker, quit 3 years ago  Family hx + for CABG in father   GERD       RECOMMENDATIONS:  Given multiple risk factor and chest pain, need further evaluation.  Will discuss with attending need for cath        Plan to be discussed with rounding attending      Rico Garcia MD. PGY- 4  Cardiology Fellow  [facility], [place], [place]  8/27/2022, 1:36 PM    I performed a history and physical examination of the patient and discussed management with the resident. I reviewed the residents note and agree with the documented findings and plan of care. Any areas of disagreement are noted on the chart. I was personally present for the key portions of any procedures. I have documented in the chart those procedures where I was not present during the key portions. I have personally evaluated this patient and have completed at least one if not all key elements of the E/M (history, physical exam, and MDM). Additional findings are as noted. Progressive symptoms concerning for angina. Has evidence of CAD on CT chest. Cor risk factors. FH of CAD. History of smoking. ASA. Add imdur 15mg po qday. Need cardiac cath for risk stratification. Risk and benefits of cardiac catheterization were discussed in detail. Risk of bleeding, requiring blood transfusion, vascular complication requiring surgery, renal insufficieny with need of dialysis, CVA, MI, death and anesthesia complications including intubation were discussed. Patient agrees to proceed and verbalizes understanding. If no significant CAD on cath, than recommend GI evaluation.     Denilson England MD

## 2022-08-27 NOTE — LETTER
[place] [facility]  [address]  [facility] [place] [address]  Phone: [phone]        August 30, 2022     Patient: Ethridge Cushing   YOB: 1955   Date of Visit: 8/27/2022       To Whom It May Concern: It is my medical opinion that Wayne Ying may return to work on 9/06/2022. If you have any questions or concerns, please don't hesitate to call.     Sincerely,      Robin Maier, DO

## 2022-08-27 NOTE — ED NOTES
Covid sample collected, labeled, and sent to lab. Pt A&Ox4, RR even and nonlabored. Pt resting on cot watching tv. Pt denies needs at this time.        Deb Chacon RN  08/27/22 1066

## 2022-08-27 NOTE — ED NOTES
Pt ambulated back from bathroom. Pt A&Ox4, RR even and nonlabored. Pt resting on cot with full cardiac monitor on. Pt denies other needs at this time.      Haritha Reeves RN  08/27/22 5084

## 2022-08-27 NOTE — ED NOTES
Report given to [facility]. All questions answered at this time.       Lisseth Warren RN  08/27/22 1927

## 2022-08-27 NOTE — LETTER
[place] [facility]  [address]  [facility] [place] [address]  Phone: [phone]          August 30, 2022     Patient: Diana Ramires   YOB: 1955   Date of Visit: 8/27/2022       To Whom It May Concern: It is my medical opinion that Omega Mccurdy may return to work on 9/5/22. If you have any questions or concerns, please don't hesitate to call [phone].     Sincerely,    Dinaa So RN

## 2022-08-27 NOTE — ED PROVIDER NOTES
[address]  ED  Emergency Department Encounter  EmergencyMedicine Resident     Pt Name:Samuel Flood  MRN: [id number]  [place] 1955  Date of evaluation: 8/27/22  PCP:  Laura Malave MD    This patient was evaluated in the Emergency Department for symptoms described in the history of present illness. The patient was evaluated in the context of the global COVID-19 pandemic, which necessitated consideration that the patient might be at risk for infection with the SARS-CoV-2 virus that causes COVID-19. Institutional protocols and algorithms that pertain to the evaluation of patients at risk for COVID-19 are in a state of rapid change based on information released by regulatory bodies including the CDC and federal and state organizations. These policies and algorithms were followed during the patient's care in the ED. CHIEF COMPLAINT       Chief Complaint   Patient presents with    Shortness of Breath    Chest Pain     Chest pain x a couple of days, radiates to back, loose stools this am       HISTORY OF PRESENT ILLNESS  (Location/Symptom, Timing/Onset, Context/Setting, Quality, Duration, Modifying Factors, Severity.)      Danay So is a 77 y.o. male patient presents with 2 weeks of of substernal chest pain that will periodically radiate to his back. Patient also states he has had some nausea and diaphoresis with this. He denies any exertional shortness of breath. He states nothing makes the pain better or worse. He describes it pain as pressure. He denies any history of blood clots. He does have a history of hypertension, hyperlipidemia and former smoker and father has a history of CABG. patient's last echocardiogram was roughly 2 years ago showed ejection fraction 55%.   Last stress test was done in 2016 that was low risk    PAST MEDICAL / SURGICAL / SOCIAL / FAMILY HISTORY      has a past medical history of Acute cholecystitis, Essential hypertension, Hyperlipidemia, Hyperparathyroidism ([address] Obesity (BMI 30-39.9), Osteoporosis, Sciatica, Stroke ([address] and Vertebral fracture, osteoporotic ([address] has a past surgical history that includes Vasectomy; Colonoscopy (3/19/2014); hernia repair; Colonoscopy (N/A, 2020); hemicolectomy (Right, 2020); omentum resection (2020); IR Biliary Drain External (2021); Cholecystectomy (N/A, 2021); and ERCP (N/A, 2021). Social History     Socioeconomic History    Marital status: Single     Spouse name: Not on file    Number of children: Not on file    Years of education: Not on file    Highest education level: Not on file   Occupational History    Not on file   Tobacco Use    Smoking status: Former     Packs/day: 1.00     Years: 40.00     Pack years: 40.00     Types: Cigarettes     Quit date: 2020     Years since quittin.5    Smokeless tobacco: Never   Vaping Use    Vaping Use: Never used   Substance and Sexual Activity    Alcohol use: Yes     Comment: rarely    Drug use: No    Sexual activity: Yes     Partners: Female   Other Topics Concern    Not on file   Social History Narrative    Not on file     Social Determinants of Health     Financial Resource Strain: Low Risk     Difficulty of Paying Living Expenses: Not hard at all   Food Insecurity: No Food Insecurity    Worried About Running Out of Food in the Last Year: Never true    Ran Out of Food in the Last Year: Never true   Transportation Needs: No Transportation Needs    Lack of Transportation (Medical): No    Lack of Transportation (Non-Medical): No   Physical Activity: Not on file   Stress: Stress Concern Present    Feeling of Stress :  To some extent   Social Connections: Not on file   Intimate Partner Violence: Not on file   Housing Stability: Low Risk     Unable to Pay for Housing in the Last Year: No    Number of Places Lived in the Last Year: 1    Unstable Housing in the Last Year: No       Family History   Problem Relation Age of Onset Heart Disease Father     No Known Problems Mother        Allergies:  Patient has no known allergies. Home Medications:  Prior to Admission medications    Medication Sig Start Date End Date Taking? Authorizing Provider   Benzocaine-Menthol (CEPACOL) 15-2.3 MG LOZG Take 1 lozenge by mouth every 2 hours as needed (Sore Throat) 7/31/22   Jama Byers DO   dicyclomine (BENTYL) 20 MG tablet Take 1 tablet by mouth 3 times daily as needed (prn) 7/25/22   Sheryle Filler, APRN - CNP   potassium chloride (KLOR-CON M) 20 MEQ extended release tablet Take 1 tablet by mouth in the morning for 7 days.  7/25/22 8/1/22  Sheryle Filler, APRN - CNP   Testosterone Enanthate (XYOSTED) 75 MG/0.5ML SOAJ Inject 1 pen into the skin once a week 7/18/22   Kimberly Nation MD   carvedilol (COREG) 6.25 MG tablet TAKE 1 TABLET BY MOUTH  TWICE DAILY WITH MEALS 6/30/22   Mallory Bansal MD   amLODIPine (NORVASC) 5 MG tablet TAKE 1 TABLET BY MOUTH ONCE DAILY 6/30/22   Mallory Bansal MD   atorvastatin (LIPITOR) 20 MG tablet TAKE 1 TABLET BY MOUTH AT  BEDTIME 6/30/22   Mallory Bansal MD   lisinopril (PRINIVIL;ZESTRIL) 5 MG tablet TAKE 1 TABLET BY MOUTH ONCE DAILY 6/30/22   Mallory Bansal MD   tadalafil (CIALIS) 20 MG tablet Take 1 tablet by mouth daily as needed for Erectile Dysfunction 6/13/22   Kimberly Nation MD   vitamin D (ERGOCALCIFEROL) 1.25 MG (94473 UT) CAPS capsule TAKE 1 CAPSULE BY MOUTH  WEEKLY 5/12/22   Mallory Bansal MD   pantoprazole (PROTONIX) 40 MG tablet take 1 tablet by mouth once daily 1/17/22   Mallory Bansal MD   fluticasone [facility]) 50 MCG/ACT nasal spray 2 sprays into each nostril daily 10/15/21   Mallory Bansal MD   loratadine (CLARITIN) 10 MG tablet Take 1 tablet by mouth daily 10/15/21   Mallory Bansal MD   guaiFENesin [facility]) 600 MG extended release tablet Take 1 tablet by mouth 2 times daily 10/11/21   Sheryle Filler, APRN - CNP   aspirin 81 MG chewable tablet Take 1 tablet by mouth daily 6/18/16   Angela Strauss MD       REVIEW OF SYSTEMS    (2-9 systems for level 4, 10 or more for level 5)      Review of Systems   Constitutional:  Negative for appetite change, fatigue and fever. HENT:  Negative for congestion and trouble swallowing. Respiratory:  Negative for cough, chest tightness and shortness of breath. Cardiovascular:  Positive for chest pain. Negative for leg swelling. Gastrointestinal:  Negative for abdominal pain, constipation, diarrhea, nausea and vomiting. Genitourinary:  Negative for dysuria. Musculoskeletal:  Negative for back pain. Skin:  Negative for color change and rash. Neurological:  Negative for dizziness, weakness and headaches. PHYSICAL EXAM   (up to 7 for level 4, 8 or more for level 5)      INITIAL VITALS:   /71   Pulse 66   Temp 97.2 °F (36.2 °C) (Oral)   Resp 15   Ht 5' 7\" (1.702 m)   Wt 225 lb (102.1 kg)   SpO2 93%   BMI 35.24 kg/m²     Physical Exam  Constitutional:       General: He is not in acute distress. HENT:      Head: Normocephalic and atraumatic. Nose: No congestion. Eyes:      General: No scleral icterus. Pupils: Pupils are equal, round, and reactive to light. Cardiovascular:      Rate and Rhythm: Normal rate. Heart sounds: No murmur heard. No friction rub. No gallop. Pulmonary:      Breath sounds: No wheezing, rhonchi or rales. Abdominal:      General: Abdomen is flat. There is no distension. Palpations: Abdomen is soft. Tenderness: There is no abdominal tenderness. There is no guarding or rebound. Musculoskeletal:         General: No swelling. Cervical back: Normal range of motion. Right lower leg: No edema. Left lower leg: No edema. Skin:     Findings: No rash. Neurological:      General: No focal deficit present.        DIFFERENTIAL  DIAGNOSIS     PLAN (LABS / IMAGING / EKG):  Orders Placed This Encounter   Procedures    XR CHEST PORTABLE    CTA CHEST Davina Connell CONTRAST    CBC with Auto Differential    Lipase    Troponin    CMP    Brain Natriuretic Peptide    Troponin    EKG 12 Lead       MEDICATIONS ORDERED:  Orders Placed This Encounter   Medications    aluminum & magnesium hydroxide-simethicone (MAALOX) 200-200-20 MG/5ML suspension 30 mL    lidocaine viscous hcl (XYLOCAINE) 2 % solution 15 mL    iopamidol (ISOVUE-370) 76 % injection 100 mL       DIAGNOSTIC RESULTS / EMERGENCY DEPARTMENT COURSE / MDM   LAB RESULTS:  Results for orders placed or performed during the hospital encounter of 08/27/22   CBC with Auto Differential   Result Value Ref Range    WBC 6.7 3.5 - 11.3 k/uL    RBC 4.50 4.21 - 5.77 m/uL    Hemoglobin 14.6 13.0 - 17.0 g/dL    Hematocrit 42.9 40.7 - 50.3 %    MCV 95.3 82.6 - 102.9 fL    MCH 32.4 25.2 - 33.5 pg    MCHC 34.0 28.4 - 34.8 g/dL    RDW 13.2 11.8 - 14.4 %    Platelets 864 527 - 724 k/uL    MPV 11.7 8.1 - 13.5 fL    NRBC Automated 0.0 0.0 per 100 WBC    Seg Neutrophils 57 36 - 65 %    Lymphocytes 29 24 - 43 %    Monocytes 9 3 - 12 %    Eosinophils % 4 1 - 4 %    Basophils 1 0 - 2 %    Immature Granulocytes 0 0 %    Segs Absolute 3.84 1.50 - 8.10 k/uL    Absolute Lymph # 1.91 1.10 - 3.70 k/uL    Absolute Mono # 0.59 0.10 - 1.20 k/uL    Absolute Eos # 0.28 0.00 - 0.44 k/uL    Basophils Absolute 0.04 0.00 - 0.20 k/uL    Absolute Immature Granulocyte <0.03 0.00 - 0.30 k/uL   Lipase   Result Value Ref Range    Lipase 27 13 - 60 U/L   Troponin   Result Value Ref Range    Troponin, High Sensitivity 10 0 - 22 ng/L   CMP   Result Value Ref Range    Glucose 135 (H) 70 - 99 mg/dL    BUN 16 8 - 23 mg/dL    Creatinine 1.01 0.70 - 1.20 mg/dL    Calcium 9.0 8.6 - 10.4 mg/dL    Sodium 136 135 - 144 mmol/L    Potassium 4.3 3.7 - 5.3 mmol/L    Chloride 103 98 - 107 mmol/L    CO2 22 20 - 31 mmol/L    Anion Gap 11 9 - 17 mmol/L    Alkaline Phosphatase 66 40 - 129 U/L    ALT 35 5 - 41 U/L    AST 21 <40 U/L    Total Bilirubin 0.32 0.3 - 1.2 mg/dL    Total Protein 7.0 6.4 - 8.3 g/dL    Albumin 4.1 3.5 - 5.2 g/dL    Albumin/Globulin Ratio 1.4 1.0 - 2.5    GFR Non-African American >60 >60 mL/min    GFR African American >60 >60 mL/min    GFR Comment         Brain Natriuretic Peptide   Result Value Ref Range    Pro-BNP 29 <300 pg/mL   Troponin   Result Value Ref Range    Troponin, High Sensitivity 10 0 - 22 ng/L       RADIOLOGY:  CTA CHEST W WO CONTRAST    Result Date: 8/27/2022  1. No thoracic aortic aneurysm or dissection. No acute aortic abnormality. 2.  Coronary artery calcification. 3.  Scar and atelectasis. Mild emphysematous changes. 4.  Central bronchial wall thickening in the lower lobes. Findings suggest bronchitis. 5.  Severe hepatic steatosis. Small splenic artery aneurysm. XR CHEST PORTABLE    Result Date: 8/27/2022  No acute cardiopulmonary process. EKG Interpretation    Interpreted by myself    Rhythm: normal sinus   Rate: normal  Axis: normal  Ectopy: none  Conduction: normal  ST Segments: normal  T Waves: normal  Q Waves: none    Clinical Impression: no acute changes    All EKG's are interpreted by the Emergency Department Physician who either signs or Co-signs this chart in the absence of a cardiologist.      EMERGENCY DEPARTMENT COURSE:  ED Course as of 08/27/22 1237   Sat Aug 27, 2022   1101 Divided bedside. Here for shortness of breath chest pain rating to the back for 2 weeks. Will get CTA chest to evaluate for possible dissection. Low suspicion for this given equal pulses upper extremities bilaterally and equal blood pressures bilaterally [ZE]   1102 Chest x-ray negative for acute process. Awaiting other labs. Patient waiting to get CTA [ZE]   [id number] Patient is a heart score 5. No ischemic changes on EKG troponin stable. No longer having chest pain.   However patient does have elevated heart score will admit to observation unit for cardiac work-up [ZE]      ED Course User Index  [ZE] Elijah Thornton DO       PROCEDURES:  Procedures CONSULTS:  None    CRITICAL CARE:  none    MDM  Here for evaluation chest pain rating to the back. Concern for dissection however equal pulses upper extremities bilaterally equal blood pressures in the extremities bilaterally. CTA chest unremarkable for any acute process. Patient did have elevated troponins however they were stable. No ischemic changes on EKG. Lab work otherwise unremarkable and chest x-ray unremarkable. Patient has a heart score 5 and would benefit from receiving further cardiac work-up. Heart score for ACS = 5  1. History - 0  Slightly suspicious: 0  Moderately suspicious: 1  Highly suspicious: 2  2. EKG - 0  Normal: 0  Non-specific repolarization disturbance:1  Significant ST depression: 2  3. Age - 2  <45: 0  45-64: 1  >65: 2  4. Risk Factors - 2  None known: 0  1-2: 1  >3: 2  5. Initial Troponin - 1  Normal limit: 0  1-3 x normal limit: 1  >3 x normal limit: 2      FINAL IMPRESSION      1. Chest pain, unspecified type          DISPOSITION / PLAN     DISPOSITION  admit      PATIENT REFERRED TO:  No follow-up provider specified.     DISCHARGE MEDICATIONS:  New Prescriptions    No medications on file       Goldie Orona DO  Emergency Medicine Resident    (Please note that portions of thisnote were completed with a voice recognition program.  Efforts were made to edit the dictations but occasionally words are mis-transcribed.)        Isabella Jenkins DO  Resident  08/27/22 3896

## 2022-08-27 NOTE — ED NOTES
Pt arrives to ED ambulatory for chest pain and SOB. Pt states the pain radiates to his back and he rates it 5/10. Pt states his chest pain feels like epigastric pain and reports taking pantoprazole for relief. Pt states he is still able to eat normally and is having normal BM. Pt A&Ox4, RR even and nonlabored. Pt resting on cot with call light in reach.        Divina De Guzman, WALTER  08/27/22 3267

## 2022-08-27 NOTE — ED NOTES
Pt A&Ox4, RR even and nonlabored. Pt is resting on cot watching tv. Pt denies needs at this time.      Leonard Villaseñor RN  08/27/22 5034

## 2022-08-28 PROCEDURE — 96372 THER/PROPH/DIAG INJ SC/IM: CPT

## 2022-08-28 PROCEDURE — 6370000000 HC RX 637 (ALT 250 FOR IP): Performed by: STUDENT IN AN ORGANIZED HEALTH CARE EDUCATION/TRAINING PROGRAM

## 2022-08-28 PROCEDURE — 6360000002 HC RX W HCPCS: Performed by: STUDENT IN AN ORGANIZED HEALTH CARE EDUCATION/TRAINING PROGRAM

## 2022-08-28 PROCEDURE — G0378 HOSPITAL OBSERVATION PER HR: HCPCS

## 2022-08-28 PROCEDURE — 6370000000 HC RX 637 (ALT 250 FOR IP)

## 2022-08-28 PROCEDURE — 2580000003 HC RX 258: Performed by: STUDENT IN AN ORGANIZED HEALTH CARE EDUCATION/TRAINING PROGRAM

## 2022-08-28 PROCEDURE — 93005 ELECTROCARDIOGRAM TRACING: CPT | Performed by: STUDENT IN AN ORGANIZED HEALTH CARE EDUCATION/TRAINING PROGRAM

## 2022-08-28 RX ORDER — NITROGLYCERIN 0.4 MG/1
0.4 TABLET SUBLINGUAL EVERY 5 MIN PRN
Status: DISCONTINUED | OUTPATIENT
Start: 2022-08-28 | End: 2022-08-30 | Stop reason: HOSPADM

## 2022-08-28 RX ORDER — ISOSORBIDE MONONITRATE 30 MG/1
15 TABLET, EXTENDED RELEASE ORAL DAILY
Status: DISCONTINUED | OUTPATIENT
Start: 2022-08-28 | End: 2022-08-30 | Stop reason: HOSPADM

## 2022-08-28 RX ADMIN — ENOXAPARIN SODIUM 30 MG: 100 INJECTION SUBCUTANEOUS at 20:57

## 2022-08-28 RX ADMIN — SODIUM CHLORIDE: 9 INJECTION, SOLUTION INTRAVENOUS at 05:45

## 2022-08-28 RX ADMIN — SODIUM CHLORIDE, PRESERVATIVE FREE 10 ML: 5 INJECTION INTRAVENOUS at 21:00

## 2022-08-28 RX ADMIN — ISOSORBIDE MONONITRATE 15 MG: 30 TABLET ORAL at 18:57

## 2022-08-28 RX ADMIN — ENOXAPARIN SODIUM 30 MG: 100 INJECTION SUBCUTANEOUS at 10:24

## 2022-08-28 RX ADMIN — ATORVASTATIN CALCIUM 20 MG: 20 TABLET, FILM COATED ORAL at 10:24

## 2022-08-28 RX ADMIN — CARVEDILOL 12.5 MG: 12.5 TABLET, FILM COATED ORAL at 20:56

## 2022-08-28 RX ADMIN — FAMOTIDINE 20 MG: 20 TABLET, FILM COATED ORAL at 10:24

## 2022-08-28 RX ADMIN — AMLODIPINE BESYLATE 5 MG: 5 TABLET ORAL at 10:24

## 2022-08-28 RX ADMIN — FAMOTIDINE 20 MG: 20 TABLET, FILM COATED ORAL at 20:56

## 2022-08-28 RX ADMIN — CARVEDILOL 12.5 MG: 12.5 TABLET, FILM COATED ORAL at 10:24

## 2022-08-28 ASSESSMENT — PAIN SCALES - WONG BAKER

## 2022-08-28 ASSESSMENT — PAIN SCALES - GENERAL
PAINLEVEL_OUTOF10: 0
PAINLEVEL_OUTOF10: 3
PAINLEVEL_OUTOF10: 0

## 2022-08-28 NOTE — PROGRESS NOTES
[facility]  Progress Note                   Date:   8/28/2022  Patient name: Sean Cornell  Date of admission:  8/27/2022 10:11 AM  MRN:   [id number]  YOB: 1955  PCP: Rudolph Nogueira MD    Reason for Admission: Chest pain [R07.9]  Chest pain, unspecified type [R07.9]    Subjective:       Clinical Changes /Abnormalities:Patient seen and examined. Denies chest pain or shortness of breath. Tele/vitals/labs reviewed . Review of Systems    Medications:   Scheduled Meds:   sodium chloride flush  5-40 mL IntraVENous 2 times per day    enoxaparin  30 mg SubCUTAneous BID    famotidine  20 mg Oral BID    amLODIPine  5 mg Oral Daily    carvedilol  12.5 mg Oral BID    atorvastatin  20 mg Oral Daily     Continuous Infusions:   sodium chloride 125 mL/hr at 08/28/22 0545    sodium chloride       CBC:   Recent Labs     08/27/22  1029   WBC 6.7   HGB 14.6        BMP:    Recent Labs     08/27/22  1029      K 4.3      CO2 22   BUN 16   CREATININE 1.01   GLUCOSE 135*     Hepatic:  Recent Labs     08/27/22  1029   AST 21   ALT 35   BILITOT 0.32   ALKPHOS 66     Troponin:   Recent Labs     08/27/22  1029 08/27/22  1147   TROPHS 10 10     BNP: No results for input(s): BNP in the last 72 hours. Lipids: No results for input(s): CHOL, HDL in the last 72 hours. Invalid input(s): LDLCALCU  INR: No results for input(s): INR in the last 72 hours. DATA:    Diagnostics:       Stress test: 6/17/2016  1. Normal study. 2. No evidence for left ventricular stress-induced ischemia. 3. No left ventricular wall infarct. 4. Left ventricular ejection fraction of 54%. ECHO:   TTE 01/30/21  Summary  Normal left ventricle size and function with an estimated EF > 55%. No segmental wall motion abnormalities seen. Moderate left ventricular hypertrophy. Normal right ventricular size and function. Trivial mitral regurgitation. Trivial tricuspid regurgitation.  Normal right ventricular systolic pressure. Aortic root is mildly dilated. (3.9 cm)  No significant pericardial effusion is seen. EKG showed normal sinus rhythm, left axis deviation. No ST-T wave changes. Troponin normal.  CT PE done showed coronary calcification, central bronchial wall thickening in the lower lobe suggestive of bronchitis. Patient denied any previous history of cardiac history. Objective:   Vitals: /89   Pulse 73   Temp 98.5 °F (36.9 °C) (Oral)   Resp 18   Ht 5' 7\" (1.702 m)   Wt 225 lb 6.4 oz (102.2 kg)   SpO2 99%   BMI 35.30 kg/m²   General appearance: alert and cooperative with exam  HEENT: Head: Normocephalic, no lesions, without obvious abnormality. Neck:no JVD, trachea midline, no adenopathy  Lungs: Clear to auscultation  Heart: Regular rate and rhythm, s1/s2 auscultated, no murmurs  Abdomen: soft, non-tender, bowel sounds active  Extremities: no edema  Neurologic: not done        Assessment / Acute Cardiac Problems:   Atypical  chest pain. EKG negative for ischemic changes, tropes normal.  No previous cardiac hx  Hypertension   Ex smoker, quit 3 years ago  Family hx + for CABG in father   GERD     Patient Active Problem List:     Osteoporosis     Vitamin D deficiency     History of stroke     Degeneration of lumbar or lumbosacral intervertebral disc     Back pain     Low back pain     Allergic rhinitis     Impaired fasting glucose     Mixed hyperlipidemia     Essential hypertension     Obesity (BMI 30-39. 9)     Tear of left rotator cuff     Left bicipital tenosynovitis     Colon polyp     Acute blood loss as cause of postoperative anemia     S/P right hemicolectomy     History of malignant neoplasm of skin     Acute postoperative pain     Anemia     Ex-cigarette smoker     Chest pain     Acute acalculous cholecystitis     Acute cholecystitis     Lumbar radiculopathy     Sacroiliitis (HCC)     Hepatic steatosis      Plan of Treatment:   Continue beta-blocker statin and Norvasc,  will add nitro tab as needed for chest pain   Plan for cardiac catheterization in a.m. per Dr. Narcisa Reyes recommendation,  please keep patient NPO after midnight  Keep K>4, Mg>2    Electronically signed by KAMALA Pompa NP on 8/28/2022 at [address].  [phone]

## 2022-08-28 NOTE — PROGRESS NOTES
[facility]  CDU / OBSERVATION eNCOUnter  Attending NOte       I performed a history and physical examination of the patient and discussed management with the resident. I reviewed the residents note and agree with the documented findings and plan of care. Any areas of disagreement are noted on the chart. I was personally present for the key portions of any procedures. I have documented in the chart those procedures where I was not present during the key portions. I have reviewed the nurses notes. I agree with the chief complaint, past medical history, past surgical history, allergies, medications, social and family history as documented unless otherwise noted below. The Family history, social history, and ROS are effectively unchanged since admission unless noted elsewhere in the chart. Plan for cath tomorrow per cardiology.  No new complaints this AM.    Luan Brittle, MD  Attending Emergency  Physician

## 2022-08-28 NOTE — H&P
[facility]  CDU / OBSERVATION eNCOUnter  Resident Note     Pt Name: Shaniqua Gonzales  MRN: [id number]  [place] 1955  Date of evaluation: 8/28/22  Patient's PCP is :  Alexander Carson MD    CHIEF COMPLAINT       Chief Complaint   Patient presents with    Shortness of Breath    Chest Pain     Chest pain x a couple of days, radiates to back, loose stools this am         HISTORY OF PRESENT Deneen Don is a 77 y.o. male with a history of hypertension, hyperlipidemia, smoking, family history of CABG who presents with substernal chest pain for the past 2 weeks patient states this pain radiates to his back. Patient describes the pain as burning that feels similar to his GERD. Rates the pain 8 out of 10 in severity. patient also complains of nausea and diaphoresis. Denies vomiting, fever, chills, SOB. Patient denies pain at time of my exam.      Location/Symptom: substernal chest pain  Timing/Onset: 2 weeks  Provocation: Unknown  Quality: Burning  Radiation: Back  Severity: 8 out of 10  Timing/Duration: constant  Modifying Factors: none    REVIEW OF SYSTEMS       General ROS - No fevers, No malaise, +diaphoresis  Ophthalmic ROS - No discharge, No changes in vision  ENT ROS -  No sore throat, No rhinorrhea,   Respiratory ROS - no shortness of breath, no cough, no  wheezing  Cardiovascular ROS - chest pain, no dyspnea on exertion  Gastrointestinal ROS - No abdominal pain,  nausea or vomiting, no change in bowel habits, no black or bloody stools  Genito-Urinary ROS - No dysuria, trouble voiding, or hematuria  Musculoskeletal ROS - No myalgias, No arthalgias  Neurological ROS - No headache, no dizziness/lightheadedness, No focal weakness, no loss of sensation  Dermatological ROS - No lesions, No rash     (PQRS) Advance directives on face sheet per hospital policy.  No change unless specifically mentioned in chart    PAST MEDICAL HISTORY    has a past medical history of Acute cholecystitis, Essential hypertension, Hyperlipidemia, Hyperparathyroidism ([facility] [address] Obesity (BMI 30-39.9), Osteoporosis, Sciatica, Stroke ([facility] [address] and Vertebral fracture, osteoporotic ([facility] [address] I have reviewed the past medical history with the patient and it is pertinent to this complaint. SURGICAL HISTORY      has a past surgical history that includes Vasectomy; Colonoscopy (3/19/2014); hernia repair; Colonoscopy (N/A, 2020); hemicolectomy (Right, 2020); omentum resection (2020); IR Biliary Drain External (2021); Cholecystectomy (N/A, 2021); and ERCP (N/A, 2021). I have reviewed and agree with Surgical History entered and it is not pertinent to this complaint. CURRENT MEDICATIONS     0.9 % sodium chloride infusion, Continuous  sodium chloride flush 0.9 % injection 5-40 mL, 2 times per day  sodium chloride flush 0.9 % injection 5-40 mL, PRN  0.9 % sodium chloride infusion, PRN  potassium chloride (KLOR-CON M) extended release tablet 40 mEq, PRN   Or  potassium bicarb-citric acid (EFFER-K) effervescent tablet 40 mEq, PRN   Or  potassium chloride 10 mEq/100 mL IVPB (Peripheral Line), PRN  enoxaparin Sodium (LOVENOX) injection 30 mg, BID  ondansetron (ZOFRAN) injection 4 mg, Q4H PRN  polyethylene glycol (GLYCOLAX) packet 17 g, Daily PRN  acetaminophen (TYLENOL) tablet 650 mg, Q6H PRN   Or  acetaminophen (TYLENOL) suppository 650 mg, Q6H PRN  famotidine (PEPCID) tablet 20 mg, BID  oxyCODONE (ROXICODONE) immediate release tablet 5 mg, Q6H PRN  amLODIPine (NORVASC) tablet 5 mg, Daily  carvedilol (COREG) tablet 12.5 mg, BID  atorvastatin (LIPITOR) tablet 20 mg, Daily        All medication charted and reviewed. ALLERGIES     has No Known Allergies. FAMILY HISTORY     He indicated that his mother is . He indicated that his father is . family history includes Heart Disease in his father; No Known Problems in his mother.   The patient denies any pertinent family history. I have reviewed and agree with the family history entered. I have reviewed the Family History and it is not significant to the case    SOCIAL HISTORY      reports that he quit smoking about 2 years ago. He has a 40.00 pack-year smoking history. He has never used smokeless tobacco. He reports current alcohol use. He reports that he does not use drugs. I have reviewed and agree with all Social.  There are no concerns for substance abuse/use. PHYSICAL EXAM     INITIAL VITALS:  height is 5' 7\" (1.702 m) and weight is 225 lb 6.4 oz (102.2 kg). His oral temperature is 97.9 °F (36.6 °C). His blood pressure is 129/81 and his pulse is 66. His respiration is 16 and oxygen saturation is 96%.       CONSTITUTIONAL: AOx4, no apparent distress, appears stated age    HEAD: normocephalic, atraumatic   EYES: PERRLA, EOMI    ENT: moist mucous membranes, uvula midline   NECK: supple, symmetric   BACK: symmetric   LUNGS: clear to auscultation bilaterally   CARDIOVASCULAR: regular rate and rhythm, no murmurs, rubs or gallops   ABDOMEN: soft, non-tender, non-distended with normal active bowel sounds   NEUROLOGIC:  MAEx4, no focal sensory or motor deficits   MUSCULOSKELETAL: no clubbing, cyanosis or edema   SKIN: no rash or wounds       DIFFERENTIAL DIAGNOSIS/MDM:       Chest Pain:  DDX: Emergent: ACS/NSTEMI/STEMI/angina, arrhythmia, trauma, aortic dissection,  PE, PNA, pneumothroax, esophageal rupture, tamponade, Cocaine use  Nonemergent: pneumonia, pericarditis, GERD, MSK, Endocarditis, anxiety  Evaluated for: diaphoresis, present chest pain, tachypnea, BP both arms, heart sounds, JVD, tender chest wall, wheezing        DIAGNOSTIC RESULTS     EKG: All EKG's are interpreted by the Observation Physician who either signs or Co-signs this chart in the absence of a cardiologist.    EKG Interpretation    Interpreted by observation physician    Rhythm: normal sinus   Rate: normal  Axis: normal  Ectopy: none  Conduction: normal  ST Include:  Hypercholesterolemia  Hypertension  Diabetes Mellitus  Cigarette smoking  Positive family history  Obesity  CAD  (SLE, CKDz, HIV, Cocaine abuse)  Troponin Levels  = Normal Limit (0 pts)  1-3 Times Normal Limit (1 pt)  > 3 Times Normal Limit (2 pts)  TOTAL: 5    Percent Risk for Major Adverse Cardiac Event (MACE)  0-3 pts indicates low risk for MACE   2.5% (DISCHARGE)   4-7 pts indicates moderate risk for MACE  20.3% (OBS)  8-10 pts indicates high risk for MACE  72.7% (EARLY INVASIVE [place])    FRANCISCO J HOUSE / Christine Tai is a 77 y.o. male with a history of hypertension, hyperlipidemia, smoking, GERD, FH CABG who presents with burning chest pain for past 2 weeks. Chest pain  No Acute EKG changes  CAD on CT chest   Troponins within normal limits  Heart score 5  Per cardiology, add Imdur 15 mg p.o. per day  likely undergo cardiac cath tomorrow  Continue home medications and pain control  Monitor vitals, labs, and imaging  DISPO: pending consults and clinical improvement    CONSULTS:    IP CONSULT TO CARDIOLOGY    PROCEDURES:  Cardiac catheterization      PATIENT REFERRED TO:    No follow-up provider specified. --  Janki Hatch, DO   Emergency Medicine Resident     This dictation was generated by voice recognition computer software. Although all attempts are made to edit the dictation for accuracy, there may be errors in the transcription that are not intended.

## 2022-08-29 ENCOUNTER — APPOINTMENT (OUTPATIENT)
Dept: CARDIAC CATH/INVASIVE PROCEDURES | Age: 67
DRG: 247 | End: 2022-08-29
Payer: MEDICARE

## 2022-08-29 PROBLEM — I20.0 UNSTABLE ANGINA (HCC): Status: ACTIVE | Noted: 2022-08-29

## 2022-08-29 LAB
ACTIVATED CLOTTING TIME: 237 SEC (ref 79–149)
EKG ATRIAL RATE: 69 BPM
EKG ATRIAL RATE: 73 BPM
EKG P AXIS: 22 DEGREES
EKG P AXIS: 28 DEGREES
EKG P-R INTERVAL: 176 MS
EKG P-R INTERVAL: 186 MS
EKG Q-T INTERVAL: 386 MS
EKG Q-T INTERVAL: 404 MS
EKG QRS DURATION: 90 MS
EKG QRS DURATION: 94 MS
EKG QTC CALCULATION (BAZETT): 425 MS
EKG QTC CALCULATION (BAZETT): 432 MS
EKG R AXIS: -7 DEGREES
EKG R AXIS: 3 DEGREES
EKG T AXIS: 18 DEGREES
EKG T AXIS: 46 DEGREES
EKG VENTRICULAR RATE: 69 BPM
EKG VENTRICULAR RATE: 73 BPM

## 2022-08-29 PROCEDURE — 6370000000 HC RX 637 (ALT 250 FOR IP): Performed by: STUDENT IN AN ORGANIZED HEALTH CARE EDUCATION/TRAINING PROGRAM

## 2022-08-29 PROCEDURE — 6360000004 HC RX CONTRAST MEDICATION

## 2022-08-29 PROCEDURE — B2111ZZ FLUOROSCOPY OF MULTIPLE CORONARY ARTERIES USING LOW OSMOLAR CONTRAST: ICD-10-PCS | Performed by: INTERNAL MEDICINE

## 2022-08-29 PROCEDURE — 4A023N7 MEASUREMENT OF CARDIAC SAMPLING AND PRESSURE, LEFT HEART, PERCUTANEOUS APPROACH: ICD-10-PCS | Performed by: INTERNAL MEDICINE

## 2022-08-29 PROCEDURE — B2151ZZ FLUOROSCOPY OF LEFT HEART USING LOW OSMOLAR CONTRAST: ICD-10-PCS | Performed by: INTERNAL MEDICINE

## 2022-08-29 PROCEDURE — 93010 ELECTROCARDIOGRAM REPORT: CPT | Performed by: INTERNAL MEDICINE

## 2022-08-29 PROCEDURE — 1200000000 HC SEMI PRIVATE

## 2022-08-29 PROCEDURE — 2500000003 HC RX 250 WO HCPCS

## 2022-08-29 PROCEDURE — 2709999900 HC NON-CHARGEABLE SUPPLY

## 2022-08-29 PROCEDURE — 027034Z DILATION OF CORONARY ARTERY, ONE ARTERY WITH DRUG-ELUTING INTRALUMINAL DEVICE, PERCUTANEOUS APPROACH: ICD-10-PCS | Performed by: INTERNAL MEDICINE

## 2022-08-29 PROCEDURE — 7100000000 HC PACU RECOVERY - FIRST 15 MIN

## 2022-08-29 PROCEDURE — C1769 GUIDE WIRE: HCPCS

## 2022-08-29 PROCEDURE — 6370000000 HC RX 637 (ALT 250 FOR IP)

## 2022-08-29 PROCEDURE — 6360000002 HC RX W HCPCS

## 2022-08-29 PROCEDURE — C1725 CATH, TRANSLUMIN NON-LASER: HCPCS

## 2022-08-29 PROCEDURE — C9600 PERC DRUG-EL COR STENT SING: HCPCS

## 2022-08-29 PROCEDURE — 7100000001 HC PACU RECOVERY - ADDTL 15 MIN

## 2022-08-29 PROCEDURE — 93458 L HRT ARTERY/VENTRICLE ANGIO: CPT

## 2022-08-29 PROCEDURE — 2580000003 HC RX 258: Performed by: STUDENT IN AN ORGANIZED HEALTH CARE EDUCATION/TRAINING PROGRAM

## 2022-08-29 PROCEDURE — C1887 CATHETER, GUIDING: HCPCS

## 2022-08-29 PROCEDURE — 6360000002 HC RX W HCPCS: Performed by: STUDENT IN AN ORGANIZED HEALTH CARE EDUCATION/TRAINING PROGRAM

## 2022-08-29 PROCEDURE — C1894 INTRO/SHEATH, NON-LASER: HCPCS

## 2022-08-29 PROCEDURE — C1874 STENT, COATED/COV W/DEL SYS: HCPCS

## 2022-08-29 PROCEDURE — 85347 COAGULATION TIME ACTIVATED: CPT

## 2022-08-29 RX ORDER — ACETAMINOPHEN 325 MG/1
650 TABLET ORAL EVERY 4 HOURS PRN
Status: DISCONTINUED | OUTPATIENT
Start: 2022-08-29 | End: 2022-08-30 | Stop reason: HOSPADM

## 2022-08-29 RX ORDER — SODIUM CHLORIDE 0.9 % (FLUSH) 0.9 %
5-40 SYRINGE (ML) INJECTION PRN
Status: DISCONTINUED | OUTPATIENT
Start: 2022-08-29 | End: 2022-08-30 | Stop reason: HOSPADM

## 2022-08-29 RX ORDER — CLOPIDOGREL BISULFATE 75 MG/1
75 TABLET ORAL DAILY
Status: DISCONTINUED | OUTPATIENT
Start: 2022-08-29 | End: 2022-08-30 | Stop reason: HOSPADM

## 2022-08-29 RX ORDER — SODIUM CHLORIDE 9 MG/ML
INJECTION, SOLUTION INTRAVENOUS PRN
Status: DISCONTINUED | OUTPATIENT
Start: 2022-08-29 | End: 2022-08-30 | Stop reason: HOSPADM

## 2022-08-29 RX ORDER — SODIUM CHLORIDE 0.9 % (FLUSH) 0.9 %
5-40 SYRINGE (ML) INJECTION EVERY 12 HOURS SCHEDULED
Status: DISCONTINUED | OUTPATIENT
Start: 2022-08-29 | End: 2022-08-30 | Stop reason: HOSPADM

## 2022-08-29 RX ORDER — ASPIRIN 81 MG/1
81 TABLET, CHEWABLE ORAL DAILY
Status: DISCONTINUED | OUTPATIENT
Start: 2022-08-29 | End: 2022-08-30 | Stop reason: HOSPADM

## 2022-08-29 RX ADMIN — SODIUM CHLORIDE, PRESERVATIVE FREE 10 ML: 5 INJECTION INTRAVENOUS at 09:50

## 2022-08-29 RX ADMIN — ENOXAPARIN SODIUM 30 MG: 100 INJECTION SUBCUTANEOUS at 19:55

## 2022-08-29 RX ADMIN — AMLODIPINE BESYLATE 5 MG: 5 TABLET ORAL at 18:33

## 2022-08-29 RX ADMIN — ISOSORBIDE MONONITRATE 15 MG: 30 TABLET ORAL at 18:32

## 2022-08-29 RX ADMIN — SODIUM CHLORIDE, PRESERVATIVE FREE 10 ML: 5 INJECTION INTRAVENOUS at 19:55

## 2022-08-29 RX ADMIN — SODIUM CHLORIDE: 9 INJECTION, SOLUTION INTRAVENOUS at 03:26

## 2022-08-29 RX ADMIN — SODIUM CHLORIDE, PRESERVATIVE FREE 10 ML: 5 INJECTION INTRAVENOUS at 19:56

## 2022-08-29 RX ADMIN — CARVEDILOL 12.5 MG: 12.5 TABLET, FILM COATED ORAL at 19:54

## 2022-08-29 RX ADMIN — ACETAMINOPHEN 650 MG: 325 TABLET ORAL at 03:32

## 2022-08-29 RX ADMIN — ATORVASTATIN CALCIUM 20 MG: 20 TABLET, FILM COATED ORAL at 18:32

## 2022-08-29 RX ADMIN — FAMOTIDINE 20 MG: 20 TABLET, FILM COATED ORAL at 19:55

## 2022-08-29 ASSESSMENT — PAIN SCALES - WONG BAKER
WONGBAKER_NUMERICALRESPONSE: 0
WONGBAKER_NUMERICALRESPONSE: 0;2
WONGBAKER_NUMERICALRESPONSE: 0

## 2022-08-29 ASSESSMENT — PAIN DESCRIPTION - ORIENTATION: ORIENTATION: MID

## 2022-08-29 ASSESSMENT — PAIN DESCRIPTION - LOCATION: LOCATION: CHEST

## 2022-08-29 ASSESSMENT — PAIN DESCRIPTION - ONSET: ONSET: ON-GOING

## 2022-08-29 ASSESSMENT — PAIN SCALES - GENERAL
PAINLEVEL_OUTOF10: 0
PAINLEVEL_OUTOF10: 4
PAINLEVEL_OUTOF10: 0

## 2022-08-29 ASSESSMENT — PAIN DESCRIPTION - FREQUENCY: FREQUENCY: CONTINUOUS

## 2022-08-29 ASSESSMENT — PAIN DESCRIPTION - DESCRIPTORS: DESCRIPTORS: PRESSURE

## 2022-08-29 ASSESSMENT — PAIN - FUNCTIONAL ASSESSMENT: PAIN_FUNCTIONAL_ASSESSMENT: ACTIVITIES ARE NOT PREVENTED

## 2022-08-29 ASSESSMENT — PAIN DESCRIPTION - PAIN TYPE: TYPE: ACUTE PAIN

## 2022-08-29 NOTE — PROGRESS NOTES
Patient admitted, consent signed and questions answered. Patient ready for procedure. Call light to reach with side rails up 2 of 2. Bilateral groin hair, wrist hair and chest area clipped with writer and FATOUMATA Olivia present. No one at bedside with patient. History and physical complete.

## 2022-08-29 NOTE — PROGRESS NOTES
Received post procedure to [facility] to room 4. Assessment obtained. Restrictions reviewed with patient. Post procedure pathway initiated. Right radial site soft , vasc band  dry and intact. No hematoma noted. Family at side. Patient without complaints.

## 2022-08-29 NOTE — PLAN OF CARE
Problem: Safety - Adult  Goal: Free from fall injury  8/29/2022 0928 by Hoda Hernandez RN  Outcome: Progressing  8/28/2022 2053 by Jadon Bear RN  Outcome: Progressing     Problem: Pain  Goal: Verbalizes/displays adequate comfort level or baseline comfort level  8/29/2022 0928 by Hoda Hernandez RN  Outcome: Progressing  8/28/2022 2053 by Jadon Bear RN  Outcome: Progressing

## 2022-08-29 NOTE — PROGRESS NOTES
[facility]  Progress Note                   Date:   8/29/2022  Patient name: Radha Thomson  Date of admission:  8/27/2022 10:11 AM  MRN:   [id number]  YOB: 1955  PCP: Stephanie Michelle MD    Reason for Admission: Chest pain [R07.9]  Chest pain, unspecified type [R07.9]    Subjective:       Clinical Changes /Abnormalities:Patient seen and examined alone in room lying quietly in bed. Denies chest pain or shortness of breath. Tele/vitals/labs reviewed. NPO for cath today. Review of Systems    Medications:   Scheduled Meds:   isosorbide mononitrate  15 mg Oral Daily    sodium chloride flush  5-40 mL IntraVENous 2 times per day    enoxaparin  30 mg SubCUTAneous BID    famotidine  20 mg Oral BID    amLODIPine  5 mg Oral Daily    carvedilol  12.5 mg Oral BID    atorvastatin  20 mg Oral Daily     Continuous Infusions:   sodium chloride 125 mL/hr at 08/29/22 0326    sodium chloride       CBC:   Recent Labs     08/27/22  1029   WBC 6.7   HGB 14.6          BMP:    Recent Labs     08/27/22  1029      K 4.3      CO2 22   BUN 16   CREATININE 1.01   GLUCOSE 135*       Hepatic:  Recent Labs     08/27/22  1029   AST 21   ALT 35   BILITOT 0.32   ALKPHOS 66       Troponin:   Recent Labs     08/27/22  1029 08/27/22  1147   TROPHS 10 10       BNP: No results for input(s): BNP in the last 72 hours. Lipids: No results for input(s): CHOL, HDL in the last 72 hours. Invalid input(s): LDLCALCU  INR: No results for input(s): INR in the last 72 hours. DATA:    Diagnostics:       Stress test: 6/17/2016  1. Normal study. 2. No evidence for left ventricular stress-induced ischemia. 3. No left ventricular wall infarct. 4. Left ventricular ejection fraction of 54%. ECHO:   TTE 01/30/21  Summary  Normal left ventricle size and function with an estimated EF > 55%. No segmental wall motion abnormalities seen. Moderate left ventricular hypertrophy.   Normal right ventricular size and function. Trivial mitral regurgitation. Trivial tricuspid regurgitation. Normal right ventricular systolic pressure. Aortic root is mildly dilated. (3.9 cm)  No significant pericardial effusion is seen. EKG showed normal sinus rhythm, left axis deviation. No ST-T wave changes. Troponin normal.  CT PE done showed coronary calcification, central bronchial wall thickening in the lower lobe suggestive of bronchitis. Patient denied any previous history of cardiac history. Objective:   Vitals: /67   Pulse 68   Temp 98.4 °F (36.9 °C) (Oral)   Resp 16   Ht 5' 7\" (1.702 m)   Wt 225 lb 6.4 oz (102.2 kg)   SpO2 96%   BMI 35.30 kg/m²   General appearance: alert and cooperative with exam  HEENT: Head: Normocephalic, no lesions, without obvious abnormality. Neck:no JVD, trachea midline, no adenopathy  Lungs: Clear to auscultation  Heart: Regular rate and rhythm, s1/s2 auscultated, no murmurs  Abdomen: soft, non-tender, bowel sounds active  Extremities: no edema  Neurologic: not done        Assessment / Acute Cardiac Problems:   Atypical  chest pain. EKG negative for ischemic changes, tropes normal.  No previous cardiac hx  Hypertension   Ex smoker, quit 3 years ago  Family hx + for CABG in father   GERD     Patient Active Problem List:     Osteoporosis     Vitamin D deficiency     History of stroke     Degeneration of lumbar or lumbosacral intervertebral disc     Back pain     Low back pain     Allergic rhinitis     Impaired fasting glucose     Mixed hyperlipidemia     Essential hypertension     Obesity (BMI 30-39. 9)     Tear of left rotator cuff     Left bicipital tenosynovitis     Colon polyp     Acute blood loss as cause of postoperative anemia     S/P right hemicolectomy     History of malignant neoplasm of skin     Acute postoperative pain     Anemia     Ex-cigarette smoker     Chest pain     Acute acalculous cholecystitis     Acute cholecystitis     Lumbar radiculopathy Sacroiliitis (HCC)     Hepatic steatosis      Plan of Treatment:   Stable. NPO for cath today  I have discussed risks (including but not limited to vascular injury, infection, hematoma, contrast induced kidney dysfunction, CVA and MI), benefits, alternatives in detail. All questions answered. Patient agrees to proceed.     Keep K>4, Mg>2    Electronically signed by KAMALA Faulkner CNP on 8/29/2022 at 11:43 [address] [facility].  [phone]

## 2022-08-29 NOTE — OP NOTE
[facility]    CARDIAC CATHETERIZATION    Date:   8/29/2022  Patient name:  Diana Ramires  Date of admission:  8/27/2022 10:11 AM  MRN:   [id number]  YOB: 1955    Operators:  Primary:   Liz Phelan MD (Attending Physician)        Procedure performed:     [x] Left Heart Catheterization. [] Graft Angiography. [x] Left Ventriculography. [] Right Heart Catheterization. [] Coronary Angiography. [] Aortic Valve Studies. [] PCI:      [] Other:       Pre Procedure Conscious Sedation Data:  ASA Class:    [] I [] II [x] III [] IV    Mallampati Class:  [x] I [] II [] III [] IV      Indication:  - Unstable angina     Procedure:  Access:  [] Femoral  [x] Radial  artery       [x] Right  [] Left    Procedure: After informed consent was obtained with explanation of the risks and benefits, patient was brought to the cath lab. The access area was prepped and draped in sterile fashion. 1% lidocaine was used for local block. The artery was cannulated with 6  Fr sheath with brisk arterial blood return. The side port was frequently flushed and aspirated with normal saline. Findings:   Cardiac Arteries and Lesion Findings       LMCA: Normal 0% stenosis. LAD: Mid area 40% stenosis     LCx: Mild irregularities 10-20%. RCA: Mid area 90% stenosis         Lesion on Mid RCA: Mid subsection. 95% stenosis 12 mm length reduced to     0%. Pre procedure RAPHAEL II flow was noted. Post Procedure RAPHAEL III flow     was present. Good runoff was present. The lesion was diagnosed as     Moderate Risk (B). Devices used         - Luge Wire 182 cm. Number of passes: 1.         - Euphora Balloon 3.5mm x 12mm. 2 inflation(s) to a max pressure of: 12     josh. - Resolute [place] 4.0 x 15 LINDSEY. 1 inflation(s) to a max pressure of: 12     josh.         Coronary Tree        Dominance: Right       LV Analysis   LV function assessed as:Normal.   Ejection Fraction +----------------------------------------------------------------------+---+   ! Method                                                                ! EF%! +----------------------------------------------------------------------+---+   ! LV gram                                                               !50 !   +----------------------------------------------------------------------+---+    Procedure Summary        Two vessel CAD    40% stenosis mid LAD    Successful PTCA -LINDESY mid RCA, 99% stenosis reduced to 0%. Preserved LV systolic function        Recommendations        Post stent protocol   ____________________________________________________________________     History and Risk Factors    [x] Hypertension                                                         [] Family history of CAD  [x] Hyperlipidemia                                                       [] Cerebrovascular Disease    [] Prior MI                                                                   [] Peripheral Vascular disease   [] Prior PCI                                                                [] Diabetes Mellitus     [] Left Main PCI. [] Currently on Dialysis. [] Prior CABG. [] Currently smoker. [] Cardiac Arrest outside of healthcare facility. [] Yes              [x] No                                           Witnessed                                [] Yes              [] No                                      Arrest after arrival of EMS      [] Yes              [] No        [] Cardiac Arrest at other Facility. [] Yes              [x] No     Pre-Procedure Information.   Heart Failure                                                   [] Yes              [x] No                                                    Class               [] I          [] II              [] III [] IV.                           New Diagnosis                                    [] Yes             [] No                          HF Type                                              [] Systolic        [] Diastolic                                                                                           [] Unknown. Diagnostic Test:              EKG                                                                  [] Normal        [x] Abnormal               New antiarrhythmia medications:                    [] Yes              [] No              New onset atrial fibrillation / Flutter                  [] Yes              [] No              ECG Abnormalities:                                         [] V. Fib           [] [place] [employer] T[employer]                                                                                        [] NS V. T       [] New LBBB                                                                                        [] T.  Inv           []  ST dev > 0.5 mm                                                                                      [] PVC's freq   [] PVC's infrequent     Stress Test Performed:                                            [] Yes              [x] No       Type:                                        [] Stress Echo            [] Exercise Stress Test (no imaging)                                                  [] Stress Nuclear        [] Stress Imaging                Results                                    [] Negative                  [] Positive                                                    [] Indeterminate         [] Unavailable                 If Positive/ Risk / Extent of Ischemia:                                                  [] Low  [] Intermediate                                                             [] High            [] Unavailable                 Cardiac CTA Performed:                                          [] Yes              [x] No Results                         [] CAD            [] Non obstructive CAD                                                  [] No CAD       [] Uncertain                                                  [] Unknown     [] Structural Disease. Pre Procedure Medications:                                    [x] Yes              [] No                                                       [x] ASA                                    [] Beta Blockers                                                  [] Nitrate                                [] Ca Channel Blockers                                                  [] Ranolazine              [] Statin                                                   [] Plavix/Others antiplatelets       Estimated Blood Loss: 10 mL      ____________________________________________________________________    Srikanth Josue MD  Fellow, [address]    I have reviewed the case / procedure with resident / fellow  I have examined the patient personally  Patient agree with treatment plan as discussed before, final arrangement based on my evaluation and exam.    Risk and benefit of procedure planned were explained in details. Procedure was performed by me personally, with all aspect of the procedure being done using standard protocol. Note was modified based on my own assessment and treatment.     Nerissa Vicente MD  [facility]

## 2022-08-29 NOTE — PROGRESS NOTES
OBS/CDU   RESIDENT NOTE      Patients PCP is:  Marcela Sinclair MD        SUBJECTIVE      Patient seen resting comfortably in chair. Patient states that his chest pain has resolved. Denies any associated SOB. Patient states that his only concern is being able to go home and feed his animals. No acute events overnight. The patient is urinating on his own and is passing flatus. Denies fever, chills, nausea, vomiting, chest pain, shortness of breath, abdominal pain, focal weakness, numbness, tingling, urinary/bowel symptoms, vision changes, visual hallucinations, or headache. PHYSICAL EXAM      General: NAD, AO X 3  Heent: EMOI, PERRL  Neck: SUPPLE, NO JVD  Cardiovascular: RRR, S1S2  Pulmonary: CTAB, NO SOB  Abdomen: SOFT, NTTP, ND, +BS  Extremities: +2/4 PULSES DISTAL, NO SWELLING  Neuro / Psych: NO NUMBNESS OR TINGLING, MENTATION AT BASELINE    PERTINENT TEST /EXAMS      I have reviewed all available laboratory results.     MEDICATIONS CURRENT   nitroGLYCERIN (NITROSTAT) SL tablet 0.4 mg, Q5 Min PRN  isosorbide mononitrate (IMDUR) extended release tablet 15 mg, Daily  0.9 % sodium chloride infusion, Continuous  sodium chloride flush 0.9 % injection 5-40 mL, 2 times per day  sodium chloride flush 0.9 % injection 5-40 mL, PRN  0.9 % sodium chloride infusion, PRN  potassium chloride (KLOR-CON M) extended release tablet 40 mEq, PRN   Or  potassium bicarb-citric acid (EFFER-K) effervescent tablet 40 mEq, PRN   Or  potassium chloride 10 mEq/100 mL IVPB (Peripheral Line), PRN  enoxaparin Sodium (LOVENOX) injection 30 mg, BID  ondansetron (ZOFRAN) injection 4 mg, Q4H PRN  polyethylene glycol (GLYCOLAX) packet 17 g, Daily PRN  acetaminophen (TYLENOL) tablet 650 mg, Q6H PRN   Or  acetaminophen (TYLENOL) suppository 650 mg, Q6H PRN  famotidine (PEPCID) tablet 20 mg, BID  oxyCODONE (ROXICODONE) immediate release tablet 5 mg, Q6H PRN  amLODIPine (NORVASC) tablet 5 mg, Daily  carvedilol (COREG) tablet 12.5 mg, BID  atorvastatin (LIPITOR) tablet 20 mg, Daily        All medication charted and reviewed. CONSULTS      IP CONSULT TO CARDIOLOGY    ASSESSMENT/PLAN       Diana Ramires is a 77 y.o. male who presents with Chest pain     Chest pain  EKG WNL, Troponins Wnl, Heart score of 5  Cardiology consulted, appreciate evaluation and recommendations  Continue BB, statin and Norvasc, add nitro tab as needed for CP  Plan for cardiac cath today  Further recs based on results  Continue home medications and pain control  Monitor vitals, labs, and imaging  DISPO: pending consults and clinical improvement    --  Meridee Schirmer, MD  Emergency Medicine Resident Physician     This dictation was generated by voice recognition computer software. Although all attempts are made to edit the dictation for accuracy, there may be errors in the transcription that are not intended.

## 2022-08-29 NOTE — PROGRESS NOTES
[facility]  CDU / OBSERVATION ENCOUNTER  ATTENDING NOTE       I performed a history and physical examination of the patient and discussed management with the resident or midlevel provider. I reviewed the resident or midlevel provider's note and agree with the documented findings and plan of care. Any areas of disagreement are noted on the chart. I was personally present for the key portions of any procedures. I have documented in the chart those procedures where I was not present during the key portions. I have reviewed the nurses notes. I agree with the chief complaint, past medical history, past surgical history, allergies, medications, social and family history as documented unless otherwise noted below. The Family history, social history, and ROS are effectively unchanged since admission unless noted elsewhere in the chart. Patient doing well over the weekend. Patient stable but for catheterization due today. Patient for reassessment after study.     Jaclyn Urrutia MD  Attending Emergency  Physician

## 2022-08-30 VITALS
SYSTOLIC BLOOD PRESSURE: 114 MMHG | BODY MASS INDEX: 35.38 KG/M2 | HEART RATE: 60 BPM | DIASTOLIC BLOOD PRESSURE: 72 MMHG | OXYGEN SATURATION: 92 % | WEIGHT: 225.4 LBS | RESPIRATION RATE: 12 BRPM | TEMPERATURE: 98.4 F | HEIGHT: 67 IN

## 2022-08-30 PROCEDURE — 6370000000 HC RX 637 (ALT 250 FOR IP): Performed by: STUDENT IN AN ORGANIZED HEALTH CARE EDUCATION/TRAINING PROGRAM

## 2022-08-30 PROCEDURE — 6360000002 HC RX W HCPCS: Performed by: STUDENT IN AN ORGANIZED HEALTH CARE EDUCATION/TRAINING PROGRAM

## 2022-08-30 PROCEDURE — 2580000003 HC RX 258: Performed by: STUDENT IN AN ORGANIZED HEALTH CARE EDUCATION/TRAINING PROGRAM

## 2022-08-30 RX ORDER — NITROGLYCERIN 0.4 MG/1
TABLET SUBLINGUAL
Qty: 25 TABLET | Refills: 3 | Status: SHIPPED | OUTPATIENT
Start: 2022-08-30

## 2022-08-30 RX ORDER — CLOPIDOGREL BISULFATE 75 MG/1
75 TABLET ORAL DAILY
Qty: 30 TABLET | Refills: 3 | Status: SHIPPED | OUTPATIENT
Start: 2022-08-31

## 2022-08-30 RX ORDER — ISOSORBIDE MONONITRATE 30 MG/1
15 TABLET, EXTENDED RELEASE ORAL DAILY
Qty: 30 TABLET | Refills: 3 | Status: SHIPPED | OUTPATIENT
Start: 2022-08-31

## 2022-08-30 RX ADMIN — ASPIRIN 81 MG: 81 TABLET, CHEWABLE ORAL at 08:29

## 2022-08-30 RX ADMIN — CLOPIDOGREL 75 MG: 75 TABLET, FILM COATED ORAL at 08:29

## 2022-08-30 RX ADMIN — CLOPIDOGREL 75 MG: 75 TABLET, FILM COATED ORAL at 00:16

## 2022-08-30 RX ADMIN — AMLODIPINE BESYLATE 5 MG: 5 TABLET ORAL at 08:29

## 2022-08-30 RX ADMIN — FAMOTIDINE 20 MG: 20 TABLET, FILM COATED ORAL at 08:29

## 2022-08-30 RX ADMIN — CARVEDILOL 12.5 MG: 12.5 TABLET, FILM COATED ORAL at 08:29

## 2022-08-30 RX ADMIN — ISOSORBIDE MONONITRATE 15 MG: 30 TABLET ORAL at 08:29

## 2022-08-30 RX ADMIN — ENOXAPARIN SODIUM 30 MG: 100 INJECTION SUBCUTANEOUS at 08:29

## 2022-08-30 RX ADMIN — SODIUM CHLORIDE, PRESERVATIVE FREE 10 ML: 5 INJECTION INTRAVENOUS at 08:30

## 2022-08-30 RX ADMIN — ATORVASTATIN CALCIUM 20 MG: 20 TABLET, FILM COATED ORAL at 08:29

## 2022-08-30 RX ADMIN — ASPIRIN 81 MG: 81 TABLET, CHEWABLE ORAL at 00:16

## 2022-08-30 ASSESSMENT — PAIN SCALES - WONG BAKER
WONGBAKER_NUMERICALRESPONSE: 0

## 2022-08-30 NOTE — PROGRESS NOTES
[facility]  Progress Note                   Date:   8/30/2022  Patient name: Sean Cornell  Date of admission:  8/27/2022 10:11 AM  MRN:   [id number]  YOB: 1955  PCP: Rudolph Nogueira MD    Reason for Admission: Chest pain [R07.9]  Chest pain, unspecified type [R07.9]  Unstable angina ([address] [I20.0]    Subjective:       Clinical Changes /Abnormalities:  Patient seen and examined. Denies chest pain or shortness of breath. Tele/vitals/labs reviewed . Review of Systems    Medications:   Scheduled Meds:   aspirin  81 mg Oral Daily    clopidogrel  75 mg Oral Daily    sodium chloride flush  5-40 mL IntraVENous 2 times per day    isosorbide mononitrate  15 mg Oral Daily    sodium chloride flush  5-40 mL IntraVENous 2 times per day    enoxaparin  30 mg SubCUTAneous BID    famotidine  20 mg Oral BID    amLODIPine  5 mg Oral Daily    carvedilol  12.5 mg Oral BID    atorvastatin  20 mg Oral Daily     Continuous Infusions:   sodium chloride      sodium chloride 125 mL/hr at 08/29/22 0326    sodium chloride       CBC:   No results for input(s): WBC, HGB, PLT in the last 72 hours. BMP:    No results for input(s): NA, K, CL, CO2, BUN, CREATININE, GLUCOSE in the last 72 hours. Hepatic:  No results for input(s): AST, ALT, ALB, BILITOT, ALKPHOS in the last 72 hours. Troponin:   No results for input(s): TROPHS in the last 72 hours. BNP: No results for input(s): BNP in the last 72 hours. Lipids: No results for input(s): CHOL, HDL in the last 72 hours. Invalid input(s): LDLCALCU  INR: No results for input(s): INR in the last 72 hours. DATA:    Diagnostics:       Stress test: 6/17/2016  1. Normal study. 2. No evidence for left ventricular stress-induced ischemia. 3. No left ventricular wall infarct. 4. Left ventricular ejection fraction of 54%. ECHO:   TTE 01/30/21  Summary  Normal left ventricle size and function with an estimated EF > 55%.   No segmental wall motion abnormalities seen. Moderate left ventricular hypertrophy. Normal right ventricular size and function. Trivial mitral regurgitation. Trivial tricuspid regurgitation. Normal right ventricular systolic pressure. Aortic root is mildly dilated. (3.9 cm)  No significant pericardial effusion is seen. EKG showed normal sinus rhythm, left axis deviation. No ST-T wave changes. Troponin normal.  CT PE done showed coronary calcification, central bronchial wall thickening in the lower lobe suggestive of bronchitis. Patient denied any previous history of cardiac history. Cath Findings:   Cardiac Arteries and Lesion Findings       LMCA: Normal 0% stenosis. LAD: Mid area 40% stenosis     LCx: Mild irregularities 10-20%. RCA: Mid area 90% stenosis         Lesion on Mid RCA: Mid subsection. 95% stenosis 12 mm length reduced to     0%. Pre procedure RAPHAEL II flow was noted. Post Procedure RAPHAEL III flow     was present. Good runoff was present. The lesion was diagnosed as     Moderate Risk (B). Devices used         - Luge Wire 182 cm. Number of passes: 1.         - Euphora Balloon 3.5mm x 12mm. 2 inflation(s) to a max pressure of: 12     josh. - Resolute [place] 4.0 x 15 LINDSEY. 1 inflation(s) to a max pressure of: 12     josh. Coronary Tree        Dominance: Right       LV Analysis   LV function assessed as:Normal.   Ejection Fraction   +----------------------------------------------------------------------+---+   ! Method                                                                ! EF%! +----------------------------------------------------------------------+---+   ! LV gram                                                               !50 !   +----------------------------------------------------------------------+---+    Procedure Summary        Two vessel CAD    40% stenosis mid LAD    Successful PTCA -LINDSEY mid RCA, 99% stenosis reduced to 0%.     Preserved LV systolic function Recommendations        Post stent protocol         Estimated Blood Loss: 10 mL        ____________________________________________________________________     Aleksandar Santos MD  [facility]       Objective:   Vitals: /70   Pulse 64   Temp 98.1 °F (36.7 °C) (Oral)   Resp 17   Ht 5' 7\" (1.702 m)   Wt 225 lb 6.4 oz (102.2 kg)   SpO2 92%   BMI 35.30 kg/m²   General appearance: alert and cooperative with exam  HEENT: Head: Normocephalic, no lesions, without obvious abnormality. Neck:no JVD, trachea midline, no adenopathy  Lungs: Clear to auscultation  Heart: Regular rate and rhythm, s1/s2 auscultated, no murmurs, right radial no hematoma or bleeding noted   Abdomen: soft, non-tender, bowel sounds active  Extremities: no edema  Neurologic: not done        Assessment / Acute Cardiac Problems:   Atypical  chest pain. EKG negative for ischemic changes, tropes normal.  No previous cardiac hx  Hypertension   Ex smoker, quit 3 years ago  Family hx + for CABG in father   GERD     Patient Active Problem List:     Osteoporosis     Vitamin D deficiency     History of stroke     Degeneration of lumbar or lumbosacral intervertebral disc     Back pain     Low back pain     Allergic rhinitis     Impaired fasting glucose     Mixed hyperlipidemia     Essential hypertension     Obesity (BMI 30-39. 9)     Tear of left rotator cuff     Left bicipital tenosynovitis     Colon polyp     Acute blood loss as cause of postoperative anemia     S/P right hemicolectomy     History of malignant neoplasm of skin     Acute postoperative pain     Anemia     Ex-cigarette smoker     Chest pain     Acute acalculous cholecystitis     Acute cholecystitis     Lumbar radiculopathy     Sacroiliitis (HCC)     Hepatic steatosis  Coronary Discharge Core Measure: Please indicate the medication given by X, and if not the reasons not given:    Not Given Reason  Given      Beta Blockers x      ACE-I x Statins x      ASA x      OAP (Plavix/Effient/Brilinta) Plavix ( meds to beds )    SL Nitro x      Plan of Treatment:   S/p cardiac cath - stable -continue statin Plavix , beta-blocker aspirin  Discussed in detail with patient post cath POC including but not limited to medications, diet, exercise, right radial artery site care, and follow-up. Questions and concerns addressed. OK for discharge home today. F/U in office in 1-3 weeks.    Electronically signed by KAMALA Da Silva NP on 8/30/2022 at 12:58 PM  [address]  [phone]

## 2022-08-30 NOTE — PROGRESS NOTES
IV out, tele off, pt dressed, meds delivered, d/c reviewed with pt, return to work slip provided, walked pt across to main hospital, pt has vehicle in er parking lot, will drive self home.

## 2022-08-30 NOTE — DISCHARGE SUMMARY
CDU Discharge Summary        Patient:  Eduardo Barr  YOB: 1955    MRN: [id number]   Acct: [de-identified]    Primary Care Physician: Jennifer Knowles MD    Admit date:  8/27/2022 10:11 AM  Discharge date: 8/30/2022  5:06 PM     Discharge Diagnoses:     Acute chest pain due to coronary artery disease  Improved with symptomatic management and cardiac catheterization with stent placement    Follow-up:  Call today/tomorrow for a follow up appointment with Jennifer Knowles MD , or return to the Emergency Room with worsening symptoms    Stressed to patient the importance of following up with primary care doctor for further workup/management of symptoms. Pt verbalizes understanding and agrees with plan. Discharge Medications:  Changes to medications            Medication List        START taking these medications      clopidogrel 75 MG tablet  Commonly known as: PLAVIX  Take 1 tablet by mouth daily     isosorbide mononitrate 30 MG extended release tablet  Commonly known as: IMDUR  Take 0.5 tablets by mouth daily     nitroGLYCERIN 0.4 MG SL tablet  Commonly known as: NITROSTAT  up to max of 3 total doses. If no relief after 1 dose, call 911.             CONTINUE taking these medications      amLODIPine 5 MG tablet  Commonly known as: NORVASC  TAKE 1 TABLET BY MOUTH ONCE DAILY     aspirin 81 MG chewable tablet  Take 1 tablet by mouth daily     atorvastatin 20 MG tablet  Commonly known as: LIPITOR  TAKE 1 TABLET BY MOUTH AT  BEDTIME     carvedilol 6.25 MG tablet  Commonly known as: COREG  TAKE 1 TABLET BY MOUTH  TWICE DAILY WITH MEALS     fluticasone 50 MCG/ACT nasal spray  Commonly known as: Flonase  2 sprays into each nostril daily     lisinopril 5 MG tablet  Commonly known as: PRINIVIL;ZESTRIL  TAKE 1 TABLET BY MOUTH ONCE DAILY     loratadine 10 MG tablet  Commonly known as: Claritin  Take 1 tablet by mouth daily     pantoprazole 40 MG tablet  Commonly known as: PROTONIX  take 1 tablet by mouth once daily     tadalafil 20 MG tablet  Commonly known as: CIALIS  Take 1 tablet by mouth daily as needed for Erectile Dysfunction     vitamin D 1.25 MG (94013 UT) Caps capsule  Commonly known as: ERGOCALCIFEROL  TAKE 1 CAPSULE BY MOUTH  WEEKLY     Xyosted 75 MG/0.5ML Soaj  Generic drug: Testosterone Enanthate  Inject 1 pen into the skin once a week               Where to Get Your Medications        These medications were sent to [facility] [address] [facility], [address]  [address], [address] [address]      Phone: [phone]   clopidogrel 75 MG tablet  isosorbide mononitrate 30 MG extended release tablet  nitroGLYCERIN 0.4 MG SL tablet         Diet:  No diet orders on file , Advance as tolerated     Activity:  As tolerated    Consultants: IP CONSULT TO CARDIOLOGY    Procedures: Cardiac catheterization    Diagnostic Test:   Results for orders placed or performed during the hospital encounter of 08/27/22   COVID-19, Rapid    Specimen: Nasopharyngeal Swab   Result Value Ref Range    Specimen Description . NASOPHARYNGEAL SWAB     SARS-CoV-2, Rapid Not Detected Not Detected   CBC with Auto Differential   Result Value Ref Range    WBC 6.7 3.5 - 11.3 k/uL    RBC 4.50 4.21 - 5.77 m/uL    Hemoglobin 14.6 13.0 - 17.0 g/dL    Hematocrit 42.9 40.7 - 50.3 %    MCV 95.3 82.6 - 102.9 fL    MCH 32.4 25.2 - 33.5 pg    MCHC 34.0 28.4 - 34.8 g/dL    RDW 13.2 11.8 - 14.4 %    Platelets 014 329 - 707 k/uL    MPV 11.7 8.1 - 13.5 fL    NRBC Automated 0.0 0.0 per 100 WBC    Seg Neutrophils 57 36 - 65 %    Lymphocytes 29 24 - 43 %    Monocytes 9 3 - 12 %    Eosinophils % 4 1 - 4 %    Basophils 1 0 - 2 %    Immature Granulocytes 0 0 %    Segs Absolute 3.84 1.50 - 8.10 k/uL    Absolute Lymph # 1.91 1.10 - 3.70 k/uL    Absolute Mono # 0.59 0.10 - 1.20 k/uL    Absolute Eos # 0.28 0.00 - 0.44 k/uL    Basophils Absolute 0.04 0.00 - 0.20 k/uL    Absolute Immature Granulocyte <0.03 0.00 - 0.30 k/uL   Lipase   Result Value Ref Range    Lipase 27 13 - 60 U/L   Troponin   Result Value Ref Range    Troponin, High Sensitivity 10 0 - 22 ng/L   CMP   Result Value Ref Range    Glucose 135 (H) 70 - 99 mg/dL    BUN 16 8 - 23 mg/dL    Creatinine 1.01 0.70 - 1.20 mg/dL    Calcium 9.0 8.6 - 10.4 mg/dL    Sodium 136 135 - 144 mmol/L    Potassium 4.3 3.7 - 5.3 mmol/L    Chloride 103 98 - 107 mmol/L    CO2 22 20 - 31 mmol/L    Anion Gap 11 9 - 17 mmol/L    Alkaline Phosphatase 66 40 - 129 U/L    ALT 35 5 - 41 U/L    AST 21 <40 U/L    Total Bilirubin 0.32 0.3 - 1.2 mg/dL    Total Protein 7.0 6.4 - 8.3 g/dL    Albumin 4.1 3.5 - 5.2 g/dL    Albumin/Globulin Ratio 1.4 1.0 - 2.5    GFR Non-African American >60 >60 mL/min    GFR African American >60 >60 mL/min    GFR Comment         Brain Natriuretic Peptide   Result Value Ref Range    Pro-BNP 29 <300 pg/mL   Troponin   Result Value Ref Range    Troponin, High Sensitivity 10 0 - 22 ng/L   Activated clotting time   Result Value Ref Range    Activated Clotting Time 237 (H) 79 - 149 sec   EKG 12 Lead   Result Value Ref Range    Ventricular Rate 69 BPM    Atrial Rate 69 BPM    P-R Interval 186 ms    QRS Duration 94 ms    Q-T Interval 404 ms    QTc Calculation (Bazett) 432 ms    P Axis 22 degrees    R Axis 3 degrees    T Axis 46 degrees   EKG 12 lead   Result Value Ref Range    Ventricular Rate 73 BPM    Atrial Rate 73 BPM    P-R Interval 176 ms    QRS Duration 90 ms    Q-T Interval 386 ms    QTc Calculation (Bazett) 425 ms    P Axis 28 degrees    R Axis -7 degrees    T Axis 18 degrees     CTA CHEST W WO CONTRAST    Result Date: 8/27/2022  EXAMINATION: CTA OF THE CHEST WITH AND WITHOUT CONTRAST 8/27/2022 11:21 am TECHNIQUE: CTA of the chest was performed before and after the administration of intravenous contrast.  Multiplanar reformatted images are provided for review. MIP images are provided for review.  Automated exposure control, iterative reconstruction, and/or weight based adjustment of the mA/kV was utilized to reduce the radiation dose to as low as reasonably achievable. COMPARISON: None. HISTORY: ORDERING SYSTEM PROVIDED HISTORY: Eval dissection TECHNOLOGIST PROVIDED HISTORY: Eval dissection Decision Support Exception - unselect if not a suspected or confirmed emergency medical condition->Emergency Medical Condition (MA) Reason for Exam: Eval dissection FINDINGS: Aorta: No evidence of thoracic aortic aneurysm or dissection. No acute abnormality of the aorta. Mild ectasia of the thoracic aorta is noted with scattered mild calcified plaque. Mediastinum: No evidence of mediastinal lymphadenopathy. The heart and pericardium demonstrate no acute abnormality. Coronary artery calcification is noted. No pericardial effusion or epicardial adenopathy. Lungs/Pleura: Bilateral atelectatic changes are noted with air areas of scar at the left base laterally. Focal area of pleural nodularity left upper lobe image 26 series 4 is noted. No effusion, consolidation or extrapleural air is noted. Emphysematous changes are noted. Central bronchial wall thickening in the lower lobes is noted. Trachea and mainstem bronchi patent. Upper Abdomen: Severe hepatic steatosis is noted. Small renal cysts are noted. Small splenic artery aneurysm with calcification of 8.2 mm. Soft Tissues/Bones: No acute bone or soft tissue abnormality. 1.  No thoracic aortic aneurysm or dissection. No acute aortic abnormality. 2.  Coronary artery calcification. 3.  Scar and atelectasis. Mild emphysematous changes. 4.  Central bronchial wall thickening in the lower lobes. Findings suggest bronchitis. 5.  Severe hepatic steatosis. Small splenic artery aneurysm.      XR CHEST PORTABLE    Result Date: 8/27/2022  EXAMINATION: ONE XRAY VIEW OF THE CHEST 8/27/2022 10:38 am COMPARISON: Chest CTA 07/31/2020, chest radiograph 07/31/2022 HISTORY: ORDERING SYSTEM PROVIDED HISTORY: shortness of breath TECHNOLOGIST PROVIDED HISTORY: shortness of breath FINDINGS: Clear lungs. No definite findings of pneumothorax or pleural effusion. Prominence of the pulmonary trunk that can be seen with pulmonary hypertension. Prominent epicardial fat especially on the left. Mildly prominent hilar contours. Normal cardiac contour. No acute fracture. No acute cardiopulmonary process. CT CHEST PULMONARY EMBOLISM W CONTRAST    Result Date: 7/31/2022  EXAMINATION: CTA OF THE CHEST 7/31/2022 2:59 pm TECHNIQUE: CTA of the chest was performed after the administration of intravenous contrast.  Multiplanar reformatted images are provided for review. MIP images are provided for review. Automated exposure control, iterative reconstruction, and/or weight based adjustment of the mA/kV was utilized to reduce the radiation dose to as low as reasonably achievable. COMPARISON: None. HISTORY: ORDERING SYSTEM PROVIDED HISTORY: r/o pe TECHNOLOGIST PROVIDED HISTORY: r/o pe Decision Support Exception - unselect if not a suspected or confirmed emergency medical condition->Emergency Medical Condition (MA) Reason for Exam: r/o pe, covid positive FINDINGS: Pulmonary Arteries: Pulmonary arteries are adequately opacified for evaluation. No evidence of intraluminal filling defect to suggest pulmonary embolism. Main pulmonary artery is normal in caliber. Mediastinum: No evidence of mediastinal lymphadenopathy. The heart and pericardium demonstrate no acute abnormality. There is no acute abnormality of the thoracic aorta. Lungs/pleura: The lungs are without acute process. No focal consolidation or pulmonary edema. No evidence of pleural effusion or pneumothorax. Upper Abdomen: Limited images of the upper abdomen demonstrates no acute abnormality. There is hepatic steatosis. The gallbladder is surgically absent. Soft Tissues/Bones: No acute bone or soft tissue abnormality. No evidence of pulmonary embolism or acute pulmonary abnormality.            Physical

## 2022-08-30 NOTE — DISCHARGE INSTRUCTIONS
Please follow up with cardiology on 9/20. Take your new medications as prescribed. Return to the ED for any new or worsening CP, SOB, or any other new or concerning symptoms.        OK TO GO BACK TO WORK WITH NO RESTRICTION IN 4 DAYS   Electronically signed by KAMALA Perkins NP on 8/30/22 at 3:12 PM EDT

## 2022-08-30 NOTE — PROGRESS NOTES
CLINICAL PHARMACY NOTE: MEDS TO BEDS    Total # of Prescriptions Filled: 3   The following medications were delivered to the patient:  Nitrostat  Clopidogrel  isosorbide    Additional Documentation:   $34.79 clover

## 2022-08-30 NOTE — PROGRESS NOTES
Pt verbalized some SOA; no chest pain, tightness or pressure. Just incidents of dyspnea. Notified MD and placed Pt on 1 L by NC per orders and continued to monitor as instructed. Noted some apnea on the monitors, but Pt stated he has no prior hx of sleep apnea. Asked Dalia Foreman if they wanted to continue NS at 125.hr. ; he told me to hold fluids. NS discontinued for now.

## 2022-08-30 NOTE — PROGRESS NOTES
SPIRITUAL CARE DEPARTMENT - [facility] [address]  PROGRESS NOTE    Shift date: 8/29/2022  Shift day: Monday   Shift # 3    Room # 2001/2001-01   Name: Benito Rodríguez                Episcopal: Zoroastrianism   Place of Faith: Unknown    Referral: Routine Visit    Admit Date & Time: 8/27/2022 10:11 AM    Assessment:  Benito Rodríguez is a 77 y.o. male in the hospital because of \"Shortness of Breath and Chest Pain. \" Patient was resting in hospital bed, watching TV, when  visited. Intervention:  Writer introduced self and title as . Patient indicated that he is \"feeling better. \" Patient was in good spirits and indicated no needs at time of visit. Outcome:  Patient thanked  for visit. Plan:  Chaplains will remain available to offer spiritual and emotional support as needed. 08/29/22 2144   Encounter Summary   Service Provided For: Patient   Referral/Consult From: [facility] Family members   Last Encounter  08/29/22   Complexity of Encounter Low   Begin Time 2144   End Time  2155   Total Time Calculated 11 min   Encounter    Type Initial Screen/Assessment   Spiritual/Emotional needs   Type Spiritual Support   Assessment/Intervention/Outcome   Assessment Calm;Coping   Intervention Active listening;Discussed illness injury and its impact; Explored/Affirmed feelings, thoughts, concerns;Explored Coping Skills/Resources;Nurtured Hope;Sustaining Presence/Ministry of presence   Outcome Comfort;Coping;Receptive   Plan and Referrals   Plan/Referrals Continue to visit, (comment)  (as needed)     Electronically signed by Florencio Lopes on 8/29/2022 at 11:53 PM.  [facility]  [phone]

## 2022-08-30 NOTE — CARE COORDINATION
Met with patient to discuss transitional planning. He is returning home independently. He denies needs.  He drove himself here and will drive home    Discharge [address] [facility] Case Management Department  Written by: Johnson Dang RN    Patient Name: Meeta Solo  Attending Provider: Kamryn Amaral MD  Admit Date: 2022 10:11 AM  MRN: [id number]  Account: [de-identified]                     : 1955  Discharge Date: 2022      Disposition: home    Johnson Dang RN

## 2022-08-30 NOTE — PLAN OF CARE
Problem: Chronic Conditions and Co-morbidities  Goal: Patient's chronic conditions and co-morbidity symptoms are monitored and maintained or improved  8/29/2022 2212 by Tone Garcia RN  Outcome: Progressing  Flowsheets (Taken 8/29/2022 2000)  Care Plan - Patient's Chronic Conditions and Co-Morbidity Symptoms are Monitored and Maintained or Improved: Monitor and assess patient's chronic conditions and comorbid symptoms for stability, deterioration, or improvement  8/29/2022 0928 by Colette Mancilla RN  Outcome: Progressing     Problem: Safety - Adult  Goal: Free from fall injury  8/29/2022 2212 by Tone Garcia RN  Outcome: Progressing  Flowsheets (Taken 8/29/2022 2000)  Free From Fall Injury: Instruct family/caregiver on patient safety  8/29/2022 0928 by Colette Mancilla RN  Outcome: Progressing     Problem: Pain  Goal: Verbalizes/displays adequate comfort level or baseline comfort level  8/29/2022 2212 by Tone Garcia RN  Outcome: Progressing  8/29/2022 0928 by Colette Mancilla RN  Outcome: Progressing     Problem: Discharge Planning  Goal: Discharge to home or other facility with appropriate resources  Outcome: Progressing  Flowsheets (Taken 8/29/2022 2000)  Discharge to home or other facility with appropriate resources: Identify barriers to discharge with patient and caregiver

## 2022-08-30 NOTE — PROGRESS NOTES
OBS/CDU   RESIDENT NOTE      Patients PCP is:  Ash Wu MD        SUBJECTIVE      Patient had an episode of shortness of breath overnight. Shortness of breath has resolved at time my exam, on room air. Cardiac catheterization yesterday. LINDSEY placed in RCA. Patient appears well this morning denies chest pain, shortness of breath, nausea, vomiting. catheterization site appears well. No acute events overnight. Has been able to tolerate a full diet without nausea or vomiting. The patient is urinating on his own and is passing flatus. Denies fever, chills, nausea, vomiting, chest pain, shortness of breath, abdominal pain. PHYSICAL EXAM      General: NAD, AO X 3  Heent: EMOI, PERRL  Neck: SUPPLE, NO JVD  Cardiovascular: RRR, S1S2  Pulmonary: CTAB, NO SOB  Abdomen: SOFT, NTTP, ND, +BS  Extremities: +2/4 PULSES DISTAL, NO SWELLING  Neuro / Psych: NO NUMBNESS OR TINGLING, MENTATION AT BASELINE    PERTINENT TEST /EXAMS      I have reviewed all available laboratory results.     MEDICATIONS CURRENT   aspirin chewable tablet 81 mg, Daily  clopidogrel (PLAVIX) tablet 75 mg, Daily  sodium chloride flush 0.9 % injection 5-40 mL, 2 times per day  sodium chloride flush 0.9 % injection 5-40 mL, PRN  0.9 % sodium chloride infusion, PRN  acetaminophen (TYLENOL) tablet 650 mg, Q4H PRN  nitroGLYCERIN (NITROSTAT) SL tablet 0.4 mg, Q5 Min PRN  isosorbide mononitrate (IMDUR) extended release tablet 15 mg, Daily  0.9 % sodium chloride infusion, Continuous  sodium chloride flush 0.9 % injection 5-40 mL, 2 times per day  sodium chloride flush 0.9 % injection 5-40 mL, PRN  0.9 % sodium chloride infusion, PRN  potassium chloride (KLOR-CON M) extended release tablet 40 mEq, PRN   Or  potassium bicarb-citric acid (EFFER-K) effervescent tablet 40 mEq, PRN   Or  potassium chloride 10 mEq/100 mL IVPB (Peripheral Line), PRN  enoxaparin Sodium (LOVENOX) injection 30 mg, BID  ondansetron (ZOFRAN) injection 4 mg, Q4H PRN  polyethylene glycol (GLYCOLAX) packet 17 g, Daily PRN  acetaminophen (TYLENOL) tablet 650 mg, Q6H PRN   Or  acetaminophen (TYLENOL) suppository 650 mg, Q6H PRN  famotidine (PEPCID) tablet 20 mg, BID  oxyCODONE (ROXICODONE) immediate release tablet 5 mg, Q6H PRN  amLODIPine (NORVASC) tablet 5 mg, Daily  carvedilol (COREG) tablet 12.5 mg, BID  atorvastatin (LIPITOR) tablet 20 mg, Daily        All medication charted and reviewed. CONSULTS      IP CONSULT TO CARDIOLOGY    ASSESSMENT/PLAN       Analilia Hamilton is a 77 y.o. male who presents with chest pain. Chest pain  EKG WNL, Troponins Wnl, Heart score of 5  Cardiology consulted, appreciate evaluation and recommendations  Continue BB, statin and Norvasc, add nitro tab as needed for CP  Cardiac catheterization performed today  Discharged home with prescriptions for Plavix, nitro, Imdur. Return to work in 1 week  Continue home medications and pain control  Monitor vitals, labs, and imaging  DISPO: pending consults and clinical improvement    --  Shakeel Renee DO  Emergency Medicine Resident Physician     This dictation was generated by voice recognition computer software. Although all attempts are made to edit the dictation for accuracy, there may be errors in the transcription that are not intended.

## 2022-08-31 ENCOUNTER — CARE COORDINATION (OUTPATIENT)
Dept: CASE MANAGEMENT | Age: 67
End: 2022-08-31

## 2022-08-31 NOTE — PROGRESS NOTES
[facility]  CDU / OBSERVATION ENCOUNTER  ATTENDING NOTE       I performed a history and physical examination of the patient and discussed management with the resident or midlevel provider. I reviewed the resident or midlevel provider's note and agree with the documented findings and plan of care. Any areas of disagreement are noted on the chart. I was personally present for the key portions of any procedures. I have documented in the chart those procedures where I was not present during the key portions. I have reviewed the nurses notes. I agree with the chief complaint, past medical history, past surgical history, allergies, medications, social and family history as documented unless otherwise noted below. The Family history, social history, and ROS are effectively unchanged since admission unless noted elsewhere in the chart. Patient status post cardiac stenting. Patient evaluated with cardiology NP. Scripts written. Patient without other needs.   Patient discharged in good condition    Flaca Mays MD  Attending Emergency  Physician

## 2022-08-31 NOTE — CARE COORDINATION
[facility] Transitions Initial Follow Up Call    Call within 2 business days of discharge: Yes    Patient: Shaniqua Gonzales   Patient : 1955   MRN: [id number]    Reason for Admission: chest pain - RCA DE stent - RECENT COVID INFECTION  Discharge Date: 22   RARS: Readmission Risk Score: 7.2      Last Discharge [facility]       Date Complaint Diagnosis Description Type Department Provider    22 Shortness of Breath; Chest Pain Chest pain, unspecified type ED to [facility] Gil Roach MD; Lisseth Peri. .. Facility:  [facility]    Non-face-to-face services provided:  Scheduled appointment with PCP-,   Scheduled appointment with [facility]   Obtained and reviewed discharge summary and/or continuity of care documents    Spoke with: patient - reports doing \"good\" since discharge. Denies any further chest discomfort since stent. Right radial access site for procedure is healing well - denies any lump, drainage or bruising. CWM within normal limits. Reviewed new cardiac medications - plavix, imdur, NTG usage. Slight headache -possibly due to new imdur. He follows up with cardiology appt . Denies any remaining covid symptoms except occasional cough from nasal congestion/drip. Patient is scheduled to go back to work as a  for [employer] on Monday. Denies any questions or issues. Informed & communicated with JIA Gregory that CT episode resolved after this initial discharge call & referred back to [facility]. Patient does have CTN's contact # if any question re: discharge.       Plan for next call: referral to ambulatory care manager-[facility] is already following - communicated with current Emma RO re: initial call completed & referred back to ambulatory care    CT episode resolved    Care Transitions 24 Hour Call    Schedule Follow Up Appointment with PCP: Completed  Do you have a copy of your discharge instructions?: Yes  Do you have all of your prescriptions and are they filled?: Yes  Have you been contacted by a [place] [facility]?: No  Have you scheduled your follow up appointment?: Yes  How are you going to get to your appointment?: Car - drive self  Do you have support at home?: Alone  Do you feel like you have everything you need to keep you well at home?: Yes  Are you an active caregiver in your home?: No  Care Transitions Interventions     Other Services: Completed (Comment: ACM)                Was this an external facility discharge? No   Challenges to be reviewed by the provider   Additional needs identified to be addressed with provider: No  none             Method of communication with provider : none    Advance Care Planning:   Does patient have an Advance Directive: not on file. Care Transition Nurse contacted the patient by telephone to perform post hospital discharge assessment. Verified name and  with patient as identifiers. Provided introduction to self, and explanation of the CTN role. CTN reviewed discharge instructions, medical action plan and red flags with patient who verbalized understanding. Patient given an opportunity to ask questions and does not have any further questions or concerns at this time. Were discharge instructions available to patient? Yes. Reviewed appropriate site of care based on symptoms and resources available to patient including: PCP  Specialist  CTN . The patient agrees to contact the PCP office for questions related to their healthcare. Medication reconciliation was performed with patient, who verbalizes understanding of administration of home medications. Was patient discharged with a pulse oximeter? no    CTN provided contact information. No further follow-up call indicated based on severity of symptoms and risk factors.   Plan for next call: referral to ambulatory care manager-[facility] is already following - communicated with current Emma RO re: initial call completed & referred back to ambulatory care       CT episode resolved      Follow Up  Future Appointments   Date Time Provider Miryam Ivanna   9/6/2022  8:30 AM KAMALA Meadows - CNP [facility]   9/19/2022  8:10 AM Omega Sims MD [facility]   9/30/2022  8:30 AM MD Jalyn Ayoub, RN

## 2022-08-31 NOTE — PROGRESS NOTES
Physician Progress Note      Isaac Cormier  CSN #:                  [id number]  :                       1955  ADMIT DATE:       2022 10:11 AM  DISCH DATE:        2022 5:06 PM  RESPONDING  PROVIDER #:        Shan Fernandez MD          QUERY TEXT:    Pt admitted with chest pain with no acute EKG changes and troponin of 10.   family history of CABG ex smoker with history of htn and obesity . Pt noted   to have left heart Cath showing 2 vessel CAD requiring a LINDSEY to mid RCA. If   possible please clarify one of the following: The medical record reflects the following:  Risk Factors: history of smoking, family history of CABG, obesity, htn, hld  Clinical Indicators: admitted with chest pain with no acute EKG changes and   troponin of 10. family history of CABG ex smoker with history of htn and   obesity . Pt noted to have left heart Cath showing 2 vessel CAD requiring a   LINDSEY to mid RCA  Treatment: Cardiac consult, monitoring, Cardiac Cath    Thank Angela Blanco RN BSN  CCDS  Email [email or web address]  Cell [phone]  office hours M-F 6am to 2:30pm  Options provided:  -- Chest pain due to CAD with unstable angina  -- chest pain due to CAD with stable angina  -- Other - I will add my own diagnosis  -- Disagree - Not applicable / Not valid  -- Disagree - Clinically unable to determine / Unknown  -- Refer to Clinical Documentation Reviewer    PROVIDER RESPONSE TEXT:    This patient has CAD with unstable angina. Query created by:  Ángela Maya on 2022 8:37 AM      Electronically signed by:  Shan Fernandez MD 2022 10:06 PM

## 2022-09-06 ENCOUNTER — OFFICE VISIT (OUTPATIENT)
Dept: FAMILY MEDICINE CLINIC | Age: 67
End: 2022-09-06
Payer: MEDICARE

## 2022-09-06 VITALS
HEART RATE: 76 BPM | SYSTOLIC BLOOD PRESSURE: 110 MMHG | WEIGHT: 223 LBS | RESPIRATION RATE: 16 BRPM | BODY MASS INDEX: 35 KG/M2 | HEIGHT: 67 IN | TEMPERATURE: 97.6 F | DIASTOLIC BLOOD PRESSURE: 68 MMHG

## 2022-09-06 DIAGNOSIS — Z00.00 MEDICARE ANNUAL WELLNESS VISIT, INITIAL: Primary | ICD-10-CM

## 2022-09-06 PROCEDURE — G0438 PPPS, INITIAL VISIT: HCPCS | Performed by: NURSE PRACTITIONER

## 2022-09-06 PROCEDURE — 1123F ACP DISCUSS/DSCN MKR DOCD: CPT | Performed by: NURSE PRACTITIONER

## 2022-09-06 RX ORDER — ASPIRIN 325 MG
325 TABLET ORAL DAILY
COMMUNITY

## 2022-09-06 ASSESSMENT — PATIENT HEALTH QUESTIONNAIRE - PHQ9
1. LITTLE INTEREST OR PLEASURE IN DOING THINGS: 0
SUM OF ALL RESPONSES TO PHQ9 QUESTIONS 1 & 2: 0
2. FEELING DOWN, DEPRESSED OR HOPELESS: 0
SUM OF ALL RESPONSES TO PHQ QUESTIONS 1-9: 0

## 2022-09-06 NOTE — PROGRESS NOTES
Medicare Annual Wellness Visit    Analilia Hamilton is here for Medicare AW    Assessment & Plan    Recommendations for Preventive Services Due: see orders and patient instructions/AVS.  Recommended screening schedule for the next 5-10 years is provided to the patient in written form: see Patient Instructions/AVS.     No follow-ups on file. Subjective   Medicare wellness visit     Patient's complete Health Risk Assessment and screening values have been reviewed and are found in Flowsheets. The following problems were reviewed today and where indicated follow up appointments were made and/or referrals ordered. Positive Risk Factor Screenings with Interventions:     Cognitive:   Words recalled: 2 Words Recalled  Clock Drawing Test (CDT): (!) Abnormal  Total Score Interpretation: Abnormal Mini-Cog  Did the patient refuse to take the cognition test?: No  Cognitive Impairment Interventions:  Patient declines any further evaluation/treatment for cognitive impairment           General Health and ACP:  General  In general, how would you say your health is?: Good  In the past 7 days, have you experienced any of the following: New or Increased Pain, New or Increased Fatigue, Loneliness, Social Isolation, Stress or Anger?: No  Do you get the social and emotional support that you need?: Yes  Do you have a Living Will?: Yes    Advance Directives       Power of  Living Will ACP-Advance Directive ACP-Power of     Not on File Not on File Not on File Not on File        General Health Risk Interventions:  Pt is     Health Habits/Nutrition:  Physical Activity: Sufficiently Active    Days of Exercise per Week: 7 days    Minutes of Exercise per Session: 60 min     Have you lost any weight without trying in the past 3 months?: No  Body mass index: (!) 34.92  Have you seen the dentist within the past year?: (!) No  Health Habits/Nutrition Interventions:  Dental exam overdue:  patient encouraged to make appointment with his/her dentist     Safety:  Do you have working smoke detectors?: Yes  Do you have any tripping hazards - clutter in doorways, halls, or stairs?: No  Do you have either shower bars, grab bars, non-slip mats or non-slip surfaces in your shower or bathtub?: (!) No  Do all of your stairways have a railing or banister?: Yes  Do you always fasten your seatbelt when you are in a car?: Yes  Safety Interventions:  Home safety tips provided           Objective   Vitals:    09/06/22 0826   BP: 110/68   Site: Left Upper Arm   Position: Sitting   Cuff Size: Large Adult   Pulse: 76   Resp: 16   Temp: 97.6 °F (36.4 °C)   TempSrc: Infrared   Weight: 223 lb (101.2 kg)   Height: 5' 7\" (1.702 m)      Body mass index is 34.93 kg/m². Constitutional: obesity. he is oriented to person, place, and time. he appears well-developed and well-nourished. No distress. HENT:   Mouth/Throat: No oropharyngeal exudate. Eyes: Conjunctivae are normal. Pupils are equal, round, and reactive to light. No scleral icterus. Neck: No JVD present. No thyromegaly present. Cardiovascular: Normal rate, regular rhythm and normal heart sounds. Exam reveals no gallop and no friction rub. No murmur heard. Pulmonary/Chest: Effort normal. No respiratory distress. he has no wheezes. he has no rales. Abdominal: Soft. Bowel sounds are normal. he exhibits no distension and no mass. There is no tenderness. There is no rebound and no guarding. Lymphadenopathy:   he has no cervical adenopathy. Neurological: he is alert and oriented to person, place, and time. No cranial nerve deficit. he exhibits normal muscle tone. Skin: he is not diaphoretic. No Known Allergies  Prior to Visit Medications    Medication Sig Taking?  Authorizing Provider   aspirin 325 MG tablet Take 325 mg by mouth daily Yes Historical Provider, MD   clopidogrel (PLAVIX) 75 MG tablet Take 1 tablet by mouth daily Yes Ellie Kim, APRN - TOREY   isosorbide mononitrate (IMDUR) 30 MG extended release tablet Take 0.5 tablets by mouth daily Yes Ellie Oconnor APRN - NP   nitroGLYCERIN (NITROSTAT) 0.4 MG SL tablet up to max of 3 total doses. If no relief after 1 dose, call 911.  Yes KAMALA Florez NP   carvedilol (COREG) 6.25 MG tablet TAKE 1 TABLET BY MOUTH  TWICE DAILY WITH MEALS Yes Ash Wu MD   amLODIPine (NORVASC) 5 MG tablet TAKE 1 TABLET BY MOUTH ONCE DAILY Yes Ash Wu MD   atorvastatin (LIPITOR) 20 MG tablet TAKE 1 TABLET BY MOUTH AT  BEDTIME Yes Ash Wu MD   lisinopril (PRINIVIL;ZESTRIL) 5 MG tablet TAKE 1 TABLET BY MOUTH ONCE DAILY Yes Ash Wu MD   tadalafil (CIALIS) 20 MG tablet Take 1 tablet by mouth daily as needed for Erectile Dysfunction Yes James Winn MD   vitamin D (ERGOCALCIFEROL) 1.25 MG (13417 UT) CAPS capsule TAKE 1 CAPSULE BY MOUTH  WEEKLY Yes Ash Wu MD   pantoprazole (PROTONIX) 40 MG tablet take 1 tablet by mouth once daily Yes Ash uW MD   fluticasone [facility]) 50 MCG/ACT nasal spray 2 sprays into each nostril daily Yes Ash Wu MD   loratadine (CLARITIN) 10 MG tablet Take 1 tablet by mouth daily Yes Ash Wu MD   Testosterone Enanthate (XYOSTED) 75 MG/0.5ML SOAJ Inject 1 pen into the skin once a week  Patient not taking: Reported on 9/6/2022  James Winn MD       [facility] (Including outside providers/suppliers regularly involved in providing care):   Patient Care Team:  Ash Wu MD as PCP - General (Family Medicine)  Ash Wu MD [facility] - [facility]  Chel Best MD as Orthopedic Surgeon (Orthopedic Surgery)  Driss Matthews MD (Neurology)  Kyara Amador MD as Consulting Physician (Gastroenterology)  Tiffani Ramirez RN as Ambulatory Care Manager     Reviewed and updated this visit:  Tobacco  Allergies  Meds  Med Hx  Surg Hx  Soc Hx  Fam Hx

## 2022-09-13 ENCOUNTER — CARE COORDINATION (OUTPATIENT)
Dept: CARE COORDINATION | Age: 67
End: 2022-09-13

## 2022-09-14 NOTE — CARE COORDINATION
Ambulatory Care Coordination Note  9/14/2022    ACC: Gala Rivera RN    Summary Note:   Date Care Coordination Episode Started:  August 1, 2022    Reason for Call Today:     CC Follow Up    Reason patient is in Care Coordination:     HTN  RAEV  70%    Topics Discussed Today:     General- states he is feeling well. Denies any symptoms except sinus issues and headache which he relates to this sinuses. Denies any dizziness, chest pain, or shortness of breath. Has all necessary medications. Has upcoming appts with urology- 9/19, cardiology- 9/20, and PCP-9/30. Interventions completed today:    Assessments completed today:     Medication reconciliation  General Health Assessment    Care Coordinator plan of care:     Keep scheduled appts with urology, cardiology, and PCP  Continue to take medications as prescribed   Follow up in 2 weeks after cardiology appt    Lab Results       None            Care Coordination Interventions    Referral from Primary Care Provider: No  Suggested Interventions and Community Resources  No Identified Needs          Goals Addressed                   This Visit's Progress     Conditions and Symptoms   On track     I will schedule office visits, as directed by my provider. I will notify my provider of any barriers to my plan of care. I will notify my provider of any symptoms that indicate a worsening of my condition. Barriers: lack of education  Plan for overcoming my barriers: education, care management   Confidence: 9/10  Anticipated Goal Completion Date: 11/1/2022                Prior to Admission medications    Medication Sig Start Date End Date Taking?  Authorizing Provider   aspirin 325 MG tablet Take 325 mg by mouth daily    Historical Provider, MD   clopidogrel (PLAVIX) 75 MG tablet Take 1 tablet by mouth daily 8/31/22   KAMALA Castillo - NP   isosorbide mononitrate (IMDUR) 30 MG extended release tablet Take 0.5 tablets by mouth daily 8/31/22   KAMALA Castillo - NP   nitroGLYCERIN (NITROSTAT) 0.4 MG SL tablet up to max of 3 total doses.  If no relief after 1 dose, call 911. 8/30/22   KAMALA Man - NP   Testosterone Enanthate (XYOSTED) 75 MG/0.5ML SOAJ Inject 1 pen into the skin once a week  Patient not taking: Reported on 9/6/2022 7/18/22   Mahesh Cyr MD   carvedilol (COREG) 6.25 MG tablet TAKE 1 TABLET BY MOUTH  TWICE DAILY WITH MEALS 6/30/22   Danika Armstrong MD   amLODIPine (NORVASC) 5 MG tablet TAKE 1 TABLET BY MOUTH ONCE DAILY 6/30/22   Danika Armstrong MD   atorvastatin (LIPITOR) 20 MG tablet TAKE 1 TABLET BY MOUTH AT  BEDTIME 6/30/22   Danika Armstrong MD   lisinopril (PRINIVIL;ZESTRIL) 5 MG tablet TAKE 1 TABLET BY MOUTH ONCE DAILY 6/30/22   Danika Armstrong MD   tadalafil (CIALIS) 20 MG tablet Take 1 tablet by mouth daily as needed for Erectile Dysfunction 6/13/22   Mahesh Cyr MD   vitamin D (ERGOCALCIFEROL) 1.25 MG (35778 UT) CAPS capsule TAKE 1 CAPSULE BY MOUTH  WEEKLY 5/12/22   Danika Armstrong MD   pantoprazole (PROTONIX) 40 MG tablet take 1 tablet by mouth once daily 1/17/22   Danika Armstrong MD   fluticasone [facility]) 50 MCG/ACT nasal spray 2 sprays into each nostril daily 10/15/21   Danika Armstrong MD   loratadine (CLARITIN) 10 MG tablet Take 1 tablet by mouth daily 10/15/21   Danika Armstrong MD       Future Appointments   Date Time Provider Miryam Goncalves   9/19/2022  8:10 AM [facility]hesh Cyr MD [facility]   9/30/2022  8:30 AM Danika Armstrong MD [facility]

## 2022-09-15 ENCOUNTER — TELEPHONE (OUTPATIENT)
Dept: UROLOGY | Age: 67
End: 2022-09-15

## 2022-09-23 RX ORDER — PANTOPRAZOLE SODIUM 40 MG/1
TABLET, DELAYED RELEASE ORAL
Qty: 90 TABLET | Refills: 1 | Status: SHIPPED | OUTPATIENT
Start: 2022-09-23

## 2022-09-30 ENCOUNTER — OFFICE VISIT (OUTPATIENT)
Dept: FAMILY MEDICINE CLINIC | Age: 67
End: 2022-09-30
Payer: MEDICARE

## 2022-09-30 VITALS
BODY MASS INDEX: 35.46 KG/M2 | WEIGHT: 226.4 LBS | TEMPERATURE: 97.5 F | SYSTOLIC BLOOD PRESSURE: 112 MMHG | RESPIRATION RATE: 16 BRPM | HEART RATE: 74 BPM | DIASTOLIC BLOOD PRESSURE: 76 MMHG

## 2022-09-30 DIAGNOSIS — E78.2 MIXED HYPERLIPIDEMIA: Chronic | ICD-10-CM

## 2022-09-30 DIAGNOSIS — Z95.5 H/O RIGHT CORONARY ARTERY STENT PLACEMENT: ICD-10-CM

## 2022-09-30 DIAGNOSIS — R73.03 PREDIABETES: ICD-10-CM

## 2022-09-30 DIAGNOSIS — E55.9 VITAMIN D DEFICIENCY: ICD-10-CM

## 2022-09-30 DIAGNOSIS — I10 ESSENTIAL HYPERTENSION: Primary | ICD-10-CM

## 2022-09-30 DIAGNOSIS — Z23 NEEDS FLU SHOT: ICD-10-CM

## 2022-09-30 PROBLEM — I20.0 UNSTABLE ANGINA (HCC): Status: RESOLVED | Noted: 2022-08-29 | Resolved: 2022-09-30

## 2022-09-30 LAB — HBA1C MFR BLD: 6 %

## 2022-09-30 PROCEDURE — 1123F ACP DISCUSS/DSCN MKR DOCD: CPT | Performed by: FAMILY MEDICINE

## 2022-09-30 PROCEDURE — G0008 ADMIN INFLUENZA VIRUS VAC: HCPCS | Performed by: FAMILY MEDICINE

## 2022-09-30 PROCEDURE — 3288F FALL RISK ASSESSMENT DOCD: CPT | Performed by: FAMILY MEDICINE

## 2022-09-30 PROCEDURE — 83036 HEMOGLOBIN GLYCOSYLATED A1C: CPT | Performed by: FAMILY MEDICINE

## 2022-09-30 PROCEDURE — 90694 VACC AIIV4 NO PRSRV 0.5ML IM: CPT | Performed by: FAMILY MEDICINE

## 2022-09-30 PROCEDURE — 99214 OFFICE O/P EST MOD 30 MIN: CPT | Performed by: FAMILY MEDICINE

## 2022-09-30 RX ORDER — METFORMIN HYDROCHLORIDE 500 MG/1
500 TABLET, EXTENDED RELEASE ORAL
Qty: 30 TABLET | Refills: 5 | Status: SHIPPED | OUTPATIENT
Start: 2022-09-30

## 2022-09-30 SDOH — ECONOMIC STABILITY: FOOD INSECURITY: WITHIN THE PAST 12 MONTHS, THE FOOD YOU BOUGHT JUST DIDN'T LAST AND YOU DIDN'T HAVE MONEY TO GET MORE.: NEVER TRUE

## 2022-09-30 SDOH — ECONOMIC STABILITY: FOOD INSECURITY: WITHIN THE PAST 12 MONTHS, YOU WORRIED THAT YOUR FOOD WOULD RUN OUT BEFORE YOU GOT MONEY TO BUY MORE.: NEVER TRUE

## 2022-09-30 ASSESSMENT — PATIENT HEALTH QUESTIONNAIRE - PHQ9
SUM OF ALL RESPONSES TO PHQ9 QUESTIONS 1 & 2: 0
SUM OF ALL RESPONSES TO PHQ QUESTIONS 1-9: 0
SUM OF ALL RESPONSES TO PHQ QUESTIONS 1-9: 0
2. FEELING DOWN, DEPRESSED OR HOPELESS: 0
SUM OF ALL RESPONSES TO PHQ QUESTIONS 1-9: 0
SUM OF ALL RESPONSES TO PHQ QUESTIONS 1-9: 0
1. LITTLE INTEREST OR PLEASURE IN DOING THINGS: 0

## 2022-09-30 ASSESSMENT — SOCIAL DETERMINANTS OF HEALTH (SDOH): HOW HARD IS IT FOR YOU TO PAY FOR THE VERY BASICS LIKE FOOD, HOUSING, MEDICAL CARE, AND HEATING?: NOT HARD AT ALL

## 2022-09-30 ASSESSMENT — ENCOUNTER SYMPTOMS
ABDOMINAL PAIN: 0
CHEST TIGHTNESS: 0
BLOOD IN STOOL: 0
SHORTNESS OF BREATH: 0

## 2022-09-30 NOTE — PROGRESS NOTES
Subjective:      Patient ID: Danay So is a 79 y.o. male. HPI  Visit Information    Have you changed or started any medications since your last visit including any over-the-counter medicines, vitamins, or herbal medicines? no   Are you having any side effects from any of your medications? -  no  Have you stopped taking any of your medications? Is so, why? -  no    Have you seen any other physician or provider since your last visit? Yes - Records Obtained  Have you had any other diagnostic tests since your last visit? Yes - Records Obtained  Have you been seen in the emergency room and/or had an admission to a hospital since we last saw you? Yes - Records Obtained  Have you had your routine dental cleaning in the past 6 months? no    Have you activated your [employer] account? If not, what are your barriers?  Yes     Patient Care Team:  Laura Malave MD as PCP - General (Family Medicine)  Laura [facility]ave MD [facility]  Rachell Razo MD as Orthopedic Surgeon (Orthopedic Surgery)  Vasile Israel MD (Neurology)  Elsie Ganser, MD as Consulting Physician (Gastroenterology)  Gena Romero RN as Ambulatory Care Manager    Medical History Review  Past Medical, Family, and Social History reviewed and does contribute to the patient presenting condition    Health Maintenance   Topic Date Due    Hepatitis C screen  Never done    Hepatitis B vaccine (4 of 4 - 4-dose series) 06/12/2003    Shingles vaccine (1 of 2) Never done    Pneumococcal 65+ years Vaccine (1 - PCV) Never done    COVID-19 Vaccine (4 - Booster for Pfizer series) 05/18/2022    Flu vaccine (1) 08/01/2022    A1C test (Diabetic or Prediabetic)  09/08/2022    Colorectal Cancer Screen  02/11/2023    Lipids  06/08/2023    Low dose CT lung screening  06/17/2023    Depression Screen  09/06/2023    Annual Wellness Visit (AWV)  09/07/2023    DTaP/Tdap/Td vaccine (2 - Td or Tdap) 06/12/2025    AAA screen  Completed Hepatitis A vaccine  Aged Out    Hib vaccine  Aged Out    Meningococcal (ACWY) vaccine  Aged Out     Patient is a 71-year-old obese white male who presents for hypertension, hyperlipidemia, prediabetes, vitamin D deficiency. He states that he is taking and tolerating his routine medication. His hemoglobin A1c today was 6.0. He denies any fever, chills, chest pain, abdominal pain, shortness of breath. He states that he had coronary artery stent placement about a week ago. He has a good appetite and remains active. Review of Systems   Constitutional:  Negative for chills and fever. HENT:  Negative for congestion. Respiratory:  Negative for chest tightness and shortness of breath. Cardiovascular:  Negative for chest pain. Gastrointestinal:  Negative for abdominal pain and blood in stool. Genitourinary:  Negative for dysuria and hematuria. Skin:  Negative for rash. Neurological:  Negative for dizziness. Psychiatric/Behavioral:  Negative for dysphoric mood. Objective:   Physical Exam  Vitals and nursing note reviewed. Constitutional:       General: He is not in acute distress. Appearance: He is well-developed. HENT:      Head: Normocephalic and atraumatic. Right Ear: Tympanic membrane, ear canal and external ear normal.      Left Ear: Tympanic membrane, ear canal and external ear normal.      Nose: Nose normal.      Mouth/Throat:      Mouth: Mucous membranes are moist.      Pharynx: Oropharynx is clear. Eyes:      General: No scleral icterus. Right eye: No discharge. Left eye: No discharge. Conjunctiva/sclera: Conjunctivae normal.   Cardiovascular:      Rate and Rhythm: Normal rate and regular rhythm. Heart sounds: Normal heart sounds. Pulmonary:      Effort: Pulmonary effort is normal. No respiratory distress. Breath sounds: Normal breath sounds. No wheezing. Abdominal:      General: There is no distension. Palpations: Abdomen is soft.

## 2022-10-03 ENCOUNTER — HOSPITAL ENCOUNTER (OUTPATIENT)
Age: 67
Discharge: HOME | End: 2022-10-03
Payer: MEDICARE

## 2022-10-03 DIAGNOSIS — R73.03 PREDIABETES: ICD-10-CM

## 2022-10-03 DIAGNOSIS — I10 ESSENTIAL HYPERTENSION: ICD-10-CM

## 2022-10-03 DIAGNOSIS — E55.9 VITAMIN D DEFICIENCY: ICD-10-CM

## 2022-10-03 DIAGNOSIS — E78.2 MIXED HYPERLIPIDEMIA: Chronic | ICD-10-CM

## 2022-10-03 DIAGNOSIS — E78.2 MIXED HYPERLIPIDEMIA: Primary | ICD-10-CM

## 2022-10-03 LAB
CHOLESTEROL/HDL RATIO: 4.9
CHOLESTEROL: 193 MG/DL
HDLC SERPL-MCNC: 39 MG/DL
LDL CHOLESTEROL: 90 MG/DL (ref 0–130)
TRIGL SERPL-MCNC: 319 MG/DL

## 2022-10-03 PROCEDURE — 36415 COLL VENOUS BLD VENIPUNCTURE: CPT

## 2022-10-03 PROCEDURE — 80061 LIPID PANEL: CPT

## 2022-10-03 RX ORDER — ROSUVASTATIN CALCIUM 10 MG/1
10 TABLET, COATED ORAL DAILY
Qty: 90 TABLET | Refills: 1 | Status: SHIPPED | OUTPATIENT
Start: 2022-10-03

## 2022-10-10 ENCOUNTER — CARE COORDINATION (OUTPATIENT)
Dept: CARE COORDINATION | Age: 67
End: 2022-10-10

## 2022-10-10 NOTE — CARE COORDINATION
Ambulatory Care Coordination Note  10/10/2022    ACC: Isa Muhammad RN  Summary  Date Care Coordination Episode Started:  August 1, 2022    Reason for Call Today:     CC Follow Up    Reason patient is in Care Coordination:     HTN  RAEV  70%    Topics Discussed Today:     General- states he is doing well. He had recent lab work done. His A1C is down to 6.0. His triglycerides were elevated and his Lipitor was stopped and changed to Crestor. Discussed diet and offered referral to dietician. He declined. Denies any symptoms. Has upcoming urology appt on 10/24. Has questions about testosterone shots and his blood pressure. Encouraged him to discuss with urology, and he may be referred back to his cardiology to see if it is safe for him to take. Education on testosterone injection; and triglycerides sent via [employer]. He denies any other needs. Interventions completed today:    Patient education provided on high triglycerides, as well as testosterone injections    Assessments completed today:     Medication reconciliation  General Health Assessment    Care Coordinator plan of care:     Continue medications as prescribed  Keep scheduled appt with urology on 10/24  Watch diet and decrease  consumption of foods containing refined sugars and high fructose corn syrup. Will follow up in 2 weeks    Lab Results       None            Care Coordination Interventions    Referral from Primary Care Provider: No  Suggested Interventions and Community Resources  No Identified Needs  Registered Dietician: Declined          Goals Addressed                   This Visit's Progress     Conditions and Symptoms   On track     I will schedule office visits, as directed by my provider. I will notify my provider of any barriers to my plan of care. I will notify my provider of any symptoms that indicate a worsening of my condition.     Barriers: lack of education  Plan for overcoming my barriers: education, care management (CLARITIN) 10 MG tablet Take 1 tablet by mouth daily 10/15/21   Nathan Werner MD       Future Appointments   Date Time Provider Miryam Goncalves   10/24/2022  8:00 AM [facility]   3/31/2023  9:00 AM Nathan Werner MD [facility]

## 2022-10-17 ENCOUNTER — HOSPITAL ENCOUNTER (OUTPATIENT)
Age: 67
Discharge: HOME | End: 2022-10-17
Payer: MEDICARE

## 2022-10-17 DIAGNOSIS — R79.89 LOW TESTOSTERONE IN MALE: ICD-10-CM

## 2022-10-17 LAB
ALBUMIN SERPL-MCNC: 4.1 G/DL (ref 3.5–5.2)
ALP BLD-CCNC: 64 U/L (ref 40–129)
ALT SERPL-CCNC: 32 U/L (ref 5–41)
AST SERPL-CCNC: 21 U/L
BILIRUB SERPL-MCNC: 0.4 MG/DL (ref 0.3–1.2)
BILIRUBIN DIRECT: 0.1 MG/DL
BILIRUBIN, INDIRECT: 0.3 MG/DL (ref 0–1)
HCT VFR BLD CALC: 43.2 % (ref 41–53)
HEMOGLOBIN: 14.6 G/DL (ref 13.5–17.5)
MCH RBC QN AUTO: 32.3 PG (ref 26–34)
MCHC RBC AUTO-ENTMCNC: 33.8 G/DL (ref 31–37)
MCV RBC AUTO: 95.7 FL (ref 80–100)
PDW BLD-RTO: 14 % (ref 11.5–14.9)
PLATELET # BLD: 169 K/UL (ref 150–450)
PMV BLD AUTO: 9.8 FL (ref 6–12)
PROSTATE SPECIFIC ANTIGEN: 0.72 NG/ML
RBC # BLD: 4.52 M/UL (ref 4.5–5.9)
TESTOSTERONE TOTAL: 183 NG/DL (ref 220–1000)
TOTAL PROTEIN: 7 G/DL (ref 6.4–8.3)
WBC # BLD: 6.1 K/UL (ref 3.5–11)

## 2022-10-17 PROCEDURE — 85027 COMPLETE CBC AUTOMATED: CPT

## 2022-10-17 PROCEDURE — 84153 ASSAY OF PSA TOTAL: CPT

## 2022-10-17 PROCEDURE — 84403 ASSAY OF TOTAL TESTOSTERONE: CPT

## 2022-10-17 PROCEDURE — 36415 COLL VENOUS BLD VENIPUNCTURE: CPT

## 2022-10-17 PROCEDURE — 80076 HEPATIC FUNCTION PANEL: CPT

## 2022-10-24 ENCOUNTER — OFFICE VISIT (OUTPATIENT)
Dept: UROLOGY | Age: 67
End: 2022-10-24
Payer: MEDICARE

## 2022-10-24 VITALS
HEIGHT: 67 IN | SYSTOLIC BLOOD PRESSURE: 118 MMHG | DIASTOLIC BLOOD PRESSURE: 74 MMHG | BODY MASS INDEX: 35.47 KG/M2 | WEIGHT: 226 LBS

## 2022-10-24 DIAGNOSIS — N52.01 ERECTILE DYSFUNCTION DUE TO ARTERIAL INSUFFICIENCY: Primary | ICD-10-CM

## 2022-10-24 DIAGNOSIS — R79.89 LOW TESTOSTERONE IN MALE: ICD-10-CM

## 2022-10-24 DIAGNOSIS — R68.82 DECREASED LIBIDO: ICD-10-CM

## 2022-10-24 PROCEDURE — 1123F ACP DISCUSS/DSCN MKR DOCD: CPT | Performed by: UROLOGY

## 2022-10-24 PROCEDURE — 99214 OFFICE O/P EST MOD 30 MIN: CPT | Performed by: UROLOGY

## 2022-10-24 ASSESSMENT — ENCOUNTER SYMPTOMS
RESPIRATORY NEGATIVE: 1
EYE PAIN: 0
GASTROINTESTINAL NEGATIVE: 1
BACK PAIN: 0
CONSTIPATION: 0
COUGH: 0
EYE REDNESS: 0
SHORTNESS OF BREATH: 0
WHEEZING: 0
DIARRHEA: 0
NAUSEA: 0
ABDOMINAL PAIN: 0
VOMITING: 0
EYES NEGATIVE: 1

## 2022-10-24 NOTE — PROGRESS NOTES
[address] [address]  Dept: [address] [facility] Urology Office Note - Established    Patient:  Duy Lackey  YOB: 1955  Date: 10/24/2022    The patient is a 79 y.o. male who presents todayfor evaluation of the following problems:   Chief Complaint   Patient presents with    Follow-up     2 month with labs       HPI  This is a very pleasant 66-year-old gentleman with low testosterone. We had given him a prescription for [facility]. Because of some ongoing cardiac issues, he was reluctant to start taking the medication. In fact, he brought one of his samples in to the office today. His cardiologist has said that he can start [facility], per patient history. He is quite symptomatic with a decreased libido. He does take tadalafil. Although his med list says he is on nitroglycerin, he states that he has not taken this. He understands the contraindication of nitroglycerin with Cialis. Summary of old records: N/A    Additional History: N/A    Procedures Today: N/A    Urinalysis today:  No results found for this visit on 10/24/22. Last several PSA's:  Lab Results   Component Value Date    PSA 0.72 10/17/2022    PSA 0.65 01/26/2022    PSA 0.58 06/18/2020     Last total testosterone:  Lab Results   Component Value Date    TESTOSTERONE 183 (L) 10/17/2022       AUA Symptom Score (10/24/2022):                                Last BUN and creatinine:  Lab Results   Component Value Date    BUN 16 08/27/2022     Lab Results   Component Value Date    CREATININE 1.01 08/27/2022       Additional Lab/Culture results: none    Imaging Reviewed during this Office Visit: none  (results were independently reviewed by physician and radiology report verified)    PAST MEDICAL, FAMILY AND SOCIAL HISTORY UPDATE:  Past Medical History:   Diagnosis Date    Acute cholecystitis     Essential hypertension 5/4/2017    Hyperlipidemia 9/4/2014    Hyperpar[facility] ([facility] [address]     Obesity (BMI 30-39. 9) 5/4/2017    Osteoporosis     Sciatica     Stroke ([facility] [address]     Vertebral fracture, osteoporotic ([facility] [address]      Past Surgical History:   Procedure Laterality Date    CHOLECYSTECTOMY N/A 2/19/2021    OPEN CHOLECYSTECTOMY performed by Leeanna Albert MD at [address] [facility]  3/19/2014    tubular adenoma x 2, inadequate prep, some polyps not removed, needs colonoscopy in 6-12 months    COLONOSCOPY N/A 2/11/2020    COLONOSCOPY POLYPECTOMY HOT BIOPSY performed by Leeanna Albert MD at [facility]    ERCP N/A 2/19/2021    ERCP DIAGNOSTIC performed by Kermit Quinn MD at [address] 2/12/2020    OPEN RIGHT COLECTOMY, PRIMARY ANASTOMOSIS performed by Leeanna Albert MD at [address] [facility]      rt. inguinal    IR BILIARY DRAIN EXTERNAL  2/1/2021    IR BILIARY DRAIN EXTERNAL 2/1/2021 [facility]    OMENTUM RESECTION  2/12/2020    OMENTECTOMY -  PARTIAL GREATER OMENECTOMY performed by Leeanna Albert MD at [address] [place] [address]       Family History   Problem Relation Age of Onset    Heart Disease Father     No Known Problems Mother      Outpatient Medications Marked as Taking for the 10/24/22 encounter (Office Visit) with Marcia Melchor MD   Medication Sig Dispense Refill    rosuvastatin (CRESTOR) 10 MG tablet Take 1 tablet by mouth daily 90 tablet 1    metFORMIN (GLUCOPHAGE-XR) 500 MG extended release tablet Take 1 tablet by mouth daily (with breakfast) 30 tablet 5    pantoprazole (PROTONIX) 40 MG tablet TAKE 1 TABLET BY MOUTH ONCE DAILY 90 tablet 1    aspirin 325 MG tablet Take 325 mg by mouth daily      clopidogrel (PLAVIX) 75 MG tablet Take 1 tablet by mouth daily 30 tablet 3    isosorbide mononitrate (IMDUR) 30 MG extended release tablet Take 0.5 tablets by mouth daily 30 tablet 3    nitroGLYCERIN (NITROSTAT) 0.4 MG SL tablet up to max of 3 total doses.  If no relief after 1 dose, call 762. 21 tablet 3    Testosterone Enanthate (XYOSTED) 75 MG/0.5ML SOAJ Inject 1 pen into the skin once a week (Patient taking differently: Inject 1 pen into the skin once a week Pt states no taking 22, has appt w/ dr Yun Souza on 10-6- 22) 4 pen 5    carvedilol (COREG) 6.25 MG tablet TAKE 1 TABLET BY MOUTH  TWICE DAILY WITH MEALS 180 tablet 1    amLODIPine (NORVASC) 5 MG tablet TAKE 1 TABLET BY MOUTH ONCE DAILY 90 tablet 1    lisinopril (PRINIVIL;ZESTRIL) 5 MG tablet TAKE 1 TABLET BY MOUTH ONCE DAILY 90 tablet 1    tadalafil (CIALIS) 20 MG tablet Take 1 tablet by mouth daily as needed for Erectile Dysfunction 30 tablet 1    vitamin D (ERGOCALCIFEROL) 1.25 MG (72138 UT) CAPS capsule TAKE 1 CAPSULE BY MOUTH  WEEKLY 13 capsule 1    fluticasone (FLONASE) 50 MCG/ACT nasal spray 2 sprays into each nostril daily 16 g 3    loratadine (CLARITIN) 10 MG tablet Take 1 tablet by mouth daily 30 tablet 3       Patient has no known allergies. Social History     Tobacco Use   Smoking Status Former    Packs/day: 1.00    Years: 40.00    Pack years: 40.00    Types: Cigarettes    Quit date: 2020    Years since quittin.6   Smokeless Tobacco Never     (Ifpatient a smoker, smoking cessation counseling offered)    Social History     Substance and Sexual Activity   Alcohol Use Yes    Comment: rarely       REVIEW OF SYSTEMS:  Review of Systems    Physical Exam:      Vitals:    10/24/22 0840   BP: 118/74     Body mass index is 35.4 kg/m². Patient is a 79 y.o. male in no acute distress and alert and oriented to person, place and time. Physical Exam  Constitutional: Patient in no acute distress. Neuro: Alert and oriented to person, place and time. Psych: Mood normal, affect normal  Skin: No rash noted  HEENT: Head: Normocephalic andatraumatic  Conjunctivae and EOM are normal. Pupils are equal, round  Nose:Normal  Right External Ear: Normal; Left External Ear: Normal      Assessment and Plan      1.  Erectile dysfunction due to arterial insufficiency    2. Low testosterone in male    3. Decreased libido           Plan:   Cont tadalafil (pt states he does not take nitroglycerin)  Gave first xyosted in office today  F/u 3 mo labs      Return in about 3 months (around 1/24/2023) for labs. Prescriptions Ordered:  No orders of the defined types were placed in this encounter. Orders Placed:  Orders Placed This Encounter   Procedures    Testosterone     Standing Status:   Future     Standing Expiration Date:   10/19/2023    CBC     Standing Status:   Future     Standing Expiration Date:   10/19/2023    Hepatic Function Panel     Standing Status:   Future     Standing Expiration Date:   10/19/2023    PSA, Diagnostic     Standing Status:   Future     Standing Expiration Date:   10/24/2023           Kate Castle MD    Agree with the ROS entered by the MA.

## 2022-10-28 DIAGNOSIS — E55.9 VITAMIN D DEFICIENCY: ICD-10-CM

## 2022-10-31 RX ORDER — ERGOCALCIFEROL 1.25 MG/1
CAPSULE ORAL
Qty: 13 CAPSULE | Refills: 1 | Status: SHIPPED | OUTPATIENT
Start: 2022-10-31

## 2022-11-15 ENCOUNTER — TELEPHONE (OUTPATIENT)
Dept: PHARMACY | Facility: CLINIC | Age: 67
End: 2022-11-15

## 2022-11-23 ENCOUNTER — CARE COORDINATION (OUTPATIENT)
Dept: CARE COORDINATION | Age: 67
End: 2022-11-23

## 2022-11-23 NOTE — CARE COORDINATION
Ambulatory Care Coordination Note  11/23/2022    ACC: Aishwarya Kendrick RN  Attempted to reach patient for follow up. He answered the phone and hung up. Will try to reach him next week. Offered patient enrollment in the Remote Patient Monitoring (RPM) program for in-home monitoring: NA. Lab Results       None            Care Coordination Interventions    Referral from Primary Care Provider: No  Suggested Interventions and Community Resources  No Identified Needs  Registered Dietician: Declined          Goals Addressed    None         Prior to Admission medications    Medication Sig Start Date End Date Taking? Authorizing Provider   vitamin D (ERGOCALCIFEROL) 1.25 MG (53098 UT) CAPS capsule TAKE 1 CAPSULE BY MOUTH  WEEKLY 10/31/22   Daniel Lugo MD   rosuvastatin (CRESTOR) 10 MG tablet Take 1 tablet by mouth daily 10/3/22   Daniel Lugo MD   metFORMIN (GLUCOPHAGE-XR) 500 MG extended release tablet Take 1 tablet by mouth daily (with breakfast) 9/30/22   Daniel Lugo MD   pantoprazole (PROTONIX) 40 MG tablet TAKE 1 TABLET BY MOUTH ONCE DAILY 9/23/22   Daniel Lugo MD   aspirin 325 MG tablet Take 325 mg by mouth daily    Historical Provider, MD   clopidogrel (PLAVIX) 75 MG tablet Take 1 tablet by mouth daily 8/31/22   KAMALA Man - TOREY   isosorbide mononitrate (IMDUR) 30 MG extended release tablet Take 0.5 tablets by mouth daily 8/31/22   KAMALA Man - NP   nitroGLYCERIN (NITROSTAT) 0.4 MG SL tablet up to max of 3 total doses.  If no relief after 1 dose, call 911. 8/30/22   KAMALA Man NP   Testosterone Enanthate (XYOSTED) 75 MG/0.5ML SOAJ Inject 1 pen into the skin once a week  Patient taking differently: Inject 1 pen into the skin once a week Pt states no taking 9-30-22, has appt w/ dr Shira Hess on 10-6- 22 7/18/22   Fareed Milligan MD   carvedilol (COREG) 6.25 MG tablet TAKE 1 TABLET BY MOUTH  TWICE DAILY WITH MEALS 6/30/22   Daniel Lugo MD amLODIPine (NORVASC) 5 MG tablet TAKE 1 TABLET BY MOUTH ONCE DAILY 6/30/22   Anny Thompson MD   lisinopril (PRINIVIL;ZESTRIL) 5 MG tablet TAKE 1 TABLET BY MOUTH ONCE DAILY 6/30/22   Anny Thompson MD   tadalafil (CIALIS) 20 MG tablet Take 1 tablet by mouth daily as needed for Erectile Dysfunction 6/13/22   Edith Moore MD   fluticasone [facility]) 50 MCG/ACT nasal spray 2 sprays into each nostril daily 10/15/21   Anny Thompson MD   loratadine (CLARITIN) 10 MG tablet Take 1 tablet by mouth daily 10/15/21   Anny Thompson MD       Future Appointments   Date Time Provider Miryam Rodríguezi   1/23/2023  9:50 AM Edith Moore MD [address] [place] [address]   3/31/2023  9:00 AM Anny Thompson MD [facility]

## 2022-12-06 ENCOUNTER — HOSPITAL ENCOUNTER (EMERGENCY)
Age: 67
Discharge: HOME | End: 2022-12-06
Attending: EMERGENCY MEDICINE
Payer: MEDICARE

## 2022-12-06 VITALS
OXYGEN SATURATION: 91 % | RESPIRATION RATE: 15 BRPM | HEIGHT: 67 IN | SYSTOLIC BLOOD PRESSURE: 112 MMHG | DIASTOLIC BLOOD PRESSURE: 72 MMHG | BODY MASS INDEX: 35.31 KG/M2 | TEMPERATURE: 98.2 F | WEIGHT: 225 LBS | HEART RATE: 72 BPM

## 2022-12-06 DIAGNOSIS — R11.0 NAUSEA: Primary | ICD-10-CM

## 2022-12-06 DIAGNOSIS — K62.5 RECTAL BLEEDING: ICD-10-CM

## 2022-12-06 LAB
ABO/RH: NORMAL
ABSOLUTE EOS #: 0.2 K/UL (ref 0–0.4)
ABSOLUTE LYMPH #: 2 K/UL (ref 1–4.8)
ABSOLUTE MONO #: 0.7 K/UL (ref 0.1–1.3)
ALBUMIN SERPL-MCNC: 4.2 G/DL (ref 3.5–5.2)
ALP BLD-CCNC: 69 U/L (ref 40–129)
ALT SERPL-CCNC: 32 U/L (ref 5–41)
ANION GAP SERPL CALCULATED.3IONS-SCNC: 11 MMOL/L (ref 9–17)
ANTIBODY SCREEN: NEGATIVE
ARM BAND NUMBER: NORMAL
AST SERPL-CCNC: 20 U/L
BASOPHILS # BLD: 1 % (ref 0–2)
BASOPHILS ABSOLUTE: 0 K/UL (ref 0–0.2)
BILIRUB SERPL-MCNC: 0.2 MG/DL (ref 0.3–1.2)
BILIRUBIN URINE: NEGATIVE
BLOOD BANK COMMENT: NORMAL
BUN BLDV-MCNC: 29 MG/DL (ref 8–23)
CALCIUM SERPL-MCNC: 9.5 MG/DL (ref 8.6–10.4)
CHLORIDE BLD-SCNC: 106 MMOL/L (ref 98–107)
CO2: 21 MMOL/L (ref 20–31)
COLOR: YELLOW
COMMENT UA: ABNORMAL
CREAT SERPL-MCNC: 1.24 MG/DL (ref 0.7–1.2)
EOSINOPHILS RELATIVE PERCENT: 3 % (ref 0–4)
EXPIRATION DATE: NORMAL
GFR SERPL CREATININE-BSD FRML MDRD: >60 ML/MIN/1.73M2
GLUCOSE BLD-MCNC: 129 MG/DL (ref 70–99)
GLUCOSE URINE: NEGATIVE
HCT VFR BLD CALC: 44 % (ref 41–53)
HEMOGLOBIN: 14.9 G/DL (ref 13.5–17.5)
INR BLD: 0.9
KETONES, URINE: ABNORMAL
LEUKOCYTE ESTERASE, URINE: NEGATIVE
LIPASE: 28 U/L (ref 13–60)
LYMPHOCYTES # BLD: 27 % (ref 24–44)
MCH RBC QN AUTO: 32.3 PG (ref 26–34)
MCHC RBC AUTO-ENTMCNC: 33.9 G/DL (ref 31–37)
MCV RBC AUTO: 95.2 FL (ref 80–100)
MONOCYTES # BLD: 10 % (ref 1–7)
NITRITE, URINE: NEGATIVE
PDW BLD-RTO: 13.6 % (ref 11.5–14.9)
PH UA: 5.5 (ref 5–8)
PLATELET # BLD: 196 K/UL (ref 150–450)
PMV BLD AUTO: 9.6 FL (ref 6–12)
POTASSIUM SERPL-SCNC: 3.5 MMOL/L (ref 3.7–5.3)
PROTEIN UA: NEGATIVE
PROTHROMBIN TIME: 12.5 SEC (ref 11.8–14.6)
RBC # BLD: 4.62 M/UL (ref 4.5–5.9)
SEG NEUTROPHILS: 59 % (ref 36–66)
SEGMENTED NEUTROPHILS ABSOLUTE COUNT: 4.5 K/UL (ref 1.3–9.1)
SODIUM BLD-SCNC: 138 MMOL/L (ref 135–144)
SPECIFIC GRAVITY UA: 1.03 (ref 1–1.03)
TOTAL PROTEIN: 7 G/DL (ref 6.4–8.3)
TURBIDITY: CLEAR
URINE HGB: NEGATIVE
UROBILINOGEN, URINE: NORMAL
WBC # BLD: 7.5 K/UL (ref 3.5–11)

## 2022-12-06 PROCEDURE — 99284 EMERGENCY DEPT VISIT MOD MDM: CPT

## 2022-12-06 PROCEDURE — 6360000002 HC RX W HCPCS: Performed by: EMERGENCY MEDICINE

## 2022-12-06 PROCEDURE — 86901 BLOOD TYPING SEROLOGIC RH(D): CPT

## 2022-12-06 PROCEDURE — C9113 INJ PANTOPRAZOLE SODIUM, VIA: HCPCS | Performed by: EMERGENCY MEDICINE

## 2022-12-06 PROCEDURE — 85025 COMPLETE CBC W/AUTO DIFF WBC: CPT

## 2022-12-06 PROCEDURE — 86900 BLOOD TYPING SEROLOGIC ABO: CPT

## 2022-12-06 PROCEDURE — 85610 PROTHROMBIN TIME: CPT

## 2022-12-06 PROCEDURE — 2580000003 HC RX 258: Performed by: EMERGENCY MEDICINE

## 2022-12-06 PROCEDURE — 80053 COMPREHEN METABOLIC PANEL: CPT

## 2022-12-06 PROCEDURE — 36415 COLL VENOUS BLD VENIPUNCTURE: CPT

## 2022-12-06 PROCEDURE — 96365 THER/PROPH/DIAG IV INF INIT: CPT

## 2022-12-06 PROCEDURE — 83690 ASSAY OF LIPASE: CPT

## 2022-12-06 PROCEDURE — 96375 TX/PRO/DX INJ NEW DRUG ADDON: CPT

## 2022-12-06 PROCEDURE — 81003 URINALYSIS AUTO W/O SCOPE: CPT

## 2022-12-06 PROCEDURE — 86850 RBC ANTIBODY SCREEN: CPT

## 2022-12-06 RX ORDER — 0.9 % SODIUM CHLORIDE 0.9 %
1000 INTRAVENOUS SOLUTION INTRAVENOUS ONCE
Status: COMPLETED | OUTPATIENT
Start: 2022-12-06 | End: 2022-12-06

## 2022-12-06 RX ORDER — ONDANSETRON 2 MG/ML
8 INJECTION INTRAMUSCULAR; INTRAVENOUS ONCE
Status: COMPLETED | OUTPATIENT
Start: 2022-12-06 | End: 2022-12-06

## 2022-12-06 RX ORDER — ONDANSETRON 4 MG/1
4 TABLET, FILM COATED ORAL EVERY 8 HOURS PRN
Qty: 20 TABLET | Refills: 0 | Status: SHIPPED | OUTPATIENT
Start: 2022-12-06

## 2022-12-06 RX ADMIN — SODIUM CHLORIDE 80 MG: 9 INJECTION, SOLUTION INTRAVENOUS at 06:46

## 2022-12-06 RX ADMIN — ONDANSETRON 8 MG: 2 INJECTION INTRAMUSCULAR; INTRAVENOUS at 06:50

## 2022-12-06 RX ADMIN — SODIUM CHLORIDE 1000 ML: 9 INJECTION, SOLUTION INTRAVENOUS at 06:50

## 2022-12-06 ASSESSMENT — PAIN - FUNCTIONAL ASSESSMENT
PAIN_FUNCTIONAL_ASSESSMENT: NONE - DENIES PAIN

## 2022-12-06 NOTE — ED PROVIDER NOTES
EMERGENCY DEPARTMENT ENCOUNTER    Pt Name: Raul Handy  MRN: [id number]  [place] 1955  Date of evaluation: 12/6/22  CHIEF COMPLAINT       Chief Complaint   Patient presents with    Nausea    Rectal Bleeding     Last BM: 12/5/22     HISTORY OF PRESENT ILLNESS   HPI  Nausea past 2 days. Some rectal bleeding with bowel movements. No bleeding otherwise. Blood in his stool blood in the toilet. No abdominal pain. No flank pain. No trauma. No fever chills sweats. He is on Plavix for coronary disease, recent stenting. No other anticoagulants. No recent travel. No diarrhea. Normal bowel movement consistencies. He has a primary care Dr. Deacon Devlin. REVIEW OF SYSTEMS     Review of Systems   All other systems reviewed and are negative. PASTMEDICAL HISTORY     Past Medical History:   Diagnosis Date    Acute cholecystitis     Essential hypertension 5/4/2017    Hyperlipidemia 9/4/2014    Hyperparathyroidism ([address]     Obesity (BMI 30-39. 9) 5/4/2017    Osteoporosis     Sciatica     Stroke [facility])     Vertebral fracture, osteoporotic (HCC)      Past Problem List  Patient Active Problem List   Diagnosis Code    Osteoporosis M81.0    Vitamin D deficiency E55.9    History of stroke Z86.73    Degeneration of lumbar or lumbosacral intervertebral disc M51.37    Back pain M54.9    Low back pain M54.50    Allergic rhinitis J30.9    Impaired fasting glucose R73.01    Mixed hyperlipidemia E78.2    Essential hypertension I10    Obesity (BMI 30-39. 9) E66.9    Tear of left rotator cuff M75.102    Left bicipital tenosynovitis M75.22    Colon polyp K63.5    Acute blood loss as cause of postoperative anemia D62    S/P right hemicolectomy Z90.49    History of malignant neoplasm of skin Z85.828    Acute postoperative pain G89.18    Anemia D64.9    Ex-cigarette smoker Z87.891    Chest pain R07.9    Acute acalculous cholecystitis K81.0    Acute cholecystitis K81.0    Lumbar radiculopathy M54.16    Sacroiliitis (HCC) M46.1    Hepatic steatosis K76.0    Prediabetes R73.03    H/O right coronary artery stent placement Z95.5     SURGICAL HISTORY       Past Surgical History:   Procedure Laterality Date    CHOLECYSTECTOMY N/A 2/19/2021    OPEN CHOLECYSTECTOMY performed by Sarah Beth Guardado MD at [address]  3/19/2014    tubular adenoma x 2, inadequate prep, some polyps not removed, needs colonoscopy in 6-12 months    COLONOSCOPY N/A 2/11/2020    COLONOSCOPY POLYPECTOMY HOT BIOPSY performed by Sarah Beth Guardado MD at [facility] ENDO    ERCP N/A 2/19/2021    ERCP DIAGNOSTIC performed by Shanita Da Silva MD at [address] Right 2/12/2020    OPEN RIGHT COLECTOMY, PRIMARY ANASTOMOSIS performed by Sarah Beth Guardado MD at [address]      rt. inguinal    IR BILIARY DRAIN EXTERNAL  2/1/2021    IR BILIARY DRAIN EXTERNAL 2/1/2021 [facility] SPECIAL PROCEDURES    OMENTUM RESECTION  2/12/2020    OMENTECTOMY -  PARTIAL GREATER OMENECTOMY performed by Sarah Beth Guardado MD at [address] [place] [address]       Previous Medications    AMLODIPINE (NORVASC) 5 MG TABLET    TAKE 1 TABLET BY MOUTH ONCE DAILY    ASPIRIN 325 MG TABLET    Take 325 mg by mouth daily    CARVEDILOL (COREG) 6.25 MG TABLET    TAKE 1 TABLET BY MOUTH  TWICE DAILY WITH MEALS    CLOPIDOGREL (PLAVIX) 75 MG TABLET    Take 1 tablet by mouth daily    FLUTICASONE (FLONASE) 50 MCG/ACT NASAL SPRAY    2 sprays into each nostril daily    ISOSORBIDE MONONITRATE (IMDUR) 30 MG EXTENDED RELEASE TABLET    Take 0.5 tablets by mouth daily    LISINOPRIL (PRINIVIL;ZESTRIL) 5 MG TABLET    TAKE 1 TABLET BY MOUTH ONCE DAILY    LORATADINE (CLARITIN) 10 MG TABLET    Take 1 tablet by mouth daily    METFORMIN (GLUCOPHAGE-XR) 500 MG EXTENDED RELEASE TABLET    Take 1 tablet by mouth daily (with breakfast)    NITROGLYCERIN (NITROSTAT) 0.4 MG SL TABLET    up to max of 3 total doses. If no relief after 1 dose, call 911.     PANTOPRAZOLE (PROTONIX) 40 MG TABLET    TAKE 1 TABLET BY MOUTH ONCE DAILY    ROSUVASTATIN (CRESTOR) 10 MG TABLET    Take 1 tablet by mouth daily    TADALAFIL (CIALIS) 20 MG TABLET    Take 1 tablet by mouth daily as needed for Erectile Dysfunction    TESTOSTERONE ENANTHATE (XYOSTED) 75 MG/0.5ML SOAJ    Inject 1 pen into the skin once a week    VITAMIN D (ERGOCALCIFEROL) 1.25 MG (24638 UT) CAPS CAPSULE    TAKE 1 CAPSULE BY MOUTH  WEEKLY     ALLERGIES     has No Known Allergies. FAMILY HISTORY     He indicated that his mother is . He indicated that his father is . SOCIAL HISTORY       Social History     Tobacco Use    Smoking status: Former     Packs/day: 1.00     Years: 40.00     Pack years: 40.00     Types: Cigarettes     Quit date: 2020     Years since quittin.7    Smokeless tobacco: Never   Vaping Use    Vaping Use: Never used   Substance Use Topics    Alcohol use: Yes     Comment: rarely    Drug use: No     PHYSICAL EXAM     INITIAL VITALS: BP (!) 137/92   Pulse 80   Temp 98.2 °F (36.8 °C) (Oral)   Resp 14   Ht 5' 7\" (1.702 m)   Wt 225 lb (102.1 kg)   SpO2 93%   BMI 35.24 kg/m²    Physical Exam  Constitutional:       General: He is not in acute distress. Appearance: Normal appearance. He is well-developed. He is not diaphoretic. HENT:      Head: Normocephalic and atraumatic. Right Ear: External ear normal.      Left Ear: External ear normal.      Nose: Nose normal. No congestion. Mouth/Throat:      Mouth: Mucous membranes are moist.      Pharynx: Oropharynx is clear. Eyes:      General:         Right eye: No discharge. Left eye: No discharge. Conjunctiva/sclera: Conjunctivae normal.      Pupils: Pupils are equal, round, and reactive to light. Neck:      Trachea: No tracheal deviation. Cardiovascular:      Rate and Rhythm: Normal rate and regular rhythm. Pulses: Normal pulses. Heart sounds: Normal heart sounds. Pulmonary:      Effort: Pulmonary effort is normal. No respiratory distress.       Breath sounds: Normal breath sounds. No stridor. No wheezing or rales. Abdominal:      Palpations: Abdomen is soft. Tenderness: There is no abdominal tenderness. There is no guarding or rebound. Genitourinary:     Comments: Dried blood at rectal exam, no active bleeding, mild nontender small hemorrhoids present  Musculoskeletal:         General: No tenderness or deformity. Normal range of motion. Cervical back: Normal range of motion and neck supple. Skin:     General: Skin is warm and dry. Capillary Refill: Capillary refill takes less than 2 seconds. Findings: No erythema or rash. Neurological:      General: No focal deficit present. Mental Status: He is alert and oriented to person, place, and time. Coordination: Coordination normal.   Psychiatric:         Mood and Affect: Mood normal.         Behavior: Behavior normal.         Thought Content: Thought content normal.         Judgment: Judgment normal.       MEDICAL DECISION MAKING / ED COURSE:     Repeat assessment, tolerating p.o., feeling better, hemoglobin stable. Perfect thank you vitals are stable. Abdomen is soft nontender. Do not suspect bleeding ulcer or perforation. Likely internal hemorrhoids. Discussed with patient anticipatory guidance, discharge instructions, follow up PCP and GI 24 hours  He has a gi doctor already Dr Shyann Vega  He is already on ppi      DATA FOR LAB AND RADIOLOGY TESTS ORDERED BELOW ARE REVIEWED BY THE ED CLINICIAN:    LABS: Lab orders shown below, the results are reviewed by myself, and all abnormals are listed below.   Labs Reviewed   CBC WITH AUTO DIFFERENTIAL - Abnormal; Notable for the following components:       Result Value    Monocytes 10 (*)     All other components within normal limits   COMPREHENSIVE METABOLIC PANEL - Abnormal; Notable for the following components:    Glucose 129 (*)     BUN 29 (*)     Creatinine 1.24 (*)     Potassium 3.5 (*)     Total Bilirubin 0.2 (*)     All other components within normal limits   URINALYSIS WITH REFLEX TO CULTURE - Abnormal; Notable for the following components:    Ketones, Urine TRACE (*)     All other components within normal limits   LIPASE   PROTIME-INR   TYPE AND SCREEN       Vitals Reviewed:    Vitals:    12/06/22 0553   BP: (!) 137/92   Pulse: 80   Resp: 14   Temp: 98.2 °F (36.8 °C)   TempSrc: Oral   SpO2: 93%   Weight: 225 lb (102.1 kg)   Height: 5' 7\" (1.702 m)     MEDICATIONS GIVEN TO PATIENT THIS ENCOUNTER:  Orders Placed This Encounter   Medications    0.9 % sodium chloride bolus    pantoprazole (PROTONIX) 80 mg in sodium chloride 0.9 % 50 mL bolus    ondansetron (ZOFRAN) injection 8 mg    ondansetron (ZOFRAN) 4 MG tablet     Sig: Take 1 tablet by mouth every 8 hours as needed for Nausea or Vomiting     Dispense:  20 tablet     Refill:  0     DISCHARGE PRESCRIPTIONS:  New Prescriptions    ONDANSETRON (ZOFRAN) 4 MG TABLET    Take 1 tablet by mouth every 8 hours as needed for Nausea or Vomiting     PHYSICIAN CONSULTS ORDERED THIS ENCOUNTER:  None  FINAL IMPRESSION      1. Nausea    2.  Rectal bleeding          DISPOSITION/PLAN   DISPOSITION Decision To Discharge 12/06/2022 07:17:01 AM      OUTPATIENT FOLLOW UP THE PATIENT:  MD Cornel Chan[address]  [phone]    Schedule an appointment as soon as possible for a visit in 1 day      Rosemary Alvarenga, [address] [facility]  [address] [facility] [address]  [phone]    Schedule an appointment as soon as possible for a visit in 1 day      MD Symone Limon MD  12/06/22 6041

## 2022-12-06 NOTE — ED TRIAGE NOTES
Mode of arrival (squad #, walk in, police, etc) : Walk In        Chief complaint(s):  Rectal Bleeding  Nausea        Arrival Note (brief scenario, treatment PTA, etc). :  Pt to ED from home with c/o blood in stools and nausea. Onset of symptoms x4 days ago. Pt reports not being able to defecate and straining while attempting to go. Pt reports seeing a large amount of blood in stools. C= \"Have you ever felt that you should Cut down on your drinking? \"  No  A= \"Have people Annoyed you by criticizing your drinking? \"  No  G= \"Have you ever felt bad or Guilty about your drinking? \"  No  E= \"Have you ever had a drink as an Eye-opener first thing in the morning to steady your nerves or to help a hangover? \"  No      Deferred []      Reason for deferring: N/A    *If yes to two or more: probable alcohol abuse. *

## 2022-12-07 ENCOUNTER — CARE COORDINATION (OUTPATIENT)
Dept: CARE COORDINATION | Age: 67
End: 2022-12-07

## 2022-12-07 NOTE — CARE COORDINATION
Ambulatory Care Coordination  ED Follow up Call    Reason for ED visit:  Nausea; rectal bleeding   Status:     improved    Did you call your PCP prior to going to the ED? Yes - called his urologist     Did you receive a discharge instructions from the Emergency Room? Yes  Review of Instructions:     Understands what to report/when to return?:  Yes   Understands discharge instructions?:  Yes   Following discharge instructions?:  Yes   If not why? Are there any new complaints of pain? No  New Pain Meds? No    Constipation prophylaxis needed? Yes    If you have a wound is the dressing clean, dry, and intact? N/A  Understands wound care regimen? N/A    Are there any other complaints/concerns that you wish to tell your provider? no    FU appts/Provider:    Future Appointments   Date Time Provider Miryam Goncalves   1/18/2023  2:30 PM Manoj Davila MD [facility]   1/23/2023  9:50 AM Ihsan Palma MD . [facility]   3/31/2023  9:00 AM Yara Allen MD [facility]           New Medications?:   Yes- zofran      Medication Reconciliation by phone - Yes  Understands Medications? Yes  Taking Medications? Yes  Can you swallow your pills? Yes    Any further needs in the home i.e. Equipment? No    Link to services in community?:  Yes   Which services:  ACM    Steph Gamboa states he is feeling better today. No blood in the stool today. He states he just needs to follow up with Dr. Rafal Joiner. Has an appt for next month. Discussed ways to help with constipation, increasing fluids, increasing fiber in diet, stool softener; to prevent straining which will aggravate the hemorrhoids. Encouraged him to call his provider if he needs prescription for stool softener, or something to help with the hemorrhoids. He verbalized understanding. Will follow up in 2 weeks.

## 2022-12-08 RX ORDER — AMLODIPINE BESYLATE 5 MG/1
TABLET ORAL
Qty: 90 TABLET | Refills: 1 | Status: SHIPPED | OUTPATIENT
Start: 2022-12-08

## 2022-12-08 RX ORDER — LISINOPRIL 5 MG/1
TABLET ORAL
Qty: 90 TABLET | Refills: 1 | Status: SHIPPED | OUTPATIENT
Start: 2022-12-08

## 2022-12-08 RX ORDER — CARVEDILOL 6.25 MG/1
TABLET ORAL
Qty: 180 TABLET | Refills: 1 | Status: SHIPPED | OUTPATIENT
Start: 2022-12-08

## 2023-01-10 ENCOUNTER — TELEPHONE (OUTPATIENT)
Dept: FAMILY MEDICINE CLINIC | Age: 68
End: 2023-01-10

## 2023-01-10 NOTE — TELEPHONE ENCOUNTER
Patient called to say that he has a plugged left ear and that the jaw near the ear is swollen and slightly painful to touch. He stated that is feels like it lump is fluid filled. No tooth soreness. I stated that he will need to be evaluated at the walk in clinic and to call his dentist as well. He stated that he will do so tomorrow 1/11/23 because he needed to get to work today.

## 2023-01-20 ENCOUNTER — HOSPITAL ENCOUNTER (OUTPATIENT)
Age: 68
Discharge: HOME | End: 2023-01-20
Payer: MEDICARE

## 2023-01-20 DIAGNOSIS — R79.89 LOW TESTOSTERONE IN MALE: ICD-10-CM

## 2023-01-20 DIAGNOSIS — E87.6 HYPOKALEMIA: ICD-10-CM

## 2023-01-20 LAB
ALBUMIN SERPL-MCNC: 4 G/DL (ref 3.5–5.2)
ALP BLD-CCNC: 63 U/L (ref 40–129)
ALT SERPL-CCNC: 29 U/L (ref 5–41)
AST SERPL-CCNC: 19 U/L
BILIRUB SERPL-MCNC: 0.5 MG/DL (ref 0.3–1.2)
BILIRUBIN DIRECT: 0.1 MG/DL
BILIRUBIN, INDIRECT: 0.4 MG/DL (ref 0–1)
HCT VFR BLD CALC: 44.7 % (ref 41–53)
HEMOGLOBIN: 14.9 G/DL (ref 13.5–17.5)
MCH RBC QN AUTO: 31.7 PG (ref 26–34)
MCHC RBC AUTO-ENTMCNC: 33.2 G/DL (ref 31–37)
MCV RBC AUTO: 95.3 FL (ref 80–100)
PDW BLD-RTO: 13.3 % (ref 11.5–14.9)
PLATELET # BLD: 185 K/UL (ref 150–450)
PMV BLD AUTO: 9.5 FL (ref 6–12)
PROSTATE SPECIFIC ANTIGEN: 0.73 NG/ML
RBC # BLD: 4.69 M/UL (ref 4.5–5.9)
TESTOSTERONE TOTAL: 118 NG/DL (ref 220–1000)
TOTAL PROTEIN: 7 G/DL (ref 6.4–8.3)
WBC # BLD: 6.4 K/UL (ref 3.5–11)

## 2023-01-20 PROCEDURE — 80076 HEPATIC FUNCTION PANEL: CPT

## 2023-01-20 PROCEDURE — 84403 ASSAY OF TOTAL TESTOSTERONE: CPT

## 2023-01-20 PROCEDURE — 84153 ASSAY OF PSA TOTAL: CPT

## 2023-01-20 PROCEDURE — 85027 COMPLETE CBC AUTOMATED: CPT

## 2023-01-20 PROCEDURE — 36415 COLL VENOUS BLD VENIPUNCTURE: CPT

## 2023-01-23 ENCOUNTER — OFFICE VISIT (OUTPATIENT)
Dept: UROLOGY | Age: 68
End: 2023-01-23
Payer: MEDICARE

## 2023-01-23 VITALS
SYSTOLIC BLOOD PRESSURE: 132 MMHG | BODY MASS INDEX: 35.31 KG/M2 | WEIGHT: 225 LBS | DIASTOLIC BLOOD PRESSURE: 62 MMHG | RESPIRATION RATE: 16 BRPM | TEMPERATURE: 97.9 F | HEIGHT: 67 IN | HEART RATE: 73 BPM

## 2023-01-23 DIAGNOSIS — R79.89 LOW TESTOSTERONE IN MALE: Primary | ICD-10-CM

## 2023-01-23 DIAGNOSIS — R79.89 LOW TESTOSTERONE IN MALE: ICD-10-CM

## 2023-01-23 DIAGNOSIS — R68.82 DECREASED LIBIDO: ICD-10-CM

## 2023-01-23 DIAGNOSIS — N52.01 ERECTILE DYSFUNCTION DUE TO ARTERIAL INSUFFICIENCY: Primary | ICD-10-CM

## 2023-01-23 PROCEDURE — 3078F DIAST BP <80 MM HG: CPT | Performed by: UROLOGY

## 2023-01-23 PROCEDURE — 3075F SYST BP GE 130 - 139MM HG: CPT | Performed by: UROLOGY

## 2023-01-23 PROCEDURE — 1036F TOBACCO NON-USER: CPT | Performed by: UROLOGY

## 2023-01-23 PROCEDURE — 99213 OFFICE O/P EST LOW 20 MIN: CPT | Performed by: UROLOGY

## 2023-01-23 PROCEDURE — 3017F COLORECTAL CA SCREEN DOC REV: CPT | Performed by: UROLOGY

## 2023-01-23 PROCEDURE — 1123F ACP DISCUSS/DSCN MKR DOCD: CPT | Performed by: UROLOGY

## 2023-01-23 PROCEDURE — G8417 CALC BMI ABV UP PARAM F/U: HCPCS | Performed by: UROLOGY

## 2023-01-23 PROCEDURE — G8427 DOCREV CUR MEDS BY ELIG CLIN: HCPCS | Performed by: UROLOGY

## 2023-01-23 PROCEDURE — G8484 FLU IMMUNIZE NO ADMIN: HCPCS | Performed by: UROLOGY

## 2023-01-23 ASSESSMENT — ENCOUNTER SYMPTOMS
EYE PAIN: 0
COUGH: 0
ABDOMINAL PAIN: 0
WHEEZING: 0
CONSTIPATION: 0
BACK PAIN: 0
DIARRHEA: 0
VOMITING: 0
NAUSEA: 0
SHORTNESS OF BREATH: 0

## 2023-01-23 NOTE — PROGRESS NOTES
[address] [address]  Dept: [address] [facility] Urology Office Note - Established    Patient:  Suha Jamil  YOB: 1955  Date: 1/23/2023    The patient is a 79 y.o. male who presents todayfor evaluation of the following problems:   Chief Complaint   Patient presents with    Follow-up     3 months w/labs       HPI  This is a 80-year-old gentleman who has a history of low testosterone. He tried [place] but this gave him tremendous headaches. He continues to have fatigue and decreased libido. He does occasionally take tadalafil but is mainly concerned about not having libido. Summary of old records: N/A    Additional History: N/A    Procedures Today: N/A    Urinalysis today:  No results found for this visit on 01/23/23. Last several PSA's:  Lab Results   Component Value Date    PSA 0.73 01/20/2023    PSA 0.72 10/17/2022    PSA 0.65 01/26/2022     Last total testosterone:  Lab Results   Component Value Date    TESTOSTERONE 118 (L) 01/20/2023       AUA Symptom Score (1/23/2023): Last BUN and creatinine:  Lab Results   Component Value Date    BUN 29 (H) 12/06/2022     Lab Results   Component Value Date    CREATININE 1.24 (H) 12/06/2022       Additional Lab/Culture results: none    Imaging Reviewed during this Office Visit: none  (results were independently reviewed by physician and radiology report verified)    PAST MEDICAL, FAMILY AND SOCIAL HISTORY UPDATE:  Past Medical History:   Diagnosis Date    Acute cholecystitis     Essential hypertension 5/4/2017    Hyperlipidemia 9/4/2014    Hyperparathyroidism ([address]     Obesity (BMI 30-39. 9) 5/4/2017    Osteoporosis     Sciatica     Stroke [facility])     Vertebral fracture, osteoporotic [facility])      Past Surgical History:   Procedure Laterality Date    CHOLECYSTECTOMY N/A 2/19/2021    OPEN [facility]LECYST[facility]Y [facility]ewski[facility] [address] [facility] [place] [address]  3/19/2014    tubular adenoma x 2, inadequate prep, some polyps not removed, needs colonoscopy in 6-12 months    COLONOSCOPY N/A 2/11/2020    COLONOSCOPY POLYPECTOMY HOT BIOPSY performed by Wilhemina Olszewski, MD at [facility] ENDO    ERCP N/A 2/19/2021    ERCP DIAGNOSTIC performed by Luis Felipe Crawley MD at [address] Right 2/12/2020    OPEN RIGHT COLECTOMY, PRIMARY ANASTOMOSIS performed by Wilhemina Olszewski, MD at [address]      rt. inguinal    IR BILIARY DRAIN EXTERNAL  2/1/2021    IR BILIARY DRAIN EXTERNAL 2/1/2021 [facility] SPECIAL PROCEDURES    OMENTUM RESECTION  2/12/2020    OMENTECTOMY -  PARTIAL GREATER OMENECTOMY performed by Wilhemina Olszewski, MD at [address]       Family History   Problem Relation Age of Onset    Heart Disease Father     No Known Problems Mother      Outpatient Medications Marked as Taking for the 1/23/23 encounter (Office Visit) with Grover Cooper MD   Medication Sig Dispense Refill    carvedilol (COREG) 6.25 MG tablet TAKE 1 TABLET BY MOUTH  TWICE DAILY WITH MEALS 180 tablet 1    amLODIPine (NORVASC) 5 MG tablet TAKE 1 TABLET BY MOUTH ONCE DAILY 90 tablet 1    lisinopril (PRINIVIL;ZESTRIL) 5 MG tablet TAKE 1 TABLET BY MOUTH ONCE DAILY 90 tablet 1    ondansetron (ZOFRAN) 4 MG tablet Take 1 tablet by mouth every 8 hours as needed for Nausea or Vomiting 20 tablet 0    vitamin D (ERGOCALCIFEROL) 1.25 MG (77644 UT) CAPS capsule TAKE 1 CAPSULE BY MOUTH  WEEKLY 13 capsule 1    rosuvastatin (CRESTOR) 10 MG tablet Take 1 tablet by mouth daily 90 tablet 1    metFORMIN (GLUCOPHAGE-XR) 500 MG extended release tablet Take 1 tablet by mouth daily (with breakfast) 30 tablet 5    pantoprazole (PROTONIX) 40 MG tablet TAKE 1 TABLET BY MOUTH ONCE DAILY 90 tablet 1    aspirin 325 MG tablet Take 325 mg by mouth daily      clopidogrel (PLAVIX) 75 MG tablet Take 1 tablet by mouth daily 30 tablet 3    isosorbide mononitrate (IMDUR) 30 MG extended release tablet Take 0.5 tablets by mouth daily 30 tablet 3    nitroGLYCERIN (NITROSTAT) 0.4 MG SL tablet up to max of 3 total doses. If no relief after 1 dose, call 911. 25 tablet 3    Testosterone Enanthate (XYOSTED) 75 MG/0.5ML SOAJ Inject 1 pen into the skin once a week (Patient taking differently: Inject 1 pen into the skin once a week Pt states no taking 22, has appt w/ dr Tatum Murrell on 10-6- 22) 4 pen 5    tadalafil (CIALIS) 20 MG tablet Take 1 tablet by mouth daily as needed for Erectile Dysfunction 30 tablet 1    fluticasone (FLONASE) 50 MCG/ACT nasal spray 2 sprays into each nostril daily 16 g 3    loratadine (CLARITIN) 10 MG tablet Take 1 tablet by mouth daily 30 tablet 3       Patient has no known allergies. Social History     Tobacco Use   Smoking Status Former    Packs/day: 1.00    Years: 40.00    Pack years: 40.00    Types: Cigarettes    Quit date: 2020    Years since quittin.9   Smokeless Tobacco Never     (Ifpatient a smoker, smoking cessation counseling offered)    Social History     Substance and Sexual Activity   Alcohol Use Yes    Comment: rarely       REVIEW OF SYSTEMS:  Review of Systems    Physical Exam:      Vitals:    23 1000   BP: 132/62   Pulse: 73   Resp: 16   Temp: 97.9 °F (36.6 °C)     Body mass index is 35.24 kg/m². Patient is a 79 y.o. male in no acute distress and alert and oriented to person, place and time. Physical Exam  Constitutional: Patient in no acute distress. Neuro: Alert and oriented to person, place and time. Psych: Mood normal, affect normal  Skin: No rash noted  HEENT: Head: Normocephalic andatraumatic      Assessment and Plan      1. Erectile dysfunction due to arterial insufficiency    2. Low testosterone in male    3.  Decreased libido           Plan:   Cont tadalafil prn  Hold off on TRT due to headaches  Refer to endocrinology for fatigue and low T  F/u 6 mo      Return in about 6 months (around 2023) for refer to Endocrine and diabetes [facility] for low testosterone and fatigue. Prescriptions Ordered:  No orders of the defined types were placed in this encounter. Orders Placed:  No orders of the defined types were placed in this encounter. Mauricio Thompson MD    Agree with the ROS entered by the MA.

## 2023-02-22 ENCOUNTER — CARE COORDINATION (OUTPATIENT)
Dept: CARE COORDINATION | Age: 68
End: 2023-02-22

## 2023-02-22 NOTE — CARE COORDINATION
Ambulatory Care Coordination Note  2023    Patient Current Location: [facility]     ACM contacted the patient by telephone. Verified name and  with patient as identifiers. Provided introduction to self, and explanation of the ACM role. Challenges to be reviewed by the provider   Additional needs identified to be addressed with provider: No  none               Method of communication with provider: none. ACM: Nelia Daniels RN  [facility] for follow up. He stated he was doing pretty good. He was at work. Said he ended up going back to work full time. He had f/u with urology last month and was referred to endocrine for the low testosterone level. He hasn't received a call to schedule an appt but says he has the paperwork at home and will call them tomorrow. He is able to get his medications with no problems. Has appt with PCP next month. Denies any further needs. Will graduate from [employer]. Offered patient enrollment in the Remote Patient Monitoring (RPM) program for in-home monitoring: NA. Lab Results       None            Care Coordination Interventions    Referral from Primary Care Provider: No  Suggested Interventions and Community Resources  No Identified Needs  Registered Dietician: Declined          Goals Addressed                   This Visit's Progress     COMPLETED: Conditions and Symptoms   On track     I will schedule office visits, as directed by my provider. I will notify my provider of any barriers to my plan of care. I will notify my provider of any symptoms that indicate a worsening of my condition.     Barriers: lack of education  Plan for overcoming my barriers: education, care management   Confidence: 9/10  Anticipated Goal Completion Date: 2022                Future Appointments   Date Time Provider Miryam Goncalves   3/31/2023  9:00 AM Maryann Lezama, [address] [facility]   2023 10:10 AM Kailyn Sandhu MD [address] [place] [address]

## 2023-02-28 ENCOUNTER — TELEPHONE (OUTPATIENT)
Dept: FAMILY MEDICINE CLINIC | Age: 68
End: 2023-02-28

## 2023-02-28 RX ORDER — GUAIFENESIN AND DEXTROMETHORPHAN HYDROBROMIDE 600; 30 MG/1; MG/1
TABLET, EXTENDED RELEASE ORAL
Qty: 28 TABLET | Refills: 1 | Status: SHIPPED | OUTPATIENT
Start: 2023-02-28

## 2023-02-28 RX ORDER — AZITHROMYCIN 250 MG/1
TABLET, FILM COATED ORAL
Qty: 6 TABLET | Refills: 0 | Status: SHIPPED | OUTPATIENT
Start: 2023-02-28

## 2023-02-28 NOTE — TELEPHONE ENCOUNTER
Patient called C/O coughing up green, nasal drainage, aches started yesterday, 2-27-23. Did not test for Covid.

## 2023-02-28 NOTE — TELEPHONE ENCOUNTER
Z-Mahesh and Mucinex DM ordered. If no improvement or his condition worsens patient is to go to ER or urgent care center.

## 2023-03-07 RX ORDER — PANTOPRAZOLE SODIUM 40 MG/1
TABLET, DELAYED RELEASE ORAL
Qty: 90 TABLET | Refills: 1 | Status: SHIPPED | OUTPATIENT
Start: 2023-03-07

## 2023-03-31 ENCOUNTER — OFFICE VISIT (OUTPATIENT)
Dept: FAMILY MEDICINE CLINIC | Age: 68
End: 2023-03-31

## 2023-03-31 VITALS
DIASTOLIC BLOOD PRESSURE: 66 MMHG | RESPIRATION RATE: 18 BRPM | SYSTOLIC BLOOD PRESSURE: 100 MMHG | WEIGHT: 230.8 LBS | BODY MASS INDEX: 36.15 KG/M2 | HEART RATE: 84 BPM | OXYGEN SATURATION: 95 %

## 2023-03-31 DIAGNOSIS — E66.01 SEVERE OBESITY (BMI 35.0-39.9) WITH COMORBIDITY (HCC): ICD-10-CM

## 2023-03-31 DIAGNOSIS — I10 ESSENTIAL HYPERTENSION: Primary | ICD-10-CM

## 2023-03-31 DIAGNOSIS — R73.01 IMPAIRED FASTING GLUCOSE: ICD-10-CM

## 2023-03-31 DIAGNOSIS — E55.9 VITAMIN D DEFICIENCY: ICD-10-CM

## 2023-03-31 DIAGNOSIS — E78.2 MIXED HYPERLIPIDEMIA: Chronic | ICD-10-CM

## 2023-03-31 PROBLEM — E66.9 OBESITY (BMI 30-39.9): Status: RESOLVED | Noted: 2018-05-15 | Resolved: 2023-03-31

## 2023-03-31 PROBLEM — M46.1 SACROILIITIS (HCC): Status: RESOLVED | Noted: 2022-01-10 | Resolved: 2023-03-31

## 2023-03-31 SDOH — ECONOMIC STABILITY: FOOD INSECURITY: WITHIN THE PAST 12 MONTHS, YOU WORRIED THAT YOUR FOOD WOULD RUN OUT BEFORE YOU GOT MONEY TO BUY MORE.: NEVER TRUE

## 2023-03-31 SDOH — ECONOMIC STABILITY: INCOME INSECURITY: HOW HARD IS IT FOR YOU TO PAY FOR THE VERY BASICS LIKE FOOD, HOUSING, MEDICAL CARE, AND HEATING?: NOT HARD AT ALL

## 2023-03-31 SDOH — ECONOMIC STABILITY: FOOD INSECURITY: WITHIN THE PAST 12 MONTHS, THE FOOD YOU BOUGHT JUST DIDN'T LAST AND YOU DIDN'T HAVE MONEY TO GET MORE.: NEVER TRUE

## 2023-03-31 ASSESSMENT — ENCOUNTER SYMPTOMS
ABDOMINAL PAIN: 0
CHEST TIGHTNESS: 0
BLOOD IN STOOL: 0
SHORTNESS OF BREATH: 0

## 2023-03-31 ASSESSMENT — PATIENT HEALTH QUESTIONNAIRE - PHQ9
SUM OF ALL RESPONSES TO PHQ QUESTIONS 1-9: 0
2. FEELING DOWN, DEPRESSED OR HOPELESS: 0
SUM OF ALL RESPONSES TO PHQ QUESTIONS 1-9: 0
SUM OF ALL RESPONSES TO PHQ9 QUESTIONS 1 & 2: 0
1. LITTLE INTEREST OR PLEASURE IN DOING THINGS: 0

## 2023-03-31 NOTE — PROGRESS NOTES
General: Skin is warm and dry. Findings: No rash. Neurological:      Mental Status: He is alert and oriented to person, place, and time. Psychiatric:         Mood and Affect: Mood normal.         Behavior: Behavior normal.       Assessment:       Diagnosis Orders   1. Essential hypertension  Basic Metabolic Panel    Lipid Panel    Magnesium      2. Mixed hyperlipidemia  Basic Metabolic Panel    Lipid Panel      3. Impaired fasting glucose  Basic Metabolic Panel      4. Vitamin D deficiency  Vitamin D 25 Hydroxy      5. Severe obesity (BMI 35.0-39. 9) with comorbidity ([address]                Plan:      Orders Placed This Encounter   Procedures    Basic Metabolic Panel     Fasting     Standing Status:   Future     Standing Expiration Date:   3/31/2024    Lipid Panel     Standing Status:   Future     Standing Expiration Date:   3/31/2024     Order Specific Question:   Is Patient Fasting?/# of Hours     Answer:    Fast 8-10 hours    Magnesium     Standing Status:   Future     Standing Expiration Date:   3/31/2024    Vitamin D 25 Hydroxy     Standing Status:   Future     Standing Expiration Date:   3/31/2024   Continue routine medications  Follow-up in 6 months or sooner if needed

## 2023-04-03 ENCOUNTER — HOSPITAL ENCOUNTER (OUTPATIENT)
Age: 68
Discharge: HOME | End: 2023-04-03
Payer: MEDICARE

## 2023-04-03 DIAGNOSIS — R73.01 IMPAIRED FASTING GLUCOSE: ICD-10-CM

## 2023-04-03 DIAGNOSIS — I10 ESSENTIAL HYPERTENSION: ICD-10-CM

## 2023-04-03 DIAGNOSIS — E78.2 MIXED HYPERLIPIDEMIA: Chronic | ICD-10-CM

## 2023-04-03 DIAGNOSIS — E55.9 VITAMIN D DEFICIENCY: ICD-10-CM

## 2023-04-03 LAB
25(OH)D3 SERPL-MCNC: 33.2 NG/ML
ANION GAP SERPL CALCULATED.3IONS-SCNC: 11 MMOL/L (ref 9–17)
BUN SERPL-MCNC: 24 MG/DL (ref 8–23)
CALCIUM SERPL-MCNC: 8.7 MG/DL (ref 8.6–10.4)
CHLORIDE SERPL-SCNC: 104 MMOL/L (ref 98–107)
CHOLEST SERPL-MCNC: 165 MG/DL
CHOLESTEROL/HDL RATIO: 4.3
CO2 SERPL-SCNC: 21 MMOL/L (ref 20–31)
CREAT SERPL-MCNC: 0.87 MG/DL (ref 0.7–1.2)
GFR SERPL CREATININE-BSD FRML MDRD: >60 ML/MIN/1.73M2
GLUCOSE SERPL-MCNC: 115 MG/DL (ref 70–99)
HDLC SERPL-MCNC: 38 MG/DL
LDLC SERPL CALC-MCNC: 91 MG/DL (ref 0–130)
MAGNESIUM SERPL-MCNC: 2.1 MG/DL (ref 1.6–2.6)
POTASSIUM SERPL-SCNC: 4.2 MMOL/L (ref 3.7–5.3)
SODIUM SERPL-SCNC: 136 MMOL/L (ref 135–144)
TRIGL SERPL-MCNC: 178 MG/DL

## 2023-04-03 PROCEDURE — 80061 LIPID PANEL: CPT

## 2023-04-03 PROCEDURE — 83735 ASSAY OF MAGNESIUM: CPT

## 2023-04-03 PROCEDURE — 80048 BASIC METABOLIC PNL TOTAL CA: CPT

## 2023-04-03 PROCEDURE — 36415 COLL VENOUS BLD VENIPUNCTURE: CPT

## 2023-04-03 PROCEDURE — 82306 VITAMIN D 25 HYDROXY: CPT

## 2023-05-16 RX ORDER — CARVEDILOL 6.25 MG/1
TABLET ORAL
Qty: 180 TABLET | Refills: 1 | Status: SHIPPED | OUTPATIENT
Start: 2023-05-16

## 2023-05-16 RX ORDER — AMLODIPINE BESYLATE 5 MG/1
TABLET ORAL
Qty: 90 TABLET | Refills: 1 | Status: SHIPPED | OUTPATIENT
Start: 2023-05-16

## 2023-05-16 RX ORDER — LISINOPRIL 5 MG/1
TABLET ORAL
Qty: 90 TABLET | Refills: 1 | Status: SHIPPED | OUTPATIENT
Start: 2023-05-16

## 2023-05-24 ENCOUNTER — TELEPHONE (OUTPATIENT)
Dept: PHARMACY | Facility: CLINIC | Age: 68
End: 2023-05-24

## 2023-05-24 NOTE — TELEPHONE ENCOUNTER
[facility] CLINICAL PHARMACY: STATIN THERAPY REVIEW  Identified statin use in persons with cardiovascular disease care gap per Karin. Records dated: 5/15/23. Last Visit: 3/31/23  Next Visit: 10/2/23    ASSESSMENT of Statin in Persons with Cardiovascular Disease Care David Stanford  has been identified as having a diagnosis for clinical ASCVD or event (e.g., inpatient hospitalization for MI, CABG, PCI or other revascularization procedures) in the measurement year and not currently filling a moderate or high intensity statin. Patients included in this care gap are males age 18-72 and females age 43-69. Currently Prescribed a statin: yes - rosuvastatin 10 mg  Per Reconcile Dispense History:     Note changed from atorvastatin to rosuvastatin 10/3/22.   Per chart review, should have refill remaining     Dispensed Days Supply Quantity Provider Pharmacy   ROSUVASTATIN CALCIUM  10 MG TABS 10/03/2022 90 90 tablet Monique Cardona MD [facility], [facility]     Dispensed Days Supply Quantity Provider Pharmacy    ATORVASTATIN CALCIUM  20 MG TABS 07/29/2022 90 90 tablet [facility], [facility]   ATORVASTATIN CALCIUM  20 MG TABS 04/29/2022 90 90 tablet [facility]   ATORVASTATIN CALCIUM  20 MG TABS 01/17/2022 90 90 [facility], [facility]       Allergies   Allergen Reactions    Metformin And Related      BAD HEADACHES       Lab Results   Component Value Date    CHOL 165 04/03/2023    TRIG 178 (H) 04/03/2023    HDL 38 (L) 04/03/2023    LDLCHOLESTEROL 91 04/03/2023     ALT   Date Value Ref Range Status   01/20/2023 29 5 - 41 U/L Final     AST   Date Value Ref Range Status   01/20/2023 19 <40 U/L Final       The 10-year ASCVD risk score (Mario STRANGE, et al., 2019) is: 11.6%    Values used to calculate the score:      Age: 79 years      Sex: Male      Is Non- : No      Diabetic: No      Tobacco smoker: No      Systolic Blood

## 2023-05-26 ENCOUNTER — OFFICE VISIT (OUTPATIENT)
Dept: GASTROENTEROLOGY | Age: 68
End: 2023-05-26
Payer: MEDICARE

## 2023-05-26 VITALS
HEIGHT: 67 IN | SYSTOLIC BLOOD PRESSURE: 110 MMHG | DIASTOLIC BLOOD PRESSURE: 72 MMHG | WEIGHT: 236 LBS | BODY MASS INDEX: 37.04 KG/M2

## 2023-05-26 DIAGNOSIS — D13.2 ADENOMATOUS DUODENAL POLYP: Primary | ICD-10-CM

## 2023-05-26 DIAGNOSIS — Z86.010 H/O ADENOMATOUS POLYP OF COLON: ICD-10-CM

## 2023-05-26 DIAGNOSIS — K62.5 HEMORRHAGE OF RECTUM AND ANUS: ICD-10-CM

## 2023-05-26 PROCEDURE — 99204 OFFICE O/P NEW MOD 45 MIN: CPT | Performed by: INTERNAL MEDICINE

## 2023-05-26 PROCEDURE — 3017F COLORECTAL CA SCREEN DOC REV: CPT | Performed by: INTERNAL MEDICINE

## 2023-05-26 PROCEDURE — 3078F DIAST BP <80 MM HG: CPT | Performed by: INTERNAL MEDICINE

## 2023-05-26 PROCEDURE — 1123F ACP DISCUSS/DSCN MKR DOCD: CPT | Performed by: INTERNAL MEDICINE

## 2023-05-26 PROCEDURE — G8427 DOCREV CUR MEDS BY ELIG CLIN: HCPCS | Performed by: INTERNAL MEDICINE

## 2023-05-26 PROCEDURE — 3074F SYST BP LT 130 MM HG: CPT | Performed by: INTERNAL MEDICINE

## 2023-05-26 PROCEDURE — G8417 CALC BMI ABV UP PARAM F/U: HCPCS | Performed by: INTERNAL MEDICINE

## 2023-05-26 PROCEDURE — 1036F TOBACCO NON-USER: CPT | Performed by: INTERNAL MEDICINE

## 2023-05-26 ASSESSMENT — ENCOUNTER SYMPTOMS
ANAL BLEEDING: 1
ALLERGIC/IMMUNOLOGIC NEGATIVE: 1
RESPIRATORY NEGATIVE: 1
RECTAL PAIN: 1
EYES NEGATIVE: 1

## 2023-05-26 NOTE — PROGRESS NOTES
Reason for Referral:   No referring provider defined for this encounter. Chief Complaint   Patient presents with    Rectal Bleeding     Pt states he was having some bleeding. States one time it filled the toilet. Pt does take blood thinners        No diagnosis found. HISTORY OF PRESENT ILLNESS:   Patient seen with a history of rectal bleeding. Recently he is noticing small to moderate amount of rectal bleeding intermittently. Visited emergency department regarding this. Denies taking NSAIDs. No history of anticoagulation therapy. No significant constipation or diarrhea. Does not strain at bowel movements. Has a good appetite and there is no dysphagia or dyspepsia. No weight loss. This patient was seen by me last time in 2014 and was found to have colon polyp that was removed. Patient was evaluated by Dr. Saint Sauce, found to have colon polyps, recommended right hemicolectomy and he had a right hemicolectomy done in February 2020. Resected specimen revealed subcentimeter polyps, one of the polyp revealed focal dysplasia. In 2021 he had cholecystectomy done and there was a question of bile leak for which she had ERCP sphincterotomy done. At that time he was found to have a polyp in the duodenum and advised outpatient EUS and management of the polyp and patient never followed that. Past Medical,Family, and Social History reviewed and does contribute to the patient presentingcondition. Patient's PMH/PSH,SH,PSYCH Hx, MEDs, ALLERGIES, and ROS were all reviewed and updated in the appropriate sections. PAST MEDICAL HISTORY:  Past Medical History:   Diagnosis Date    Acute cholecystitis     Essential hypertension 5/4/2017    Hyperlipidemia 9/4/2014    Hyperparathyroidism ([address]     Obesity (BMI 30-39. 9) 5/4/2017    Osteoporosis     Sciatica     Stroke [facility])     Vertebral fracture, osteoporotic ([address]        Past Surgical History:   Procedure Laterality Date    CHOLECYSTECTOMY N/A

## 2023-05-29 RX ORDER — POLYETHYLENE GLYCOL 3350 17 G/17G
POWDER, FOR SOLUTION ORAL
Qty: 238 G | Refills: 0 | Status: SHIPPED | OUTPATIENT
Start: 2023-05-29

## 2023-05-29 RX ORDER — BISACODYL 5 MG/1
TABLET, DELAYED RELEASE ORAL
Qty: 4 TABLET | Refills: 0 | Status: SHIPPED | OUTPATIENT
Start: 2023-05-29

## 2023-08-08 ENCOUNTER — TELEPHONE (OUTPATIENT)
Dept: ONCOLOGY | Age: 68
End: 2023-08-08

## 2023-08-08 DIAGNOSIS — Z87.891 PERSONAL HISTORY OF NICOTINE DEPENDENCE: Primary | ICD-10-CM

## 2023-08-08 NOTE — TELEPHONE ENCOUNTER
Our records indicate that your patient is coming due for their annual lung cancer screening follow up testing. For your convenience, we have pended the order for the scan for you. If you do not agree with the need for the test, please cancel the order and let us know. Sincerely,    [address] Screening Program    Auto printed reminder letter sent to patient.

## 2023-08-09 ENCOUNTER — TELEPHONE (OUTPATIENT)
Dept: PHARMACY | Facility: CLINIC | Age: 68
End: 2023-08-09

## 2023-08-09 NOTE — TELEPHONE ENCOUNTER
[facility] CLINICAL PHARMACY: ADHERENCE REVIEW  Identified care gap per [employer]: fills at [facility]: ACE/ARB adherence    ASSESSMENT    ACE/ARB ADHERENCE    Insurance Records claims through 07/24/2023 (Prior Year [address] = not reported; YTD [address] = FIRST FILL; Potential Fail Date: 8/15/23): Lisinopril 5 mg tablets last filled on 3/21/23 for 90 day supply. Next refill due: 6/19/23    Per chart review, 6 month refill sent to [facility] pharmacy 5/16/23    BP Readings from Last 3 Encounters:   05/26/23 110/72   03/31/23 100/66   01/23/23 132/62     Estimated Creatinine Clearance: 95 mL/min (based on SCr of 0.87 mg/dL). Lab Results   Component Value Date    CREATININE 0.87 04/03/2023     Lab Results   Component Value Date    K 4.2 04/03/2023     SPC Statin gap    Note per 5/24/23 pharmacist outreach, \"Reached patient to review. Patient noted that after he started metformin and rosuvastatin in the fall, he started getting headaches so he stopped both. See it was noted in different notes in the fall about the metformin causing headaches and metformin added to allergy list for headaches. Appears rosuvastatin was started after lipid goals not met on atorvastatin 20 mg. Patient will restart rosuvastatin and contact office with questions or concerns of side effects. States he does not need rosuvastatin refilled as he has some remaining from last fall at this time. \"    Lab Results   Component Value Date    CHOL 165 04/03/2023    TRIG 178 (H) 04/03/2023    HDL 38 (L) 04/03/2023    LDLCHOLESTEROL 91 04/03/2023     ALT   Date Value Ref Range Status   01/20/2023 29 5 - 41 U/L Final     AST   Date Value Ref Range Status   01/20/2023 19 <40 U/L Final     The 10-year ASCVD risk score (Mario STRANGE, et al., 2019) is: 13.6%    Values used to calculate the score:      Age: 79 years      Sex: Male      Is Non- : No      Diabetic: No      Tobacco smoker: No      Systolic Blood Pressure: 239 mmHg      Is BP treated:

## 2023-08-09 NOTE — TELEPHONE ENCOUNTER
Patient returned call, stating he is taking lisinopril 5 mg tab QD,  patient denies needing a refill at this time. Patient states he does not take rosuvastatin. Patient states he had side effects (headaches), he does not remember exactly how long he has been off of it, but states, \"It's been quite some time). Will route to PharmD.

## 2023-09-05 RX ORDER — PANTOPRAZOLE SODIUM 40 MG/1
TABLET, DELAYED RELEASE ORAL
Qty: 90 TABLET | Refills: 1 | Status: SHIPPED | OUTPATIENT
Start: 2023-09-05

## 2023-09-13 ENCOUNTER — HOSPITAL ENCOUNTER (OUTPATIENT)
Dept: PREADMISSION TESTING | Age: 68
Discharge: HOME | End: 2023-09-17

## 2023-09-13 VITALS — HEIGHT: 67 IN | BODY MASS INDEX: 36.57 KG/M2 | WEIGHT: 233 LBS

## 2023-09-13 NOTE — PROGRESS NOTES
Pre-op Instructions For Out-Patient Endoscopy Surgery    Medication Instructions:  Please stop herbs and any supplements now (includes vitamins and minerals). Please contact your surgeon and prescribing physician for pre-op instructions for any blood thinners. Aspirin and Plavix     If you have inhalers/aerosol treatments at home, please use them the morning of your surgery and bring the inhalers with you to the hospital.    Please take the following medications the morning of your surgery with a sip of water:    Carvedilol and Amlodipine     Surgery Instructions:  After midnight before surgery:  Do not eat or drink anything, including water, mints, gum, and hard candy. You may brush your teeth without swallowing. No smoking, chewing tobacco, or street drugs. Please shower or bathe before surgery. Please do not wear any cologne, lotion, powder, jewelry, piercings, perfume, makeup, nail polish, hair accessories, or hair spray on the day of surgery. Wear loose comfortable clothing. Leave your valuables at home but bring a payment source for any after-surgery prescriptions you plan to fill at [facility]. Bring a storage case for any glasses/contacts. An adult who is responsible for you MUST drive you home and should be with you for the first 24 hours after surgery. The Day of Surgery:  Arrive at [facility] AT [facility] Surgery Entrance at the time directed by your surgeon and check in at the desk. If you have a living will or healthcare power of , please bring a copy. You will be taken to the pre-op holding area where you will be prepared for surgery. A physical assessment will be performed by a nurse practitioner or house officer. Your IV will be started and you will meet your anesthesiologist.    When you go to surgery, your family will be directed to the surgical waiting room, where the doctor should speak with them after your surgery.     After surgery, you

## 2023-09-14 ENCOUNTER — HOSPITAL ENCOUNTER (OUTPATIENT)
Dept: CT IMAGING | Age: 68
Discharge: HOME | End: 2023-09-16
Payer: MEDICARE

## 2023-09-14 VITALS — BODY MASS INDEX: 37.35 KG/M2 | HEIGHT: 67 IN | WEIGHT: 238 LBS

## 2023-09-14 DIAGNOSIS — Z87.891 PERSONAL HISTORY OF NICOTINE DEPENDENCE: ICD-10-CM

## 2023-09-14 PROCEDURE — 71271 CT THORAX LUNG CANCER SCR C-: CPT

## 2023-09-15 DIAGNOSIS — R91.1 PULMONARY NODULE, LEFT: Primary | ICD-10-CM

## 2023-09-15 DIAGNOSIS — I72.8 SPLENIC ARTERY ANEURYSM (HCC): ICD-10-CM

## 2023-09-20 DIAGNOSIS — K62.5 HEMORRHAGE OF RECTUM AND ANUS: Primary | ICD-10-CM

## 2023-09-20 NOTE — TELEPHONE ENCOUNTER
Patient came into office regarding his bowel prep-pt previously stated he had his prep however today stated he does not know what he did with the prep and if our office can send another rx to the pharmacy. Writer will ask MA to resend Miralax/dulc to patients pharmacy. Patients procedure on 9/25/23 with [facility].

## 2023-09-21 RX ORDER — BISACODYL 5 MG/1
TABLET, DELAYED RELEASE ORAL
Qty: 4 TABLET | Refills: 0 | Status: SHIPPED | OUTPATIENT
Start: 2023-09-21

## 2023-09-21 RX ORDER — POLYETHYLENE GLYCOL 3350 17 G/17G
POWDER, FOR SOLUTION ORAL
Qty: 238 G | Refills: 0 | Status: SHIPPED | OUTPATIENT
Start: 2023-09-21

## 2023-09-21 NOTE — PRE-PROCEDURE INSTRUCTIONS
No answer, left message ? Unable to leave message ? -FULL MAILBOX    When were you told to arrive at hospital ?      Do you have a  ? Are you on any blood thinners ? If yes when did you stop taking ? Do you have your prep Rx filled and instruction ? Nothing to eat the day before , only clear liquids. Are you experiencing any covid symptoms ? Do you have any infections or rash we should be aware of ? Do you have the Hibiclens soap to use the night before and the morning of surgery ? Nothing to eat or drink after midnight, only a sip of water to take any medication instructed to take the night before. Wear comfortable clothing, leave any valuables at home, remove any jewelry and body piercing .

## 2023-09-22 ENCOUNTER — ANESTHESIA EVENT (OUTPATIENT)
Dept: ENDOSCOPY | Age: 68
End: 2023-09-22
Payer: MEDICARE

## 2023-09-25 ENCOUNTER — ANESTHESIA (OUTPATIENT)
Dept: ENDOSCOPY | Age: 68
End: 2023-09-25
Payer: MEDICARE

## 2023-09-25 ENCOUNTER — HOSPITAL ENCOUNTER (OUTPATIENT)
Age: 68
Setting detail: OUTPATIENT SURGERY
Discharge: HOME | End: 2023-09-25
Attending: INTERNAL MEDICINE | Admitting: INTERNAL MEDICINE
Payer: MEDICARE

## 2023-09-25 VITALS
DIASTOLIC BLOOD PRESSURE: 74 MMHG | HEIGHT: 67 IN | TEMPERATURE: 97.7 F | OXYGEN SATURATION: 95 % | WEIGHT: 238 LBS | HEART RATE: 83 BPM | SYSTOLIC BLOOD PRESSURE: 98 MMHG | BODY MASS INDEX: 37.35 KG/M2 | RESPIRATION RATE: 20 BRPM

## 2023-09-25 DIAGNOSIS — K51.40 PSEUDOPOLYPOSIS OF COLON, UNSPECIFIED COMPLICATION STATUS, UNSPECIFIED PART OF COLON (HCC): ICD-10-CM

## 2023-09-25 DIAGNOSIS — K62.5 RECTAL BLEED: ICD-10-CM

## 2023-09-25 PROBLEM — K62.1 RECTAL POLYP: Status: ACTIVE | Noted: 2023-09-25

## 2023-09-25 PROCEDURE — 3609010600 HC COLONOSCOPY POLYPECTOMY SNARE/COLD BIOPSY: Performed by: INTERNAL MEDICINE

## 2023-09-25 PROCEDURE — 6360000002 HC RX W HCPCS: Performed by: NURSE ANESTHETIST, CERTIFIED REGISTERED

## 2023-09-25 PROCEDURE — 3700000001 HC ADD 15 MINUTES (ANESTHESIA): Performed by: INTERNAL MEDICINE

## 2023-09-25 PROCEDURE — 7100000010 HC PHASE II RECOVERY - FIRST 15 MIN: Performed by: INTERNAL MEDICINE

## 2023-09-25 PROCEDURE — 2709999900 HC NON-CHARGEABLE SUPPLY: Performed by: INTERNAL MEDICINE

## 2023-09-25 PROCEDURE — 88305 TISSUE EXAM BY PATHOLOGIST: CPT

## 2023-09-25 PROCEDURE — 3609012400 HC EGD TRANSORAL BIOPSY SINGLE/MULTIPLE: Performed by: INTERNAL MEDICINE

## 2023-09-25 PROCEDURE — 2500000003 HC RX 250 WO HCPCS: Performed by: NURSE ANESTHETIST, CERTIFIED REGISTERED

## 2023-09-25 PROCEDURE — 7100000011 HC PHASE II RECOVERY - ADDTL 15 MIN: Performed by: INTERNAL MEDICINE

## 2023-09-25 PROCEDURE — 3700000000 HC ANESTHESIA ATTENDED CARE: Performed by: INTERNAL MEDICINE

## 2023-09-25 PROCEDURE — 2580000003 HC RX 258: Performed by: ANESTHESIOLOGY

## 2023-09-25 RX ORDER — PROPOFOL 10 MG/ML
INJECTION, EMULSION INTRAVENOUS PRN
Status: DISCONTINUED | OUTPATIENT
Start: 2023-09-25 | End: 2023-09-25 | Stop reason: SDUPTHER

## 2023-09-25 RX ORDER — PROPOFOL 10 MG/ML
INJECTION, EMULSION INTRAVENOUS CONTINUOUS PRN
Status: DISCONTINUED | OUTPATIENT
Start: 2023-09-25 | End: 2023-09-25 | Stop reason: SDUPTHER

## 2023-09-25 RX ORDER — LIDOCAINE HYDROCHLORIDE 20 MG/ML
INJECTION, SOLUTION EPIDURAL; INFILTRATION; INTRACAUDAL; PERINEURAL PRN
Status: DISCONTINUED | OUTPATIENT
Start: 2023-09-25 | End: 2023-09-25 | Stop reason: SDUPTHER

## 2023-09-25 RX ORDER — SODIUM CHLORIDE 9 MG/ML
INJECTION, SOLUTION INTRAVENOUS PRN
Status: DISCONTINUED | OUTPATIENT
Start: 2023-09-25 | End: 2023-09-25 | Stop reason: HOSPADM

## 2023-09-25 RX ORDER — LIDOCAINE HYDROCHLORIDE 10 MG/ML
1 INJECTION, SOLUTION EPIDURAL; INFILTRATION; INTRACAUDAL; PERINEURAL
Status: DISCONTINUED | OUTPATIENT
Start: 2023-09-25 | End: 2023-09-25 | Stop reason: HOSPADM

## 2023-09-25 RX ORDER — FENTANYL CITRATE 0.05 MG/ML
25 INJECTION, SOLUTION INTRAMUSCULAR; INTRAVENOUS EVERY 5 MIN PRN
Status: DISCONTINUED | OUTPATIENT
Start: 2023-09-25 | End: 2023-09-25 | Stop reason: HOSPADM

## 2023-09-25 RX ORDER — SODIUM CHLORIDE, SODIUM LACTATE, POTASSIUM CHLORIDE, CALCIUM CHLORIDE 600; 310; 30; 20 MG/100ML; MG/100ML; MG/100ML; MG/100ML
INJECTION, SOLUTION INTRAVENOUS CONTINUOUS
Status: DISCONTINUED | OUTPATIENT
Start: 2023-09-25 | End: 2023-09-25 | Stop reason: HOSPADM

## 2023-09-25 RX ORDER — SODIUM CHLORIDE 0.9 % (FLUSH) 0.9 %
5-40 SYRINGE (ML) INJECTION EVERY 12 HOURS SCHEDULED
Status: DISCONTINUED | OUTPATIENT
Start: 2023-09-25 | End: 2023-09-25 | Stop reason: HOSPADM

## 2023-09-25 RX ORDER — SODIUM CHLORIDE 0.9 % (FLUSH) 0.9 %
5-40 SYRINGE (ML) INJECTION PRN
Status: DISCONTINUED | OUTPATIENT
Start: 2023-09-25 | End: 2023-09-25 | Stop reason: HOSPADM

## 2023-09-25 RX ORDER — ONDANSETRON 2 MG/ML
4 INJECTION INTRAMUSCULAR; INTRAVENOUS
Status: DISCONTINUED | OUTPATIENT
Start: 2023-09-25 | End: 2023-09-25 | Stop reason: HOSPADM

## 2023-09-25 RX ORDER — FENTANYL CITRATE 0.05 MG/ML
50 INJECTION, SOLUTION INTRAMUSCULAR; INTRAVENOUS EVERY 5 MIN PRN
Status: DISCONTINUED | OUTPATIENT
Start: 2023-09-25 | End: 2023-09-25 | Stop reason: HOSPADM

## 2023-09-25 RX ORDER — EPHEDRINE SULFATE/0.9% NACL/PF 50 MG/5 ML
SYRINGE (ML) INTRAVENOUS PRN
Status: DISCONTINUED | OUTPATIENT
Start: 2023-09-25 | End: 2023-09-25 | Stop reason: SDUPTHER

## 2023-09-25 RX ORDER — DIPHENHYDRAMINE HYDROCHLORIDE 50 MG/ML
12.5 INJECTION INTRAMUSCULAR; INTRAVENOUS
Status: DISCONTINUED | OUTPATIENT
Start: 2023-09-25 | End: 2023-09-25 | Stop reason: HOSPADM

## 2023-09-25 RX ADMIN — SODIUM CHLORIDE, POTASSIUM CHLORIDE, SODIUM LACTATE AND CALCIUM CHLORIDE: 600; 310; 30; 20 INJECTION, SOLUTION INTRAVENOUS at 07:30

## 2023-09-25 RX ADMIN — Medication 10 MG: at 09:33

## 2023-09-25 RX ADMIN — LIDOCAINE HYDROCHLORIDE 80 MG: 20 INJECTION, SOLUTION EPIDURAL; INFILTRATION; INTRACAUDAL; PERINEURAL at 08:55

## 2023-09-25 RX ADMIN — PROPOFOL 150 MCG/KG/MIN: 10 INJECTION, EMULSION INTRAVENOUS at 08:55

## 2023-09-25 RX ADMIN — PROPOFOL 100 MG: 10 INJECTION, EMULSION INTRAVENOUS at 08:55

## 2023-09-25 RX ADMIN — Medication 10 MG: at 09:36

## 2023-09-25 ASSESSMENT — PAIN - FUNCTIONAL ASSESSMENT: PAIN_FUNCTIONAL_ASSESSMENT: 0-10

## 2023-09-25 NOTE — H&P
HISTORY and [id number]       NAME:  Adrien Villegas  MRN: [id number]   YOB: 1955   Date: 9/25/2023   Age: 76 y.o. Gender: male       COMPLAINT AND PRESENT HISTORY:     Adrien Villegas is 76 y.o.,  male, will be having a   EGD ESOPHAGOGASTRODUODENOSCOPY, COLONOSCOPY. Pt is being seen for hx of : Pre-Op Diagnosis Codes:     * Rectal bleed- pt states that he had a hemorrhoid that was burst.      * Pseudopolyposis of colon, unspecified complication status, unspecified part of colon      Prior Colonoscopy  and EGD was done in 2020 with polypectomy. Patient has hx of tubular adenoma. Patient is s/p Colon Surgery. Hemicolectomy 2020    Pt  denies  abdominal pain including bloating/gas. Denies any nausea or vomiting. No changes in bowel habits. No diarrhea, constipation, pt has hx of blood in stools, none present. black tarry stools. No changes in appetite and unintended weight loss. No  heartburn, indigestion,  acid reflux. or regurgitation. Pt is taking Protonix prn. .  Pt denies any dysphagia . Denies Family Hx of colon or esophageal cancer. Medical history reviewed:  hx CAD X1 STENT , CVA - no residual-12 yrs ago  Denies current chest pain/pressure . Pt reports no SOB. No recent URI, fever or chills. Patient has been NPO since midnight. No blood thinners in the past 2 weeks. ( Plavix,aspirin)  Patient took ? Carvedilol and Amlodipine  this am    Patient reports taking full bowel prep with clear outcome. Patient denies any personal or family problems with anesthesia .     PAST MEDICAL HISTORY     Past Medical History:   Diagnosis Date    Acute cholecystitis     CAD (coronary artery disease)     Essential hypertension 05/04/2017    H/O cardiac catheterization 08/27/2022    \"stent x1\"    Hyperlipidemia 09/04/2014    Hyperparathyroidism ([address])     Obesity (BMI 30-39.9) 05/04/2017    Osteoporosis     Sciatica     Stroke ([address])

## 2023-09-25 NOTE — ANESTHESIA PRE PROCEDURE
Department of Anesthesiology  Preprocedure Note       Name:  Toro Lamb   Age:  76 y.o.  :  1955                                          MRN:  [id number]         Date:  2023      Surgeon: Fredo Berg):  Filemon Moya MD    Procedure: Procedure(s):  EGD BIOPSY  COLONOSCOPY DIAGNOSTIC    Medications prior to admission:   Prior to Admission medications    Medication Sig Start Date End Date Taking? Authorizing Provider   pantoprazole (PROTONIX) 40 MG tablet TAKE 1 TABLET BY MOUTH ONCE  DAILY 23   Monica Osborn MD   lisinopril (PRINIVIL;ZESTRIL) 5 MG tablet TAKE 1 TABLET BY MOUTH ONCE  DAILY 23   Monica Osborn MD   amLODIPine (NORVASC) 5 MG tablet TAKE 1 TABLET BY MOUTH ONCE  DAILY 23   Monica Osborn MD   carvedilol (COREG) 6.25 MG tablet TAKE 1 TABLET BY MOUTH TWICE  DAILY WITH MEALS 23   Monica Osborn MD   vitamin D (ERGOCALCIFEROL) 1.25 MG (56428 UT) CAPS capsule TAKE 1 CAPSULE BY MOUTH WEEKLY 23   Monica Osborn MD   ondansetron (ZOFRAN) 4 MG tablet Take 1 tablet by mouth every 8 hours as needed for Nausea or Vomiting  Patient not taking: Reported on 2023   Nuria Acuna MD   rosuvastatin (CRESTOR) 10 MG tablet Take 1 tablet by mouth daily  Patient not taking: Reported on 2023 10/3/22   Monica Osborn MD   aspirin 325 MG tablet Take 1 tablet by mouth daily    Historical Provider, MD   clopidogrel (PLAVIX) 75 MG tablet Take 1 tablet by mouth daily 22   KAMALA Man NP   isosorbide mononitrate (IMDUR) 30 MG extended release tablet Take 0.5 tablets by mouth daily  Patient not taking: Reported on 2023   KAMALA Man NP   nitroGLYCERIN (NITROSTAT) 0.4 MG SL tablet up to max of 3 total doses. If no relief after 1 dose, call 911.   Patient not taking: Reported on 2023   KAMALA Man NP   tadalafil (CIALIS) 20 MG tablet Take 1 tablet by mouth daily as needed for

## 2023-09-25 NOTE — OP NOTE
ESOPHAGOGASTRODUODENOSCOPY   ( EGD )  DATE OF PROCEDURE: 9/25/2023     SURGEON: Jess Story MD    ASSISTANT: None    PREOPERATIVE DIAGNOSIS: Patient has history of duodenal polyps diagnosed in the past.  Patient lost for follow-up. Procedure performed to evaluate duodenal polyps    POSTOPERATIVE DIAGNOSIS: Duodenal polyps as described. OPERATION: Upper GI endoscopy with Biopsy    ANESTHESIA: MAC    ESTIMATED BLOOD LOSS: None    COMPLICATIONS: None. SPECIMENS:  Was Obtained: Questionable polyp, lipoma in the second part of the duodenum. Flat polyp in the postbulbar area proximal to papula. Polyp in the apex of the duodenal bulb. HISTORY: The patient is a 76y.o. year old male with history of above preop diagnosis. I recommended esophagogastroduodenoscopy with possible biopsy and I explained the risk, benefits, expected outcome, and alternatives to the procedure. Risks included but are not limited to bleeding, infection, respiratory distress, hypotension, and perforation of the esophagus, stomach, or duodenum. Patient understands and is in agreement. PROCEDURE: The patient was given IV conscious sedation. The patient's SPO2 remained above 90% throughout the procedure. Cetacaine spray given. Patient placed in left lateral position. Olympus  videogastroscope was inserted orally under vision into the esophagus without difficulty and advanced into the stomach then through the pylorus up to the second part of duodenum. Findings:    Retropharyngeal area was grossly normal appearing  . Esophagus: normal.  No signs of peptic esophagitis Chaves's mucosa seen. No hiatal hernia. Squamocolumnar junction is at about 39 cm. Stomach:    Fundus and Cardia Examined in Retroflexed View: normal    Body: normal    Antrum: normal    Duodenum:     Descending: abnormal: Beyond the papila there is a polyp which is about 5 mm.   After biopsies it looks like this may be

## 2023-09-25 NOTE — OP NOTE
COLONOSCOPY    DATE OF PROCEDURE: 9/25/2023    SURGEON: Kermit Bolden MD    ASSISTANT: None    PREOPERATIVE DIAGNOSIS:    History of multiple colon polyps past.    Patient had subtotal right colectomy and polyps. Polyp surveillance colonoscopy. Also patient is having GERD. GI.    POSTOPERATIVE DIAGNOSIS: Polyp in the lower sigmoid colon and a polyp in the rectum. OPERATION: Total colonoscopy, snare polypectomy sigmoid colon and excisional polyp with biopsy forceps from the rectum    ANESTHESIA: MAC    ESTIMATED BLOOD LOSS: None    COMPLICATIONS: None     SPECIMENS:  Was Obtained: Polyp sigmoid colon, polyp rectum    HISTORY: The patient is a 76y.o. year old male with history of above preop diagnosis. I recommended colonoscopy with possible biopsy or polypectomy and I explained the risk, benefits, expected outcome, and alternatives to the procedure. Risks included but are not limited to bleeding, infection, respiratory distress, hypotension, and perforation of the colon and possibility of missing a lesion. The patient understands and is in agreement. PROCEDURE:  The patient's SPO2 remained above 90% throughout the procedure. Digital rectal exam was normal.  The colonoscope was inserted through the anus into the rectum and advanced under direct vision to the cecum without difficulty. Terminal ileum was examined for approximately 2 inches. The prep was adequate. Findings:  Neoterminal ileum normal.    Cecum/Ascending colon: normal, appears that part of the right colon was resected in the past.    Transverse colon: normal    Descending/Sigmoid colon: abnormal: In the lower sigmoid colon there is a polyp which is about 7 mm flat. Removed with cold snare. Rectum/Anus: examined in normal and retroflexed positions and was a polyp which is about 5 mm, flat, excised with biopsy forceps. Withdrawal Time was (minutes): 14          The colon was decompressed and the scope was removed.   The

## 2023-09-25 NOTE — DISCHARGE INSTRUCTIONS
instead of white rice, whole-wheat bread instead of white bread). Include a variety of grains in your diet, such as wheat, rye, barley, oats, quinoa, and bulgur. Eat more vegetarian-based meals. Here are some ideas: black bean burgers, eggplant lasagna, and veggie tofu stir-gentile. Choose high-fiber snacks, such as fruits, popcorn, whole-grain crackers, and nuts. Make whole-grain cereal or whole-grain toast part of your daily breakfast regime. When eating out, whether ordering a sandwich or dinner, ask for extra vegetables. When baking, replace part of the white flour with whole-wheat flour. Whole-wheat flour is particularly easy to incorporate into a recipe. High-Fiber Diet Eating Guide   Food Category   Foods Recommended   Notes   Grains   Whole-grain breads, muffins, bagels, or tone bread Rye bread Whole-wheat crackers or crisp breads Whole-grain or bran cereals Oatmeal, oat bran, or grits Wheat germ Whole-wheat pasta and brown rice   Read the ingredients list on food labels. Look for products that list \"whole\" as the first ingredient (eg, whole-wheat, whole oats). Choose cereals with at least 2 grams of fiber per serving. Vegetables   All vegetables, especially asparagus, bean sprouts, broccoli, [place] sprouts, cabbage, carrots, cauliflower, celery, corn, greens, green beans, green pepper, onions, peas, potatoes (with skin), snow peas, spinach, squash, sweet potatoes, tomatoes, zucchini   For maximum fiber intake, eat the peels of fruits and vegetablesjust be sure to wash them well first.   Fruits   All fruits, especially apples, berries, grapefruits, mangoes, nectarines, oranges, peaches, pears, dried fruits (figs, dates, prunes, raisins)   Choose raw fruits and vegetables over juice, cooked, or cannedraw fruit has more fiber. Dried fruit is also a good source of fiber. Milk   With the exception of yogurt containing inulin (a type of fiber), dairy foods provide little fiber.    Add more

## 2023-09-26 LAB — SURGICAL PATHOLOGY REPORT: NORMAL

## 2023-09-27 ENCOUNTER — TELEPHONE (OUTPATIENT)
Dept: PHARMACY | Facility: CLINIC | Age: 68
End: 2023-09-27

## 2023-09-27 DIAGNOSIS — E78.2 MIXED HYPERLIPIDEMIA: ICD-10-CM

## 2023-09-27 DIAGNOSIS — E55.9 VITAMIN D DEFICIENCY: ICD-10-CM

## 2023-09-27 NOTE — TELEPHONE ENCOUNTER
Verónica Ayala MD    Note patient has upcoming appointment schedule with you 10/2/23  Patient reported headache to rosuvastatin and not taking (also reported headache with metformin which were started together, currently unclear if trialed rosuvastatin after metformin stopped.)  Appears patient was on atorvastatin 20 mg last year and changed to rosuvastatin when TG not at goal.  If patient prefers not to be on rosuvastatin, could consider prescribing atorvastatin 40 mg daily at upcoming appointment if you find appropriate. Patient has been identified as a statin use in persons with cardiovascular disease care gap per [place] [facility] and not currently filling a moderate or high intensity statin. I can contact the patient to discuss any changes in therapy. Thank you,  Jonatan Madera, PharmD, [address] [place] [address] Pharmacist  Department, toll free: [phone], option 1   ================================================================    POPULATION HEALTH CLINICAL PHARMACY: STATIN THERAPY REVIEW  Identified statin use in persons with cardiovascular disease care gap per [place] [facility] Records dated: 9/25/23. Last Visit: 3/31/23  Next Visit: 10/2/23    Future Appointments   Date Time Provider [address]   10/2/2023  9:00 AM Verónica Ayala MD [facility] [address]   10/9/2023  2:30 PM Yeny Ruth MD [facility]     ASSESSMENT of Statin in Persons with Cardiovascular Disease Care Eulalio Martinez  has been identified as having a diagnosis for clinical ASCVD or event (e.g., inpatient hospitalization for MI, CABG, PCI or other revascularization procedures) in the measurement year and not currently filling a moderate or high intensity statin. Patients included in this care gap are males age 18-72 and females age 37-78.      Currently Prescribed a statin: yes - rosuvastatin 10 mg    Note changed from atorvastatin to rosuvastatin 10/3/22 after lab work, \"Patient's lipid profile

## 2023-09-28 RX ORDER — ERGOCALCIFEROL 1.25 MG/1
CAPSULE ORAL
Qty: 13 CAPSULE | Refills: 3 | Status: SHIPPED | OUTPATIENT
Start: 2023-09-28

## 2023-10-02 ENCOUNTER — OFFICE VISIT (OUTPATIENT)
Dept: FAMILY MEDICINE CLINIC | Age: 68
End: 2023-10-02
Payer: MEDICARE

## 2023-10-02 VITALS
RESPIRATION RATE: 18 BRPM | DIASTOLIC BLOOD PRESSURE: 68 MMHG | OXYGEN SATURATION: 96 % | HEART RATE: 65 BPM | WEIGHT: 235.6 LBS | BODY MASS INDEX: 36.9 KG/M2 | SYSTOLIC BLOOD PRESSURE: 104 MMHG

## 2023-10-02 DIAGNOSIS — Z90.49 HX OF CHOLECYSTECTOMY: ICD-10-CM

## 2023-10-02 DIAGNOSIS — R73.9 HYPERGLYCEMIA: ICD-10-CM

## 2023-10-02 DIAGNOSIS — K21.9 GASTROESOPHAGEAL REFLUX DISEASE, UNSPECIFIED WHETHER ESOPHAGITIS PRESENT: ICD-10-CM

## 2023-10-02 DIAGNOSIS — J01.00 ACUTE NON-RECURRENT MAXILLARY SINUSITIS: ICD-10-CM

## 2023-10-02 DIAGNOSIS — R73.01 IMPAIRED FASTING GLUCOSE: ICD-10-CM

## 2023-10-02 DIAGNOSIS — I10 ESSENTIAL HYPERTENSION: Primary | ICD-10-CM

## 2023-10-02 DIAGNOSIS — E78.2 MIXED HYPERLIPIDEMIA: Chronic | ICD-10-CM

## 2023-10-02 DIAGNOSIS — E55.9 VITAMIN D DEFICIENCY: ICD-10-CM

## 2023-10-02 PROBLEM — G89.18 ACUTE POSTOPERATIVE PAIN: Status: RESOLVED | Noted: 2020-04-22 | Resolved: 2023-10-02

## 2023-10-02 PROBLEM — D64.9 ANEMIA: Status: RESOLVED | Noted: 2020-04-22 | Resolved: 2023-10-02

## 2023-10-02 PROBLEM — D62 ACUTE BLOOD LOSS AS CAUSE OF POSTOPERATIVE ANEMIA: Status: RESOLVED | Noted: 2020-02-16 | Resolved: 2023-10-02

## 2023-10-02 PROBLEM — K81.0 ACUTE CHOLECYSTITIS: Status: RESOLVED | Noted: 2021-02-19 | Resolved: 2023-10-02

## 2023-10-02 PROBLEM — K81.0 ACUTE ACALCULOUS CHOLECYSTITIS: Status: RESOLVED | Noted: 2021-01-31 | Resolved: 2023-10-02

## 2023-10-02 LAB — HBA1C MFR BLD: 6.8 %

## 2023-10-02 PROCEDURE — 99214 OFFICE O/P EST MOD 30 MIN: CPT | Performed by: FAMILY MEDICINE

## 2023-10-02 PROCEDURE — 3078F DIAST BP <80 MM HG: CPT | Performed by: FAMILY MEDICINE

## 2023-10-02 PROCEDURE — 83036 HEMOGLOBIN GLYCOSYLATED A1C: CPT | Performed by: FAMILY MEDICINE

## 2023-10-02 PROCEDURE — 1123F ACP DISCUSS/DSCN MKR DOCD: CPT | Performed by: FAMILY MEDICINE

## 2023-10-02 PROCEDURE — 3074F SYST BP LT 130 MM HG: CPT | Performed by: FAMILY MEDICINE

## 2023-10-02 RX ORDER — ROSUVASTATIN CALCIUM 10 MG/1
10 TABLET, COATED ORAL DAILY
Qty: 90 TABLET | Refills: 1 | Status: SHIPPED | OUTPATIENT
Start: 2023-10-02

## 2023-10-02 RX ORDER — FLUTICASONE PROPIONATE 50 MCG
2 SPRAY, SUSPENSION (ML) NASAL DAILY
Qty: 16 G | Refills: 0 | Status: SHIPPED | OUTPATIENT
Start: 2023-10-02

## 2023-10-02 RX ORDER — AMOXICILLIN AND CLAVULANATE POTASSIUM 875; 125 MG/1; MG/1
1 TABLET, FILM COATED ORAL 2 TIMES DAILY WITH MEALS
Qty: 20 TABLET | Refills: 0 | Status: SHIPPED | OUTPATIENT
Start: 2023-10-02 | End: 2023-10-12

## 2023-10-02 SDOH — ECONOMIC STABILITY: FOOD INSECURITY: WITHIN THE PAST 12 MONTHS, THE FOOD YOU BOUGHT JUST DIDN'T LAST AND YOU DIDN'T HAVE MONEY TO GET MORE.: NEVER TRUE

## 2023-10-02 SDOH — ECONOMIC STABILITY: FOOD INSECURITY: WITHIN THE PAST 12 MONTHS, YOU WORRIED THAT YOUR FOOD WOULD RUN OUT BEFORE YOU GOT MONEY TO BUY MORE.: NEVER TRUE

## 2023-10-02 SDOH — ECONOMIC STABILITY: INCOME INSECURITY: HOW HARD IS IT FOR YOU TO PAY FOR THE VERY BASICS LIKE FOOD, HOUSING, MEDICAL CARE, AND HEATING?: NOT HARD AT ALL

## 2023-10-02 ASSESSMENT — ENCOUNTER SYMPTOMS
ABDOMINAL PAIN: 0
SINUS PRESSURE: 1
RHINORRHEA: 1
SINUS PAIN: 1
SHORTNESS OF BREATH: 0
CHEST TIGHTNESS: 0
BLOOD IN STOOL: 0

## 2023-10-02 NOTE — PROGRESS NOTES
Right Ear: Tympanic membrane, ear canal and external ear normal.      Left Ear: Tympanic membrane, ear canal and external ear normal.      Nose:      Right Sinus: Maxillary sinus tenderness present. No frontal sinus tenderness. Left Sinus: Maxillary sinus tenderness present. No frontal sinus tenderness. Mouth/Throat:      Mouth: Mucous membranes are moist.      Pharynx: Oropharynx is clear. Eyes:      General: No scleral icterus. Right eye: No discharge. Left eye: No discharge. Conjunctiva/sclera: Conjunctivae normal.   Cardiovascular:      Rate and Rhythm: Normal rate and regular rhythm. Heart sounds: Normal heart sounds. Pulmonary:      Effort: Pulmonary effort is normal. No respiratory distress. Breath sounds: Normal breath sounds. No wheezing. Abdominal:      General: There is no distension. Palpations: Abdomen is soft. Tenderness: There is no abdominal tenderness. Musculoskeletal:      Cervical back: Neck supple. Skin:     General: Skin is warm and dry. Findings: No rash. Neurological:      Mental Status: He is alert and oriented to person, place, and time. Psychiatric:         Mood and Affect: Mood normal.         Behavior: Behavior normal.         Assessment:       Diagnosis Orders   1. Essential hypertension  Comprehensive Metabolic Panel    Lipid Panel    Magnesium    Vitamin B12    Folate    Zinc      2. Mixed hyperlipidemia  Comprehensive Metabolic Panel    Lipid Panel      3. Impaired fasting glucose  POCT glycosylated hemoglobin (Hb A1C)    Comprehensive Metabolic Panel      4. Vitamin D deficiency  Vitamin D 25 Hydroxy      5. Hyperglycemia  Hemoglobin A1C      6. Gastroesophageal reflux disease, unspecified whether esophagitis present  Magnesium    Vitamin B12    Folate    Zinc      7.  Acute non-recurrent maxillary sinusitis  amoxicillin-clavulanate (AUGMENTIN) 875-125 MG per tablet    fluticasone (FLONASE) 50 MCG/ACT nasal spray

## 2023-10-02 NOTE — TELEPHONE ENCOUNTER
I spoke with the patient today and he stated that he will try taking the Crestor again and if it causes him to have headaches he would let me know and then I will switch him back to Lipitor.   Thank you

## 2023-10-03 NOTE — TELEPHONE ENCOUNTER
For Pharmacy Admin Tracking Only    Program: Swapnil in place:  No  Recommendation Provided To: Provider: 1 via Note to Provider  Intervention Detail: Adherence Monitorin and Refill(s) Provided  Intervention Accepted By: Provider: 1  Gap Closed?: Yes   Time Spent (min): 20

## 2023-10-09 ENCOUNTER — OFFICE VISIT (OUTPATIENT)
Dept: GASTROENTEROLOGY | Age: 68
End: 2023-10-09
Payer: MEDICARE

## 2023-10-09 VITALS
BODY MASS INDEX: 37.17 KG/M2 | HEART RATE: 82 BPM | HEIGHT: 67 IN | SYSTOLIC BLOOD PRESSURE: 110 MMHG | WEIGHT: 236.8 LBS | DIASTOLIC BLOOD PRESSURE: 74 MMHG | TEMPERATURE: 97.6 F

## 2023-10-09 DIAGNOSIS — D13.2 ADENOMATOUS DUODENAL POLYP: Primary | ICD-10-CM

## 2023-10-09 PROCEDURE — 3078F DIAST BP <80 MM HG: CPT | Performed by: INTERNAL MEDICINE

## 2023-10-09 PROCEDURE — 99213 OFFICE O/P EST LOW 20 MIN: CPT | Performed by: INTERNAL MEDICINE

## 2023-10-09 PROCEDURE — 1123F ACP DISCUSS/DSCN MKR DOCD: CPT | Performed by: INTERNAL MEDICINE

## 2023-10-09 PROCEDURE — 3074F SYST BP LT 130 MM HG: CPT | Performed by: INTERNAL MEDICINE

## 2023-10-09 ASSESSMENT — ENCOUNTER SYMPTOMS
BLOOD IN STOOL: 0
ABDOMINAL PAIN: 0
ANAL BLEEDING: 0
VOMITING: 0
WHEEZING: 0
DIARRHEA: 0
TROUBLE SWALLOWING: 0
VOICE CHANGE: 0
ABDOMINAL DISTENTION: 0
NAUSEA: 0
BACK PAIN: 0
SINUS PRESSURE: 0
CHOKING: 0
RECTAL PAIN: 0
CONSTIPATION: 0
SORE THROAT: 0
COUGH: 0

## 2023-10-09 NOTE — PROGRESS NOTES
GI OFFICE FOLLOW UP    INTERVAL HISTORY:   No referring provider defined for this encounter. Chief Complaint   Patient presents with    Follow Up After Procedure     Patient is here today to be seen for a follow up from his colonoscopy and EGD. 1. Adenomatous duodenal polyp              HISTORY OF PRESENT ILLNESS:   Recently patient had a EGD done to evaluate duodenal polyps. Patient has couple of polyps seen in the second part of the duodenum 1 polyp distal to papilla which was a lipoma. However the polyp proximal to papilla is an adenoma. Colonoscopy revealed subcentimeter polyps in the sigmoid colon and rectum that were removed and at this hyperplastic polyps. At present patient is doing reasonably well. He denies GI issues symptoms. Tolerating diet well. No abdominal pain, bloating, nausea vomiting. Overall he is doing well and satisfied with the improvement. Past Medical,Family, and Social History reviewed and does contribute to the patient presenting condition. Patient's PMH/PSH,SH,PSYCH Hx, MEDs, ALLERGIES, and ROS were all reviewed and updated in the appropriate sections.  Yes      PAST MEDICAL HISTORY:  Past Medical History:   Diagnosis Date    Acute cholecystitis     CAD (coronary artery disease)     Essential hypertension 05/04/2017    H/O cardiac catheterization 08/27/2022    \"stent x1\"    Hyperlipidemia 09/04/2014    Hyperparathyroidism ([address])     Obesity (BMI 30-39.9) 05/04/2017    Osteoporosis     Sciatica     Stroke [facility])     Vertebral fracture, osteoporotic (HCC)        Past Surgical History:   Procedure Laterality Date    CARDIAC SURGERY  08/2022    Two vessel CAD, 40% stenosis mid LAD,Successful PTCA-LINDSEY mid RCA, 99% stenosis reduced to 0%, Preserved LV systolic function    CHOLECYSTECTOMY N/A 02/19/2021    OPEN CHOLECYSTECTOMY performed by Cornelio Aburto

## 2023-10-16 ENCOUNTER — HOSPITAL ENCOUNTER (OUTPATIENT)
Age: 68
Discharge: HOME | End: 2023-10-16
Payer: MEDICARE

## 2023-10-16 DIAGNOSIS — I10 ESSENTIAL HYPERTENSION: ICD-10-CM

## 2023-10-16 DIAGNOSIS — E55.9 VITAMIN D DEFICIENCY: ICD-10-CM

## 2023-10-16 DIAGNOSIS — R73.01 IMPAIRED FASTING GLUCOSE: ICD-10-CM

## 2023-10-16 DIAGNOSIS — K21.9 GASTROESOPHAGEAL REFLUX DISEASE, UNSPECIFIED WHETHER ESOPHAGITIS PRESENT: ICD-10-CM

## 2023-10-16 DIAGNOSIS — R73.9 HYPERGLYCEMIA: ICD-10-CM

## 2023-10-16 DIAGNOSIS — E78.2 MIXED HYPERLIPIDEMIA: Chronic | ICD-10-CM

## 2023-10-16 LAB
25(OH)D3 SERPL-MCNC: 49.7 NG/ML (ref 30–100)
ALBUMIN SERPL-MCNC: 4.1 G/DL (ref 3.5–5.2)
ALP SERPL-CCNC: 61 U/L (ref 40–129)
ALT SERPL-CCNC: 31 U/L (ref 5–41)
ANION GAP SERPL CALCULATED.3IONS-SCNC: 11 MMOL/L (ref 9–17)
AST SERPL-CCNC: 20 U/L
BILIRUB SERPL-MCNC: 0.5 MG/DL (ref 0.3–1.2)
BUN SERPL-MCNC: 16 MG/DL (ref 8–23)
CALCIUM SERPL-MCNC: 9.2 MG/DL (ref 8.6–10.4)
CHLORIDE SERPL-SCNC: 103 MMOL/L (ref 98–107)
CHOLEST SERPL-MCNC: 145 MG/DL
CHOLESTEROL/HDL RATIO: 3.7
CO2 SERPL-SCNC: 23 MMOL/L (ref 20–31)
CREAT SERPL-MCNC: 1 MG/DL (ref 0.7–1.2)
EST. AVERAGE GLUCOSE BLD GHB EST-MCNC: 140 MG/DL
FOLATE SERPL-MCNC: 10.4 NG/ML
GFR SERPL CREATININE-BSD FRML MDRD: >60 ML/MIN/1.73M2
GLUCOSE SERPL-MCNC: 121 MG/DL (ref 70–99)
HBA1C MFR BLD: 6.5 % (ref 4–6)
HDLC SERPL-MCNC: 39 MG/DL
LDLC SERPL CALC-MCNC: 76 MG/DL (ref 0–130)
MAGNESIUM SERPL-MCNC: 2.1 MG/DL (ref 1.6–2.6)
POTASSIUM SERPL-SCNC: 4.4 MMOL/L (ref 3.7–5.3)
PROT SERPL-MCNC: 7 G/DL (ref 6.4–8.3)
SODIUM SERPL-SCNC: 137 MMOL/L (ref 135–144)
TRIGL SERPL-MCNC: 148 MG/DL
VIT B12 SERPL-MCNC: 710 PG/ML (ref 232–1245)

## 2023-10-16 PROCEDURE — 36415 COLL VENOUS BLD VENIPUNCTURE: CPT

## 2023-10-16 PROCEDURE — 82607 VITAMIN B-12: CPT

## 2023-10-16 PROCEDURE — 83735 ASSAY OF MAGNESIUM: CPT

## 2023-10-16 PROCEDURE — 80053 COMPREHEN METABOLIC PANEL: CPT

## 2023-10-16 PROCEDURE — 83036 HEMOGLOBIN GLYCOSYLATED A1C: CPT

## 2023-10-16 PROCEDURE — 82306 VITAMIN D 25 HYDROXY: CPT

## 2023-10-16 PROCEDURE — 84630 ASSAY OF ZINC: CPT

## 2023-10-16 PROCEDURE — 82746 ASSAY OF FOLIC ACID SERUM: CPT

## 2023-10-16 PROCEDURE — 80061 LIPID PANEL: CPT

## 2023-10-17 PROBLEM — E11.9 NEWLY DIAGNOSED DIABETES (HCC): Status: ACTIVE | Noted: 2023-10-17

## 2023-10-18 LAB — ZINC SERPL-MCNC: 99.4 UG/DL (ref 60–120)

## 2023-11-10 ENCOUNTER — TELEPHONE (OUTPATIENT)
Dept: GASTROENTEROLOGY | Age: 68
End: 2023-11-10

## 2023-11-10 ENCOUNTER — INITIAL CONSULT (OUTPATIENT)
Dept: VASCULAR SURGERY | Age: 68
End: 2023-11-10
Payer: MEDICARE

## 2023-11-10 VITALS
BODY MASS INDEX: 36.73 KG/M2 | RESPIRATION RATE: 16 BRPM | DIASTOLIC BLOOD PRESSURE: 81 MMHG | HEIGHT: 67 IN | OXYGEN SATURATION: 96 % | WEIGHT: 234 LBS | SYSTOLIC BLOOD PRESSURE: 121 MMHG | HEART RATE: 71 BPM

## 2023-11-10 DIAGNOSIS — I72.8 SPLENIC ARTERY ANEURYSM (HCC): Primary | ICD-10-CM

## 2023-11-10 PROCEDURE — 1123F ACP DISCUSS/DSCN MKR DOCD: CPT | Performed by: SURGERY

## 2023-11-10 PROCEDURE — 99203 OFFICE O/P NEW LOW 30 MIN: CPT | Performed by: SURGERY

## 2023-11-10 PROCEDURE — 3074F SYST BP LT 130 MM HG: CPT | Performed by: SURGERY

## 2023-11-10 PROCEDURE — 3079F DIAST BP 80-89 MM HG: CPT | Performed by: SURGERY

## 2023-11-10 ASSESSMENT — ENCOUNTER SYMPTOMS
ABDOMINAL PAIN: 0
VOMITING: 0
TROUBLE SWALLOWING: 0
COLOR CHANGE: 0
COUGH: 0
ABDOMINAL DISTENTION: 0
CHEST TIGHTNESS: 0
EYE PAIN: 0
SHORTNESS OF BREATH: 0
VOICE CHANGE: 0

## 2023-11-10 NOTE — TELEPHONE ENCOUNTER
Received a clearance from Dr. Walter Schaffer to hold asa/ plavix 5 days before procedure and resume post op when ok with GI. The patient was informed 11/10/23. Per the patient he is out of the Plavix. Advised to call the cardiologist for refill.   Verbalized understanding.-shea

## 2023-11-10 NOTE — PROGRESS NOTES
[address] [facility] [address] [facility] [address] [facility] [address]  Dept: [phone]     Patient: Luis Enrique Alexander  : 1955  MRN: [id number]  DOS: 11/10/2023    Referring provider:  KAMALA Sherwood NP         HPI:  Luis Enrique Alexander is a 76 y.o. male who comes to the office for the first time regarding a small splenic artery aneurysm. This was found on a CTA of the chest.  It measures less than 1 cm in diameter. It is more distally located. He has no left upper quadrant pain. He has no abdominal pain. He does have high blood pressure and has had a stroke in the past.  He does not have carotid stenosis as per his report. He stopped smoking long ago. He denies claudication rest pain or ulceration.   Past Medical History:   Diagnosis Date    Acute cholecystitis     CAD (coronary artery disease)     Essential hypertension 2017    H/O cardiac catheterization 2022    \"stent x1\"    Hyperlipidemia 2014    Hyperparathyroidism ([address])     Obesity (BMI 30-39.9) 2017    Osteoporosis     Sciatica     Stroke ([address])     Vertebral fracture, osteoporotic (HCC)      Family History   Problem Relation Age of Onset    Heart Disease Father     No Known Problems Mother       Social History     Socioeconomic History    Marital status: Single     Spouse name: Not on file    Number of children: Not on file    Years of education: Not on file    Highest education level: Not on file   Occupational History    Not on file   Tobacco Use    Smoking status: Former     Packs/day: 1.00     Years: 40.00     Additional pack years: 0.00     Total pack years: 40.00     Types: Cigarettes     Quit date: 2020     Years since quitting: 3.7    Smokeless tobacco: Never   Vaping Use    Vaping Use: Never used   Substance and Sexual Activity    Alcohol use: Yes     Comment: rarely    Drug use: No    Sexual activity: Yes     Partners:

## 2023-11-14 RX ORDER — AMLODIPINE BESYLATE 5 MG/1
TABLET ORAL
Qty: 90 TABLET | Refills: 1 | Status: SHIPPED | OUTPATIENT
Start: 2023-11-14

## 2023-11-14 RX ORDER — CARVEDILOL 6.25 MG/1
TABLET ORAL
Qty: 180 TABLET | Refills: 1 | Status: SHIPPED | OUTPATIENT
Start: 2023-11-14

## 2023-11-14 RX ORDER — LISINOPRIL 5 MG/1
TABLET ORAL
Qty: 90 TABLET | Refills: 1 | Status: SHIPPED | OUTPATIENT
Start: 2023-11-14

## 2023-11-21 DIAGNOSIS — K62.5 HEMORRHAGE OF RECTUM AND ANUS: ICD-10-CM

## 2023-11-21 DIAGNOSIS — Z86.010 H/O ADENOMATOUS POLYP OF COLON: ICD-10-CM

## 2023-11-21 DIAGNOSIS — D13.2 ADENOMATOUS DUODENAL POLYP: ICD-10-CM

## 2023-11-21 RX ORDER — PANTOPRAZOLE SODIUM 40 MG/1
TABLET, DELAYED RELEASE ORAL
Qty: 100 TABLET | Refills: 2 | OUTPATIENT
Start: 2023-11-21

## 2023-12-26 ENCOUNTER — HOSPITAL ENCOUNTER (OUTPATIENT)
Dept: PREADMISSION TESTING | Age: 68
Discharge: HOME | End: 2023-12-26

## 2024-01-04 ENCOUNTER — APPOINTMENT (OUTPATIENT)
Dept: CT IMAGING | Age: 69
End: 2024-01-04
Payer: MEDICARE

## 2024-01-04 ENCOUNTER — HOSPITAL ENCOUNTER (EMERGENCY)
Age: 69
Discharge: HOME | End: 2024-01-04
Attending: EMERGENCY MEDICINE
Payer: MEDICARE

## 2024-01-04 VITALS
DIASTOLIC BLOOD PRESSURE: 76 MMHG | SYSTOLIC BLOOD PRESSURE: 126 MMHG | RESPIRATION RATE: 16 BRPM | TEMPERATURE: 98.5 F | HEART RATE: 70 BPM | HEIGHT: 67 IN | WEIGHT: 244 LBS | OXYGEN SATURATION: 92 % | BODY MASS INDEX: 38.3 KG/M2

## 2024-01-04 DIAGNOSIS — R20.2 PARESTHESIA: Primary | ICD-10-CM

## 2024-01-04 DIAGNOSIS — R52 BODY ACHES: ICD-10-CM

## 2024-01-04 LAB
ALBUMIN SERPL-MCNC: 4.2 G/DL (ref 3.5–5.2)
ALP SERPL-CCNC: 63 U/L (ref 40–129)
ALT SERPL-CCNC: 30 U/L (ref 5–41)
ANION GAP SERPL CALCULATED.3IONS-SCNC: 11 MMOL/L (ref 9–17)
AST SERPL-CCNC: 18 U/L
BASOPHILS # BLD: 0.1 K/UL (ref 0–0.2)
BASOPHILS NFR BLD: 1 % (ref 0–2)
BILIRUB SERPL-MCNC: 0.3 MG/DL (ref 0.3–1.2)
BUN SERPL-MCNC: 20 MG/DL (ref 8–23)
CALCIUM SERPL-MCNC: 9.5 MG/DL (ref 8.6–10.4)
CHLORIDE SERPL-SCNC: 101 MMOL/L (ref 98–107)
CO2 SERPL-SCNC: 24 MMOL/L (ref 20–31)
CREAT SERPL-MCNC: 1.2 MG/DL (ref 0.7–1.2)
EOSINOPHIL # BLD: 0.3 K/UL (ref 0–0.4)
EOSINOPHILS RELATIVE PERCENT: 3 % (ref 0–4)
ERYTHROCYTE [DISTWIDTH] IN BLOOD BY AUTOMATED COUNT: 14 % (ref 11.5–14.9)
GFR SERPL CREATININE-BSD FRML MDRD: >60 ML/MIN/1.73M2
GLUCOSE SERPL-MCNC: 137 MG/DL (ref 70–99)
HCT VFR BLD AUTO: 43.8 % (ref 41–53)
HGB BLD-MCNC: 14.9 G/DL (ref 13.5–17.5)
LYMPHOCYTES NFR BLD: 2.1 K/UL (ref 1–4.8)
LYMPHOCYTES RELATIVE PERCENT: 22 % (ref 24–44)
MAGNESIUM SERPL-MCNC: 2.2 MG/DL (ref 1.6–2.6)
MCH RBC QN AUTO: 32.6 PG (ref 26–34)
MCHC RBC AUTO-ENTMCNC: 33.9 G/DL (ref 31–37)
MCV RBC AUTO: 96.2 FL (ref 80–100)
MONOCYTES NFR BLD: 1 K/UL (ref 0.1–1.3)
MONOCYTES NFR BLD: 10 % (ref 1–7)
NEUTROPHILS NFR BLD: 64 % (ref 36–66)
NEUTS SEG NFR BLD: 6.1 K/UL (ref 1.3–9.1)
PLATELET # BLD AUTO: 180 K/UL (ref 150–450)
PMV BLD AUTO: 9.4 FL (ref 6–12)
POTASSIUM SERPL-SCNC: 4.1 MMOL/L (ref 3.7–5.3)
PROT SERPL-MCNC: 7.5 G/DL (ref 6.4–8.3)
RBC # BLD AUTO: 4.56 M/UL (ref 4.5–5.9)
SODIUM SERPL-SCNC: 136 MMOL/L (ref 135–144)
WBC OTHER # BLD: 9.6 K/UL (ref 3.5–11)

## 2024-01-04 PROCEDURE — 85025 COMPLETE CBC W/AUTO DIFF WBC: CPT

## 2024-01-04 PROCEDURE — 36415 COLL VENOUS BLD VENIPUNCTURE: CPT

## 2024-01-04 PROCEDURE — 80053 COMPREHEN METABOLIC PANEL: CPT

## 2024-01-04 PROCEDURE — 70450 CT HEAD/BRAIN W/O DYE: CPT

## 2024-01-04 PROCEDURE — 83735 ASSAY OF MAGNESIUM: CPT

## 2024-01-04 PROCEDURE — 99284 EMERGENCY DEPT VISIT MOD MDM: CPT

## 2024-01-04 ASSESSMENT — ENCOUNTER SYMPTOMS
COUGH: 0
NAUSEA: 0
VOMITING: 0
ABDOMINAL PAIN: 0
SHORTNESS OF BREATH: 0

## 2024-01-04 ASSESSMENT — LIFESTYLE VARIABLES
HOW MANY STANDARD DRINKS CONTAINING ALCOHOL DO YOU HAVE ON A TYPICAL DAY: PATIENT DOES NOT DRINK
HOW OFTEN DO YOU HAVE A DRINK CONTAINING ALCOHOL: NEVER

## 2024-01-04 NOTE — ED TRIAGE NOTES
Mode of arrival (squad #, walk in, police, etc) : walk in        Chief complaint(s): Generalized body aches, facial numbness        Arrival Note (brief scenario, treatment PTA, etc).: Pt reports left sided facial numbness for the last month. Pt reports generalized body aches for the last week.         C= \"Have you ever felt that you should Cut down on your drinking?\"  No  A= \"Have people Annoyed you by criticizing your drinking?\"  No  G= \"Have you ever felt bad or Guilty about your drinking?\"  No  E= \"Have you ever had a drink as an Eye-opener first thing in the morning to steady your nerves or to help a hangover?\"  No      Deferred []      Reason for deferring: N/A    *If yes to two or more: probable alcohol abuse.*

## 2024-01-04 NOTE — ED PROVIDER NOTES
present.      Mental Status: He is alert and oriented to person, place, and time.      GCS: GCS eye subscore is 4. GCS verbal subscore is 5. GCS motor subscore is 6.      Cranial Nerves: Cranial nerves 2-12 are intact. No cranial nerve deficit.      Sensory: Sensation is intact.      Motor: Motor function is intact.      Coordination: Coordination is intact.      Gait: Gait is intact.   Psychiatric:         Behavior: Behavior normal.         MEDICAL DECISION MAKING / ED COURSE:         1)  Number and Complexity of Problems Addressed at this Encounter  Problem List This Visit: Paresthesias, body aches, abnormal feeling in his head    Differential Diagnosis: Intracranial mass, intracranial hemorrhage, electrolyte abnormalities    2)  Data Reviewed and Analyzed  (Lab and radiology tests/orders below in next section)    Patient is electrolytes are within normal limits.  He has no leukocytosis.  Hemoglobin and hematocrit stable.  His lab results are unremarkable.  Vital signs are stable.    Imaging that is independently reviewed and interpreted by me as: CT head shows no acute intracranial pathology including hemorrhage or mass.    3)  Treatment and Disposition         I have discussed his results with him at bedside on reevaluation.  Patient agrees with discharge and follow-up with his PCP and a neurologist.  He is able to ambulate with a steady gait.  He has no neurological deficits.  He is stable for discharge at this time.      CRITICAL CARE:       PROCEDURES:    Procedures      DATA FOR LAB AND RADIOLOGY TESTS ORDERED BELOW ARE REVIEWED BY THE ED CLINICIAN:    RADIOLOGY: All x-rays, CT, MRI, and formal ultrasound images (except ED bedside ultrasound) are read by the radiologist, see reports below, unless otherwise noted in MDM or here.  Reports below are reviewed by myself.  CT HEAD WO CONTRAST   Final Result   No acute intracranial abnormality.             LABS: Lab orders shown below, the results are reviewed by

## 2024-01-05 ENCOUNTER — CARE COORDINATION (OUTPATIENT)
Dept: CARE COORDINATION | Age: 69
End: 2024-01-05

## 2024-01-05 NOTE — CARE COORDINATION
Attempted to reach patient for ED follow up. Left a message on his voicemail with call back number.

## 2024-03-16 ENCOUNTER — HOSPITAL ENCOUNTER (EMERGENCY)
Age: 69
Discharge: HOME | End: 2024-03-16
Attending: EMERGENCY MEDICINE
Payer: MEDICARE

## 2024-03-16 ENCOUNTER — APPOINTMENT (OUTPATIENT)
Dept: CT IMAGING | Age: 69
End: 2024-03-16
Payer: MEDICARE

## 2024-03-16 ENCOUNTER — APPOINTMENT (OUTPATIENT)
Dept: GENERAL RADIOLOGY | Age: 69
End: 2024-03-16
Payer: MEDICARE

## 2024-03-16 VITALS
DIASTOLIC BLOOD PRESSURE: 77 MMHG | TEMPERATURE: 97.7 F | RESPIRATION RATE: 24 BRPM | SYSTOLIC BLOOD PRESSURE: 101 MMHG | HEART RATE: 78 BPM | OXYGEN SATURATION: 95 %

## 2024-03-16 DIAGNOSIS — R07.89 ATYPICAL CHEST PAIN: Primary | ICD-10-CM

## 2024-03-16 LAB
ALBUMIN SERPL-MCNC: 4.4 G/DL (ref 3.5–5.2)
ALBUMIN/GLOB SERPL: 1 {RATIO} (ref 1–2.5)
ALP SERPL-CCNC: 62 U/L (ref 40–129)
ALT SERPL-CCNC: 42 U/L (ref 10–50)
ANION GAP SERPL CALCULATED.3IONS-SCNC: 12 MMOL/L (ref 9–16)
AST SERPL-CCNC: 30 U/L (ref 10–50)
BACTERIA URNS QL MICRO: ABNORMAL
BILIRUB SERPL-MCNC: 0.3 MG/DL (ref 0–1.2)
BILIRUB UR QL STRIP: NEGATIVE
BUN BLD-MCNC: 24 MG/DL (ref 8–26)
BUN SERPL-MCNC: 22 MG/DL (ref 8–23)
CA-I BLD-SCNC: 1.25 MMOL/L (ref 1.15–1.33)
CALCIUM SERPL-MCNC: 9.4 MG/DL (ref 8.6–10.4)
CASTS #/AREA URNS LPF: ABNORMAL /LPF (ref 0–8)
CHLORIDE BLD-SCNC: 107 MMOL/L (ref 98–107)
CHLORIDE SERPL-SCNC: 105 MMOL/L (ref 98–107)
CLARITY UR: CLEAR
CO2 BLD CALC-SCNC: 24 MMOL/L (ref 22–30)
CO2 SERPL-SCNC: 22 MMOL/L (ref 20–31)
COLOR UR: YELLOW
CREAT SERPL-MCNC: 1.1 MG/DL (ref 0.7–1.2)
EGFR, POC: >60 ML/MIN/1.73M2
EPI CELLS #/AREA URNS HPF: ABNORMAL /HPF (ref 0–5)
ERYTHROCYTE [DISTWIDTH] IN BLOOD BY AUTOMATED COUNT: 13.2 % (ref 11.8–14.4)
GFR SERPL CREATININE-BSD FRML MDRD: >60 ML/MIN/1.73M2
GLUCOSE BLD-MCNC: 137 MG/DL (ref 74–100)
GLUCOSE SERPL-MCNC: 130 MG/DL (ref 74–99)
GLUCOSE UR STRIP-MCNC: NEGATIVE MG/DL
HCO3 VENOUS: 24.1 MMOL/L (ref 22–29)
HCT VFR BLD AUTO: 48.7 % (ref 40.7–50.3)
HCT VFR BLD AUTO: 49 % (ref 41–53)
HGB BLD-MCNC: 16.1 G/DL (ref 13–17)
HGB UR QL STRIP.AUTO: NEGATIVE
KETONES UR STRIP-MCNC: NEGATIVE MG/DL
LACTIC ACID, WHOLE BLOOD: 1.2 MMOL/L (ref 0.7–2.1)
LEUKOCYTE ESTERASE UR QL STRIP: NEGATIVE
LIPASE SERPL-CCNC: 32 U/L (ref 13–60)
MCH RBC QN AUTO: 32.6 PG (ref 25.2–33.5)
MCHC RBC AUTO-ENTMCNC: 33.1 G/DL (ref 28.4–34.8)
MCV RBC AUTO: 98.6 FL (ref 82.6–102.9)
NEGATIVE BASE EXCESS, VEN: 1.1 MMOL/L (ref 0–2)
NITRITE UR QL STRIP: NEGATIVE
NRBC BLD-RTO: 0 PER 100 WBC
O2 SAT, VEN: 82.3 % (ref 60–85)
PCO2, VEN: 41.3 MM HG (ref 41–51)
PH UR STRIP: 5.5 [PH] (ref 5–8)
PH VENOUS: 7.38 (ref 7.32–7.43)
PLATELET # BLD AUTO: 192 K/UL (ref 138–453)
PMV BLD AUTO: 11.5 FL (ref 8.1–13.5)
PO2, VEN: 47.8 MM HG (ref 30–50)
POC ANION GAP: 11 MMOL/L (ref 7–16)
POC CREATININE WHOLE BLOOD: 0.9
POC CREATININE: 0.9 MG/DL (ref 0.51–1.19)
POC HEMOGLOBIN (CALC): 16.8 G/DL (ref 13.5–17.5)
POC LACTIC ACID: 1.1 MMOL/L (ref 0.56–1.39)
POTASSIUM BLD-SCNC: 4.4 MMOL/L (ref 3.5–4.5)
POTASSIUM SERPL-SCNC: 4.4 MMOL/L (ref 3.7–5.3)
PROT SERPL-MCNC: 7.7 G/DL (ref 6.6–8.7)
PROT UR STRIP-MCNC: NEGATIVE MG/DL
RBC # BLD AUTO: 4.94 M/UL (ref 4.21–5.77)
RBC #/AREA URNS HPF: ABNORMAL /HPF (ref 0–4)
SODIUM BLD-SCNC: 141 MMOL/L (ref 138–146)
SODIUM SERPL-SCNC: 139 MMOL/L (ref 136–145)
SP GR UR STRIP: 1.04 (ref 1–1.03)
TROPONIN I SERPL HS-MCNC: 13 NG/L (ref 0–22)
UROBILINOGEN UR STRIP-ACNC: NORMAL EU/DL (ref 0–1)
WBC #/AREA URNS HPF: ABNORMAL /HPF (ref 0–5)
WBC OTHER # BLD: 11.4 K/UL (ref 3.5–11.3)

## 2024-03-16 PROCEDURE — 80051 ELECTROLYTE PANEL: CPT

## 2024-03-16 PROCEDURE — 84484 ASSAY OF TROPONIN QUANT: CPT

## 2024-03-16 PROCEDURE — 80053 COMPREHEN METABOLIC PANEL: CPT

## 2024-03-16 PROCEDURE — 84520 ASSAY OF UREA NITROGEN: CPT

## 2024-03-16 PROCEDURE — 74174 CTA ABD&PLVS W/CONTRAST: CPT

## 2024-03-16 PROCEDURE — 82330 ASSAY OF CALCIUM: CPT

## 2024-03-16 PROCEDURE — 99285 EMERGENCY DEPT VISIT HI MDM: CPT | Performed by: EMERGENCY MEDICINE

## 2024-03-16 PROCEDURE — 83690 ASSAY OF LIPASE: CPT

## 2024-03-16 PROCEDURE — 81001 URINALYSIS AUTO W/SCOPE: CPT

## 2024-03-16 PROCEDURE — 82565 ASSAY OF CREATININE: CPT

## 2024-03-16 PROCEDURE — 71275 CT ANGIOGRAPHY CHEST: CPT

## 2024-03-16 PROCEDURE — 93005 ELECTROCARDIOGRAM TRACING: CPT

## 2024-03-16 PROCEDURE — 6360000004 HC RX CONTRAST MEDICATION

## 2024-03-16 PROCEDURE — 83605 ASSAY OF LACTIC ACID: CPT

## 2024-03-16 PROCEDURE — 85014 HEMATOCRIT: CPT

## 2024-03-16 PROCEDURE — 82803 BLOOD GASES ANY COMBINATION: CPT

## 2024-03-16 PROCEDURE — 71045 X-RAY EXAM CHEST 1 VIEW: CPT

## 2024-03-16 PROCEDURE — 85027 COMPLETE CBC AUTOMATED: CPT

## 2024-03-16 PROCEDURE — 82947 ASSAY GLUCOSE BLOOD QUANT: CPT

## 2024-03-16 RX ADMIN — IOPAMIDOL 100 ML: 755 INJECTION, SOLUTION INTRAVENOUS at 21:06

## 2024-03-17 LAB
EKG ATRIAL RATE: 76 BPM
EKG P AXIS: 39 DEGREES
EKG P-R INTERVAL: 172 MS
EKG Q-T INTERVAL: 382 MS
EKG QRS DURATION: 90 MS
EKG QTC CALCULATION (BAZETT): 429 MS
EKG R AXIS: -10 DEGREES
EKG T AXIS: 29 DEGREES
EKG VENTRICULAR RATE: 76 BPM

## 2024-03-17 PROCEDURE — 93010 ELECTROCARDIOGRAM REPORT: CPT | Performed by: INTERNAL MEDICINE

## 2024-03-17 NOTE — ED PROVIDER NOTES
FACULTY SIGN-OUT  ADDENDUM       Patient: Samuel Tamayo   MRN: [id number]  PCP:  Blaze Motta MD  Note Started: 3/16/24, 9:51 PM EDT  Attestation  I was available and discussed any additional care issues that arose and coordinated the management plans with the resident(s) caring for the patient during my duty period. Any areas of disagreement with resident's documentation of care or procedures are noted on the chart. I was personally present for the key portions of any/all procedures during my duty period. I have documented in the chart those procedures where I was not present during the key portions.   The patient's initial evaluation and plan have been discussed with the prior provider who initially evaluated the patient.      Pertinent Comments:  The patient is a 68 y.o. male taken in signout with chest pain epigastric pain and back pain with suspicion for obstruction and previous abdominal surgeries  We are awaiting workup including CTA of the chest/abdomen/pelvis and likely admission after    ED COURSE      The patient was given the following medications:  Orders Placed This Encounter   Medications    iopamidol (ISOVUE-370) 76 % injection 100 mL       RECENT VITALS:   BP: 122/82  Pulse: 78  Respirations: 15  Temp: 97.7 °F (36.5 °C) SpO2: 92 %    (Please note that portions of this note were completed with a voice recognition program.  Efforts were made to edit the dictations but occasionally words are mis-transcribed.)    Schuyler Hancock MD [facility]  Attending Emergency Medicine Physician       Schuyler Hancock MD  03/16/24 [id number]

## 2024-03-17 NOTE — ED PROVIDER NOTES
[facility] ED  Emergency Department Encounter  Emergency Medicine Resident     Pt Name:Samuel Tamayo  MRN: [id number]  Birthdate 1955  Date of evaluation: 3/16/24  PCP:  Blaze Motta MD  Note Started: 8:44 PM EDT      CHIEF COMPLAINT       Chief Complaint   Patient presents with    Chest Pain    Nausea    Diarrhea       HISTORY OF PRESENT ILLNESS  (Location/Symptom, Timing/Onset, Context/Setting, Quality, Duration, Modifying Factors, Severity.)      Samuel Tamayo is a 68 y.o. male who presents with acute onset chest pain, back pain, diarrhea, nausea started at 730 this evening.  Patient states he was sitting at home and suddenly had chest pain associated with some back pain and 1 episode of nonbloody diarrhea.  Is never had anything like this before.  Does have a history of stenting in 2022.  Denies any shortness of breath currently.  Has not been vomiting but has felt a little nauseated.  Did not appreciate any blood in his bowel movement.  Otherwise denies any recent illnesses.  No significant headache or vision changes.  Denies any numbness, tingling, weakness.  Has had multiple abdominal surgeries to include partial small bowel resection, cholecystectomy, hernia repair.    PAST MEDICAL / SURGICAL / SOCIAL / FAMILY HISTORY      has a past medical history of Acute cholecystitis, CAD (coronary artery disease), Essential hypertension, H/O cardiac catheterization, Hyperlipidemia, Hyperparathyroidism (HCC), Obesity (BMI 30-39.9), Osteoporosis, Sciatica, Stroke (HCC), and Vertebral fracture, osteoporotic (HCC).       has a past surgical history that includes Vasectomy; Colonoscopy (03/19/2014); hernia repair; Colonoscopy (N/A, 02/11/2020); hemicolectomy (Right, 02/12/2020); omentum resection (02/12/2020); IR Biliary Drain External (02/01/2021); Cholecystectomy (N/A, 02/19/2021); ERCP (N/A, 02/19/2021); Cardiac surgery (08/2022); Upper gastrointestinal endoscopy (N/A,

## 2024-03-17 NOTE — ED NOTES
Pt arrives to ED ambulatory through triage for chest pain/abd pain/diarrhea.   Pt states symptoms all started x1 hr ago, around 1930.   CP is midsternal, nonradiating. Pt reports hx stent placement, denies MI.   Pt states abd pain is all in upper quadrants and accompanied with nausea. Pt denies vomiting or blood in stool.  Pt has hx multiple stomach surgeries including cholecystectomy, hernia repair, and partial colon removal.  Pt also has hx CVA, denies blood thinner, denies deficits.   Pt complaining of upper back that is baseline and lower back pain that is new. Pt denies urinary symptoms.     Pt A&Ox4, rr even and nonlabored, nad.   Pt changed into gown, placed on full cardiac monitor, labs/EKG obtained.   Friend at bedside.

## 2024-03-17 NOTE — DISCHARGE INSTRUCTIONS
You are seen in the emergency department for concerning symptoms.  Your workup was overall not concerning for cardiac or abdominal issues.  You should call  office Monday morning to schedule appointment to be seen a soon as possible for possible stress test, echocardiogram.  Your CT scan showed the below findings.  You should continue your home medications as previously prescribed.  You should plan to follow-up with your primary care provider in the next week or so for reevaluation and to make sure your symptoms have not returned.  If you have recurrence of your symptoms or develop any other concerning symptoms, please return to emergency department for further evaluation.    IMPRESSION:  1. No evidence of abdominal aortic aneurysm or dissection.  2. Diffuse hepatic steatosis.  3. Nonspecific fat stranding seen in the right perivesical region.  4. Enlarged prostate gland indenting on the bladder base. Correlate with PSA  levels.  5. Ascending colectomy changes are seen.  6. Cardiomegaly.  7. Diffuse are less haziness, multifocal atelectasis is seen in the lung  bases.  8. Simple cortical cyst in the left kidney measuring up to 2.8 cm.

## 2024-03-17 NOTE — ED PROVIDER NOTES
[facility]     Emergency Department     Faculty Note/ Attestation      Pt Name: Samuel Tamayo                                       MRN: [id number]  Birthdate 1955  Date of evaluation: 3/16/2024    Patients PCP:    Blaze Motta MD    Note Started: 8:49 PM EDT      Attestation  I performed a history and physical examination of the patient and discussed management with the resident. I reviewed the resident’s note and agree with the documented findings and plan of care. Any areas of disagreement are noted on the chart. I was personally present for the key portions of any procedures. I have documented in the chart those procedures where I was not present during the key portions. I have reviewed the emergency nurses triage note. I agree with the chief complaint, past medical history, past surgical history, allergies, medications, social and family history as documented unless otherwise noted below.    For Physician Assistant/ Nurse Practitioner cases/documentation I have personally evaluated this patient and have completed at least one if not all key elements of the E/M (history, physical exam, and MDM). Additional findings are as noted.      Initial Screens:        [place] Coma Scale  Eye Opening: Spontaneous  Best Verbal Response: Oriented  Best Motor Response: Obeys commands  [place] Coma Scale Score: 15    Vitals:    Vitals:    03/16/24 2024   BP: (!) 134/94   Pulse: 87   Resp: 19   Temp: 97.7 °F (36.5 °C)   TempSrc: Oral   SpO2: 97%       CHIEF COMPLAINT       Chief Complaint   Patient presents with    Chest Pain    Nausea    Diarrhea             DIAGNOSTIC RESULTS             RADIOLOGY:   CTA CHEST W WO CONTRAST    (Results Pending)   CTA ABDOMEN PELVIS W CONTRAST    (Results Pending)   XR CHEST PORTABLE    (Results Pending)         LABS:  Labs Reviewed   POCT GLUCOSE - Abnormal; Notable for the following components:       Result Value    POC Glucose 137 (*)     All other

## 2024-03-18 ENCOUNTER — OFFICE VISIT (OUTPATIENT)
Dept: UROLOGY | Age: 69
End: 2024-03-18
Payer: MEDICARE

## 2024-03-18 VITALS
HEIGHT: 67 IN | HEART RATE: 73 BPM | SYSTOLIC BLOOD PRESSURE: 123 MMHG | DIASTOLIC BLOOD PRESSURE: 85 MMHG | BODY MASS INDEX: 38.3 KG/M2 | TEMPERATURE: 96.9 F | WEIGHT: 244 LBS

## 2024-03-18 DIAGNOSIS — R68.82 DECREASED LIBIDO: ICD-10-CM

## 2024-03-18 DIAGNOSIS — N52.01 ERECTILE DYSFUNCTION DUE TO ARTERIAL INSUFFICIENCY: Primary | ICD-10-CM

## 2024-03-18 DIAGNOSIS — Z12.5 PROSTATE CANCER SCREENING: ICD-10-CM

## 2024-03-18 DIAGNOSIS — R79.89 LOW TESTOSTERONE IN MALE: ICD-10-CM

## 2024-03-18 PROCEDURE — 3074F SYST BP LT 130 MM HG: CPT | Performed by: UROLOGY

## 2024-03-18 PROCEDURE — 1123F ACP DISCUSS/DSCN MKR DOCD: CPT | Performed by: UROLOGY

## 2024-03-18 PROCEDURE — 99214 OFFICE O/P EST MOD 30 MIN: CPT | Performed by: UROLOGY

## 2024-03-18 PROCEDURE — 3079F DIAST BP 80-89 MM HG: CPT | Performed by: UROLOGY

## 2024-03-18 ASSESSMENT — ENCOUNTER SYMPTOMS
VOMITING: 0
GASTROINTESTINAL NEGATIVE: 1
WHEEZING: 0
DIARRHEA: 0
COUGH: 0
EYE REDNESS: 0
EYE PAIN: 0
BACK PAIN: 0
CONSTIPATION: 0
NAUSEA: 0
SHORTNESS OF BREATH: 0
ABDOMINAL PAIN: 0

## 2024-03-18 NOTE — PROGRESS NOTES
[facility] PHYSICIANS [facility] UROLOGY CENTER  [address]  [facility] [address]  Dept: [phone]    [facility] Urology Office Note - Established    Patient:  Samuel Tamayo  YOB: 1955  Date: 3/18/2024    The patient is a 68 y.o. male who presents todayfor evaluation of the following problems:   Chief Complaint   Patient presents with    Erectile Dysfunction     Past due 6 month f/u       HPI  This is a very pleasant 68-year-old gentleman who has a history of low testosterone.  We had tried Xyosted but he got headaches from this.  We had suggested that he see an endocrinologist but he has not done that yet.  He does continue to struggle with decreased libido and fatigue.  He does also have erectile dysfunction which responds reasonably well to tadalafil.  He is interested in considering testosterone replacement again.  His symptoms of low testosterone have worsened a bit.    Summary of old records: N/A    Additional History: N/A    Procedures Today: N/A    Urinalysis today:  No results found for this visit on 03/18/24.  Last several PSA's:  Lab Results   Component Value Date    PSA 0.73 01/20/2023    PSA 0.72 10/17/2022    PSA 0.65 01/26/2022     Last total testosterone:  Lab Results   Component Value Date    TESTOSTERONE 118 (L) 01/20/2023       AUA Symptom Score (3/18/2024):                               Last BUN and creatinine:  Lab Results   Component Value Date    BUN 22 03/16/2024     Lab Results   Component Value Date    CREATININE 1.1 03/16/2024       Additional Lab/Culture results: none    Imaging Reviewed during this Office Visit: none  (results were independently reviewed by physician and radiology report verified)    PAST MEDICAL, FAMILY AND SOCIAL HISTORY UPDATE:  Past Medical History:   Diagnosis Date    Acute cholecystitis     CAD (coronary artery disease)     Essential hypertension 05/04/2017    H/O cardiac catheterization

## 2024-03-25 RX ORDER — AMLODIPINE BESYLATE 5 MG/1
TABLET ORAL
Qty: 100 TABLET | Refills: 1 | Status: SHIPPED | OUTPATIENT
Start: 2024-03-25

## 2024-03-25 RX ORDER — CARVEDILOL 6.25 MG/1
TABLET ORAL
Qty: 200 TABLET | Refills: 1 | Status: SHIPPED | OUTPATIENT
Start: 2024-03-25

## 2024-03-25 RX ORDER — LISINOPRIL 5 MG/1
TABLET ORAL
Qty: 100 TABLET | Refills: 1 | Status: SHIPPED | OUTPATIENT
Start: 2024-03-25

## 2024-04-04 ENCOUNTER — OFFICE VISIT (OUTPATIENT)
Dept: NEUROLOGY | Age: 69
End: 2024-04-04

## 2024-04-04 VITALS
HEART RATE: 90 BPM | HEIGHT: 67 IN | DIASTOLIC BLOOD PRESSURE: 72 MMHG | WEIGHT: 244.7 LBS | SYSTOLIC BLOOD PRESSURE: 119 MMHG | BODY MASS INDEX: 38.4 KG/M2

## 2024-04-04 DIAGNOSIS — R51.9 CHRONIC NONINTRACTABLE HEADACHE, UNSPECIFIED HEADACHE TYPE: Primary | ICD-10-CM

## 2024-04-04 DIAGNOSIS — G89.29 CHRONIC NONINTRACTABLE HEADACHE, UNSPECIFIED HEADACHE TYPE: Primary | ICD-10-CM

## 2024-04-04 DIAGNOSIS — R51.9 NEW ONSET OF HEADACHES AFTER AGE 50: ICD-10-CM

## 2024-04-04 DIAGNOSIS — G51.4 FACIAL TWITCHING: ICD-10-CM

## 2024-04-04 ASSESSMENT — ENCOUNTER SYMPTOMS
COUGH: 0
ABDOMINAL DISTENTION: 0
STRIDOR: 0
SHORTNESS OF BREATH: 0

## 2024-04-04 NOTE — PROGRESS NOTES
head wo contrast 1/4/24: No acute intracranial abnormality.    ASSESSMENT       68 year old male with past medical history of HTN, DM, stroke 12 years ago with no residual deficits, CAD s/p stent placement, HLD, bowel obstruction s/p right colectomy, obesity who presents to the clinic with chief complaint \"head zaps\". He recently presented to [facility] in January with left sided numbness, right sided lip twitching which had been ongoing the prior 2 months sporadically. The \"head zaps\" are described as severe pain in the center of his. These episodes wake him up from sleep.     PLAN:     Primary stabbing headache  -MRI Brain w and wo contrast  -EEG extended  -ESR, CRP to r/p GCA  -Follow up in 3 months      Instructed patient to call the clinic if symptoms worsen or develop any new symptoms.     Mr. Tamayo received counseling on the following healthy behaviors: medical compliance, smoking cessation, blood pressure control, regular follow up with primary doctor.      I have spent 30 minutes face to face with the patient more than 50% of this time was spent counseling and coordinating care.      Electronically signed by Oren Israel MD on 4/4/2024 at 12:58 PM

## 2024-04-22 ENCOUNTER — HOSPITAL ENCOUNTER (OUTPATIENT)
Age: 69
Discharge: HOME | End: 2024-04-22
Payer: MEDICARE

## 2024-04-22 DIAGNOSIS — R79.89 LOW TESTOSTERONE IN MALE: ICD-10-CM

## 2024-04-22 DIAGNOSIS — Z12.5 PROSTATE CANCER SCREENING: ICD-10-CM

## 2024-04-22 LAB
PSA SERPL-MCNC: 0.7 NG/ML (ref 0–4)
TESTOST SERPL-MCNC: 123 NG/DL (ref 193–740)

## 2024-04-22 PROCEDURE — 84403 ASSAY OF TOTAL TESTOSTERONE: CPT

## 2024-04-22 PROCEDURE — G0103 PSA SCREENING: HCPCS

## 2024-04-22 PROCEDURE — 36415 COLL VENOUS BLD VENIPUNCTURE: CPT

## 2024-04-29 ENCOUNTER — OFFICE VISIT (OUTPATIENT)
Dept: UROLOGY | Age: 69
End: 2024-04-29
Payer: MEDICARE

## 2024-04-29 VITALS
WEIGHT: 244 LBS | BODY MASS INDEX: 38.3 KG/M2 | HEART RATE: 78 BPM | SYSTOLIC BLOOD PRESSURE: 128 MMHG | OXYGEN SATURATION: 96 % | HEIGHT: 67 IN | TEMPERATURE: 97.9 F | DIASTOLIC BLOOD PRESSURE: 82 MMHG

## 2024-04-29 DIAGNOSIS — R68.82 DECREASED LIBIDO: ICD-10-CM

## 2024-04-29 DIAGNOSIS — R79.89 LOW TESTOSTERONE IN MALE: Primary | ICD-10-CM

## 2024-04-29 DIAGNOSIS — N52.01 ERECTILE DYSFUNCTION DUE TO ARTERIAL INSUFFICIENCY: ICD-10-CM

## 2024-04-29 PROCEDURE — 99214 OFFICE O/P EST MOD 30 MIN: CPT | Performed by: UROLOGY

## 2024-04-29 PROCEDURE — 1123F ACP DISCUSS/DSCN MKR DOCD: CPT | Performed by: UROLOGY

## 2024-04-29 PROCEDURE — 3074F SYST BP LT 130 MM HG: CPT | Performed by: UROLOGY

## 2024-04-29 PROCEDURE — 3079F DIAST BP 80-89 MM HG: CPT | Performed by: UROLOGY

## 2024-04-29 RX ORDER — TESTOSTERONE 16.2 MG/G
1 GEL TRANSDERMAL DAILY
Qty: 1 EACH | Refills: 5 | Status: SHIPPED | OUTPATIENT
Start: 2024-04-29 | End: 2024-05-29

## 2024-04-29 RX ORDER — TADALAFIL 20 MG/1
20 TABLET ORAL DAILY PRN
Qty: 30 TABLET | Refills: 5 | Status: SHIPPED | OUTPATIENT
Start: 2024-04-29

## 2024-04-29 ASSESSMENT — ENCOUNTER SYMPTOMS
EYES NEGATIVE: 1
NAUSEA: 0
BACK PAIN: 0
VOMITING: 0
SHORTNESS OF BREATH: 0
COUGH: 0
COLOR CHANGE: 0
ABDOMINAL PAIN: 0
EYE PAIN: 0
WHEEZING: 0
ALLERGIC/IMMUNOLOGIC NEGATIVE: 1
RESPIRATORY NEGATIVE: 1
GASTROINTESTINAL NEGATIVE: 1
EYE REDNESS: 0

## 2024-04-29 NOTE — PROGRESS NOTES
[facility] PHYSICIANS [facility] UROLOGY CENTER  [address]  [facility] [address]  Dept: [phone]    [facility] Urology Office Note - Established    Patient:  Samuel Tamayo  YOB: 1955  Date: 4/29/2024    The patient is a 68 y.o. male who presents todayfor evaluation of the following problems:   Chief Complaint   Patient presents with    Erectile dysfunction due to arterial insufficiency     4 week follow up +  T lab       HPI  This is a very pleasant 68-year-old gentleman who has a history of low testosterone and sexual dysfunction.  He is getting reasonable erections with tadalafil.  He had tried Xyosted for low T in the past and got headaches.  However, he is very symptomatic with a decreased libido and fatigue.  He is hoping to try different product for testosterone replacement therapy.    Summary of old records: N/A    Additional History: N/A    Procedures Today: N/A    Urinalysis today:  No results found for this visit on 04/29/24.  Last several PSA's:  Lab Results   Component Value Date    PSA 0.70 04/22/2024    PSA 0.73 01/20/2023    PSA 0.72 10/17/2022     Last total testosterone:  Lab Results   Component Value Date    TESTOSTERONE 123 (L) 04/22/2024       AUA Symptom Score (4/29/2024):                               Last BUN and creatinine:  Lab Results   Component Value Date    BUN 22 03/16/2024     Lab Results   Component Value Date    CREATININE 1.1 03/16/2024       Additional Lab/Culture results: none    Imaging Reviewed during this Office Visit: none  (results were independently reviewed by physician and radiology report verified)    PAST MEDICAL, FAMILY AND SOCIAL HISTORY UPDATE:  Past Medical History:   Diagnosis Date    Acute cholecystitis     CAD (coronary artery disease)     Essential hypertension 05/04/2017    H/O cardiac catheterization 08/27/2022    \"stent x1\"    Headache Long time    Hyperlipidemia 09/04/2014

## 2024-05-01 ENCOUNTER — HOSPITAL ENCOUNTER (OUTPATIENT)
Dept: MRI IMAGING | Age: 69
Discharge: HOME | End: 2024-05-03
Payer: MEDICARE

## 2024-05-01 ENCOUNTER — HOSPITAL ENCOUNTER (OUTPATIENT)
Dept: NEUROLOGY | Age: 69
Discharge: HOME | End: 2024-05-01
Payer: MEDICARE

## 2024-05-01 ENCOUNTER — HOSPITAL ENCOUNTER (OUTPATIENT)
Age: 69
Discharge: HOME | End: 2024-05-01
Payer: MEDICARE

## 2024-05-01 DIAGNOSIS — R51.9 NEW ONSET OF HEADACHES AFTER AGE 50: ICD-10-CM

## 2024-05-01 DIAGNOSIS — G51.4 FACIAL TWITCHING: ICD-10-CM

## 2024-05-01 LAB
CRP SERPL HS-MCNC: 3.8 MG/L (ref 0–5)
EGFR, POC: >90 ML/MIN/1.73M2
ERYTHROCYTE [SEDIMENTATION RATE] IN BLOOD BY PHOTOMETRIC METHOD: 26 MM/HR (ref 0–20)
POC CREATININE: 0.8 MG/DL (ref 0.51–1.19)

## 2024-05-01 PROCEDURE — 95816 EEG AWAKE AND DROWSY: CPT | Performed by: PSYCHIATRY & NEUROLOGY

## 2024-05-01 PROCEDURE — 70553 MRI BRAIN STEM W/O & W/DYE: CPT

## 2024-05-01 PROCEDURE — 6360000004 HC RX CONTRAST MEDICATION

## 2024-05-01 PROCEDURE — 36415 COLL VENOUS BLD VENIPUNCTURE: CPT

## 2024-05-01 PROCEDURE — 95816 EEG AWAKE AND DROWSY: CPT

## 2024-05-01 PROCEDURE — 82565 ASSAY OF CREATININE: CPT

## 2024-05-01 PROCEDURE — 85652 RBC SED RATE AUTOMATED: CPT

## 2024-05-01 PROCEDURE — A9576 INJ PROHANCE MULTIPACK: HCPCS

## 2024-05-01 PROCEDURE — 86140 C-REACTIVE PROTEIN: CPT

## 2024-05-01 PROCEDURE — 2580000003 HC RX 258

## 2024-05-01 RX ORDER — SODIUM CHLORIDE 0.9 % (FLUSH) 0.9 %
10 SYRINGE (ML) INJECTION PRN
Status: DISCONTINUED | OUTPATIENT
Start: 2024-05-01 | End: 2024-05-04 | Stop reason: HOSPADM

## 2024-05-01 RX ADMIN — GADOTERIDOL 20 ML: 279.3 INJECTION, SOLUTION INTRAVENOUS at 13:37

## 2024-05-01 RX ADMIN — SODIUM CHLORIDE, PRESERVATIVE FREE 10 ML: 5 INJECTION INTRAVENOUS at 13:38

## 2024-05-01 NOTE — PROCEDURES
EEG REPORT       Patient: Samuel Tamayo Age: 68 y.o.  MRN: [id number]      Referring Provider: Blaze Motta MD  Attending Physician:  No att. providers found      History: This is a routine scalp EEG recorded with time-locked video monitoring.  Patient is with c/o \"head zaps\" and \"facial twitching\" is being evaluated for cerebral seizures.    This EEG was performed to evaluate for focal and epileptiform abnormalities.       Samuel Tamayo   Current Outpatient Medications   Medication Sig Dispense Refill    ANDROGEL PUMP 20.25 MG/ACT (1.62%) GEL gel Place 1 actuation onto the skin daily for 30 days. Max Daily Amount: 1,250 mg 1 each 5    tadalafil (CIALIS) 20 MG tablet Take 1 tablet by mouth daily as needed for Erectile Dysfunction 30 tablet 5    amLODIPine (NORVASC) 5 MG tablet TAKE 1 TABLET BY MOUTH ONCE  DAILY 100 tablet 1    carvedilol (COREG) 6.25 MG tablet TAKE 1 TABLET BY MOUTH TWICE  DAILY WITH MEALS 200 tablet 1    lisinopril (PRINIVIL;ZESTRIL) 5 MG tablet TAKE 1 TABLET BY MOUTH ONCE  DAILY 100 tablet 1    SITagliptin (JANUVIA) 50 MG tablet Take 1 tablet by mouth daily (Patient not taking: Reported on 4/4/2024) 90 tablet 1    pravastatin (PRAVACHOL) 20 MG tablet Take 1 tablet by mouth daily 30 tablet 5    fluticasone (FLONASE) 50 MCG/ACT nasal spray 2 sprays by Each Nostril route daily (Patient not taking: Reported on 4/4/2024) 16 g 0    vitamin D (ERGOCALCIFEROL) 1.25 MG (58141 UT) CAPS capsule TAKE 1 CAPSULE BY MOUTH WEEKLY 13 capsule 3    pantoprazole (PROTONIX) 40 MG tablet TAKE 1 TABLET BY MOUTH ONCE  DAILY 90 tablet 1    aspirin 325 MG tablet Take 1 tablet by mouth daily      clopidogrel (PLAVIX) 75 MG tablet Take 1 tablet by mouth daily (Patient not taking: Reported on 4/4/2024) 30 tablet 3    isosorbide mononitrate (IMDUR) 30 MG extended release tablet Take 0.5 tablets by mouth daily (Patient taking differently: Take 0.5 tablets by mouth daily PT STATES THIS IS STILL ON HOLD) 30 tablet

## 2024-05-09 ENCOUNTER — OFFICE VISIT (OUTPATIENT)
Dept: FAMILY MEDICINE CLINIC | Age: 69
End: 2024-05-09

## 2024-05-09 VITALS
OXYGEN SATURATION: 93 % | DIASTOLIC BLOOD PRESSURE: 76 MMHG | HEART RATE: 69 BPM | WEIGHT: 241.6 LBS | SYSTOLIC BLOOD PRESSURE: 116 MMHG | RESPIRATION RATE: 18 BRPM | BODY MASS INDEX: 37.84 KG/M2

## 2024-05-09 DIAGNOSIS — K21.9 GASTROESOPHAGEAL REFLUX DISEASE, UNSPECIFIED WHETHER ESOPHAGITIS PRESENT: ICD-10-CM

## 2024-05-09 DIAGNOSIS — Z11.59 NEED FOR HEPATITIS C SCREENING TEST: ICD-10-CM

## 2024-05-09 DIAGNOSIS — I10 ESSENTIAL HYPERTENSION: ICD-10-CM

## 2024-05-09 DIAGNOSIS — E78.2 MIXED HYPERLIPIDEMIA: Chronic | ICD-10-CM

## 2024-05-09 DIAGNOSIS — I72.8 SPLENIC ARTERY ANEURYSM (HCC): ICD-10-CM

## 2024-05-09 DIAGNOSIS — E11.9 NEWLY DIAGNOSED DIABETES (HCC): Primary | ICD-10-CM

## 2024-05-09 DIAGNOSIS — E55.9 VITAMIN D DEFICIENCY: ICD-10-CM

## 2024-05-09 DIAGNOSIS — Z79.899 CURRENT USE OF PROTON PUMP INHIBITOR: ICD-10-CM

## 2024-05-09 LAB — HBA1C MFR BLD: 7.3 %

## 2024-05-09 SDOH — ECONOMIC STABILITY: FOOD INSECURITY: WITHIN THE PAST 12 MONTHS, YOU WORRIED THAT YOUR FOOD WOULD RUN OUT BEFORE YOU GOT MONEY TO BUY MORE.: NEVER TRUE

## 2024-05-09 SDOH — ECONOMIC STABILITY: FOOD INSECURITY: WITHIN THE PAST 12 MONTHS, THE FOOD YOU BOUGHT JUST DIDN'T LAST AND YOU DIDN'T HAVE MONEY TO GET MORE.: NEVER TRUE

## 2024-05-09 SDOH — ECONOMIC STABILITY: INCOME INSECURITY: HOW HARD IS IT FOR YOU TO PAY FOR THE VERY BASICS LIKE FOOD, HOUSING, MEDICAL CARE, AND HEATING?: NOT HARD AT ALL

## 2024-05-09 ASSESSMENT — PATIENT HEALTH QUESTIONNAIRE - PHQ9
SUM OF ALL RESPONSES TO PHQ QUESTIONS 1-9: 0
2. FEELING DOWN, DEPRESSED OR HOPELESS: NOT AT ALL
1. LITTLE INTEREST OR PLEASURE IN DOING THINGS: NOT AT ALL
SUM OF ALL RESPONSES TO PHQ QUESTIONS 1-9: 0
SUM OF ALL RESPONSES TO PHQ QUESTIONS 1-9: 0
SUM OF ALL RESPONSES TO PHQ9 QUESTIONS 1 & 2: 0
SUM OF ALL RESPONSES TO PHQ QUESTIONS 1-9: 0

## 2024-05-09 ASSESSMENT — ENCOUNTER SYMPTOMS
BLOOD IN STOOL: 0
SHORTNESS OF BREATH: 0
CHEST TIGHTNESS: 0
ABDOMINAL PAIN: 0

## 2024-05-09 NOTE — PROGRESS NOTES
Average packs/day: 1 pack/day for 40.0 years (40.0 ttl pk-yrs)     Types: Cigarettes     Start date: 1980     Quit date: 2020     Years since quittin.2    Smokeless tobacco: Never    Tobacco comments:     Smoked a pack and a half a day for forty years   Vaping Use    Vaping Use: Never used   Substance and Sexual Activity    Alcohol use: Not Currently     Comment: Very rarely drink    Drug use: No    Sexual activity: Yes     Partners: Female     Social Determinants of Health     Financial Resource Strain: Low Risk  (2024)    Overall Financial Resource Strain (CARDIA)     Difficulty of Paying Living Expenses: Not hard at all   Food Insecurity: No Food Insecurity (2024)    Hunger Vital Sign     Worried About Running Out of Food in the Last Year: Never true     Ran Out of Food in the Last Year: Never true   Transportation Needs: Unknown (2024)    PRAPARE - Transportation     Lack of Transportation (Non-Medical): No   Physical Activity: Sufficiently Active (2022)    Exercise Vital Sign     Days of Exercise per Week: 7 days     Minutes of Exercise per Session: 60 min   Stress: Stress Concern Present (2022)    [employer] [place] [employer] - Occupational Stress Questionnaire     Feeling of Stress : To some extent   Housing Stability: Unknown (2024)    Housing Stability Vital Sign     Unstable Housing in the Last Year: No        Family History   Problem Relation Age of Onset    Heart Disease Father     No Known Problems Mother         PHYSICAL EXAM:     /76   Pulse 69   Resp 18   Wt 109.6 kg (241 lb 9.6 oz)   SpO2 93%   BMI 37.84 kg/m²    Physical Exam  Vitals and nursing note reviewed.   Constitutional:       General: He is not in acute distress.     Appearance: He is well-developed.   HENT:      Head: Normocephalic and atraumatic.      Right Ear: Tympanic membrane, ear canal and external ear normal.      Left Ear: Tympanic membrane, ear canal and external ear

## 2024-05-10 ENCOUNTER — HOSPITAL ENCOUNTER (OUTPATIENT)
Age: 69
Discharge: HOME | End: 2024-05-10
Payer: MEDICARE

## 2024-05-10 DIAGNOSIS — E11.9 NEWLY DIAGNOSED DIABETES (HCC): ICD-10-CM

## 2024-05-10 DIAGNOSIS — E55.9 VITAMIN D DEFICIENCY: ICD-10-CM

## 2024-05-10 DIAGNOSIS — K21.9 GASTROESOPHAGEAL REFLUX DISEASE, UNSPECIFIED WHETHER ESOPHAGITIS PRESENT: ICD-10-CM

## 2024-05-10 DIAGNOSIS — Z11.59 NEED FOR HEPATITIS C SCREENING TEST: ICD-10-CM

## 2024-05-10 DIAGNOSIS — E78.2 MIXED HYPERLIPIDEMIA: Chronic | ICD-10-CM

## 2024-05-10 DIAGNOSIS — I10 ESSENTIAL HYPERTENSION: ICD-10-CM

## 2024-05-10 DIAGNOSIS — Z79.899 CURRENT USE OF PROTON PUMP INHIBITOR: ICD-10-CM

## 2024-05-10 LAB
25(OH)D3 SERPL-MCNC: 41.7 NG/ML (ref 30–100)
ANION GAP SERPL CALCULATED.3IONS-SCNC: 12 MMOL/L (ref 9–17)
BUN SERPL-MCNC: 20 MG/DL (ref 8–23)
CALCIUM SERPL-MCNC: 9.2 MG/DL (ref 8.6–10.4)
CHLORIDE SERPL-SCNC: 103 MMOL/L (ref 98–107)
CHOLEST SERPL-MCNC: 166 MG/DL
CHOLESTEROL/HDL RATIO: 3.9
CO2 SERPL-SCNC: 21 MMOL/L (ref 20–31)
CREAT SERPL-MCNC: 1 MG/DL (ref 0.7–1.2)
CREAT UR-MCNC: 32.2 MG/DL (ref 39–259)
FOLATE SERPL-MCNC: 14.3 NG/ML (ref 4.8–24.2)
GFR, ESTIMATED: 82 ML/MIN/1.73M2
GLUCOSE SERPL-MCNC: 127 MG/DL (ref 70–99)
HCV AB SERPL QL IA: NONREACTIVE
HDLC SERPL-MCNC: 43 MG/DL
LDLC SERPL CALC-MCNC: 93 MG/DL (ref 0–130)
MAGNESIUM SERPL-MCNC: 2.1 MG/DL (ref 1.6–2.6)
MICROALBUMIN UR-MCNC: <12 MG/L (ref 0–20)
MICROALBUMIN/CREAT UR-RTO: ABNORMAL MCG/MG CREAT (ref 0–17)
POTASSIUM SERPL-SCNC: 4.3 MMOL/L (ref 3.7–5.3)
SODIUM SERPL-SCNC: 136 MMOL/L (ref 135–144)
TRIGL SERPL-MCNC: 152 MG/DL
VIT B12 SERPL-MCNC: 651 PG/ML (ref 232–1245)

## 2024-05-10 PROCEDURE — 82306 VITAMIN D 25 HYDROXY: CPT

## 2024-05-10 PROCEDURE — 83735 ASSAY OF MAGNESIUM: CPT

## 2024-05-10 PROCEDURE — 86803 HEPATITIS C AB TEST: CPT

## 2024-05-10 PROCEDURE — 84630 ASSAY OF ZINC: CPT

## 2024-05-10 PROCEDURE — 36415 COLL VENOUS BLD VENIPUNCTURE: CPT

## 2024-05-10 PROCEDURE — 82746 ASSAY OF FOLIC ACID SERUM: CPT

## 2024-05-10 PROCEDURE — 80048 BASIC METABOLIC PNL TOTAL CA: CPT

## 2024-05-10 PROCEDURE — 80061 LIPID PANEL: CPT

## 2024-05-10 PROCEDURE — 82570 ASSAY OF URINE CREATININE: CPT

## 2024-05-10 PROCEDURE — 82043 UR ALBUMIN QUANTITATIVE: CPT

## 2024-05-10 PROCEDURE — 82607 VITAMIN B-12: CPT

## 2024-05-12 LAB — ZINC SERPL-MCNC: 92.2 UG/DL (ref 60–120)

## 2024-05-23 ENCOUNTER — TELEPHONE (OUTPATIENT)
Dept: FAMILY MEDICINE CLINIC | Age: 69
End: 2024-05-23

## 2024-05-23 DIAGNOSIS — E11.9 NEWLY DIAGNOSED DIABETES (HCC): Primary | ICD-10-CM

## 2024-05-23 NOTE — TELEPHONE ENCOUNTER
Patient called stating he is not taking the Januvia anymore.  He takes it and ends up waking up in middle of night  gasping for air.  Is there something else he can take?  Please advise.

## 2024-06-03 RX ORDER — PANTOPRAZOLE SODIUM 40 MG/1
TABLET, DELAYED RELEASE ORAL
Qty: 90 TABLET | Refills: 1 | Status: SHIPPED | OUTPATIENT
Start: 2024-06-03

## 2024-07-08 ENCOUNTER — OFFICE VISIT (OUTPATIENT)
Dept: UROLOGY | Age: 69
End: 2024-07-08
Payer: MEDICARE

## 2024-07-08 VITALS
HEIGHT: 67 IN | DIASTOLIC BLOOD PRESSURE: 82 MMHG | WEIGHT: 238 LBS | BODY MASS INDEX: 37.35 KG/M2 | SYSTOLIC BLOOD PRESSURE: 134 MMHG

## 2024-07-08 DIAGNOSIS — L72.0 EPIDERMAL CYST: Primary | ICD-10-CM

## 2024-07-08 DIAGNOSIS — R79.89 LOW TESTOSTERONE IN MALE: ICD-10-CM

## 2024-07-08 PROCEDURE — 1123F ACP DISCUSS/DSCN MKR DOCD: CPT | Performed by: UROLOGY

## 2024-07-08 PROCEDURE — 99214 OFFICE O/P EST MOD 30 MIN: CPT | Performed by: UROLOGY

## 2024-07-08 PROCEDURE — 3075F SYST BP GE 130 - 139MM HG: CPT | Performed by: UROLOGY

## 2024-07-08 PROCEDURE — 3079F DIAST BP 80-89 MM HG: CPT | Performed by: UROLOGY

## 2024-07-08 ASSESSMENT — ENCOUNTER SYMPTOMS
EYE PAIN: 0
BACK PAIN: 0
COUGH: 0
ABDOMINAL PAIN: 0
SHORTNESS OF BREATH: 0
COLOR CHANGE: 0
EYE REDNESS: 0
VOMITING: 0
NAUSEA: 0
WHEEZING: 0

## 2024-07-08 NOTE — PROGRESS NOTES
Review of Systems   Constitutional:  Negative for activity change, chills and fever.   Eyes:  Negative for pain, redness and visual disturbance.   Respiratory:  Negative for cough, shortness of breath and wheezing.    Cardiovascular:  Negative for chest pain and leg swelling.   Gastrointestinal:  Negative for abdominal pain, nausea and vomiting.   Genitourinary:  Negative for difficulty urinating, dysuria, flank pain, frequency, hematuria, penile discharge, scrotal swelling and testicular pain.        Cyst to perineum   Musculoskeletal:  Negative for back pain, joint swelling and myalgias.   Skin:  Negative for color change and rash.   Neurological:  Negative for dizziness, tremors and numbness.   Hematological:  Negative for adenopathy. Does not bruise/bleed easily.

## 2024-07-08 NOTE — PROGRESS NOTES
[facility] PHYSICIANS [facility], [facility] UROLOGY CENTER  [address]  [facility] [address]  Dept: [phone]    [facility] Urology Office Note - Established    Patient:  Samuel Tamayo  YOB: 1955  Date: 7/8/2024    The patient is a 68 y.o. male who presents todayfor evaluation of the following problems:   Chief Complaint   Patient presents with    Follow-up     Growth by penis-Patient feels it has been slowly growing for for about a couple months.        HPI  This is a 68-year-old gentleman who has a history of low testosterone.  We had given him Xyosted but he had headaches from this so we switched him to AndroGel.  He confesses to not using this daily.  He is seeing us today because he did have a small lesion on his perineum which ruptured and bled.  He has trouble seeing it himself but thinks that the drainage has stopped.  He wanted us to examine it.    Summary of old records: N/A    Additional History: N/A    Procedures Today: N/A    Urinalysis today:  No results found for this visit on 07/08/24.  Last several PSA's:  Lab Results   Component Value Date    PSA 0.70 04/22/2024    PSA 0.73 01/20/2023    PSA 0.72 10/17/2022     Last total testosterone:  Lab Results   Component Value Date    TESTOSTERONE 123 (L) 04/22/2024       AUA Symptom Score (7/8/2024):                               Last BUN and creatinine:  Lab Results   Component Value Date    BUN 20 05/10/2024     Lab Results   Component Value Date    CREATININE 1.0 05/10/2024       Additional Lab/Culture results: none    Imaging Reviewed during this Office Visit: none  (results were independently reviewed by physician and radiology report verified)    PAST MEDICAL, FAMILY AND SOCIAL HISTORY UPDATE:  Past Medical History:   Diagnosis Date    Acute cholecystitis     CAD (coronary artery disease)     Essential hypertension 05/04/2017    H/O cardiac catheterization 08/27/2022    \"stent x1\"    Headache

## 2024-07-18 ENCOUNTER — OFFICE VISIT (OUTPATIENT)
Dept: NEUROLOGY | Age: 69
End: 2024-07-18
Payer: MEDICARE

## 2024-07-18 VITALS
HEIGHT: 67 IN | HEART RATE: 73 BPM | BODY MASS INDEX: 37.51 KG/M2 | SYSTOLIC BLOOD PRESSURE: 112 MMHG | DIASTOLIC BLOOD PRESSURE: 73 MMHG | WEIGHT: 239 LBS

## 2024-07-18 DIAGNOSIS — G89.29 CHRONIC NONINTRACTABLE HEADACHE, UNSPECIFIED HEADACHE TYPE: Primary | ICD-10-CM

## 2024-07-18 DIAGNOSIS — R51.9 CHRONIC NONINTRACTABLE HEADACHE, UNSPECIFIED HEADACHE TYPE: Primary | ICD-10-CM

## 2024-07-18 PROCEDURE — 3074F SYST BP LT 130 MM HG: CPT

## 2024-07-18 PROCEDURE — 3078F DIAST BP <80 MM HG: CPT

## 2024-07-18 PROCEDURE — 1123F ACP DISCUSS/DSCN MKR DOCD: CPT

## 2024-07-18 PROCEDURE — 99214 OFFICE O/P EST MOD 30 MIN: CPT

## 2024-07-31 DIAGNOSIS — E11.9 NEWLY DIAGNOSED DIABETES (HCC): ICD-10-CM

## 2024-07-31 RX ORDER — LINAGLIPTIN 5 MG/1
5 TABLET, FILM COATED ORAL DAILY
Qty: 100 TABLET | Refills: 1 | Status: SHIPPED | OUTPATIENT
Start: 2024-07-31

## 2024-08-16 RX ORDER — PANTOPRAZOLE SODIUM 40 MG/1
TABLET, DELAYED RELEASE ORAL
Qty: 100 TABLET | Refills: 1 | Status: SHIPPED | OUTPATIENT
Start: 2024-08-16

## 2024-09-13 RX ORDER — AMLODIPINE BESYLATE 5 MG/1
TABLET ORAL
Qty: 100 TABLET | Refills: 1 | Status: SHIPPED | OUTPATIENT
Start: 2024-09-13

## 2024-09-13 RX ORDER — LISINOPRIL 5 MG/1
TABLET ORAL
Qty: 100 TABLET | Refills: 1 | Status: SHIPPED | OUTPATIENT
Start: 2024-09-13

## 2024-09-22 DIAGNOSIS — E55.9 VITAMIN D DEFICIENCY: ICD-10-CM

## 2024-09-23 RX ORDER — ERGOCALCIFEROL 1.25 MG/1
CAPSULE, LIQUID FILLED ORAL
Qty: 15 CAPSULE | Refills: 2 | Status: SHIPPED | OUTPATIENT
Start: 2024-09-23

## 2024-09-23 RX ORDER — CARVEDILOL 6.25 MG/1
TABLET ORAL
Qty: 200 TABLET | Refills: 2 | Status: SHIPPED | OUTPATIENT
Start: 2024-09-23

## 2024-10-07 ENCOUNTER — TELEPHONE (OUTPATIENT)
Dept: UROLOGY | Age: 69
End: 2024-10-07

## 2024-10-07 NOTE — TELEPHONE ENCOUNTER
Writer called pt to remind them of appt on 10/14 and to have labs prior to visit.   Pt states he will call us back

## 2024-10-09 ENCOUNTER — HOSPITAL ENCOUNTER (OUTPATIENT)
Dept: CT IMAGING | Age: 69
Discharge: HOME | End: 2024-10-11
Attending: INTERNAL MEDICINE
Payer: MEDICARE

## 2024-10-09 DIAGNOSIS — F17.211 CIGARETTE NICOTINE DEPENDENCE IN REMISSION: ICD-10-CM

## 2024-10-09 DIAGNOSIS — Z87.891 PERSONAL HISTORY OF TOBACCO USE, PRESENTING HAZARDS TO HEALTH: ICD-10-CM

## 2024-10-09 PROCEDURE — 71271 CT THORAX LUNG CANCER SCR C-: CPT

## 2024-10-10 ENCOUNTER — PATIENT MESSAGE (OUTPATIENT)
Dept: FAMILY MEDICINE CLINIC | Age: 69
End: 2024-10-10

## 2024-10-15 ENCOUNTER — TELEPHONE (OUTPATIENT)
Dept: PHARMACY | Facility: CLINIC | Age: 69
End: 2024-10-15

## 2024-10-15 NOTE — TELEPHONE ENCOUNTER
[facility] CLINICAL PHARMACY REVIEW: ADHERENCE  Identified care gap per [employer]: fills at [facility]: Diabetes adherence    Patient also appears to be prescribed: ACE/ARB (passed measure) and Statin     ASSESSMENT  DIABETES ADHERENCE    Insurance Records claims through 10/15/24 (Prior Year PDC = not reported; YTD PDC = 71%; Potential Fail Date: 10/16/2024):   Tradjenta (linagliptin) 5mg daily last filled on 05/23/2024 for 100 day supply. Next refill due: 08/31/2024    Last Rx sent on 07/31/2024 for 100ds with 1 refill    Lab Results   Component Value Date    LABA1C 7.3 05/09/2024    LABA1C 6.5 (H) 10/16/2023    LABA1C 6.8 10/02/2023     NOTE A1c <9%     PLAN  The following are interventions that have been identified:   Patient OVERDUE refilling Tradjenta and active on home medication list    Will send "[employer]t message to patient regarding the above.     Future Appointments   Date Time Provider Department Center   10/21/2024  9:10 AM Vijay Godoy MD [facility]   12/19/2024 12:00 PM Blaze Motta MD [facility] DE[facility]     Lety Llamas, PharmD, BCACP, BCGP  Population Health Pharmacist   [facility] Clinical Pharmacy  Department, toll free: [phone], option 1    =======================================================    For Pharmacy Admin Tracking Only  Program: Pop Health  CPA in place:  No  Gap Closed?: No   Time Spent (min): 20

## 2024-10-17 ENCOUNTER — HOSPITAL ENCOUNTER (OUTPATIENT)
Age: 69
Discharge: HOME | End: 2024-10-17
Payer: MEDICARE

## 2024-10-17 DIAGNOSIS — R79.89 LOW TESTOSTERONE IN MALE: ICD-10-CM

## 2024-10-17 LAB
ALBUMIN SERPL-MCNC: 4.3 G/DL (ref 3.5–5.2)
ALP SERPL-CCNC: 61 U/L (ref 40–129)
ALT SERPL-CCNC: 69 U/L (ref 10–50)
AST SERPL-CCNC: 38 U/L (ref 10–50)
BILIRUB DIRECT SERPL-MCNC: 0.2 MG/DL (ref 0–0.3)
BILIRUB INDIRECT SERPL-MCNC: 0.2 MG/DL (ref 0–1)
BILIRUB SERPL-MCNC: 0.4 MG/DL (ref 0–1.2)
ERYTHROCYTE [DISTWIDTH] IN BLOOD BY AUTOMATED COUNT: 14.4 % (ref 11.5–14.9)
HCT VFR BLD AUTO: 43.5 % (ref 41–53)
HGB BLD-MCNC: 15.4 G/DL (ref 13.5–17.5)
MCH RBC QN AUTO: 34.5 PG (ref 26–34)
MCHC RBC AUTO-ENTMCNC: 35.3 G/DL (ref 31–37)
MCV RBC AUTO: 97.8 FL (ref 80–100)
PLATELET # BLD AUTO: 154 K/UL (ref 150–450)
PMV BLD AUTO: 10 FL (ref 6–12)
PROT SERPL-MCNC: 7.4 G/DL (ref 6.6–8.7)
PSA SERPL-MCNC: 0.7 NG/ML (ref 0–4)
RBC # BLD AUTO: 4.45 M/UL (ref 4.5–5.9)
TESTOST SERPL-MCNC: 109 NG/DL (ref 193–740)
WBC OTHER # BLD: 6.2 K/UL (ref 3.5–11)

## 2024-10-17 PROCEDURE — 84403 ASSAY OF TOTAL TESTOSTERONE: CPT

## 2024-10-17 PROCEDURE — 80076 HEPATIC FUNCTION PANEL: CPT

## 2024-10-17 PROCEDURE — 84153 ASSAY OF PSA TOTAL: CPT

## 2024-10-17 PROCEDURE — 85027 COMPLETE CBC AUTOMATED: CPT

## 2024-10-17 PROCEDURE — 36415 COLL VENOUS BLD VENIPUNCTURE: CPT

## 2024-10-21 ENCOUNTER — OFFICE VISIT (OUTPATIENT)
Dept: UROLOGY | Age: 69
End: 2024-10-21
Payer: MEDICARE

## 2024-10-21 VITALS
DIASTOLIC BLOOD PRESSURE: 78 MMHG | BODY MASS INDEX: 37.67 KG/M2 | SYSTOLIC BLOOD PRESSURE: 122 MMHG | TEMPERATURE: 97.8 F | WEIGHT: 240 LBS | HEIGHT: 67 IN | HEART RATE: 75 BPM | OXYGEN SATURATION: 95 %

## 2024-10-21 DIAGNOSIS — R79.89 LOW TESTOSTERONE IN MALE: Primary | ICD-10-CM

## 2024-10-21 DIAGNOSIS — Z12.5 PROSTATE CANCER SCREENING: ICD-10-CM

## 2024-10-21 DIAGNOSIS — N52.01 ERECTILE DYSFUNCTION DUE TO ARTERIAL INSUFFICIENCY: ICD-10-CM

## 2024-10-21 PROCEDURE — 3078F DIAST BP <80 MM HG: CPT | Performed by: UROLOGY

## 2024-10-21 PROCEDURE — 1123F ACP DISCUSS/DSCN MKR DOCD: CPT | Performed by: UROLOGY

## 2024-10-21 PROCEDURE — 99214 OFFICE O/P EST MOD 30 MIN: CPT | Performed by: UROLOGY

## 2024-10-21 PROCEDURE — 3074F SYST BP LT 130 MM HG: CPT | Performed by: UROLOGY

## 2024-10-21 RX ORDER — TADALAFIL 20 MG/1
20 TABLET ORAL DAILY PRN
Qty: 30 TABLET | Refills: 5 | Status: SHIPPED | OUTPATIENT
Start: 2024-10-21

## 2024-10-21 ASSESSMENT — ENCOUNTER SYMPTOMS
BACK PAIN: 0
EYE PAIN: 0
RESPIRATORY NEGATIVE: 1
SHORTNESS OF BREATH: 0
EYE REDNESS: 0
VOMITING: 0
ALLERGIC/IMMUNOLOGIC NEGATIVE: 1
GASTROINTESTINAL NEGATIVE: 1
WHEEZING: 0
EYES NEGATIVE: 1
COUGH: 0
NAUSEA: 0
ABDOMINAL PAIN: 0
COLOR CHANGE: 0

## 2024-10-21 NOTE — PROGRESS NOTES
[facility] PHYSICIANS [facility], [facility] UROLOGY CENTER  [address]  [facility] [address]  Dept: [phone]    [facility] Urology Office Note - Established    Patient:  Samuel Tamayo  YOB: 1955  Date: 10/21/2024    The patient is a 69 y.o. male who presents todayfor evaluation of the following problems:   Chief Complaint   Patient presents with    Epidermal Cyst     2 month f/u w/ labs         HPI  This is a 69-year-old gentleman with low testosterone.  He had been on AndroGel.  He was feeling jittery and not getting any sleep so he stopped taking this.  He is content to stay off of testosterone replacement therapy for now.  He also has erectile dysfunction and does well with tadalafil.  He gets decent erections and is happy.    Summary of old records: N/A    Additional History: N/A    Procedures Today: N/A    Urinalysis today:  No results found for this visit on 10/21/24.  Last several PSA's:  Lab Results   Component Value Date    PSA 0.70 10/17/2024    PSA 0.70 04/22/2024    PSA 0.73 01/20/2023     Last total testosterone:  Lab Results   Component Value Date    TESTOSTERONE 109 (L) 10/17/2024       AUA Symptom Score (10/21/2024):                               Last BUN and creatinine:  Lab Results   Component Value Date    BUN 20 05/10/2024     Lab Results   Component Value Date    CREATININE 1.0 05/10/2024       Additional Lab/Culture results: none    Imaging Reviewed during this Office Visit: none  (results were independently reviewed by physician and radiology report verified)    PAST MEDICAL, FAMILY AND SOCIAL HISTORY UPDATE:  Past Medical History:   Diagnosis Date    Acute cholecystitis     CAD (coronary artery disease)     Essential hypertension 05/04/2017    H/O cardiac catheterization 08/27/2022    \"stent x1\"    Headache Long time    Hyperlipidemia 09/04/2014    Hyperparathyroidism (HCC)     Obesity (BMI 30-39.9) 05/04/2017    Osteoporosis

## 2024-11-12 ENCOUNTER — TELEPHONE (OUTPATIENT)
Dept: VASCULAR SURGERY | Age: 69
End: 2024-11-12

## 2024-11-12 DIAGNOSIS — I72.8 SPLENIC ARTERY ANEURYSM (HCC): Primary | ICD-10-CM

## 2024-11-12 NOTE — TELEPHONE ENCOUNTER
Spoke with patient in regards to setting up 1yr follow-up for splenic artery aneurysm, patient will need a repeat CTA Abdomen pelvis, patient states that he will call us back after he learns his schedule for work

## 2024-11-26 ENCOUNTER — HOSPITAL ENCOUNTER (OUTPATIENT)
Dept: CT IMAGING | Age: 69
Discharge: HOME | End: 2024-11-28
Attending: SURGERY
Payer: MEDICARE

## 2024-11-26 DIAGNOSIS — I72.8 SPLENIC ARTERY ANEURYSM (HCC): ICD-10-CM

## 2024-11-26 LAB
EGFR, POC: 81 ML/MIN/1.73M2
POC CREATININE: 1 MG/DL (ref 0.51–1.19)

## 2024-11-26 PROCEDURE — 2580000003 HC RX 258: Performed by: SURGERY

## 2024-11-26 PROCEDURE — 82565 ASSAY OF CREATININE: CPT

## 2024-11-26 PROCEDURE — 74174 CTA ABD&PLVS W/CONTRAST: CPT

## 2024-11-26 PROCEDURE — 6360000004 HC RX CONTRAST MEDICATION: Performed by: SURGERY

## 2024-11-26 RX ORDER — SODIUM CHLORIDE 0.9 % (FLUSH) 0.9 %
10 SYRINGE (ML) INJECTION ONCE
Status: COMPLETED | OUTPATIENT
Start: 2024-11-26 | End: 2024-11-26

## 2024-11-26 RX ORDER — IOPAMIDOL 755 MG/ML
100 INJECTION, SOLUTION INTRAVASCULAR
Status: COMPLETED | OUTPATIENT
Start: 2024-11-26 | End: 2024-11-26

## 2024-11-26 RX ORDER — 0.9 % SODIUM CHLORIDE 0.9 %
100 INTRAVENOUS SOLUTION INTRAVENOUS ONCE
Status: COMPLETED | OUTPATIENT
Start: 2024-11-26 | End: 2024-11-26

## 2024-11-26 RX ADMIN — SODIUM CHLORIDE 100 ML: 9 INJECTION, SOLUTION INTRAVENOUS at 07:24

## 2024-11-26 RX ADMIN — SODIUM CHLORIDE, PRESERVATIVE FREE 10 ML: 5 INJECTION INTRAVENOUS at 07:24

## 2024-11-26 RX ADMIN — IOPAMIDOL 100 ML: 755 INJECTION, SOLUTION INTRAVENOUS at 07:23

## 2024-12-06 ENCOUNTER — TELEPHONE (OUTPATIENT)
Dept: PHARMACY | Facility: CLINIC | Age: 69
End: 2024-12-06

## 2024-12-06 ENCOUNTER — OFFICE VISIT (OUTPATIENT)
Dept: VASCULAR SURGERY | Age: 69
End: 2024-12-06
Payer: MEDICARE

## 2024-12-06 VITALS
HEART RATE: 70 BPM | SYSTOLIC BLOOD PRESSURE: 120 MMHG | WEIGHT: 240 LBS | OXYGEN SATURATION: 94 % | DIASTOLIC BLOOD PRESSURE: 77 MMHG | BODY MASS INDEX: 37.67 KG/M2 | HEIGHT: 67 IN | RESPIRATION RATE: 16 BRPM

## 2024-12-06 DIAGNOSIS — E78.2 MIXED HYPERLIPIDEMIA: Primary | Chronic | ICD-10-CM

## 2024-12-06 DIAGNOSIS — I72.8 SPLENIC ARTERY ANEURYSM (HCC): Primary | ICD-10-CM

## 2024-12-06 PROCEDURE — 3074F SYST BP LT 130 MM HG: CPT | Performed by: SURGERY

## 2024-12-06 PROCEDURE — 1123F ACP DISCUSS/DSCN MKR DOCD: CPT | Performed by: SURGERY

## 2024-12-06 PROCEDURE — 1159F MED LIST DOCD IN RCRD: CPT | Performed by: SURGERY

## 2024-12-06 PROCEDURE — 99213 OFFICE O/P EST LOW 20 MIN: CPT | Performed by: SURGERY

## 2024-12-06 PROCEDURE — 1126F AMNT PAIN NOTED NONE PRSNT: CPT | Performed by: SURGERY

## 2024-12-06 PROCEDURE — 3078F DIAST BP <80 MM HG: CPT | Performed by: SURGERY

## 2024-12-06 NOTE — PROGRESS NOTES
[facility]  [address] [address] [address]  [place] [address]  Dept: [phone]     Patient: Samuel Tamayo  : 1955  MRN: [id number]  DOS: 2024            HPI:  Samuel Tamayo is a 69 y.o. male who returns to the office regarding his splenic artery aneurysm.  The aneurysm measures approximately 1 cm in diameter which is unchanged from previous measurements.  This was done on CTA.  He has no abdominal pain.  He has no left upper quadrant pain or flank pain.  He has no claudication rest pain or ulceration.    Review of Systems    Vitals:    24 1116   BP: 120/77   Site: Right Upper Arm   Position: Sitting   Cuff Size: Medium Adult   Pulse: 70   Resp: 16   SpO2: 94%   Weight: 108.9 kg (240 lb)   Height: 1.702 m (5' 7\")          Physical Exam  His examination reveals normal pulses as described previously.  He has palpable posterior tibial pulses bilaterally in both feet.  The hands are warm and well-perfused as are the feet.  His abdomen is soft and nontender.    Assessment:  1. Splenic artery aneurysm (HCC)          Plan:  We can see him every year with a CTA to investigate the splenic artery.  If it does not grow next year then we may follow every other year.    Electronically signed by:  Wander Aj MD

## 2024-12-19 ENCOUNTER — OFFICE VISIT (OUTPATIENT)
Dept: FAMILY MEDICINE CLINIC | Age: 69
End: 2024-12-19

## 2024-12-19 VITALS
DIASTOLIC BLOOD PRESSURE: 76 MMHG | WEIGHT: 243.6 LBS | RESPIRATION RATE: 16 BRPM | BODY MASS INDEX: 38.15 KG/M2 | SYSTOLIC BLOOD PRESSURE: 124 MMHG | OXYGEN SATURATION: 97 % | HEART RATE: 78 BPM

## 2024-12-19 DIAGNOSIS — I10 ESSENTIAL HYPERTENSION: ICD-10-CM

## 2024-12-19 DIAGNOSIS — E55.9 VITAMIN D DEFICIENCY: ICD-10-CM

## 2024-12-19 DIAGNOSIS — Z79.899 CURRENT USE OF PROTON PUMP INHIBITOR: ICD-10-CM

## 2024-12-19 DIAGNOSIS — E11.9 NEWLY DIAGNOSED DIABETES (HCC): Primary | ICD-10-CM

## 2024-12-19 DIAGNOSIS — E78.2 MIXED HYPERLIPIDEMIA: Chronic | ICD-10-CM

## 2024-12-19 DIAGNOSIS — K21.9 GASTROESOPHAGEAL REFLUX DISEASE, UNSPECIFIED WHETHER ESOPHAGITIS PRESENT: ICD-10-CM

## 2024-12-19 LAB — HBA1C MFR BLD: 7.4 %

## 2024-12-19 RX ORDER — GLIMEPIRIDE 1 MG/1
1 TABLET ORAL
Qty: 90 TABLET | Refills: 1 | Status: SHIPPED | OUTPATIENT
Start: 2024-12-19

## 2024-12-19 SDOH — ECONOMIC STABILITY: FOOD INSECURITY: WITHIN THE PAST 12 MONTHS, THE FOOD YOU BOUGHT JUST DIDN'T LAST AND YOU DIDN'T HAVE MONEY TO GET MORE.: NEVER TRUE

## 2024-12-19 SDOH — ECONOMIC STABILITY: FOOD INSECURITY: WITHIN THE PAST 12 MONTHS, YOU WORRIED THAT YOUR FOOD WOULD RUN OUT BEFORE YOU GOT MONEY TO BUY MORE.: NEVER TRUE

## 2024-12-19 SDOH — ECONOMIC STABILITY: INCOME INSECURITY: HOW HARD IS IT FOR YOU TO PAY FOR THE VERY BASICS LIKE FOOD, HOUSING, MEDICAL CARE, AND HEATING?: NOT HARD AT ALL

## 2024-12-19 ASSESSMENT — ENCOUNTER SYMPTOMS
BLOOD IN STOOL: 0
SHORTNESS OF BREATH: 0
CHEST TIGHTNESS: 0
ABDOMINAL PAIN: 0

## 2024-12-19 ASSESSMENT — PATIENT HEALTH QUESTIONNAIRE - PHQ9
SUM OF ALL RESPONSES TO PHQ9 QUESTIONS 1 & 2: 0
1. LITTLE INTEREST OR PLEASURE IN DOING THINGS: NOT AT ALL
SUM OF ALL RESPONSES TO PHQ QUESTIONS 1-9: 0
2. FEELING DOWN, DEPRESSED OR HOPELESS: NOT AT ALL
SUM OF ALL RESPONSES TO PHQ QUESTIONS 1-9: 0

## 2024-12-19 NOTE — PROGRESS NOTES
Vitamin B12     Standing Status:   Future     Standing Expiration Date:   12/19/2025    Folate     Standing Status:   Future     Standing Expiration Date:   12/19/2025    Zinc     Standing Status:   Future     Standing Expiration Date:   12/19/2025    POCT glycosylated hemoglobin (Hb A1C)             Return in about 4 months (around 4/19/2025).    Electronically signed by Blaze Motta MD on 12/19/2024 at 12:37 PM

## 2024-12-20 RX ORDER — PRAVASTATIN SODIUM 20 MG
20 TABLET ORAL DAILY
Qty: 90 TABLET | Refills: 1 | Status: SHIPPED | OUTPATIENT
Start: 2024-12-20

## 2024-12-20 NOTE — TELEPHONE ENCOUNTER
Dr. Blaze Motta MD,      Patient's insurance has identified statin use in persons with diabetes (SUPD) care gap:   I saw in your note from yesterday that cost was an issue for pravastatin so I ran a price check with his current insurance - For generic pravastatin 20mg daily, the patient would have a $0 copay.   When investigating further, Januvia was started at the same time as pravastatin so I believe that was the high cost medication as that was also not started in October 2023.   If you would like patient to try statin, consider sending prescription to pharmacy.    I can follow-up with patient to discuss, if you'd like.     Please let me know if you have any questions.      Thank you!  Lety Llamas, PharmD, BCACP, BCGP  Population Health Pharmacist   [facility] Clinical Pharmacy  Department, toll free: [phone], option 1  
[facility] CLINICAL PHARMACY REVIEW: STATIN THERAPY    Per PreCheck MyScript via [employer] portal, copay should also be $0 for 90ds of glimepiride.    Outreach to pharmacy - verified $0 copay for both pravastatin and glimepiride, these two medications were filled and shipped out to patient earlier today.     Reached patient to review the above - he verbalized understanding of no copay and that medications should be to him in a few days. He reports no questions/concerns at this time about medications.     Lety Llamas, PharmD, BCACP, [facility] Pharmacist   [facility] Clinical Pharmacy  Department, toll free: [phone], option 1    =======================================================    For Pharmacy Admin Tracking Only  Program: [facility] [employer]  CPA in place:  No  Recommendation Provided To: Provider: 1 via Note to Provider  Intervention Detail: New Rx: 1, reason: Needs Additional Therapy  Intervention Accepted By: Provider: 1  Gap Closed?: Yes   Time Spent (min): 60    
Sneha Davidson,  I sent a prescription for pravastatin to his pharmacy today.  I had also prescribed Tradjenta 5 mg daily on 7/31/2024 to see if his insurance would cover this, but it may have been too expensive.  I started him on Amaryl on 12/19/2024 instead.  Thank you for following up with the patient.  Much appreciated.  
in patients who experienced statin associated side effects that were not severe, it is recommended to reassess and rechallenge to achieve maximal LDL-C reduction using a modified dosing regimen, an alternate statin (such as a hydrophilic statin), or a combination of a statin with non-statin therapy. Additionally, some strategies to help tolerate statins could be three time weekly dosing and while the 2018 AHA/ACC Guideline on the Management of Blood Cholesterol makes no recommendation for using coenzyme Q10 with statins or ensuring patient is not vitamin D deficient, there is some data that suggest these could be beneficial. It is also important to avoid any DDIs with statins and patient's other medications.     According to the 2018 AHA/ACC Guideline on the Management of Blood Cholesterol, it is reasonable to use statins in patients with stable liver disease, including nonalcoholic steatohepatitis, after obtaining baseline lab values and creating a plan for regular safety monitoring.       Latest Reference Range & Units 05/10/24 07:45   Vit D, 25-Hydroxy 30.0 - 100.0 ng/mL 41.7     Other classes of medications included in medication adherence quality measures prescribed to patient:   Diabetes (failed measure) and ACEi/ARB (passed measure)    PLAN  The following are interventions that have been identified:  Statin Gap (Diabetes)    Patient's LDL >55, age 40-75 years, LFTs WNL, has DM and ASCVD. Multiple evidence-based clinical guidelines recommend patient be on statin therapy.     Per chart review, atorvastatin was stopped due to worsening lipid panel and changed to rosuvastatin, which patient claimed he has had headaches with. PCP started pravastatin as patient reported he was willing to try another statin - no evidence medication ever filled via insurance records or reconciled dispense history.      Latest Reference Range & Units 10/16/23 08:03 05/10/24 07:45   Cholesterol, Total <200 mg/dL 145 166   HDL

## 2024-12-24 ENCOUNTER — HOSPITAL ENCOUNTER (OUTPATIENT)
Age: 69
Discharge: HOME | End: 2024-12-24
Payer: MEDICARE

## 2024-12-24 DIAGNOSIS — E78.2 MIXED HYPERLIPIDEMIA: Chronic | ICD-10-CM

## 2024-12-24 DIAGNOSIS — K21.9 GASTROESOPHAGEAL REFLUX DISEASE, UNSPECIFIED WHETHER ESOPHAGITIS PRESENT: ICD-10-CM

## 2024-12-24 DIAGNOSIS — E87.1 HYPONATREMIA: Primary | ICD-10-CM

## 2024-12-24 DIAGNOSIS — I10 ESSENTIAL HYPERTENSION: ICD-10-CM

## 2024-12-24 DIAGNOSIS — E11.9 NEWLY DIAGNOSED DIABETES (HCC): ICD-10-CM

## 2024-12-24 DIAGNOSIS — Z79.899 CURRENT USE OF PROTON PUMP INHIBITOR: ICD-10-CM

## 2024-12-24 DIAGNOSIS — E55.9 VITAMIN D DEFICIENCY: ICD-10-CM

## 2024-12-24 LAB
25(OH)D3 SERPL-MCNC: 37 NG/ML (ref 30–100)
ALBUMIN SERPL-MCNC: 4.2 G/DL (ref 3.5–5.2)
ALP SERPL-CCNC: 56 U/L (ref 40–129)
ALT SERPL-CCNC: 50 U/L (ref 10–50)
ANION GAP SERPL CALCULATED.3IONS-SCNC: 10 MMOL/L (ref 9–16)
AST SERPL-CCNC: 38 U/L (ref 10–50)
BILIRUB SERPL-MCNC: 0.4 MG/DL (ref 0–1.2)
BUN SERPL-MCNC: 16 MG/DL (ref 8–23)
CALCIUM SERPL-MCNC: 9.4 MG/DL (ref 8.6–10.4)
CHLORIDE SERPL-SCNC: 100 MMOL/L (ref 98–107)
CHOLEST SERPL-MCNC: 186 MG/DL (ref 0–199)
CHOLESTEROL/HDL RATIO: 4.4
CO2 SERPL-SCNC: 24 MMOL/L (ref 20–31)
CREAT SERPL-MCNC: 1 MG/DL (ref 0.7–1.2)
FOLATE SERPL-MCNC: 15.3 NG/ML (ref 4.8–24.2)
GFR, ESTIMATED: 81 ML/MIN/1.73M2
GLUCOSE SERPL-MCNC: 127 MG/DL (ref 74–99)
HDLC SERPL-MCNC: 42 MG/DL
LDLC SERPL CALC-MCNC: 108 MG/DL (ref 0–100)
MAGNESIUM SERPL-MCNC: 2.1 MG/DL (ref 1.6–2.4)
POTASSIUM SERPL-SCNC: 4.4 MMOL/L (ref 3.7–5.3)
PROT SERPL-MCNC: 7.2 G/DL (ref 6.6–8.7)
SODIUM SERPL-SCNC: 134 MMOL/L (ref 136–145)
TRIGL SERPL-MCNC: 182 MG/DL (ref 0–149)
VIT B12 SERPL-MCNC: 753 PG/ML (ref 232–1245)

## 2024-12-24 PROCEDURE — 82746 ASSAY OF FOLIC ACID SERUM: CPT

## 2024-12-24 PROCEDURE — 84630 ASSAY OF ZINC: CPT

## 2024-12-24 PROCEDURE — 83735 ASSAY OF MAGNESIUM: CPT

## 2024-12-24 PROCEDURE — 82306 VITAMIN D 25 HYDROXY: CPT

## 2024-12-24 PROCEDURE — 80061 LIPID PANEL: CPT

## 2024-12-24 PROCEDURE — 82607 VITAMIN B-12: CPT

## 2024-12-24 PROCEDURE — 36415 COLL VENOUS BLD VENIPUNCTURE: CPT

## 2024-12-24 PROCEDURE — 80053 COMPREHEN METABOLIC PANEL: CPT

## 2024-12-27 LAB — ZINC SERPL-MCNC: 88.3 UG/DL (ref 60–120)

## 2025-01-16 ENCOUNTER — OFFICE VISIT (OUTPATIENT)
Dept: FAMILY MEDICINE CLINIC | Age: 70
End: 2025-01-16

## 2025-01-16 VITALS
SYSTOLIC BLOOD PRESSURE: 116 MMHG | DIASTOLIC BLOOD PRESSURE: 82 MMHG | TEMPERATURE: 98.4 F | BODY MASS INDEX: 38.71 KG/M2 | WEIGHT: 246.6 LBS | RESPIRATION RATE: 16 BRPM | OXYGEN SATURATION: 94 % | HEART RATE: 72 BPM | HEIGHT: 67 IN

## 2025-01-16 DIAGNOSIS — Z00.00 MEDICARE ANNUAL WELLNESS VISIT, SUBSEQUENT: Primary | ICD-10-CM

## 2025-01-16 PROBLEM — E29.1 HYPOGONADISM IN MALE: Status: ACTIVE | Noted: 2023-04-27

## 2025-01-16 SDOH — ECONOMIC STABILITY: FOOD INSECURITY: WITHIN THE PAST 12 MONTHS, THE FOOD YOU BOUGHT JUST DIDN'T LAST AND YOU DIDN'T HAVE MONEY TO GET MORE.: NEVER TRUE

## 2025-01-16 SDOH — ECONOMIC STABILITY: FOOD INSECURITY: WITHIN THE PAST 12 MONTHS, YOU WORRIED THAT YOUR FOOD WOULD RUN OUT BEFORE YOU GOT MONEY TO BUY MORE.: NEVER TRUE

## 2025-01-16 ASSESSMENT — LIFESTYLE VARIABLES
HOW OFTEN DO YOU HAVE A DRINK CONTAINING ALCOHOL: NEVER
HOW MANY STANDARD DRINKS CONTAINING ALCOHOL DO YOU HAVE ON A TYPICAL DAY: PATIENT DOES NOT DRINK

## 2025-01-16 ASSESSMENT — PATIENT HEALTH QUESTIONNAIRE - PHQ9
SUM OF ALL RESPONSES TO PHQ QUESTIONS 1-9: 0
2. FEELING DOWN, DEPRESSED OR HOPELESS: NOT AT ALL
SUM OF ALL RESPONSES TO PHQ QUESTIONS 1-9: 0
SUM OF ALL RESPONSES TO PHQ9 QUESTIONS 1 & 2: 0
SUM OF ALL RESPONSES TO PHQ QUESTIONS 1-9: 0
SUM OF ALL RESPONSES TO PHQ QUESTIONS 1-9: 0
1. LITTLE INTEREST OR PLEASURE IN DOING THINGS: NOT AT ALL

## 2025-01-16 NOTE — PROGRESS NOTES
Medicare Annual Wellness Visit    Samuel Tamayo is here for Medicare AWV and Health Maintenance (The patient is due for flu, Pneumovax 23 and shingles vaccines)    Assessment & Plan   Medicare annual wellness visit, subsequent       Return for as scheduled .     Subjective     Here for medicare wellness visit. Hx of DM and currently is on dual therapy        Patient's complete Health Risk Assessment and screening values have been reviewed and are found in Flowsheets. The following problems were reviewed today and where indicated follow up appointments were made and/or referrals ordered.    Positive Risk Factor Screenings with Interventions:                Abnormal BMI (obese):  Body mass index is 38.62 kg/m². (!) Abnormal  Interventions:  Patient advised to follow-up in this office for further evaluation and treatment          Vision Screen:  Do you have difficulty driving, watching TV, or doing any of your daily activities because of your eyesight?: No  Have you had an eye exam within the past year?: (!) No  Interventions:   Patient encouraged to make appointment with their eye specialist    Safety:  Do you have non-slip mats or non-slip surfaces or shower bars or grab bars in your shower or bathtub?: (!) No  Interventions:  Home safety tips provided              Objective   Vitals:    01/16/25 1709   BP: 116/82   Site: Left Upper Arm   Position: Sitting   Cuff Size: Large Adult   Pulse: 72   Resp: 16   Temp: 98.4 °F (36.9 °C)   TempSrc: Temporal   SpO2: 94%   Weight: 111.9 kg (246 lb 9.6 oz)   Height: 1.702 m (5' 7\")      Body mass index is 38.62 kg/m².        Patient-Reported Vitals  No data recorded Constitutional: obesity. he appears well-developed and well-nourished. No distress.     HENT: no congestion or rhinorrhea  Eyes: Conjunctivae are normal. Pupils are equal, round, and reactive to light. No scleral icterus.   Neck: No thyromegaly present.   Cardiovascular: Normal rate, regular rhythm and normal

## 2025-01-24 RX ORDER — PANTOPRAZOLE SODIUM 40 MG/1
TABLET, DELAYED RELEASE ORAL
Qty: 100 TABLET | Refills: 1 | Status: SHIPPED | OUTPATIENT
Start: 2025-01-24

## 2025-02-03 ENCOUNTER — HOSPITAL ENCOUNTER (OUTPATIENT)
Dept: ULTRASOUND IMAGING | Age: 70
Discharge: HOME | End: 2025-02-05
Attending: UROLOGY
Payer: MEDICARE

## 2025-02-03 ENCOUNTER — OFFICE VISIT (OUTPATIENT)
Dept: UROLOGY | Age: 70
End: 2025-02-03
Payer: MEDICARE

## 2025-02-03 VITALS
BODY MASS INDEX: 38.61 KG/M2 | TEMPERATURE: 97.8 F | HEIGHT: 67 IN | HEART RATE: 74 BPM | WEIGHT: 246 LBS | OXYGEN SATURATION: 95 % | DIASTOLIC BLOOD PRESSURE: 82 MMHG | SYSTOLIC BLOOD PRESSURE: 126 MMHG

## 2025-02-03 DIAGNOSIS — N50.812 LEFT TESTICULAR PAIN: ICD-10-CM

## 2025-02-03 DIAGNOSIS — N52.01 ERECTILE DYSFUNCTION DUE TO ARTERIAL INSUFFICIENCY: Primary | ICD-10-CM

## 2025-02-03 PROCEDURE — 93976 VASCULAR STUDY: CPT

## 2025-02-03 PROCEDURE — 99214 OFFICE O/P EST MOD 30 MIN: CPT | Performed by: UROLOGY

## 2025-02-03 PROCEDURE — 1159F MED LIST DOCD IN RCRD: CPT | Performed by: UROLOGY

## 2025-02-03 PROCEDURE — 3079F DIAST BP 80-89 MM HG: CPT | Performed by: UROLOGY

## 2025-02-03 PROCEDURE — 3074F SYST BP LT 130 MM HG: CPT | Performed by: UROLOGY

## 2025-02-03 PROCEDURE — 1123F ACP DISCUSS/DSCN MKR DOCD: CPT | Performed by: UROLOGY

## 2025-02-03 ASSESSMENT — ENCOUNTER SYMPTOMS
COUGH: 0
WHEEZING: 0
GASTROINTESTINAL NEGATIVE: 1
ALLERGIC/IMMUNOLOGIC NEGATIVE: 1
EYES NEGATIVE: 1
BACK PAIN: 0
VOMITING: 0
COLOR CHANGE: 0
ABDOMINAL PAIN: 0
RESPIRATORY NEGATIVE: 1
SHORTNESS OF BREATH: 0
EYE PAIN: 0
EYE REDNESS: 0
NAUSEA: 0

## 2025-02-03 NOTE — PROGRESS NOTES
[facility] PHYSICIANS [facility], [facility] UROLOGY CENTER  [address]  [facility] [address]  Dept: [phone]    [facility] Urology Office Note - Established    Patient:  Samuel Tamayo  YOB: 1955  Date: 2/3/2025    The patient is a 69 y.o. male who presents todayfor evaluation of the following problems:   Chief Complaint   Patient presents with    Testicles is black and blue       HPI  69-year-old gentleman who we typically see for ED.  He has not had left testicular pain for the last week or so.  He was lifting something heavy at work and felt the pain all of a sudden.  He has noticed that his pain has gotten slightly better but it is still present and fairly constant    Summary of old records: N/A    Additional History: N/A    Procedures Today: N/A    Urinalysis today:  No results found for this visit on 02/03/25.  Last several PSA's:  Lab Results   Component Value Date    PSA 0.70 10/17/2024    PSA 0.70 04/22/2024    PSA 0.73 01/20/2023     Last total testosterone:  Lab Results   Component Value Date    TESTOSTERONE 109 (L) 10/17/2024       AUA Symptom Score (2/3/2025):                               Last BUN and creatinine:  Lab Results   Component Value Date    BUN 16 12/24/2024     Lab Results   Component Value Date    CREATININE 1.0 12/24/2024       Additional Lab/Culture results: none    Imaging Reviewed during this Office Visit: none  (results were independently reviewed by physician and radiology report verified)    PAST MEDICAL, FAMILY AND SOCIAL HISTORY UPDATE:  Past Medical History:   Diagnosis Date    Acute cholecystitis     CAD (coronary artery disease)     Essential hypertension 05/04/2017    H/O cardiac catheterization 08/27/2022    \"stent x1\"    Headache Long time    Hyperlipidemia 09/04/2014    Hyperparathyroidism (HCC)     Obesity (BMI 30-39.9) 05/04/2017    Osteoporosis     Sciatica     Sleep difficulties Years    Stroke (HCC)

## 2025-02-21 RX ORDER — AMLODIPINE BESYLATE 5 MG/1
TABLET ORAL
Qty: 100 TABLET | Refills: 1 | Status: SHIPPED | OUTPATIENT
Start: 2025-02-21

## 2025-02-21 RX ORDER — LISINOPRIL 5 MG/1
TABLET ORAL
Qty: 100 TABLET | Refills: 1 | Status: SHIPPED | OUTPATIENT
Start: 2025-02-21

## 2025-02-26 DIAGNOSIS — E11.9 NEWLY DIAGNOSED DIABETES (HCC): ICD-10-CM

## 2025-02-26 RX ORDER — GLIMEPIRIDE 1 MG/1
1 TABLET ORAL
Qty: 100 TABLET | Refills: 1 | Status: SHIPPED | OUTPATIENT
Start: 2025-02-26

## 2025-02-27 DIAGNOSIS — E78.2 MIXED HYPERLIPIDEMIA: Chronic | ICD-10-CM

## 2025-02-27 RX ORDER — PRAVASTATIN SODIUM 20 MG
20 TABLET ORAL DAILY
Qty: 100 TABLET | Refills: 1 | Status: SHIPPED | OUTPATIENT
Start: 2025-02-27

## 2025-05-08 ENCOUNTER — OFFICE VISIT (OUTPATIENT)
Dept: FAMILY MEDICINE CLINIC | Age: 70
End: 2025-05-08
Payer: MEDICARE

## 2025-05-08 VITALS
OXYGEN SATURATION: 94 % | BODY MASS INDEX: 37.93 KG/M2 | SYSTOLIC BLOOD PRESSURE: 112 MMHG | HEART RATE: 73 BPM | WEIGHT: 242.2 LBS | DIASTOLIC BLOOD PRESSURE: 64 MMHG | RESPIRATION RATE: 16 BRPM

## 2025-05-08 DIAGNOSIS — Z91.148 POOR COMPLIANCE WITH MEDICATION: ICD-10-CM

## 2025-05-08 DIAGNOSIS — E55.9 VITAMIN D DEFICIENCY: ICD-10-CM

## 2025-05-08 DIAGNOSIS — Z79.899 CURRENT USE OF PROTON PUMP INHIBITOR: ICD-10-CM

## 2025-05-08 DIAGNOSIS — E78.2 MIXED HYPERLIPIDEMIA: Chronic | ICD-10-CM

## 2025-05-08 DIAGNOSIS — I10 ESSENTIAL HYPERTENSION: ICD-10-CM

## 2025-05-08 DIAGNOSIS — E11.9 NEWLY DIAGNOSED DIABETES (HCC): Primary | ICD-10-CM

## 2025-05-08 DIAGNOSIS — K21.9 GASTROESOPHAGEAL REFLUX DISEASE, UNSPECIFIED WHETHER ESOPHAGITIS PRESENT: ICD-10-CM

## 2025-05-08 LAB — HBA1C MFR BLD: 7.4 %

## 2025-05-08 PROCEDURE — 3078F DIAST BP <80 MM HG: CPT | Performed by: FAMILY MEDICINE

## 2025-05-08 PROCEDURE — 99214 OFFICE O/P EST MOD 30 MIN: CPT | Performed by: FAMILY MEDICINE

## 2025-05-08 PROCEDURE — 3051F HG A1C>EQUAL 7.0%<8.0%: CPT | Performed by: FAMILY MEDICINE

## 2025-05-08 PROCEDURE — 3074F SYST BP LT 130 MM HG: CPT | Performed by: FAMILY MEDICINE

## 2025-05-08 PROCEDURE — 1160F RVW MEDS BY RX/DR IN RCRD: CPT | Performed by: FAMILY MEDICINE

## 2025-05-08 PROCEDURE — 1159F MED LIST DOCD IN RCRD: CPT | Performed by: FAMILY MEDICINE

## 2025-05-08 PROCEDURE — 83036 HEMOGLOBIN GLYCOSYLATED A1C: CPT | Performed by: FAMILY MEDICINE

## 2025-05-08 PROCEDURE — 1123F ACP DISCUSS/DSCN MKR DOCD: CPT | Performed by: FAMILY MEDICINE

## 2025-05-08 SDOH — ECONOMIC STABILITY: FOOD INSECURITY: WITHIN THE PAST 12 MONTHS, YOU WORRIED THAT YOUR FOOD WOULD RUN OUT BEFORE YOU GOT MONEY TO BUY MORE.: NEVER TRUE

## 2025-05-08 SDOH — ECONOMIC STABILITY: FOOD INSECURITY: WITHIN THE PAST 12 MONTHS, THE FOOD YOU BOUGHT JUST DIDN'T LAST AND YOU DIDN'T HAVE MONEY TO GET MORE.: NEVER TRUE

## 2025-05-08 ASSESSMENT — PATIENT HEALTH QUESTIONNAIRE - PHQ9
SUM OF ALL RESPONSES TO PHQ QUESTIONS 1-9: 0
1. LITTLE INTEREST OR PLEASURE IN DOING THINGS: NOT AT ALL
2. FEELING DOWN, DEPRESSED OR HOPELESS: NOT AT ALL
SUM OF ALL RESPONSES TO PHQ QUESTIONS 1-9: 0

## 2025-05-08 ASSESSMENT — ENCOUNTER SYMPTOMS
ABDOMINAL PAIN: 0
BLOOD IN STOOL: 0
CHEST TIGHTNESS: 0
SHORTNESS OF BREATH: 0

## 2025-05-08 NOTE — PROGRESS NOTES
Future  - AST; Future  - Basic Metabolic Panel; Future  - CBC with Auto Differential; Future  - Lipid Panel; Future  - Magnesium; Future  - Vitamin B12; Future  - Folate; Future  Stable.  Continue current management with Norvasc, Coreg and lisinopril.  3. Mixed hyperlipidemia    - ALT; Future  - AST; Future  - Basic Metabolic Panel; Future  - Lipid Panel; Future  Stable.  Continue current management with pravastatin  4. Vitamin D deficiency    - Basic Metabolic Panel; Future  - Vitamin D 25 Hydroxy; Future  Stable.  Continue current management with weekly vitamin D2  5. Gastroesophageal reflux disease, unspecified whether esophagitis present    - Basic Metabolic Panel; Future  - Magnesium; Future  - Vitamin B12; Future  - Folate; Future  Stable.  Continue current management with Protonix  6. Current use of proton pump inhibitor    - Basic Metabolic Panel; Future  - Magnesium; Future  - Vitamin B12; Future  - Folate; Future    7. Poor compliance with medication  Patient was urged to take his medications as prescribed.       Orders Placed This Encounter   Procedures    ALT     Standing Status:   Future     Expected Date:   5/8/2025     Expiration Date:   5/8/2026    AST     Standing Status:   Future     Expected Date:   5/8/2025     Expiration Date:   5/8/2026    Basic Metabolic Panel     Fasting     Standing Status:   Future     Expected Date:   5/8/2025     Expiration Date:   5/8/2026    CBC with Auto Differential     Standing Status:   Future     Expected Date:   5/8/2025     Expiration Date:   5/8/2026    Lipid Panel     Standing Status:   Future     Expected Date:   5/8/2025     Expiration Date:   5/8/2026    Magnesium     Standing Status:   Future     Expected Date:   5/8/2025     Expiration Date:   5/8/2026    Vitamin B12     Standing Status:   Future     Expected Date:   5/8/2025     Expiration Date:   5/8/2026    Folate     Standing Status:   Future     Expected Date:   5/8/2025     Expiration Date:

## 2025-06-09 DIAGNOSIS — E55.9 VITAMIN D DEFICIENCY: ICD-10-CM

## 2025-06-10 RX ORDER — ERGOCALCIFEROL 1.25 MG/1
CAPSULE, LIQUID FILLED ORAL
Qty: 15 CAPSULE | Refills: 2 | Status: SHIPPED | OUTPATIENT
Start: 2025-06-10

## 2025-06-10 RX ORDER — CARVEDILOL 6.25 MG/1
6.25 TABLET ORAL 2 TIMES DAILY WITH MEALS
Qty: 200 TABLET | Refills: 2 | Status: SHIPPED | OUTPATIENT
Start: 2025-06-10

## 2025-07-07 RX ORDER — PANTOPRAZOLE SODIUM 40 MG/1
40 TABLET, DELAYED RELEASE ORAL DAILY
Qty: 100 TABLET | Refills: 2 | Status: SHIPPED | OUTPATIENT
Start: 2025-07-07

## 2025-08-04 RX ORDER — AMLODIPINE BESYLATE 5 MG/1
5 TABLET ORAL DAILY
Qty: 100 TABLET | Refills: 2 | Status: SHIPPED | OUTPATIENT
Start: 2025-08-04

## 2025-08-04 RX ORDER — LISINOPRIL 5 MG/1
5 TABLET ORAL DAILY
Qty: 100 TABLET | Refills: 2 | Status: SHIPPED | OUTPATIENT
Start: 2025-08-04

## 2025-08-06 ENCOUNTER — OFFICE VISIT (OUTPATIENT)
Dept: FAMILY MEDICINE CLINIC | Age: 70
End: 2025-08-06

## 2025-08-06 VITALS
SYSTOLIC BLOOD PRESSURE: 124 MMHG | BODY MASS INDEX: 37.24 KG/M2 | RESPIRATION RATE: 20 BRPM | WEIGHT: 237.8 LBS | HEART RATE: 64 BPM | DIASTOLIC BLOOD PRESSURE: 66 MMHG

## 2025-08-06 DIAGNOSIS — M54.9 ACUTE ON CHRONIC BACK PAIN: Primary | ICD-10-CM

## 2025-08-06 DIAGNOSIS — G89.29 ACUTE ON CHRONIC BACK PAIN: Primary | ICD-10-CM

## 2025-08-06 DIAGNOSIS — M54.16 LUMBAR RADICULOPATHY: ICD-10-CM

## 2025-08-06 DIAGNOSIS — E11.9 NEWLY DIAGNOSED DIABETES (HCC): ICD-10-CM

## 2025-08-06 DIAGNOSIS — M48.061 SPINAL STENOSIS AT L4-L5 LEVEL: ICD-10-CM

## 2025-08-06 RX ORDER — KETOROLAC TROMETHAMINE 10 MG/1
10 TABLET, FILM COATED ORAL EVERY 6 HOURS PRN
Qty: 20 TABLET | Refills: 0 | Status: ON HOLD | OUTPATIENT
Start: 2025-08-06 | End: 2025-08-07

## 2025-08-06 RX ORDER — METHYLPREDNISOLONE 4 MG/1
TABLET ORAL
Qty: 1 KIT | Refills: 0 | Status: ON HOLD | OUTPATIENT
Start: 2025-08-06 | End: 2025-08-07

## 2025-08-06 RX ORDER — KETOROLAC TROMETHAMINE 30 MG/ML
30 INJECTION, SOLUTION INTRAMUSCULAR; INTRAVENOUS ONCE
Status: COMPLETED | OUTPATIENT
Start: 2025-08-06 | End: 2025-08-06

## 2025-08-06 RX ORDER — GLIMEPIRIDE 1 MG/1
1 TABLET ORAL
Qty: 100 TABLET | Refills: 1 | Status: SHIPPED | OUTPATIENT
Start: 2025-08-06

## 2025-08-06 RX ADMIN — KETOROLAC TROMETHAMINE 30 MG: 30 INJECTION, SOLUTION INTRAMUSCULAR; INTRAVENOUS at 17:46

## 2025-08-06 ASSESSMENT — PATIENT HEALTH QUESTIONNAIRE - PHQ9
SUM OF ALL RESPONSES TO PHQ QUESTIONS 1-9: 0
1. LITTLE INTEREST OR PLEASURE IN DOING THINGS: NOT AT ALL
SUM OF ALL RESPONSES TO PHQ QUESTIONS 1-9: 0
2. FEELING DOWN, DEPRESSED OR HOPELESS: NOT AT ALL

## 2025-08-06 ASSESSMENT — ENCOUNTER SYMPTOMS
BACK PAIN: 1
ABDOMINAL PAIN: 0
NAUSEA: 0
SHORTNESS OF BREATH: 0
COUGH: 0

## 2025-08-07 ENCOUNTER — HOSPITAL ENCOUNTER (OUTPATIENT)
Age: 70
Setting detail: OBSERVATION
Discharge: HOME | End: 2025-08-08
Attending: EMERGENCY MEDICINE | Admitting: FAMILY MEDICINE
Payer: MEDICARE

## 2025-08-07 ENCOUNTER — APPOINTMENT (OUTPATIENT)
Dept: MRI IMAGING | Age: 70
End: 2025-08-07
Payer: MEDICARE

## 2025-08-07 DIAGNOSIS — M54.9 ACUTE ON CHRONIC BACK PAIN: ICD-10-CM

## 2025-08-07 DIAGNOSIS — M54.31 SCIATICA OF RIGHT SIDE: Primary | ICD-10-CM

## 2025-08-07 DIAGNOSIS — M54.9 INTRACTABLE BACK PAIN: ICD-10-CM

## 2025-08-07 DIAGNOSIS — G89.29 ACUTE ON CHRONIC BACK PAIN: ICD-10-CM

## 2025-08-07 LAB
ANION GAP SERPL CALCULATED.3IONS-SCNC: 13 MMOL/L (ref 9–16)
BASOPHILS # BLD: <0.03 K/UL (ref 0–0.2)
BASOPHILS NFR BLD: 0 % (ref 0–2)
BUN SERPL-MCNC: 14 MG/DL (ref 8–23)
CALCIUM SERPL-MCNC: 8.9 MG/DL (ref 8.6–10.4)
CHLORIDE SERPL-SCNC: 103 MMOL/L (ref 98–107)
CO2 SERPL-SCNC: 21 MMOL/L (ref 20–31)
CREAT SERPL-MCNC: 0.9 MG/DL (ref 0.7–1.2)
EOSINOPHIL # BLD: <0.03 K/UL (ref 0–0.44)
EOSINOPHILS RELATIVE PERCENT: 0 % (ref 0–4)
ERYTHROCYTE [DISTWIDTH] IN BLOOD BY AUTOMATED COUNT: 12.7 % (ref 11.5–14.9)
GFR, ESTIMATED: >90 ML/MIN/1.73M2
GLUCOSE SERPL-MCNC: 166 MG/DL (ref 74–99)
HCT VFR BLD AUTO: 44.7 % (ref 41–53)
HGB BLD-MCNC: 14.7 G/DL (ref 13.5–17.5)
IMM GRANULOCYTES # BLD AUTO: 0.04 K/UL (ref 0–0.3)
IMM GRANULOCYTES NFR BLD: 0 %
LYMPHOCYTES NFR BLD: 1.23 K/UL (ref 1.1–3.7)
LYMPHOCYTES RELATIVE PERCENT: 13 % (ref 24–44)
MCH RBC QN AUTO: 32 PG (ref 26–34)
MCHC RBC AUTO-ENTMCNC: 32.9 G/DL (ref 31–37)
MCV RBC AUTO: 97.2 FL (ref 80–100)
MONOCYTES NFR BLD: 0.38 K/UL (ref 0.1–1.2)
MONOCYTES NFR BLD: 4 % (ref 3–12)
NEUTROPHILS NFR BLD: 83 % (ref 36–66)
NEUTS SEG NFR BLD: 7.53 K/UL (ref 1.5–8.1)
NRBC BLD-RTO: 0 PER 100 WBC
PLATELET # BLD AUTO: 175 K/UL (ref 150–450)
PMV BLD AUTO: 11.4 FL (ref 8–13.5)
POTASSIUM SERPL-SCNC: 4.4 MMOL/L (ref 3.7–5.3)
RBC # BLD AUTO: 4.6 M/UL (ref 4.21–5.77)
SODIUM SERPL-SCNC: 137 MMOL/L (ref 136–145)
WBC OTHER # BLD: 9.2 K/UL (ref 3.5–11)

## 2025-08-07 PROCEDURE — 36415 COLL VENOUS BLD VENIPUNCTURE: CPT

## 2025-08-07 PROCEDURE — 96374 THER/PROPH/DIAG INJ IV PUSH: CPT

## 2025-08-07 PROCEDURE — 6360000002 HC RX W HCPCS: Performed by: FAMILY MEDICINE

## 2025-08-07 PROCEDURE — 80048 BASIC METABOLIC PNL TOTAL CA: CPT

## 2025-08-07 PROCEDURE — 72148 MRI LUMBAR SPINE W/O DYE: CPT

## 2025-08-07 PROCEDURE — 6360000002 HC RX W HCPCS: Performed by: EMERGENCY MEDICINE

## 2025-08-07 PROCEDURE — 2500000003 HC RX 250 WO HCPCS: Performed by: FAMILY MEDICINE

## 2025-08-07 PROCEDURE — 85025 COMPLETE CBC W/AUTO DIFF WBC: CPT

## 2025-08-07 PROCEDURE — 99285 EMERGENCY DEPT VISIT HI MDM: CPT

## 2025-08-07 PROCEDURE — G0378 HOSPITAL OBSERVATION PER HR: HCPCS

## 2025-08-07 PROCEDURE — 96376 TX/PRO/DX INJ SAME DRUG ADON: CPT

## 2025-08-07 PROCEDURE — 6370000000 HC RX 637 (ALT 250 FOR IP): Performed by: FAMILY MEDICINE

## 2025-08-07 RX ORDER — MORPHINE SULFATE 2 MG/ML
2 INJECTION, SOLUTION INTRAMUSCULAR; INTRAVENOUS ONCE
Status: COMPLETED | OUTPATIENT
Start: 2025-08-07 | End: 2025-08-07

## 2025-08-07 RX ORDER — ONDANSETRON 4 MG/1
4 TABLET, ORALLY DISINTEGRATING ORAL EVERY 8 HOURS PRN
Status: DISCONTINUED | OUTPATIENT
Start: 2025-08-07 | End: 2025-08-08 | Stop reason: HOSPADM

## 2025-08-07 RX ORDER — SODIUM CHLORIDE 0.9 % (FLUSH) 0.9 %
5-40 SYRINGE (ML) INJECTION PRN
Status: DISCONTINUED | OUTPATIENT
Start: 2025-08-07 | End: 2025-08-08 | Stop reason: HOSPADM

## 2025-08-07 RX ORDER — HYDROCODONE BITARTRATE AND ACETAMINOPHEN 5; 325 MG/1; MG/1
1 TABLET ORAL EVERY 6 HOURS PRN
Refills: 0 | Status: DISCONTINUED | OUTPATIENT
Start: 2025-08-07 | End: 2025-08-08

## 2025-08-07 RX ORDER — POTASSIUM CHLORIDE 1500 MG/1
40 TABLET, EXTENDED RELEASE ORAL PRN
Status: DISCONTINUED | OUTPATIENT
Start: 2025-08-07 | End: 2025-08-08 | Stop reason: HOSPADM

## 2025-08-07 RX ORDER — MORPHINE SULFATE 2 MG/ML
2 INJECTION, SOLUTION INTRAMUSCULAR; INTRAVENOUS ONCE
Refills: 0 | Status: COMPLETED | OUTPATIENT
Start: 2025-08-07 | End: 2025-08-07

## 2025-08-07 RX ORDER — CARVEDILOL 6.25 MG/1
6.25 TABLET ORAL 2 TIMES DAILY WITH MEALS
Status: DISCONTINUED | OUTPATIENT
Start: 2025-08-07 | End: 2025-08-08 | Stop reason: HOSPADM

## 2025-08-07 RX ORDER — POTASSIUM CHLORIDE 7.45 MG/ML
10 INJECTION INTRAVENOUS PRN
Status: DISCONTINUED | OUTPATIENT
Start: 2025-08-07 | End: 2025-08-08 | Stop reason: HOSPADM

## 2025-08-07 RX ORDER — SODIUM CHLORIDE 9 MG/ML
INJECTION, SOLUTION INTRAVENOUS PRN
Status: DISCONTINUED | OUTPATIENT
Start: 2025-08-07 | End: 2025-08-08 | Stop reason: HOSPADM

## 2025-08-07 RX ORDER — MAGNESIUM SULFATE HEPTAHYDRATE 40 MG/ML
2000 INJECTION, SOLUTION INTRAVENOUS PRN
Status: DISCONTINUED | OUTPATIENT
Start: 2025-08-07 | End: 2025-08-08 | Stop reason: HOSPADM

## 2025-08-07 RX ORDER — PANTOPRAZOLE SODIUM 40 MG/1
40 TABLET, DELAYED RELEASE ORAL DAILY
Status: DISCONTINUED | OUTPATIENT
Start: 2025-08-07 | End: 2025-08-08 | Stop reason: HOSPADM

## 2025-08-07 RX ORDER — ONDANSETRON 2 MG/ML
4 INJECTION INTRAMUSCULAR; INTRAVENOUS EVERY 6 HOURS PRN
Status: DISCONTINUED | OUTPATIENT
Start: 2025-08-07 | End: 2025-08-08 | Stop reason: HOSPADM

## 2025-08-07 RX ORDER — POLYETHYLENE GLYCOL 3350 17 G/17G
17 POWDER, FOR SOLUTION ORAL DAILY PRN
Status: DISCONTINUED | OUTPATIENT
Start: 2025-08-07 | End: 2025-08-08 | Stop reason: HOSPADM

## 2025-08-07 RX ORDER — ENOXAPARIN SODIUM 100 MG/ML
30 INJECTION SUBCUTANEOUS 2 TIMES DAILY
Status: DISCONTINUED | OUTPATIENT
Start: 2025-08-07 | End: 2025-08-08 | Stop reason: HOSPADM

## 2025-08-07 RX ORDER — SODIUM CHLORIDE 0.9 % (FLUSH) 0.9 %
5-40 SYRINGE (ML) INJECTION EVERY 12 HOURS SCHEDULED
Status: DISCONTINUED | OUTPATIENT
Start: 2025-08-07 | End: 2025-08-08 | Stop reason: HOSPADM

## 2025-08-07 RX ORDER — ACETAMINOPHEN 325 MG/1
650 TABLET ORAL EVERY 6 HOURS PRN
Status: DISCONTINUED | OUTPATIENT
Start: 2025-08-07 | End: 2025-08-08 | Stop reason: HOSPADM

## 2025-08-07 RX ORDER — AMLODIPINE BESYLATE 5 MG/1
5 TABLET ORAL DAILY
Status: DISCONTINUED | OUTPATIENT
Start: 2025-08-07 | End: 2025-08-08 | Stop reason: HOSPADM

## 2025-08-07 RX ORDER — ACETAMINOPHEN 650 MG/1
650 SUPPOSITORY RECTAL EVERY 6 HOURS PRN
Status: DISCONTINUED | OUTPATIENT
Start: 2025-08-07 | End: 2025-08-08 | Stop reason: HOSPADM

## 2025-08-07 RX ORDER — LISINOPRIL 5 MG/1
5 TABLET ORAL DAILY
Status: DISCONTINUED | OUTPATIENT
Start: 2025-08-07 | End: 2025-08-08 | Stop reason: HOSPADM

## 2025-08-07 RX ADMIN — SODIUM CHLORIDE, PRESERVATIVE FREE 10 ML: 5 INJECTION INTRAVENOUS at 21:06

## 2025-08-07 RX ADMIN — MORPHINE SULFATE 2 MG: 2 INJECTION, SOLUTION INTRAMUSCULAR; INTRAVENOUS at 17:55

## 2025-08-07 RX ADMIN — AMLODIPINE BESYLATE 5 MG: 5 TABLET ORAL at 21:04

## 2025-08-07 RX ADMIN — CARVEDILOL 6.25 MG: 6.25 TABLET, FILM COATED ORAL at 21:04

## 2025-08-07 RX ADMIN — MORPHINE SULFATE 2 MG: 2 INJECTION, SOLUTION INTRAMUSCULAR; INTRAVENOUS at 09:09

## 2025-08-07 RX ADMIN — HYDROCODONE BITARTRATE AND ACETAMINOPHEN 1 TABLET: 5; 325 TABLET ORAL at 21:04

## 2025-08-07 RX ADMIN — MORPHINE SULFATE 2 MG: 2 INJECTION, SOLUTION INTRAMUSCULAR; INTRAVENOUS at 10:13

## 2025-08-07 RX ADMIN — MORPHINE SULFATE 2 MG: 2 INJECTION, SOLUTION INTRAMUSCULAR; INTRAVENOUS at 11:48

## 2025-08-07 RX ADMIN — LISINOPRIL 5 MG: 5 TABLET ORAL at 21:04

## 2025-08-07 ASSESSMENT — PAIN SCALES - GENERAL
PAINLEVEL_OUTOF10: 6
PAINLEVEL_OUTOF10: 8
PAINLEVEL_OUTOF10: 10
PAINLEVEL_OUTOF10: 9
PAINLEVEL_OUTOF10: 10
PAINLEVEL_OUTOF10: 1
PAINLEVEL_OUTOF10: 8
PAINLEVEL_OUTOF10: 10

## 2025-08-07 ASSESSMENT — PAIN DESCRIPTION - LOCATION
LOCATION: BACK
LOCATION: HIP
LOCATION: BACK
LOCATION: BACK

## 2025-08-07 ASSESSMENT — ENCOUNTER SYMPTOMS
SHORTNESS OF BREATH: 0
DIARRHEA: 0
ABDOMINAL PAIN: 0
COLOR CHANGE: 0
NAUSEA: 0
VOMITING: 0
COUGH: 0
TROUBLE SWALLOWING: 0
PHOTOPHOBIA: 0
BACK PAIN: 1

## 2025-08-07 ASSESSMENT — PAIN DESCRIPTION - ORIENTATION
ORIENTATION: LOWER
ORIENTATION: RIGHT

## 2025-08-07 ASSESSMENT — PAIN DESCRIPTION - DESCRIPTORS
DESCRIPTORS: THROBBING
DESCRIPTORS: SHOOTING
DESCRIPTORS: SHARP
DESCRIPTORS: SHOOTING

## 2025-08-07 ASSESSMENT — PAIN DESCRIPTION - PAIN TYPE: TYPE: ACUTE PAIN

## 2025-08-08 VITALS
TEMPERATURE: 98.4 F | SYSTOLIC BLOOD PRESSURE: 151 MMHG | RESPIRATION RATE: 18 BRPM | HEIGHT: 67 IN | DIASTOLIC BLOOD PRESSURE: 80 MMHG | OXYGEN SATURATION: 95 % | HEART RATE: 62 BPM | WEIGHT: 244 LBS | BODY MASS INDEX: 38.3 KG/M2

## 2025-08-08 PROCEDURE — G0378 HOSPITAL OBSERVATION PER HR: HCPCS

## 2025-08-08 PROCEDURE — 6370000000 HC RX 637 (ALT 250 FOR IP): Performed by: FAMILY MEDICINE

## 2025-08-08 PROCEDURE — 2500000003 HC RX 250 WO HCPCS: Performed by: FAMILY MEDICINE

## 2025-08-08 PROCEDURE — 97161 PT EVAL LOW COMPLEX 20 MIN: CPT

## 2025-08-08 PROCEDURE — 97530 THERAPEUTIC ACTIVITIES: CPT

## 2025-08-08 PROCEDURE — 97165 OT EVAL LOW COMPLEX 30 MIN: CPT

## 2025-08-08 PROCEDURE — 97116 GAIT TRAINING THERAPY: CPT

## 2025-08-08 RX ORDER — OXYCODONE AND ACETAMINOPHEN 5; 325 MG/1; MG/1
1 TABLET ORAL EVERY 8 HOURS PRN
Qty: 15 TABLET | Refills: 0 | Status: SHIPPED | OUTPATIENT
Start: 2025-08-08 | End: 2025-08-14

## 2025-08-08 RX ORDER — OXYCODONE AND ACETAMINOPHEN 5; 325 MG/1; MG/1
1 TABLET ORAL EVERY 6 HOURS PRN
Refills: 0 | Status: DISCONTINUED | OUTPATIENT
Start: 2025-08-08 | End: 2025-08-08 | Stop reason: HOSPADM

## 2025-08-08 RX ADMIN — LISINOPRIL 5 MG: 5 TABLET ORAL at 07:33

## 2025-08-08 RX ADMIN — ACETAMINOPHEN 650 MG: 325 TABLET ORAL at 03:26

## 2025-08-08 RX ADMIN — AMLODIPINE BESYLATE 5 MG: 5 TABLET ORAL at 07:33

## 2025-08-08 RX ADMIN — OXYCODONE HYDROCHLORIDE AND ACETAMINOPHEN 1 TABLET: 5; 325 TABLET ORAL at 09:39

## 2025-08-08 RX ADMIN — PANTOPRAZOLE SODIUM 40 MG: 40 TABLET, DELAYED RELEASE ORAL at 07:33

## 2025-08-08 RX ADMIN — CARVEDILOL 6.25 MG: 6.25 TABLET, FILM COATED ORAL at 07:33

## 2025-08-08 RX ADMIN — SODIUM CHLORIDE, PRESERVATIVE FREE 10 ML: 5 INJECTION INTRAVENOUS at 07:34

## 2025-08-08 ASSESSMENT — PAIN DESCRIPTION - ORIENTATION: ORIENTATION: RIGHT

## 2025-08-08 ASSESSMENT — PAIN DESCRIPTION - DESCRIPTORS: DESCRIPTORS: ACHING;SHARP

## 2025-08-08 ASSESSMENT — PAIN SCALES - GENERAL
PAINLEVEL_OUTOF10: 2
PAINLEVEL_OUTOF10: 7
PAINLEVEL_OUTOF10: 8

## 2025-08-08 ASSESSMENT — PAIN DESCRIPTION - LOCATION: LOCATION: HIP

## 2025-08-11 ENCOUNTER — ENROLLMENT (OUTPATIENT)
Dept: CARE COORDINATION | Age: 70
End: 2025-08-11

## 2025-08-11 ENCOUNTER — CARE COORDINATION (OUTPATIENT)
Dept: CARE COORDINATION | Age: 70
End: 2025-08-11

## 2025-08-12 ENCOUNTER — CARE COORDINATION (OUTPATIENT)
Dept: CARE COORDINATION | Age: 70
End: 2025-08-12

## 2025-08-14 ENCOUNTER — OFFICE VISIT (OUTPATIENT)
Dept: FAMILY MEDICINE CLINIC | Age: 70
End: 2025-08-14

## 2025-08-14 VITALS
RESPIRATION RATE: 14 BRPM | TEMPERATURE: 98.4 F | BODY MASS INDEX: 36.65 KG/M2 | SYSTOLIC BLOOD PRESSURE: 120 MMHG | WEIGHT: 234 LBS | OXYGEN SATURATION: 96 % | DIASTOLIC BLOOD PRESSURE: 60 MMHG | HEART RATE: 68 BPM

## 2025-08-14 DIAGNOSIS — Z09 HOSPITAL DISCHARGE FOLLOW-UP: ICD-10-CM

## 2025-08-14 DIAGNOSIS — G89.29 ACUTE ON CHRONIC BACK PAIN: Primary | ICD-10-CM

## 2025-08-14 DIAGNOSIS — M54.9 ACUTE ON CHRONIC BACK PAIN: Primary | ICD-10-CM

## 2025-08-14 DIAGNOSIS — M48.061 SPINAL STENOSIS AT L4-L5 LEVEL: ICD-10-CM

## 2025-08-14 DIAGNOSIS — R10.31 RIGHT GROIN PAIN: ICD-10-CM

## 2025-08-14 DIAGNOSIS — M54.16 LUMBAR RADICULOPATHY: ICD-10-CM

## 2025-08-14 ASSESSMENT — PATIENT HEALTH QUESTIONNAIRE - PHQ9
1. LITTLE INTEREST OR PLEASURE IN DOING THINGS: NOT AT ALL
SUM OF ALL RESPONSES TO PHQ QUESTIONS 1-9: 0
2. FEELING DOWN, DEPRESSED OR HOPELESS: NOT AT ALL
SUM OF ALL RESPONSES TO PHQ QUESTIONS 1-9: 0

## 2025-08-18 ENCOUNTER — HOSPITAL ENCOUNTER (OUTPATIENT)
Dept: ULTRASOUND IMAGING | Age: 70
Discharge: HOME | End: 2025-08-20
Payer: MEDICARE

## 2025-08-18 DIAGNOSIS — R10.31 RIGHT GROIN PAIN: ICD-10-CM

## 2025-08-18 PROCEDURE — 76857 US EXAM PELVIC LIMITED: CPT

## 2025-08-19 ENCOUNTER — CARE COORDINATION (OUTPATIENT)
Dept: CARE COORDINATION | Age: 70
End: 2025-08-19

## 2025-08-27 ENCOUNTER — CARE COORDINATION (OUTPATIENT)
Dept: CARE COORDINATION | Age: 70
End: 2025-08-27

## 2025-08-28 ENCOUNTER — CARE COORDINATION (OUTPATIENT)
Dept: CARE COORDINATION | Age: 70
End: 2025-08-28

## 2025-09-04 ENCOUNTER — HOSPITAL ENCOUNTER (OUTPATIENT)
Dept: PAIN MANAGEMENT | Age: 70
Discharge: HOME | End: 2025-09-04
Payer: MEDICARE

## 2025-09-04 VITALS — BODY MASS INDEX: 36.73 KG/M2 | HEIGHT: 67 IN | WEIGHT: 234 LBS

## 2025-09-04 DIAGNOSIS — M54.16 RIGHT LUMBAR RADICULITIS: Primary | ICD-10-CM

## 2025-09-04 DIAGNOSIS — M47.817 LUMBOSACRAL SPONDYLOSIS WITHOUT MYELOPATHY: ICD-10-CM

## 2025-09-04 DIAGNOSIS — M48.061 LUMBAR FORAMINAL STENOSIS: ICD-10-CM

## 2025-09-04 PROCEDURE — 99203 OFFICE O/P NEW LOW 30 MIN: CPT

## 2025-09-04 PROCEDURE — 99204 OFFICE O/P NEW MOD 45 MIN: CPT | Performed by: ANESTHESIOLOGY

## 2025-09-04 ASSESSMENT — ENCOUNTER SYMPTOMS
BACK PAIN: 1
GASTROINTESTINAL NEGATIVE: 1
RESPIRATORY NEGATIVE: 1

## 2025-09-04 ASSESSMENT — PAIN SCALES - GENERAL: PAINLEVEL_OUTOF10: 6

## (undated) DEVICE — COVER,C-ARM,41X74: Brand: MEDLINE

## (undated) DEVICE — DRESSING TRNSPAR W4XL10IN FLM MIC POR SURESITE 123

## (undated) DEVICE — SEALER ENDOSCP NANO COAT OPN DIV CRV L JAW LIGASURE IMPACT

## (undated) DEVICE — SUTURE PDS + SZ 0 L36IN ABSRB VLT CT 1 L36MM 1 2 CIR PDP346H

## (undated) DEVICE — SINGLE PORT MANIFOLD: Brand: NEPTUNE 2

## (undated) DEVICE — BARRIER, ABSORBABLE, ADHESION: Brand: SEPRAFILM®

## (undated) DEVICE — SPONGE LAP W18XL18IN WHT COT 4 PLY FLD STRUNG RADPQ DISP ST

## (undated) DEVICE — PAD,NON-ADHERENT,3X8,STERILE,LF,1/PK: Brand: MEDLINE

## (undated) DEVICE — COVER,TABLE,60X90,STERILE: Brand: MEDLINE

## (undated) DEVICE — SOLUTION IV IRRIG POUR BRL 0.9% SODIUM CHL 2F7124

## (undated) DEVICE — SPONGE DRN W4XL4IN RAYON/POLYESTER 6 PLY NONWOVEN PRECUT

## (undated) DEVICE — RETRIEVAL BALLOON CATHETER: Brand: EXTRACTOR™ PRO RX

## (undated) DEVICE — FORCEPS BX L240CM WRK CHN 2.8MM STD CAP W/ NDL MIC MESH

## (undated) DEVICE — RELOAD STPL L75MM OPN H3.8MM CLS 1.5MM WIRE DIA0.2MM REG

## (undated) DEVICE — TUBING, SUCTION, 3/16" X 10', STRAIGHT: Brand: MEDLINE

## (undated) DEVICE — Z INACTIVE USE 2653177 SPONGE GZ W2XL2IN NONWOVEN 4 PLY FASTER WICKING ABIL AVANT

## (undated) DEVICE — SUTURE PDS II SZ 0 L60IN ABSRB VLT L48MM CTX 1/2 CIR Z990G

## (undated) DEVICE — SUTURE PDS II SZ 0 L27IN ABSRB VLT UR-6 L26MM 1/2 CIR D7185

## (undated) DEVICE — GOWN,AURORA,NONREINFORCED,LARGE: Brand: MEDLINE

## (undated) DEVICE — GLOVE ORANGE PI 7 1/2   MSG9075

## (undated) DEVICE — MARKER,SKIN,WI/RULER AND LABELS: Brand: MEDLINE

## (undated) DEVICE — SUTURE PERMAHAND SZ 0 L30IN NONABSORBABLE BLK FSL L30MM 3/8 680H

## (undated) DEVICE — KIT DRN FLAT W/ 100CC EVAC 10MM FULL PERF

## (undated) DEVICE — PMI DISPOSABLE CHOLANGIOGRAPH CATHETER 7", FOR TRADITIONAL OPEN PROCEDURE: Brand: PMI

## (undated) DEVICE — STRAP,POSITIONING,KNEE/BODY,FOAM,4X60": Brand: MEDLINE

## (undated) DEVICE — SUTURE PDS + SZ 4 0 L27IN ABSRB VLT L26MM SH 1 2 CIR PDP315H

## (undated) DEVICE — STAPLER INT L75MM CUT LN L73MM STPL LN L77MM BLU B FRM 8

## (undated) DEVICE — BITEBLOCK 54FR W/ DENT RIM BLOX

## (undated) DEVICE — Device

## (undated) DEVICE — JELLY,LUBE,STERILE,FLIP TOP,TUBE,2-OZ: Brand: MEDLINE

## (undated) DEVICE — BLADELESS OBTURATOR: Brand: WECK VISTA

## (undated) DEVICE — TRIPLE LUMEN NEEDLE KNIFE: Brand: RX NEEDLE KNIFE XL

## (undated) DEVICE — RELOAD STPL L45MM VASCULAR/MEDIUM TISS TAN CRV TIP ARTC

## (undated) DEVICE — SYRINGE IRRIG 60ML SFT PLIABLE BLB EZ TO GRP 1 HND USE W/

## (undated) DEVICE — GLOVE ORANGE PI 7   MSG9070

## (undated) DEVICE — INTENDED FOR TISSUE SEPARATION, AND OTHER PROCEDURES THAT REQUIRE A SHARP SURGICAL BLADE TO PUNCTURE OR CUT.: Brand: BARD-PARKER ® CARBON RIB-BACK BLADES

## (undated) DEVICE — POLYP TRAP: Brand: TRAPEASE®

## (undated) DEVICE — CLIP LIG L235CM RESOL 360 BX/20

## (undated) DEVICE — STAPLER INT L60MM REG TISS BLU B FRM 8 FIRING 2 ROW AUTO

## (undated) DEVICE — SNARE ENDOSCP L240CM LOOP W13MM DIA2.4MM SHT THROW SM OVL

## (undated) DEVICE — COVER,MAYO STAND,XL,STERILE: Brand: MEDLINE

## (undated) DEVICE — ENDO KIT W/SYRINGE: Brand: MEDLINE INDUSTRIES, INC.

## (undated) DEVICE — Device: Brand: SPOT EX ENDOSCOPIC TATTOO

## (undated) DEVICE — SPHINCTEROTOME: Brand: DREAMTOME™ RX 44

## (undated) DEVICE — SOLUTION IV IRRIG WATER 1000ML POUR BRL 2F7114

## (undated) DEVICE — STRAP POS MP 30X3 IN HK LOOP CLOSURE FOAM DISP

## (undated) DEVICE — 35 ML SYRINGE LUER-LOCK TIP: Brand: MONOJECT

## (undated) DEVICE — ADHESIVE SKIN CLOSURE TOP 36 CC HI VISC DERMBND MINI

## (undated) DEVICE — GLOVE ORTHO 7 1/2   MSG9475

## (undated) DEVICE — SET CHOLANGIOGRAPHY 4FR L60CM W/ ARW KARLAN BLLN CATH CRV

## (undated) DEVICE — ST CHARLES MAJOR ABDOMINAL PK: Brand: MEDLINE INDUSTRIES, INC.

## (undated) DEVICE — SYRINGE MED 50ML LUERSLIP TIP

## (undated) DEVICE — GOWN,POLY REINFORCED,LG: Brand: MEDLINE

## (undated) DEVICE — RELOAD STPL L100MM OPN H3.8MM CLS H1.5MM WIRE DIA0.2MM BLU

## (undated) DEVICE — YANKAUER,POOLE TIP,STERILE,50/CS: Brand: MEDLINE

## (undated) DEVICE — SUTURE PERMAHAND SZ 3-0 L18IN NONABSORBABLE BLK L26MM SH C013D

## (undated) DEVICE — DEFENDO AIR WATER SUCTION AND BIOPSY VALVE KIT FOR  OLYMPUS: Brand: DEFENDO AIR/WATER/SUCTION AND BIOPSY VALVE

## (undated) DEVICE — ERBE NESSY® OMEGA PLATE USA (85+23)CM² , WITH CABLE 3 M: Brand: ERBE

## (undated) DEVICE — APPLIER CLP L L13IN TI MULT RNG HNDL 20 CLP STR LIGACLP

## (undated) DEVICE — DRAPE IRRIG FLD WRM W44XL44IN W/ AORN STD PRTBL INTRATEMP

## (undated) DEVICE — GLOVE ORTHO 8   MSG9480

## (undated) DEVICE — STAPLER INT 12MM 60MM CART SHT NEW KNF BLDE W/ EVERY FIRING

## (undated) DEVICE — 450 ML BOTTLE OF 0.05% CHLORHEXIDINE GLUCONATE IN 99.95% STERILE WATER FOR IRRIGATION, USP AND APPLICATOR.: Brand: IRRISEPT ANTIMICROBIAL WOUND LAVAGE

## (undated) DEVICE — MERCY HEALTH ST CHARLES: Brand: MEDLINE INDUSTRIES, INC.

## (undated) DEVICE — FORCEPS BX L240CM JAW DIA2.2MM RAD JAW 4 HOT DISP

## (undated) DEVICE — Z DISCONTINUED GLOVE SURG SZ 7.5 L12IN FNGR THK13MIL WHT ISOLEX

## (undated) DEVICE — SUTURE PDS + SZ 1 L36IN ABSRB VLT L40MM CT 1/2 CIR PDP359T

## (undated) DEVICE — ST CHARLES GEN LAPAROSCOPY PK: Brand: MEDLINE INDUSTRIES, INC.

## (undated) DEVICE — BINDER ABD 2XL W9IN CIRC 62 73IN 3 PNL E HK AND LOOP CLSR W

## (undated) DEVICE — BLANKET WRM W29.9XL79.1IN UP BODY FORC AIR MISTRAL-AIR

## (undated) DEVICE — SNARE ENDOSCP AD SM L240CM LOOP W1.3CM SHTH DIA2.4MM POLYP

## (undated) DEVICE — APPLIER LIG CLP M L11IN TI STR RNG HNDL FOR 20 CLP DISP